# Patient Record
Sex: FEMALE | Race: WHITE | Employment: OTHER | URBAN - METROPOLITAN AREA
[De-identification: names, ages, dates, MRNs, and addresses within clinical notes are randomized per-mention and may not be internally consistent; named-entity substitution may affect disease eponyms.]

---

## 2017-01-22 ENCOUNTER — HOSPITAL ENCOUNTER (OUTPATIENT)
Age: 67
Discharge: HOME | End: 2017-01-22
Attending: FAMILY MEDICINE
Payer: MEDICARE

## 2017-01-22 VITALS
HEART RATE: 69 BPM | RESPIRATION RATE: 20 BRPM | DIASTOLIC BLOOD PRESSURE: 73 MMHG | HEIGHT: 64 IN | BODY MASS INDEX: 25.61 KG/M2 | OXYGEN SATURATION: 100 % | TEMPERATURE: 98 F | WEIGHT: 150 LBS | SYSTOLIC BLOOD PRESSURE: 142 MMHG

## 2017-01-22 DIAGNOSIS — I88.9 CERVICAL ADENITIS: ICD-10-CM

## 2017-01-22 DIAGNOSIS — J03.00 STREPTOCOCCAL TONSILLOPHARYNGITIS: Primary | ICD-10-CM

## 2017-01-22 LAB — POCT RAPID STREP: POSITIVE

## 2017-01-22 PROCEDURE — 87430 STREP A AG IA: CPT | Performed by: FAMILY MEDICINE

## 2017-01-22 PROCEDURE — 99213 OFFICE O/P EST LOW 20 MIN: CPT

## 2017-01-22 RX ORDER — AMOXICILLIN AND CLAVULANATE POTASSIUM 875; 125 MG/1; MG/1
1 TABLET, FILM COATED ORAL 2 TIMES DAILY
Qty: 20 TABLET | Refills: 0 | Status: SHIPPED | OUTPATIENT
Start: 2017-01-22 | End: 2017-02-01

## 2017-01-22 NOTE — ED INITIAL ASSESSMENT (HPI)
Pt states woke up with pain to left side of neck. Thinks it is a swollen gland. Hurts on left side with swallowing. Denies fever/chills. States had some nasal congestion yesterday and took Advil Cold and Sinus.

## 2017-06-17 ENCOUNTER — OFFICE VISIT (OUTPATIENT)
Dept: INTERNAL MEDICINE CLINIC | Facility: CLINIC | Age: 67
End: 2017-06-17

## 2017-06-17 VITALS
HEART RATE: 70 BPM | WEIGHT: 157 LBS | TEMPERATURE: 98 F | DIASTOLIC BLOOD PRESSURE: 76 MMHG | BODY MASS INDEX: 26.8 KG/M2 | HEIGHT: 64 IN | SYSTOLIC BLOOD PRESSURE: 112 MMHG | RESPIRATION RATE: 16 BRPM

## 2017-06-17 DIAGNOSIS — Z12.31 VISIT FOR SCREENING MAMMOGRAM: ICD-10-CM

## 2017-06-17 DIAGNOSIS — Z00.00 ENCOUNTER FOR ANNUAL HEALTH EXAMINATION: ICD-10-CM

## 2017-06-17 DIAGNOSIS — Z11.59 ENCOUNTER FOR HEPATITIS C SCREENING TEST FOR LOW RISK PATIENT: ICD-10-CM

## 2017-06-17 DIAGNOSIS — Z00.00 MEDICARE ANNUAL WELLNESS VISIT, SUBSEQUENT: Primary | ICD-10-CM

## 2017-06-17 DIAGNOSIS — I10 ESSENTIAL HYPERTENSION: ICD-10-CM

## 2017-06-17 DIAGNOSIS — K63.5 POLYP OF COLON, UNSPECIFIED PART OF COLON, UNSPECIFIED TYPE: ICD-10-CM

## 2017-06-17 DIAGNOSIS — Z85.3 HISTORY OF BREAST CANCER: ICD-10-CM

## 2017-06-17 DIAGNOSIS — E78.00 HIGH CHOLESTEROL: ICD-10-CM

## 2017-06-17 DIAGNOSIS — Z87.19 HISTORY OF DIVERTICULITIS: ICD-10-CM

## 2017-06-17 DIAGNOSIS — E04.2 MULTIPLE THYROID NODULES: ICD-10-CM

## 2017-06-17 DIAGNOSIS — Z78.0 POST-MENOPAUSAL: ICD-10-CM

## 2017-06-17 PROCEDURE — 99203 OFFICE O/P NEW LOW 30 MIN: CPT | Performed by: INTERNAL MEDICINE

## 2017-06-17 PROCEDURE — G0439 PPPS, SUBSEQ VISIT: HCPCS | Performed by: INTERNAL MEDICINE

## 2017-06-17 RX ORDER — ATENOLOL 50 MG/1
50 TABLET ORAL DAILY
Qty: 90 TABLET | Refills: 3 | Status: SHIPPED | OUTPATIENT
Start: 2017-06-17 | End: 2018-05-09

## 2017-06-17 RX ORDER — TRIAMTERENE AND HYDROCHLOROTHIAZIDE 37.5; 25 MG/1; MG/1
1 CAPSULE ORAL EVERY MORNING
Qty: 90 CAPSULE | Refills: 3 | Status: SHIPPED | OUTPATIENT
Start: 2017-06-17 | End: 2018-05-09

## 2017-06-17 NOTE — PATIENT INSTRUCTIONS
Emily Velasquez's SCREENING SCHEDULE   Tests on this list are recommended by your physician but may not be covered, or covered at this frequency, by your insurer. Please check with your insurance carrier before scheduling to verify coverage.    PREVENTATI Years due on 09/07/2021 Update Health Maintenance if applicable    Flex Sigmoidoscopy Screen  Covered every 5 years No results found for this or any previous visit. No flowsheet data found.      Fecal Occult Blood   Covered Annually No results found for: FO previous visit. Please get once after your 65th birthday    Hepatitis B for Moderate/High Risk       No orders found for this or any previous visit.  Medium/high risk factors:   End-stage renal disease   Hemophiliacs who received Factor VIII or IX concentra

## 2017-07-17 ENCOUNTER — OFFICE VISIT (OUTPATIENT)
Dept: INTERNAL MEDICINE CLINIC | Facility: CLINIC | Age: 67
End: 2017-07-17

## 2017-07-17 VITALS
WEIGHT: 155 LBS | HEART RATE: 60 BPM | TEMPERATURE: 98 F | RESPIRATION RATE: 96 BRPM | BODY MASS INDEX: 27 KG/M2 | DIASTOLIC BLOOD PRESSURE: 80 MMHG | SYSTOLIC BLOOD PRESSURE: 128 MMHG

## 2017-07-17 DIAGNOSIS — R53.83 FATIGUE, UNSPECIFIED TYPE: ICD-10-CM

## 2017-07-17 DIAGNOSIS — J02.9 SORE THROAT: Primary | ICD-10-CM

## 2017-07-17 PROCEDURE — 99213 OFFICE O/P EST LOW 20 MIN: CPT | Performed by: NURSE PRACTITIONER

## 2017-07-17 NOTE — PATIENT INSTRUCTIONS
Gargle with warm salt water solution 3-5 times daily. Dissolve 1/2 teaspoon salt in half cup of warm tap water. Gargle and spit. Use a humidifier in your room at night.      Try a premixed saline nasal spray, available over the counter, such as Ocean Na

## 2017-07-17 NOTE — PROGRESS NOTES
Patient presents with:  Sore Throat: x 3-4 nights, per patient not too bad. ..[facility], is going on vacation and wants to have something before it gets worse.  Took Tylenol        HPI:  Presents with 4 day history of sore throat 2       Physical Exam  /80 (BP Location: Right arm, Patient Position: Sitting, Cuff Size: adult)   Pulse 60   Temp 98.1 °F (36.7 °C) (Oral)   Resp (!) 96   Wt 155 lb   LMP  (LMP Unknown)   BMI 26.61 kg/m²   Constitutional:  No distress.    HEENT:  Nor

## 2017-08-14 ENCOUNTER — MED REC SCAN ONLY (OUTPATIENT)
Dept: INTERNAL MEDICINE CLINIC | Facility: CLINIC | Age: 67
End: 2017-08-14

## 2017-09-01 ENCOUNTER — HOSPITAL ENCOUNTER (OUTPATIENT)
Dept: MAMMOGRAPHY | Age: 67
Discharge: HOME | End: 2017-09-01
Attending: INTERNAL MEDICINE
Payer: MEDICARE

## 2017-09-01 ENCOUNTER — LAB ENCOUNTER (OUTPATIENT)
Dept: LAB | Age: 67
End: 2017-09-01
Attending: INTERNAL MEDICINE
Payer: MEDICARE

## 2017-09-01 ENCOUNTER — HOSPITAL ENCOUNTER (OUTPATIENT)
Dept: BONE DENSITY | Age: 67
Discharge: HOME | End: 2017-09-01
Attending: INTERNAL MEDICINE
Payer: MEDICARE

## 2017-09-01 DIAGNOSIS — Z78.0 POST-MENOPAUSAL: ICD-10-CM

## 2017-09-01 DIAGNOSIS — Z85.3 HISTORY OF BREAST CANCER: ICD-10-CM

## 2017-09-01 DIAGNOSIS — I10 ESSENTIAL HYPERTENSION: ICD-10-CM

## 2017-09-01 DIAGNOSIS — Z12.31 VISIT FOR SCREENING MAMMOGRAM: ICD-10-CM

## 2017-09-01 DIAGNOSIS — Z11.59 ENCOUNTER FOR HEPATITIS C SCREENING TEST FOR LOW RISK PATIENT: ICD-10-CM

## 2017-09-01 DIAGNOSIS — E04.2 MULTIPLE THYROID NODULES: ICD-10-CM

## 2017-09-01 DIAGNOSIS — E78.00 HIGH CHOLESTEROL: ICD-10-CM

## 2017-09-01 LAB
ALBUMIN SERPL-MCNC: 3.8 G/DL (ref 3.5–4.8)
ALP LIVER SERPL-CCNC: 71 U/L (ref 55–142)
ALT SERPL-CCNC: 31 U/L (ref 14–54)
AST SERPL-CCNC: 23 U/L (ref 15–41)
BASOPHILS # BLD AUTO: 0.08 X10(3) UL (ref 0–0.1)
BASOPHILS NFR BLD AUTO: 0.9 %
BILIRUB SERPL-MCNC: 0.9 MG/DL (ref 0.1–2)
BUN BLD-MCNC: 18 MG/DL (ref 8–20)
CALCIUM BLD-MCNC: 9.6 MG/DL (ref 8.3–10.3)
CHLORIDE: 103 MMOL/L (ref 101–111)
CHOLEST SMN-MCNC: 334 MG/DL (ref ?–200)
CO2: 29 MMOL/L (ref 22–32)
CREAT BLD-MCNC: 1.05 MG/DL (ref 0.55–1.02)
EOSINOPHIL # BLD AUTO: 0.45 X10(3) UL (ref 0–0.3)
EOSINOPHIL NFR BLD AUTO: 5.1 %
ERYTHROCYTE [DISTWIDTH] IN BLOOD BY AUTOMATED COUNT: 13.6 % (ref 11.5–16)
GLUCOSE BLD-MCNC: 107 MG/DL (ref 70–99)
HCT VFR BLD AUTO: 43.9 % (ref 34–50)
HDLC SERPL-MCNC: 54 MG/DL (ref 45–?)
HDLC SERPL: 6.19 {RATIO} (ref ?–4.44)
HEPATITIS C VIRUS AB INTERPRETATION: NONREACTIVE
HGB BLD-MCNC: 14.3 G/DL (ref 12–16)
IMMATURE GRANULOCYTE COUNT: 0.03 X10(3) UL (ref 0–1)
IMMATURE GRANULOCYTE RATIO %: 0.3 %
LDLC SERPL CALC-MCNC: 215 MG/DL (ref ?–130)
LDLC SERPL-MCNC: 65 MG/DL (ref 5–40)
LYMPHOCYTES # BLD AUTO: 2.14 X10(3) UL (ref 0.9–4)
LYMPHOCYTES NFR BLD AUTO: 24.3 %
M PROTEIN MFR SERPL ELPH: 7.9 G/DL (ref 6.1–8.3)
MCH RBC QN AUTO: 28.4 PG (ref 27–33.2)
MCHC RBC AUTO-ENTMCNC: 32.6 G/DL (ref 31–37)
MCV RBC AUTO: 87.3 FL (ref 81–100)
MONOCYTES # BLD AUTO: 0.8 X10(3) UL (ref 0.1–0.6)
MONOCYTES NFR BLD AUTO: 9.1 %
NEUTROPHIL ABS PRELIM: 5.32 X10 (3) UL (ref 1.3–6.7)
NEUTROPHILS # BLD AUTO: 5.32 X10(3) UL (ref 1.3–6.7)
NEUTROPHILS NFR BLD AUTO: 60.3 %
NONHDLC SERPL-MCNC: 280 MG/DL (ref ?–130)
PLATELET # BLD AUTO: 289 10(3)UL (ref 150–450)
POTASSIUM SERPL-SCNC: 4.4 MMOL/L (ref 3.6–5.1)
RBC # BLD AUTO: 5.03 X10(6)UL (ref 3.8–5.1)
RED CELL DISTRIBUTION WIDTH-SD: 42.6 FL (ref 35.1–46.3)
SODIUM SERPL-SCNC: 140 MMOL/L (ref 136–144)
TRIGLYCERIDES: 325 MG/DL (ref ?–150)
TSI SER-ACNC: 1.87 MIU/ML (ref 0.35–5.5)
WBC # BLD AUTO: 8.8 X10(3) UL (ref 4–13)

## 2017-09-01 PROCEDURE — 80053 COMPREHEN METABOLIC PANEL: CPT

## 2017-09-01 PROCEDURE — 86803 HEPATITIS C AB TEST: CPT

## 2017-09-01 PROCEDURE — 36415 COLL VENOUS BLD VENIPUNCTURE: CPT

## 2017-09-01 PROCEDURE — 77080 DXA BONE DENSITY AXIAL: CPT | Performed by: INTERNAL MEDICINE

## 2017-09-01 PROCEDURE — 85025 COMPLETE CBC W/AUTO DIFF WBC: CPT

## 2017-09-01 PROCEDURE — 84443 ASSAY THYROID STIM HORMONE: CPT

## 2017-09-01 PROCEDURE — 77067 SCR MAMMO BI INCL CAD: CPT | Performed by: INTERNAL MEDICINE

## 2017-09-01 PROCEDURE — 80061 LIPID PANEL: CPT

## 2017-10-04 ENCOUNTER — OFFICE VISIT (OUTPATIENT)
Dept: INTERNAL MEDICINE CLINIC | Facility: CLINIC | Age: 67
End: 2017-10-04

## 2017-10-04 VITALS
DIASTOLIC BLOOD PRESSURE: 80 MMHG | WEIGHT: 161 LBS | HEIGHT: 64 IN | SYSTOLIC BLOOD PRESSURE: 132 MMHG | HEART RATE: 67 BPM | BODY MASS INDEX: 27.49 KG/M2 | TEMPERATURE: 98 F | RESPIRATION RATE: 16 BRPM | OXYGEN SATURATION: 97 %

## 2017-10-04 DIAGNOSIS — J02.9 SORE THROAT: Primary | ICD-10-CM

## 2017-10-04 PROCEDURE — 99213 OFFICE O/P EST LOW 20 MIN: CPT | Performed by: PHYSICIAN ASSISTANT

## 2017-10-04 PROCEDURE — 87081 CULTURE SCREEN ONLY: CPT | Performed by: PHYSICIAN ASSISTANT

## 2017-10-04 NOTE — PROGRESS NOTES
HPI:   Anne Beaulieu is a 69year old female who presents for upper respiratory symptoms for  2  days. Patient reports sore throat, dry cough, prior history of sinusitis, denies fever, denies sinus pain. Very mild cough and mild congestion.   Cough is Stroke Mother    • Other Kimberly Porch Mother    • Other Kimberly Porch Maternal Grandmother       of aneurysm   • Other [OTHER] Sister      spinal problems, prolactin problem, factor v leiden + (pt negative)   • Other [OTHER] Brother      healthy   • Breast Cancer

## 2017-10-09 ENCOUNTER — OFFICE VISIT (OUTPATIENT)
Dept: INTERNAL MEDICINE CLINIC | Facility: CLINIC | Age: 67
End: 2017-10-09

## 2017-10-09 VITALS
DIASTOLIC BLOOD PRESSURE: 82 MMHG | SYSTOLIC BLOOD PRESSURE: 130 MMHG | RESPIRATION RATE: 16 BRPM | TEMPERATURE: 98 F | HEART RATE: 68 BPM | BODY MASS INDEX: 29.25 KG/M2 | HEIGHT: 62.5 IN | WEIGHT: 163 LBS

## 2017-10-09 DIAGNOSIS — E78.00 HIGH CHOLESTEROL: Primary | ICD-10-CM

## 2017-10-09 DIAGNOSIS — I10 ESSENTIAL HYPERTENSION: ICD-10-CM

## 2017-10-09 PROCEDURE — 99213 OFFICE O/P EST LOW 20 MIN: CPT | Performed by: INTERNAL MEDICINE

## 2017-10-09 RX ORDER — ROSUVASTATIN CALCIUM 5 MG/1
5 TABLET, COATED ORAL NIGHTLY
Qty: 90 TABLET | Refills: 0 | Status: SHIPPED | OUTPATIENT
Start: 2017-10-09 | End: 2018-04-27

## 2017-10-09 NOTE — PROGRESS NOTES
Patient presents with:  Lab Results: ROOM 8       HPI:  Here for high chol. Pt had been on statin in past, had to have bowel resectiion, stopped med after that. No known relation per pt but she alsways worried it was somehow related.      Review of Systems time. No distress. HEENT:  Normocephalic and atraumatic. TM's wnl. Nose normal. Oropharynx is clear and moist.   Eyes: Conjunctivae wnl. Neck: Normal range of motion. Neck supple. Normal carotid pulses. No masses.   Cardiovascular: Normal rate, regular

## 2018-02-18 ENCOUNTER — HOSPITAL ENCOUNTER (INPATIENT)
Facility: HOSPITAL | Age: 68
LOS: 1 days | Discharge: HOME | DRG: 379 | End: 2018-02-19
Attending: EMERGENCY MEDICINE | Admitting: HOSPITALIST
Payer: MEDICARE

## 2018-02-18 DIAGNOSIS — K62.5 RECTAL BLEEDING: Primary | ICD-10-CM

## 2018-02-18 LAB
APTT PPP: 23.2 SECONDS (ref 25–34)
BASOPHILS # BLD AUTO: 0.09 X10(3) UL (ref 0–0.1)
BASOPHILS NFR BLD AUTO: 1 %
BUN BLD-MCNC: 21 MG/DL (ref 8–20)
CALCIUM BLD-MCNC: 8.5 MG/DL (ref 8.3–10.3)
CHLORIDE: 98 MMOL/L (ref 101–111)
CO2: 29 MMOL/L (ref 22–32)
CREAT BLD-MCNC: 1.03 MG/DL (ref 0.55–1.02)
EOSINOPHIL # BLD AUTO: 0.59 X10(3) UL (ref 0–0.3)
EOSINOPHIL NFR BLD AUTO: 6.6 %
ERYTHROCYTE [DISTWIDTH] IN BLOOD BY AUTOMATED COUNT: 12.8 % (ref 11.5–16)
GLUCOSE BLD-MCNC: 147 MG/DL (ref 70–99)
HCT VFR BLD AUTO: 40.5 % (ref 34–50)
HGB BLD-MCNC: 13.9 G/DL (ref 12–16)
IMMATURE GRANULOCYTE COUNT: 0.02 X10(3) UL (ref 0–1)
IMMATURE GRANULOCYTE RATIO %: 0.2 %
INR BLD: 0.87 (ref 0.89–1.12)
LYMPHOCYTES # BLD AUTO: 3.23 X10(3) UL (ref 0.9–4)
LYMPHOCYTES NFR BLD AUTO: 36 %
MCH RBC QN AUTO: 28.7 PG (ref 27–33.2)
MCHC RBC AUTO-ENTMCNC: 34.3 G/DL (ref 31–37)
MCV RBC AUTO: 83.7 FL (ref 81–100)
MONOCYTES # BLD AUTO: 0.79 X10(3) UL (ref 0.1–1)
MONOCYTES NFR BLD AUTO: 8.8 %
NEUTROPHIL ABS PRELIM: 4.26 X10 (3) UL (ref 1.3–6.7)
NEUTROPHILS # BLD AUTO: 4.26 X10(3) UL (ref 1.3–6.7)
NEUTROPHILS NFR BLD AUTO: 47.4 %
PLATELET # BLD AUTO: 258 10(3)UL (ref 150–450)
POTASSIUM SERPL-SCNC: 3.4 MMOL/L (ref 3.6–5.1)
PSA SERPL DL<=0.01 NG/ML-MCNC: 11.6 SECONDS (ref 11.8–14.1)
RBC # BLD AUTO: 4.84 X10(6)UL (ref 3.8–5.1)
RED CELL DISTRIBUTION WIDTH-SD: 38.9 FL (ref 35.1–46.3)
SODIUM SERPL-SCNC: 134 MMOL/L (ref 136–144)
WBC # BLD AUTO: 9 X10(3) UL (ref 4–13)

## 2018-02-18 RX ORDER — ONDANSETRON 2 MG/ML
4 INJECTION INTRAMUSCULAR; INTRAVENOUS EVERY 4 HOURS PRN
Status: CANCELLED | OUTPATIENT
Start: 2018-02-18

## 2018-02-18 RX ORDER — SODIUM CHLORIDE 9 MG/ML
INJECTION, SOLUTION INTRAVENOUS CONTINUOUS
Status: CANCELLED | OUTPATIENT
Start: 2018-02-18 | End: 2018-02-19

## 2018-02-19 VITALS
WEIGHT: 162.19 LBS | HEART RATE: 61 BPM | RESPIRATION RATE: 18 BRPM | OXYGEN SATURATION: 99 % | HEIGHT: 64 IN | DIASTOLIC BLOOD PRESSURE: 64 MMHG | BODY MASS INDEX: 27.69 KG/M2 | TEMPERATURE: 98 F | SYSTOLIC BLOOD PRESSURE: 148 MMHG

## 2018-02-19 LAB
BUN BLD-MCNC: 19 MG/DL (ref 8–20)
CALCIUM BLD-MCNC: 8.8 MG/DL (ref 8.3–10.3)
CHLORIDE: 104 MMOL/L (ref 101–111)
CO2: 26 MMOL/L (ref 22–32)
CREAT BLD-MCNC: 0.8 MG/DL (ref 0.55–1.02)
ERYTHROCYTE [DISTWIDTH] IN BLOOD BY AUTOMATED COUNT: 12.8 % (ref 11.5–16)
GLUCOSE BLD-MCNC: 105 MG/DL (ref 70–99)
HCT VFR BLD AUTO: 37.1 % (ref 34–50)
HGB BLD-MCNC: 12.6 G/DL (ref 12–16)
MCH RBC QN AUTO: 28.7 PG (ref 27–33.2)
MCHC RBC AUTO-ENTMCNC: 34 G/DL (ref 31–37)
MCV RBC AUTO: 84.5 FL (ref 81–100)
PLATELET # BLD AUTO: 228 10(3)UL (ref 150–450)
POTASSIUM SERPL-SCNC: 3.2 MMOL/L (ref 3.6–5.1)
RBC # BLD AUTO: 4.39 X10(6)UL (ref 3.8–5.1)
RED CELL DISTRIBUTION WIDTH-SD: 39.5 FL (ref 35.1–46.3)
SODIUM SERPL-SCNC: 138 MMOL/L (ref 136–144)
WBC # BLD AUTO: 7.5 X10(3) UL (ref 4–13)

## 2018-02-19 PROCEDURE — 99222 1ST HOSP IP/OBS MODERATE 55: CPT | Performed by: HOSPITALIST

## 2018-02-19 RX ORDER — POTASSIUM CHLORIDE 14.9 MG/ML
20 INJECTION INTRAVENOUS ONCE
Status: COMPLETED | OUTPATIENT
Start: 2018-02-19 | End: 2018-02-19

## 2018-02-19 RX ORDER — ATENOLOL 50 MG/1
50 TABLET ORAL
Status: DISCONTINUED | OUTPATIENT
Start: 2018-02-19 | End: 2018-02-19

## 2018-02-19 RX ORDER — TRIAMTERENE AND HYDROCHLOROTHIAZIDE 37.5; 25 MG/1; MG/1
1 CAPSULE ORAL EVERY MORNING
Status: DISCONTINUED | OUTPATIENT
Start: 2018-02-19 | End: 2018-02-19

## 2018-02-19 RX ORDER — POTASSIUM CHLORIDE 20 MEQ/1
40 TABLET, EXTENDED RELEASE ORAL EVERY 4 HOURS
Status: DISPENSED | OUTPATIENT
Start: 2018-02-19 | End: 2018-02-19

## 2018-02-19 RX ORDER — SODIUM CHLORIDE 9 MG/ML
INJECTION, SOLUTION INTRAVENOUS CONTINUOUS
Status: DISCONTINUED | OUTPATIENT
Start: 2018-02-19 | End: 2018-02-19

## 2018-02-19 RX ORDER — ONDANSETRON 2 MG/ML
4 INJECTION INTRAMUSCULAR; INTRAVENOUS EVERY 6 HOURS PRN
Status: DISCONTINUED | OUTPATIENT
Start: 2018-02-19 | End: 2018-02-19

## 2018-02-19 NOTE — PLAN OF CARE
NURSING DISCHARGE NOTE    Discharged Home via Wheelchair. Accompanied by Family member and Support staff  Belongings Taken by patient/family. Patient given discharge paperwork. Verbalizes understanding.

## 2018-02-19 NOTE — ED NOTES
[facility].

## 2018-02-19 NOTE — PROGRESS NOTES
Hospitalist Progress Note    Patient seen and examined and agree with plan outlined by Dr. Christian Delarosa. No further rectal bleeding since admission. Hemoglobin stable. Continue CLD. GI following.     Bayron Paez,

## 2018-02-19 NOTE — CONSULTS
[facility]                       [facility]    Stephany Carvalho Patient Status:  Inpatient    1950 MRN [id number]   [facility] 3NE-A Attending Justin Ghotra, [address] [facility] Day # 0 PCP Cande Beverage diseases NEC     utd with gyn   • Hyperlipidemia    • Other and unspecified personal history of malignant neoplasm 2004    breast cancer   • Unspecified menopausal and postmenopausal disorder     treated with effexor - however pt would like to get off med asthma, copd, recurrent pneumonia            Hematologic: The patient reports no easy bruising, frequent gum bleeding or nose bleeding;   The patient has no history of known chronic anemia            Dermatologic: The patient reports no recent rashes or chr CO2 26.0 02/19/2018    02/19/2018   CA 8.8 02/19/2018   PTT 23.2 02/18/2018   INR 0.87 02/18/2018   PTP 11.6 02/18/2018     Recent Labs   Lab  02/18/18   2255  02/19/18   0521   GLU  147*  105*   BUN  21*  19   CREATSERUM  1.03*  0.80   GFRAA  62 Stephan Moses had two episodes of rectal bleeding per rectum, heavy in amount and bright red in color, dripped into toilet bowel after BM. She has not had this previously. Both episodes were assoc with brown stool mixed in.   She had hemorrhoidal bleeding earlie

## 2018-02-19 NOTE — H&P
[facility]  History and Physical     Anne Mealing Patient Status:  Observation    1950 MRN [id number]   [facility] 3NE-A Attending Kathy Costa MD   Hosp Day # 0 PCP Michael Sarah MD     Chief Complaint: Rectal bleeding ventral hernia repair    Social History:  reports that she has never smoked. She has never used smokeless tobacco. She reports that she drinks alcohol. She reports that she does not use drugs.     Family History:   Family History   Problem Relation Age of O No carotid bruits. Respiratory: Clear to auscultation bilaterally. No wheezes. No rhonchi. Cardiovascular: S1, S2. Regular rate and rhythm. No murmurs, rubs or gallops. Equal pulses. Chest and Back: No tenderness or deformity.   Abdomen: Soft, nontender

## 2018-02-19 NOTE — ED PROVIDER NOTES
Patient Seen in: [facility] 3ne-a    History   Patient presents with:  Bleeding (hematologic)  Anal Problem (GI)    Stated Complaint: BRIGHT RED RECTAL BLEEDING    HPI    69-year-old female with a history of breast cancer, diverticulosis, diverticul Smokeless tobacco: Never Used                      Alcohol use:  Yes              Comment: OCCASIONALLY SOME RED WINE      Review of Systems    Positive for stated complaint: BRIGHT RED RECTAL BLEEDING  Other syst Absolute 0.59 (*)     All other components within normal limits   CBC WITH DIFFERENTIAL WITH PLATELET    Narrative: The following orders were created for panel order CBC WITH DIFFERENTIAL WITH PLATELET.   Procedure                               Abnormal

## 2018-02-19 NOTE — PROGRESS NOTES
Physician Addendum  Full consult to follow:    Mrs Jacqueline Villeda had two episodes of rectal bleeding per rectum, heavy in amount and bright red in color, dripped into toilet bowel after BM. She has not had this previously.   Both episodes were assoc with brown st

## 2018-02-19 NOTE — PROGRESS NOTES
NURSING ADMISSION NOTE      Patient admitted via Wheelchair  Oriented to room 2445  Safety precautions initiated. Bed in low position. Call light in reach. Received patient from [place] ED. A&Ox4, accompanied by .  Admission navigator compl

## 2018-04-25 ENCOUNTER — TELEPHONE (OUTPATIENT)
Dept: INTERNAL MEDICINE CLINIC | Facility: CLINIC | Age: 68
End: 2018-04-25

## 2018-04-25 DIAGNOSIS — Z85.3 HISTORY OF BREAST CANCER: ICD-10-CM

## 2018-04-25 DIAGNOSIS — I10 ESSENTIAL HYPERTENSION: Primary | ICD-10-CM

## 2018-04-25 DIAGNOSIS — E04.2 MULTIPLE THYROID NODULES: ICD-10-CM

## 2018-04-25 DIAGNOSIS — Z12.39 ENCOUNTER FOR SCREENING FOR MALIGNANT NEOPLASM OF BREAST: ICD-10-CM

## 2018-04-25 DIAGNOSIS — E78.00 HIGH CHOLESTEROL: ICD-10-CM

## 2018-04-25 NOTE — TELEPHONE ENCOUNTER
Future Appointments  Date Time Provider Tay Washington   6/18/2018 1:00 PM Marisa Mace MD [facility]     [facility]  Orders to edward-Pt aware to fast-no call back required

## 2018-04-27 ENCOUNTER — OFFICE VISIT (OUTPATIENT)
Dept: OBGYN CLINIC | Facility: CLINIC | Age: 68
End: 2018-04-27

## 2018-04-27 VITALS
HEIGHT: 63 IN | BODY MASS INDEX: 28.7 KG/M2 | SYSTOLIC BLOOD PRESSURE: 130 MMHG | DIASTOLIC BLOOD PRESSURE: 80 MMHG | WEIGHT: 162 LBS

## 2018-04-27 DIAGNOSIS — Z85.3 HX OF BREAST CANCER: ICD-10-CM

## 2018-04-27 DIAGNOSIS — Z01.419 WELL FEMALE EXAM WITH ROUTINE GYNECOLOGICAL EXAM: Primary | ICD-10-CM

## 2018-04-27 PROCEDURE — G0101 CA SCREEN;PELVIC/BREAST EXAM: HCPCS | Performed by: OBSTETRICS & GYNECOLOGY

## 2018-04-27 NOTE — PROGRESS NOTES
GYN H&P     4/27/2018  1:41 PM    CC: Patient is here for gyne exam    HPI: patient is a 69year old [id number] here for her annual gyn exam.   She has no complaints. Postmenopausal, no pmb. Hx of breast cancer 15 yrs ago, took about 5 yrs of arimadex.  Denies REPORT      Comment: ventral hernia repair    Altace [Ramipril]       Anaphylaxis    Comment:angioedema    Current Outpatient Prescriptions on File Prior to Visit:  atenolol 50 MG Oral Tab Take 1 tablet (50 mg total) by mouth daily.  Disp: 90 tablet Rfl: 3 cough or wheezing  Cardiovascular: denies chest pain, palpitations, exercise intolerance   GI: denies abdominal pain, diarrhea, constipation  : no complaints, denies dysuria, increased urinary frequency.  no dyspareunia no pmb  Hematological/lymphatic: de gynecological exam    2. Hx of breast cancer        Patient counseled on diet/exercise       1. Well female exam with routine gynecological exam  Neg exam    2.  Hx of breast cancer  Has mammos in september      Hetal Lopez MD

## 2018-04-27 NOTE — PATIENT INSTRUCTIONS
Breast Health: Breast Self-Awareness  What is breast self-awareness? Breast self-awareness is knowing how your breasts normally look and feel. Your breasts change as you go through different stages of your life.  So it’s important to learn what is normal It’s normal to be upset if you find a lump. But it’s important to contact your provider right away. Remember that most breast lumps are benign. This means they are not cancer. Date Last Reviewed: 8/1/2017  © 9298-6499 [employer] [address]  [address] Mathew

## 2018-05-09 RX ORDER — ATENOLOL 50 MG/1
50 TABLET ORAL DAILY
Qty: 90 TABLET | Refills: 3 | Status: ON HOLD | OUTPATIENT
Start: 2018-05-09 | End: 2018-12-31

## 2018-05-09 RX ORDER — TRIAMTERENE AND HYDROCHLOROTHIAZIDE 37.5; 25 MG/1; MG/1
1 CAPSULE ORAL EVERY MORNING
Qty: 90 CAPSULE | Refills: 3 | Status: SHIPPED | OUTPATIENT
Start: 2018-05-09 | End: 2018-06-11

## 2018-05-31 ENCOUNTER — TELEPHONE (OUTPATIENT)
Dept: INTERNAL MEDICINE CLINIC | Facility: CLINIC | Age: 68
End: 2018-05-31

## 2018-05-31 NOTE — TELEPHONE ENCOUNTER
Emily had hip surgery on 5/24/18 and will be released from hospital on 6/2/18. They were advised to schedule a HFU with her PCP. Please contact her spouse, Blayne henson per FYI, to schedule.

## 2018-05-31 NOTE — TELEPHONE ENCOUNTER
Called and spoke with pt's , [facility] per HIPAA), to schedule HFU on 6/5 as requested for post hip surgery from 5/24/18.  verbalized understanding and agreed with POC.  indicated pt is doing well. Wound site looks good.  No redness or

## 2018-06-11 ENCOUNTER — LAB ENCOUNTER (OUTPATIENT)
Dept: LAB | Age: 68
End: 2018-06-11
Attending: INTERNAL MEDICINE
Payer: MEDICARE

## 2018-06-11 ENCOUNTER — OFFICE VISIT (OUTPATIENT)
Dept: INTERNAL MEDICINE CLINIC | Facility: CLINIC | Age: 68
End: 2018-06-11

## 2018-06-11 ENCOUNTER — TELEPHONE (OUTPATIENT)
Dept: INTERNAL MEDICINE CLINIC | Facility: CLINIC | Age: 68
End: 2018-06-11

## 2018-06-11 VITALS
WEIGHT: 169 LBS | BODY MASS INDEX: 29.95 KG/M2 | DIASTOLIC BLOOD PRESSURE: 70 MMHG | HEIGHT: 63 IN | HEART RATE: 64 BPM | SYSTOLIC BLOOD PRESSURE: 134 MMHG | RESPIRATION RATE: 16 BRPM

## 2018-06-11 DIAGNOSIS — R79.89 ELEVATED PLATELET COUNT: Primary | ICD-10-CM

## 2018-06-11 DIAGNOSIS — I10 ESSENTIAL HYPERTENSION: Primary | ICD-10-CM

## 2018-06-11 DIAGNOSIS — Z09 HOSPITAL DISCHARGE FOLLOW-UP: ICD-10-CM

## 2018-06-11 DIAGNOSIS — I10 ESSENTIAL HYPERTENSION: ICD-10-CM

## 2018-06-11 DIAGNOSIS — Z96.642 HISTORY OF LEFT HIP REPLACEMENT: ICD-10-CM

## 2018-06-11 PROCEDURE — 1111F DSCHRG MED/CURRENT MED MERGE: CPT | Performed by: INTERNAL MEDICINE

## 2018-06-11 PROCEDURE — 99496 TRANSJ CARE MGMT HIGH F2F 7D: CPT | Performed by: INTERNAL MEDICINE

## 2018-06-11 PROCEDURE — 85025 COMPLETE CBC W/AUTO DIFF WBC: CPT

## 2018-06-11 RX ORDER — TRIAMTERENE AND HYDROCHLOROTHIAZIDE 37.5; 25 MG/1; MG/1
1 TABLET ORAL DAILY
Qty: 90 TABLET | Refills: 1 | Status: SHIPPED | OUTPATIENT
Start: 2018-06-11 | End: 2018-12-08

## 2018-06-11 NOTE — PROGRESS NOTES
HPI:    Jory Alvarado is a 77year old female here today for hospital follow up.    [facility]   Admit Date: 5/24/18  Discharge Date: 6/8/18  Hospital Discharge Diagnosis:   Left hip replacement    Interactive contact wit Essential hypertension; High blood pressure; diseases NEC (2005); diseases NEC (1990); diseases NEC; diseases NEC; Hyperlipidemia; Other and unspecified personal history of malignant neoplasm (2004); and Unspecified menopausal and postmenopausal disorder. Height as of this encounter: 63\". Weight as of this encounter: 169 lb.    /70   Pulse 64   Resp 16   Ht 63\"   Wt 169 lb   BMI 29.94 kg/m²    GENERAL: well developed, well nourished, in no apparent distress  SKIN: no rashes, no suspicious lesions

## 2018-06-12 ENCOUNTER — TELEPHONE (OUTPATIENT)
Dept: INTERNAL MEDICINE CLINIC | Facility: CLINIC | Age: 68
End: 2018-06-12

## 2018-06-12 PROBLEM — M25.552 ACUTE HIP PAIN, LEFT: Status: ACTIVE | Noted: 2018-06-12

## 2018-06-12 NOTE — TELEPHONE ENCOUNTER
Mike Vail with [facility] states this patient is coming in today for PT, post-op, hip, and there is no referral.  Please advise.     [phone]

## 2018-06-12 NOTE — TELEPHONE ENCOUNTER
Spoke with patient, states Dr. Araceli Araiza with [facility] did her surgery. Spoke with Lady Agrawal with [facility], informed would need to contact Dr. Beti Galo office for PT orders as he may have specific indications in regards to PT.  Lady Agrawal will contact Dr. Beti Galo

## 2018-06-13 ENCOUNTER — TELEPHONE (OUTPATIENT)
Dept: INTERNAL MEDICINE CLINIC | Facility: CLINIC | Age: 68
End: 2018-06-13

## 2018-06-13 NOTE — TELEPHONE ENCOUNTER
Spoke with patient stating this morning she noticed a rash on her left arm that is very itchy, \"little red\", and \"raised bumps\" that comes and goes, no discharge from bumps. She has been putting lotion on it, which seems to help with itchiness.  She did

## 2018-06-13 NOTE — TELEPHONE ENCOUNTER
Pt called and stated that her whole body is Itchy and has been for the last couple days. She had taken a walk outside and developed a rash on her LT arm, was able to make it partially go away with lotion. States today was the worst day for the itching.

## 2018-06-25 ENCOUNTER — LABORATORY ENCOUNTER (OUTPATIENT)
Dept: LAB | Age: 68
End: 2018-06-25
Attending: INTERNAL MEDICINE
Payer: MEDICARE

## 2018-06-25 DIAGNOSIS — R79.89 ELEVATED PLATELET COUNT: ICD-10-CM

## 2018-06-25 PROCEDURE — 85025 COMPLETE CBC W/AUTO DIFF WBC: CPT

## 2018-08-04 ENCOUNTER — LAB ENCOUNTER (OUTPATIENT)
Dept: LAB | Facility: HOSPITAL | Age: 68
End: 2018-08-04
Attending: INTERNAL MEDICINE
Payer: MEDICARE

## 2018-08-04 DIAGNOSIS — E04.2 MULTIPLE THYROID NODULES: ICD-10-CM

## 2018-08-04 DIAGNOSIS — I10 ESSENTIAL HYPERTENSION: ICD-10-CM

## 2018-08-04 DIAGNOSIS — E78.00 HIGH CHOLESTEROL: ICD-10-CM

## 2018-08-04 LAB
ALBUMIN SERPL-MCNC: 3.6 G/DL (ref 3.5–4.8)
ALBUMIN/GLOB SERPL: 1 {RATIO} (ref 1–2)
ALP LIVER SERPL-CCNC: 99 U/L (ref 55–142)
ALT SERPL-CCNC: 30 U/L (ref 14–54)
ANION GAP SERPL CALC-SCNC: 7 MMOL/L (ref 0–18)
AST SERPL-CCNC: 25 U/L (ref 15–41)
BASOPHILS # BLD AUTO: 0.06 X10(3) UL (ref 0–0.1)
BASOPHILS NFR BLD AUTO: 0.8 %
BILIRUB SERPL-MCNC: 0.8 MG/DL (ref 0.1–2)
BUN BLD-MCNC: 12 MG/DL (ref 8–20)
BUN/CREAT SERPL: 13.6 (ref 10–20)
CALCIUM BLD-MCNC: 9.8 MG/DL (ref 8.3–10.3)
CHLORIDE SERPL-SCNC: 104 MMOL/L (ref 101–111)
CHOLEST SMN-MCNC: 255 MG/DL (ref ?–200)
CO2 SERPL-SCNC: 29 MMOL/L (ref 22–32)
CREAT BLD-MCNC: 0.88 MG/DL (ref 0.55–1.02)
EOSINOPHIL # BLD AUTO: 0.48 X10(3) UL (ref 0–0.3)
EOSINOPHIL NFR BLD AUTO: 6.2 %
ERYTHROCYTE [DISTWIDTH] IN BLOOD BY AUTOMATED COUNT: 14.1 % (ref 11.5–16)
GLOBULIN PLAS-MCNC: 3.7 G/DL (ref 2.5–3.7)
GLUCOSE BLD-MCNC: 111 MG/DL (ref 70–99)
HCT VFR BLD AUTO: 42.6 % (ref 34–50)
HDLC SERPL-MCNC: 51 MG/DL (ref 40–59)
HGB BLD-MCNC: 13.9 G/DL (ref 12–16)
IMMATURE GRANULOCYTE COUNT: 0.02 X10(3) UL (ref 0–1)
IMMATURE GRANULOCYTE RATIO %: 0.3 %
LDLC SERPL CALC-MCNC: 154 MG/DL (ref ?–100)
LYMPHOCYTES # BLD AUTO: 1.92 X10(3) UL (ref 0.9–4)
LYMPHOCYTES NFR BLD AUTO: 25 %
M PROTEIN MFR SERPL ELPH: 7.3 G/DL (ref 6.1–8.3)
MCH RBC QN AUTO: 28 PG (ref 27–33.2)
MCHC RBC AUTO-ENTMCNC: 32.6 G/DL (ref 31–37)
MCV RBC AUTO: 85.7 FL (ref 81–100)
MONOCYTES # BLD AUTO: 0.78 X10(3) UL (ref 0.1–1)
MONOCYTES NFR BLD AUTO: 10.1 %
NEUTROPHIL ABS PRELIM: 4.43 X10 (3) UL (ref 1.3–6.7)
NEUTROPHILS # BLD AUTO: 4.43 X10(3) UL (ref 1.3–6.7)
NEUTROPHILS NFR BLD AUTO: 57.6 %
NONHDLC SERPL-MCNC: 204 MG/DL (ref ?–130)
OSMOLALITY SERPL CALC.SUM OF ELEC: 290 MOSM/KG (ref 275–295)
PLATELET # BLD AUTO: 278 10(3)UL (ref 150–450)
POTASSIUM SERPL-SCNC: 4.2 MMOL/L (ref 3.6–5.1)
RBC # BLD AUTO: 4.97 X10(6)UL (ref 3.8–5.1)
RED CELL DISTRIBUTION WIDTH-SD: 44 FL (ref 35.1–46.3)
SODIUM SERPL-SCNC: 140 MMOL/L (ref 136–144)
TRIGL SERPL-MCNC: 249 MG/DL (ref 30–149)
TSI SER-ACNC: 0.88 MIU/ML (ref 0.35–5.5)
VLDLC SERPL CALC-MCNC: 50 MG/DL (ref 0–30)
WBC # BLD AUTO: 7.7 X10(3) UL (ref 4–13)

## 2018-08-04 PROCEDURE — 85025 COMPLETE CBC W/AUTO DIFF WBC: CPT

## 2018-08-04 PROCEDURE — 80061 LIPID PANEL: CPT

## 2018-08-04 PROCEDURE — 80053 COMPREHEN METABOLIC PANEL: CPT

## 2018-08-04 PROCEDURE — 84443 ASSAY THYROID STIM HORMONE: CPT

## 2018-08-04 PROCEDURE — 36415 COLL VENOUS BLD VENIPUNCTURE: CPT

## 2018-08-13 ENCOUNTER — OFFICE VISIT (OUTPATIENT)
Dept: INTERNAL MEDICINE CLINIC | Facility: CLINIC | Age: 68
End: 2018-08-13
Payer: MEDICARE

## 2018-08-13 VITALS
RESPIRATION RATE: 12 BRPM | WEIGHT: 151 LBS | DIASTOLIC BLOOD PRESSURE: 84 MMHG | TEMPERATURE: 98 F | HEART RATE: 64 BPM | SYSTOLIC BLOOD PRESSURE: 132 MMHG | HEIGHT: 63 IN | BODY MASS INDEX: 26.75 KG/M2

## 2018-08-13 DIAGNOSIS — Z78.0 POST-MENOPAUSAL: ICD-10-CM

## 2018-08-13 DIAGNOSIS — Z12.31 VISIT FOR SCREENING MAMMOGRAM: ICD-10-CM

## 2018-08-13 DIAGNOSIS — K63.5 POLYP OF COLON, UNSPECIFIED PART OF COLON, UNSPECIFIED TYPE: ICD-10-CM

## 2018-08-13 DIAGNOSIS — E04.2 MULTIPLE THYROID NODULES: ICD-10-CM

## 2018-08-13 DIAGNOSIS — Z96.642 HISTORY OF LEFT HIP REPLACEMENT: ICD-10-CM

## 2018-08-13 DIAGNOSIS — Z00.00 MEDICARE ANNUAL WELLNESS VISIT, SUBSEQUENT: Primary | ICD-10-CM

## 2018-08-13 DIAGNOSIS — Z85.3 HISTORY OF BREAST CANCER: ICD-10-CM

## 2018-08-13 DIAGNOSIS — E78.00 HIGH CHOLESTEROL: ICD-10-CM

## 2018-08-13 DIAGNOSIS — Z00.00 ENCOUNTER FOR ANNUAL HEALTH EXAMINATION: ICD-10-CM

## 2018-08-13 DIAGNOSIS — I10 ESSENTIAL HYPERTENSION: ICD-10-CM

## 2018-08-13 DIAGNOSIS — Z87.19 HISTORY OF DIVERTICULITIS: ICD-10-CM

## 2018-08-13 PROBLEM — K62.5 RECTAL BLEEDING: Status: RESOLVED | Noted: 2018-02-18 | Resolved: 2018-08-13

## 2018-08-13 PROBLEM — M25.552 ACUTE HIP PAIN, LEFT: Status: RESOLVED | Noted: 2018-06-12 | Resolved: 2018-08-13

## 2018-08-13 PROCEDURE — G0009 ADMIN PNEUMOCOCCAL VACCINE: HCPCS | Performed by: INTERNAL MEDICINE

## 2018-08-13 PROCEDURE — 90670 PCV13 VACCINE IM: CPT | Performed by: INTERNAL MEDICINE

## 2018-08-13 PROCEDURE — G0439 PPPS, SUBSEQ VISIT: HCPCS | Performed by: INTERNAL MEDICINE

## 2018-08-13 NOTE — PATIENT INSTRUCTIONS
Emily Velasquez's SCREENING SCHEDULE   Tests on this list are recommended by your physician but may not be covered, or covered at this frequency, by your insurer. Please check with your insurance carrier before scheduling to verify coverage.    PREVENTATI cigarettes in their lifetime   • Anyone with a family history    Colorectal Cancer Screening  Covered up to Age 76     Colonoscopy Screen   Covered every 10 years- more often if abnormal Colonoscopy due on 09/07/2021 Update Health Maintenance if applicable (Prevnar)  Covered Once after 65   Orders placed or performed in visit on 08/13/18  -PNEUMOCOCCAL VACC, 13 MARLY IM    Please get once after your 65th birthday    Pneumococcal 23 (Pneumovax)  Covered Once after 65 No orders found for this or any previous vis

## 2018-08-13 NOTE — PROGRESS NOTES
HPI:   Cici Solano is a 77year old female who presents for a Medicare Subsequent Annual Wellness visit (Pt already had Initial Annual Wellness). Here for medicare well visit.  Still with left hip limitations after surgical replacement, but would l screened for Alcohol abuse and had a score of 0 so is at low risk.     Patient Care Team: Patient Care Team:  Nakul Freire MD as PCP - General (Internal Medicine)  Neagr Trevizo MD ([facility])    Patient Active Problem List:     Jessica (2005); Colectomy; colonoscopy; lumpectomy right (2004); radiation right (2004); and timoteo biopsy stereotactic nodule 1 site right (2004).     Her family history includes Breast Cancer (age of onset: 54) in her self; Breast Cancer (age of onset: [de-identified]) in her ma and S2 normal, no murmur, rub, or gallop   Abdomen:   Soft, non-tender, bowel sounds active all four quadrants,  no masses, no organomegaly   Pelvic: Deferred   Extremities: Extremities normal, atraumatic, no cyanosis or edema   Pulses: 2+ and symmetric level?: Daily Walks; Other  How would you describe your daily physical activity?: Moderate  How would you describe your current health state?: Fair  How do you maintain positive mental well-being?: Social Interaction; Visiting Friends; Visiting Family      Th Mammogram due on 09/01/2018 Update Health Maintenance if applicable     Immunizations (Update Immunization Activity if applicable)     Influenza  Covered Annually  Please get every year    Pneumococcal 13 (Prevnar)  Covered Once after 65 08/13/2018 Please

## 2018-08-30 ENCOUNTER — MED REC SCAN ONLY (OUTPATIENT)
Dept: INTERNAL MEDICINE CLINIC | Facility: CLINIC | Age: 68
End: 2018-08-30

## 2018-10-14 ENCOUNTER — HOSPITAL ENCOUNTER (OUTPATIENT)
Age: 68
Discharge: HOME | End: 2018-10-14
Attending: FAMILY MEDICINE
Payer: MEDICARE

## 2018-10-14 VITALS
RESPIRATION RATE: 18 BRPM | OXYGEN SATURATION: 97 % | HEIGHT: 63 IN | BODY MASS INDEX: 22.15 KG/M2 | WEIGHT: 125 LBS | TEMPERATURE: 98 F | HEART RATE: 57 BPM | SYSTOLIC BLOOD PRESSURE: 150 MMHG | DIASTOLIC BLOOD PRESSURE: 62 MMHG

## 2018-10-14 DIAGNOSIS — H11.31 SUBCONJUNCTIVAL HEMORRHAGE OF RIGHT EYE: Primary | ICD-10-CM

## 2018-10-14 PROCEDURE — 99212 OFFICE O/P EST SF 10 MIN: CPT

## 2018-10-14 NOTE — ED PROVIDER NOTES
Patient Seen in: [address] [facility]    History   Patient presents with:   Eye Visual Problem (opthalmic)    Stated Complaint: Irritation/Redness to R eye    HPI  78 yo F here with complaints of R eye redness - no other symptoms - noticed stated complaint: Irritation/Redness to R eye  Other systems are as noted in HPI. Constitutional and vital signs reviewed. All other systems reviewed and negative except as noted above.     Physical Exam     ED Triage Vitals [10/14/18 0950]   /6

## 2018-10-29 ENCOUNTER — HOSPITAL ENCOUNTER (OUTPATIENT)
Dept: MAMMOGRAPHY | Age: 68
Discharge: HOME | End: 2018-10-29
Attending: INTERNAL MEDICINE
Payer: MEDICARE

## 2018-10-29 DIAGNOSIS — Z12.31 VISIT FOR SCREENING MAMMOGRAM: ICD-10-CM

## 2018-10-29 PROCEDURE — 77063 BREAST TOMOSYNTHESIS BI: CPT | Performed by: INTERNAL MEDICINE

## 2018-10-29 PROCEDURE — 77067 SCR MAMMO BI INCL CAD: CPT | Performed by: INTERNAL MEDICINE

## 2018-12-10 RX ORDER — TRIAMTERENE AND HYDROCHLOROTHIAZIDE 37.5; 25 MG/1; MG/1
TABLET ORAL
Qty: 90 TABLET | Refills: 0 | Status: SHIPPED | OUTPATIENT
Start: 2018-12-10 | End: 2019-01-07

## 2018-12-12 ENCOUNTER — OFFICE VISIT (OUTPATIENT)
Dept: INTERNAL MEDICINE CLINIC | Facility: CLINIC | Age: 68
End: 2018-12-12
Payer: MEDICARE

## 2018-12-12 VITALS
BODY MASS INDEX: 27.07 KG/M2 | DIASTOLIC BLOOD PRESSURE: 80 MMHG | HEART RATE: 66 BPM | TEMPERATURE: 98 F | RESPIRATION RATE: 16 BRPM | OXYGEN SATURATION: 98 % | HEIGHT: 63 IN | WEIGHT: 152.81 LBS | SYSTOLIC BLOOD PRESSURE: 150 MMHG

## 2018-12-12 DIAGNOSIS — K21.9 GASTROESOPHAGEAL REFLUX DISEASE, ESOPHAGITIS PRESENCE NOT SPECIFIED: Primary | ICD-10-CM

## 2018-12-12 DIAGNOSIS — R10.13 EPIGASTRIC ABDOMINAL PAIN: ICD-10-CM

## 2018-12-12 PROCEDURE — 99213 OFFICE O/P EST LOW 20 MIN: CPT | Performed by: INTERNAL MEDICINE

## 2018-12-12 NOTE — PROGRESS NOTES
Patient presents with:  Diarrhea: ROOM 9- Started friday a couple weeks ago. was having watery diahrrea 9 times a day. Has settled down since then. was also having excessive gas. experiences lightheadedness from time to time. Refused flu shot.        HPI: Physical Exam  /80   Pulse 66   Temp 98 °F (36.7 °C) (Oral)   Resp 16   Ht 63\"   Wt 152 lb 12.8 oz   SpO2 98%   Breastfeeding? No   BMI 27.07 kg/m²   Constitutional: Oriented to person, place, and time. No distress.    HEENT:  Normocephalic and

## 2018-12-17 ENCOUNTER — LAB ENCOUNTER (OUTPATIENT)
Dept: LAB | Facility: HOSPITAL | Age: 68
End: 2018-12-17
Attending: INTERNAL MEDICINE
Payer: MEDICARE

## 2018-12-17 DIAGNOSIS — K21.9 GASTROESOPHAGEAL REFLUX DISEASE, ESOPHAGITIS PRESENCE NOT SPECIFIED: ICD-10-CM

## 2018-12-17 PROCEDURE — 87338 HPYLORI STOOL AG IA: CPT

## 2018-12-18 ENCOUNTER — HOSPITAL ENCOUNTER (OUTPATIENT)
Dept: ULTRASOUND IMAGING | Age: 68
Discharge: HOME | End: 2018-12-18
Attending: INTERNAL MEDICINE
Payer: MEDICARE

## 2018-12-18 DIAGNOSIS — R10.13 EPIGASTRIC ABDOMINAL PAIN: ICD-10-CM

## 2018-12-18 PROCEDURE — 76700 US EXAM ABDOM COMPLETE: CPT | Performed by: INTERNAL MEDICINE

## 2018-12-28 ENCOUNTER — TELEPHONE (OUTPATIENT)
Dept: INTERNAL MEDICINE CLINIC | Facility: CLINIC | Age: 68
End: 2018-12-28

## 2018-12-28 NOTE — TELEPHONE ENCOUNTER
Patient notified to restart Nexium, follow low fat diet and see CB for f/u on 1/2/19 at 11:15am.  Pt verbalizes understanding.

## 2018-12-28 NOTE — TELEPHONE ENCOUNTER
Patient states she came to see AS on 12/12/18 for diarrhea, gas, scheduled US Abdomen, H Pylori tests negative, was told to take Nexium otc.   Pt states Xmas Day pt had diarrhea again, read that Nexium could cause diarrhea so she stopped the otc med and nev

## 2018-12-28 NOTE — TELEPHONE ENCOUNTER
Pt stated she was recently seen for stomach issues on 12/12. She stated that she is experiencing chest pain/pressure tightness that started in the middle of the night. She described the pain as \"felt like an elephant was sitting on my chest\".

## 2018-12-29 ENCOUNTER — HOSPITAL ENCOUNTER (INPATIENT)
Facility: HOSPITAL | Age: 68
LOS: 3 days | Discharge: HOME | DRG: 247 | End: 2019-01-02
Attending: EMERGENCY MEDICINE | Admitting: HOSPITALIST
Payer: MEDICARE

## 2018-12-29 ENCOUNTER — APPOINTMENT (OUTPATIENT)
Dept: GENERAL RADIOLOGY | Facility: HOSPITAL | Age: 68
DRG: 247 | End: 2018-12-29
Payer: MEDICARE

## 2018-12-29 DIAGNOSIS — R94.31 ST SEGMENT DEPRESSION: ICD-10-CM

## 2018-12-29 DIAGNOSIS — R07.9 CHEST PAIN: ICD-10-CM

## 2018-12-29 DIAGNOSIS — R07.9 ACUTE CHEST PAIN: Primary | ICD-10-CM

## 2018-12-29 LAB
ALBUMIN SERPL-MCNC: 3.5 G/DL (ref 3.1–4.5)
ALBUMIN/GLOB SERPL: 0.8 {RATIO} (ref 1–2)
ALP LIVER SERPL-CCNC: 86 U/L (ref 55–142)
ALT SERPL-CCNC: 28 U/L (ref 14–54)
ANION GAP SERPL CALC-SCNC: 6 MMOL/L (ref 0–18)
APTT PPP: 31.4 SECONDS (ref 26.1–34.6)
AST SERPL-CCNC: 29 U/L (ref 15–41)
ATRIAL RATE: 66 BPM
BASOPHILS # BLD AUTO: 0.06 X10(3) UL (ref 0–0.1)
BASOPHILS NFR BLD AUTO: 0.8 %
BILIRUB SERPL-MCNC: 0.6 MG/DL (ref 0.1–2)
BUN BLD-MCNC: 17 MG/DL (ref 8–20)
BUN/CREAT SERPL: 15.9 (ref 10–20)
CALCIUM BLD-MCNC: 9.3 MG/DL (ref 8.3–10.3)
CHLORIDE SERPL-SCNC: 102 MMOL/L (ref 101–111)
CO2 SERPL-SCNC: 31 MMOL/L (ref 22–32)
CREAT BLD-MCNC: 1.07 MG/DL (ref 0.55–1.02)
EOSINOPHIL # BLD AUTO: 0.33 X10(3) UL (ref 0–0.3)
EOSINOPHIL NFR BLD AUTO: 4.1 %
ERYTHROCYTE [DISTWIDTH] IN BLOOD BY AUTOMATED COUNT: 12.9 % (ref 11.5–16)
GLOBULIN PLAS-MCNC: 4.2 G/DL (ref 2.8–4.4)
GLUCOSE BLD-MCNC: 141 MG/DL (ref 70–99)
HCT VFR BLD AUTO: 42.7 % (ref 34–50)
HGB BLD-MCNC: 14.7 G/DL (ref 12–16)
IMMATURE GRANULOCYTE COUNT: 0.02 X10(3) UL (ref 0–1)
IMMATURE GRANULOCYTE RATIO %: 0.3 %
LYMPHOCYTES # BLD AUTO: 1.44 X10(3) UL (ref 0.9–4)
LYMPHOCYTES NFR BLD AUTO: 18 %
M PROTEIN MFR SERPL ELPH: 7.7 G/DL (ref 6.4–8.2)
MCH RBC QN AUTO: 28.7 PG (ref 27–33.2)
MCHC RBC AUTO-ENTMCNC: 34.4 G/DL (ref 31–37)
MCV RBC AUTO: 83.4 FL (ref 81–100)
MONOCYTES # BLD AUTO: 0.57 X10(3) UL (ref 0.1–1)
MONOCYTES NFR BLD AUTO: 7.1 %
NEUTROPHIL ABS PRELIM: 5.58 X10 (3) UL (ref 1.3–6.7)
NEUTROPHILS # BLD AUTO: 5.58 X10(3) UL (ref 1.3–6.7)
NEUTROPHILS NFR BLD AUTO: 69.7 %
OSMOLALITY SERPL CALC.SUM OF ELEC: 292 MOSM/KG (ref 275–295)
P AXIS: 68 DEGREES
P-R INTERVAL: 216 MS
PLATELET # BLD AUTO: 267 10(3)UL (ref 150–450)
POTASSIUM SERPL-SCNC: 3.4 MMOL/L (ref 3.6–5.1)
Q-T INTERVAL: 408 MS
QRS DURATION: 86 MS
QTC CALCULATION (BEZET): 427 MS
R AXIS: 77 DEGREES
RBC # BLD AUTO: 5.12 X10(6)UL (ref 3.8–5.1)
RED CELL DISTRIBUTION WIDTH-SD: 39.1 FL (ref 35.1–46.3)
SODIUM SERPL-SCNC: 139 MMOL/L (ref 136–144)
T AXIS: 87 DEGREES
TROPONIN I SERPL-MCNC: 0.3 NG/ML (ref ?–0.05)
TROPONIN I SERPL-MCNC: <0.046 NG/ML (ref ?–0.05)
VENTRICULAR RATE: 66 BPM
WBC # BLD AUTO: 8 X10(3) UL (ref 4–13)

## 2018-12-29 PROCEDURE — 71045 X-RAY EXAM CHEST 1 VIEW: CPT

## 2018-12-29 PROCEDURE — 99223 1ST HOSP IP/OBS HIGH 75: CPT | Performed by: HOSPITALIST

## 2018-12-29 RX ORDER — ONDANSETRON 2 MG/ML
4 INJECTION INTRAMUSCULAR; INTRAVENOUS EVERY 6 HOURS PRN
Status: DISCONTINUED | OUTPATIENT
Start: 2018-12-29 | End: 2019-01-02

## 2018-12-29 RX ORDER — POTASSIUM CHLORIDE 20 MEQ/1
40 TABLET, EXTENDED RELEASE ORAL EVERY 4 HOURS
Status: DISPENSED | OUTPATIENT
Start: 2018-12-29 | End: 2018-12-29

## 2018-12-29 RX ORDER — HEPARIN SODIUM AND DEXTROSE 10000; 5 [USP'U]/100ML; G/100ML
12 INJECTION INTRAVENOUS ONCE
Status: COMPLETED | OUTPATIENT
Start: 2018-12-29 | End: 2018-12-30

## 2018-12-29 RX ORDER — ACETAMINOPHEN 325 MG/1
650 TABLET ORAL EVERY 6 HOURS PRN
Status: DISCONTINUED | OUTPATIENT
Start: 2018-12-29 | End: 2019-01-02

## 2018-12-29 RX ORDER — HEPARIN SODIUM 5000 [USP'U]/ML
60 INJECTION INTRAVENOUS; SUBCUTANEOUS ONCE
Status: COMPLETED | OUTPATIENT
Start: 2018-12-29 | End: 2018-12-29

## 2018-12-29 RX ORDER — NITROGLYCERIN 20 MG/100ML
10 INJECTION INTRAVENOUS CONTINUOUS
Status: DISCONTINUED | OUTPATIENT
Start: 2018-12-29 | End: 2018-12-31

## 2018-12-29 RX ORDER — ASPIRIN 81 MG/1
81 TABLET, CHEWABLE ORAL DAILY
Status: DISCONTINUED | OUTPATIENT
Start: 2018-12-29 | End: 2019-01-02

## 2018-12-29 RX ORDER — NITROGLYCERIN 0.4 MG/1
0.4 TABLET SUBLINGUAL EVERY 5 MIN PRN
Status: DISCONTINUED | OUTPATIENT
Start: 2018-12-29 | End: 2019-01-02

## 2018-12-29 RX ORDER — ENOXAPARIN SODIUM 100 MG/ML
40 INJECTION SUBCUTANEOUS DAILY
Status: DISCONTINUED | OUTPATIENT
Start: 2018-12-29 | End: 2018-12-29

## 2018-12-29 RX ORDER — ASPIRIN 81 MG/1
324 TABLET, CHEWABLE ORAL ONCE
Status: COMPLETED | OUTPATIENT
Start: 2018-12-29 | End: 2018-12-29

## 2018-12-29 RX ORDER — ASPIRIN 325 MG
325 TABLET ORAL DAILY
Status: DISCONTINUED | OUTPATIENT
Start: 2018-12-29 | End: 2018-12-29

## 2018-12-29 RX ORDER — ATENOLOL 50 MG/1
50 TABLET ORAL DAILY
Status: DISCONTINUED | OUTPATIENT
Start: 2018-12-29 | End: 2018-12-29

## 2018-12-29 RX ORDER — HEPARIN SODIUM AND DEXTROSE 10000; 5 [USP'U]/100ML; G/100ML
INJECTION INTRAVENOUS CONTINUOUS
Status: DISCONTINUED | OUTPATIENT
Start: 2018-12-29 | End: 2018-12-31

## 2018-12-29 RX ORDER — FAMOTIDINE 20 MG/1
20 TABLET ORAL DAILY
Status: DISCONTINUED | OUTPATIENT
Start: 2018-12-29 | End: 2018-12-31

## 2018-12-29 NOTE — CONSULTS
[facility]  Report of Consultation    Sandra Leonel Patient Status:  Observation    1950 MRN [id number]   [facility] 8NE-A Attending Randall Aschoff, MD   Hosp Day # 0 PCP Mike Matamoros MD     Reason for Consultation:  Chest RADIATION RIGHT  2004   • SPECIAL SERVICE OR REPORT  2005    diverticulitis - had L sigmoidectomy   • SPECIAL SERVICE OR REPORT  2005    ventral hernia repair     Family History   Problem Relation Age of Onset   • Heart Disorder Father         mi at 55 and (36.6 °C), Min:97.5 °F (36.4 °C), Max:97.9 °F (36.6 °C)    Wt Readings from Last 3 Encounters:  12/29/18 : 150 lb 14.4 oz (68.4 kg)  12/12/18 : 152 lb 12.8 oz (69.3 kg)  10/14/18 : 125 lb (56.7 kg)      Telemetry: Normal sinus rhythm  General: Alert and or a.m.  4.  Begin nitroglycerin  5. Beta-blocker  6.   I would have a low threshold for proceeding with cardiac catheterization given her history    Zehra Poe MD  12/29/2018  2:25 PM

## 2018-12-29 NOTE — H&P
[facility]PITAL[facility]  History and Physical     Patric Singh Patient Status:  Emergency    1950 MRN [id number]   Location [address] [facility] Attending Doug Talavera MD   Hosp Day # 0 PCP Robles Washington MD     Chief Complaint: Kezia Brine History   Problem Relation Age of Onset   • Heart Disorder Father         mi at 55 and  at 48 of 9th mi   • Lipids Father    • Heart Disease Father    • Other (Other) Father    • Hypertension Mother    • Cancer Mother 52        breast ca   • Stroke Mot sounds. No rebound, guarding or organomegaly. Neurologic: No focal neurological deficits. CNII-XII grossly intact. Musculoskeletal: Moves all extremities. Extremities: No edema or cyanosis. Integument: No rashes or lesions.    Psychiatric: Appropriate m

## 2018-12-29 NOTE — PLAN OF CARE
Patient/Family Goals    • Patient/Family Long Term Goal Progressing    • Patient/Family Short Term Goal Progressing        Patient is alert and oriented x4. NSR on tele. Oxygenation is 100% on RA. L sounds clear. VSS.  Patient denies chest pain, shortness o

## 2018-12-29 NOTE — ED INITIAL ASSESSMENT (HPI)
Patient to ED for intermittent, substernal, squeezing chest pain x 5 days. Patient denies aggravating or alleviating factors.

## 2018-12-29 NOTE — ED PROVIDER NOTES
Patient Seen in: [facility] Emergency Department    History   Patient presents with:  Chest Pain Angina (cardiovascular)    Stated Complaint: chest pain    HPI    Patient presents with acute episodes of chest pain which initially started several day COLONOSCOPY     • COLONOSCOPY,DIAGNOSTIC  2004    nl colonoscopy   • HIP SURGERY Left     May 2018   • LUMPECTOMY RIGHT  2004   • GERMÁN BIOPSY STEREOTACTIC NODULE 1 SITE RIGHT  2004   • RADIATION RIGHT  2004   • SPECIAL SERVICE OR REPORT  2005    diverticuli Nursing note and vitals reviewed.            ED Course     Labs Reviewed   COMP METABOLIC PANEL (14) - Abnormal; Notable for the following components:       Result Value    Glucose 141 (*)     Potassium 3.4 (*)     Creatinine 1.07 (*)     GFR, Non- for evaluation for acute coronary disease.   Admission disposition: 12/29/2018  1:05 PM                 Disposition and Plan     Clinical Impression:  Acute chest pain  (primary encounter diagnosis)  ST segment depression    Disposition:  Admit  12/29/2018

## 2018-12-30 ENCOUNTER — APPOINTMENT (OUTPATIENT)
Dept: CV DIAGNOSTICS | Facility: HOSPITAL | Age: 68
DRG: 247 | End: 2018-12-30
Attending: INTERNAL MEDICINE
Payer: MEDICARE

## 2018-12-30 ENCOUNTER — PRIOR ORIGINAL RECORDS (OUTPATIENT)
Dept: OTHER | Age: 68
End: 2018-12-30

## 2018-12-30 LAB
APTT PPP: 63.2 SECONDS (ref 26.1–34.6)
APTT PPP: 88.7 SECONDS (ref 26.1–34.6)
ATRIAL RATE: 64 BPM
CHOLEST SMN-MCNC: 265 MG/DL (ref ?–200)
HDLC SERPL-MCNC: 47 MG/DL (ref 40–59)
LDLC SERPL CALC-MCNC: 149 MG/DL (ref ?–100)
NONHDLC SERPL-MCNC: 218 MG/DL (ref ?–130)
P AXIS: 72 DEGREES
P-R INTERVAL: 214 MS
POTASSIUM SERPL-SCNC: 3.8 MMOL/L (ref 3.6–5.1)
Q-T INTERVAL: 432 MS
QRS DURATION: 84 MS
QTC CALCULATION (BEZET): 445 MS
R AXIS: 51 DEGREES
T AXIS: 15 DEGREES
TRIGL SERPL-MCNC: 343 MG/DL (ref 30–149)
TROPONIN I SERPL-MCNC: 0.89 NG/ML (ref ?–0.05)
TROPONIN I SERPL-MCNC: 1.86 NG/ML (ref ?–0.05)
VENTRICULAR RATE: 64 BPM
VLDLC SERPL CALC-MCNC: 69 MG/DL (ref 0–30)

## 2018-12-30 PROCEDURE — 93306 TTE W/DOPPLER COMPLETE: CPT | Performed by: INTERNAL MEDICINE

## 2018-12-30 PROCEDURE — 99232 SBSQ HOSP IP/OBS MODERATE 35: CPT | Performed by: HOSPITALIST

## 2018-12-30 RX ORDER — SODIUM CHLORIDE 9 MG/ML
INJECTION, SOLUTION INTRAVENOUS CONTINUOUS
Status: DISCONTINUED | OUTPATIENT
Start: 2018-12-30 | End: 2019-01-01

## 2018-12-30 NOTE — PLAN OF CARE
Assumed care for pt at 19:30 on 12/29    Right arm precaution for hx right lumpectomy  Heparin infusing at 8ml/hr, next ptt 00:30  Nitro infusing at 1.5 ml/hr    A&Ox4  Denies chest discomfort.  Reporting cramp pain in abdomen following K admin and minor he

## 2018-12-30 NOTE — PROGRESS NOTES
[facility]  Progress Note    Jordan Madrid Patient Status:  Inpatient    1950 MRN [id number]   [facility] 8NE-A Attending Chadwick Torres MD   Hosp Day # 0 PCP Tammy Daniels MD     Subjective:  No complaints of chest pain to Continuous   aspirin chewable tab 81 mg 81 mg Oral Daily   metoprolol Tartrate (LOPRESSOR) tab 25 mg 25 mg Oral 2x Daily(Beta Blocker)   heparin (PORCINE) drip [id number]/250mL infusion CONTINUOUS 200-3,000 Units/hr Intravenous Continuous   famoTIDine (PEP

## 2018-12-31 ENCOUNTER — APPOINTMENT (OUTPATIENT)
Dept: INTERVENTIONAL RADIOLOGY/VASCULAR | Facility: HOSPITAL | Age: 68
DRG: 247 | End: 2018-12-31
Attending: INTERNAL MEDICINE
Payer: MEDICARE

## 2018-12-31 ENCOUNTER — APPOINTMENT (OUTPATIENT)
Dept: ULTRASOUND IMAGING | Facility: HOSPITAL | Age: 68
DRG: 247 | End: 2018-12-31
Attending: INTERNAL MEDICINE
Payer: MEDICARE

## 2018-12-31 LAB
ANION GAP SERPL CALC-SCNC: 8 MMOL/L (ref 0–18)
APTT PPP: 48.8 SECONDS (ref 26.1–34.6)
BASOPHILS # BLD AUTO: 0.06 X10(3) UL (ref 0–0.1)
BASOPHILS NFR BLD AUTO: 0.6 %
BUN BLD-MCNC: 13 MG/DL (ref 8–20)
BUN/CREAT SERPL: 14.4 (ref 10–20)
CALCIUM BLD-MCNC: 8.9 MG/DL (ref 8.3–10.3)
CHLORIDE SERPL-SCNC: 107 MMOL/L (ref 101–111)
CO2 SERPL-SCNC: 28 MMOL/L (ref 22–32)
CREAT BLD-MCNC: 0.9 MG/DL (ref 0.55–1.02)
EOSINOPHIL # BLD AUTO: 0.48 X10(3) UL (ref 0–0.3)
EOSINOPHIL NFR BLD AUTO: 5 %
ERYTHROCYTE [DISTWIDTH] IN BLOOD BY AUTOMATED COUNT: 13.2 % (ref 11.5–16)
GLUCOSE BLD-MCNC: 99 MG/DL (ref 70–99)
HCT VFR BLD AUTO: 40.2 % (ref 34–50)
HGB BLD-MCNC: 13.1 G/DL (ref 12–16)
IMMATURE GRANULOCYTE COUNT: 0.04 X10(3) UL (ref 0–1)
IMMATURE GRANULOCYTE RATIO %: 0.4 %
ISTAT ACTIVATED CLOTTING TIME: 307 SECONDS (ref 74–137)
LYMPHOCYTES # BLD AUTO: 2.84 X10(3) UL (ref 0.9–4)
LYMPHOCYTES NFR BLD AUTO: 29.4 %
MCH RBC QN AUTO: 28.2 PG (ref 27–33.2)
MCHC RBC AUTO-ENTMCNC: 32.6 G/DL (ref 31–37)
MCV RBC AUTO: 86.5 FL (ref 81–100)
MONOCYTES # BLD AUTO: 0.69 X10(3) UL (ref 0.1–1)
MONOCYTES NFR BLD AUTO: 7.2 %
NEUTROPHIL ABS PRELIM: 5.54 X10 (3) UL (ref 1.3–6.7)
NEUTROPHILS # BLD AUTO: 5.54 X10(3) UL (ref 1.3–6.7)
NEUTROPHILS NFR BLD AUTO: 57.4 %
OSMOLALITY SERPL CALC.SUM OF ELEC: 296 MOSM/KG (ref 275–295)
PLATELET # BLD AUTO: 247 10(3)UL (ref 150–450)
POTASSIUM SERPL-SCNC: 3.6 MMOL/L (ref 3.6–5.1)
RBC # BLD AUTO: 4.65 X10(6)UL (ref 3.8–5.1)
RED CELL DISTRIBUTION WIDTH-SD: 42 FL (ref 35.1–46.3)
SODIUM SERPL-SCNC: 143 MMOL/L (ref 136–144)
WBC # BLD AUTO: 9.7 X10(3) UL (ref 4–13)

## 2018-12-31 PROCEDURE — B2111ZZ FLUOROSCOPY OF MULTIPLE CORONARY ARTERIES USING LOW OSMOLAR CONTRAST: ICD-10-PCS | Performed by: INTERNAL MEDICINE

## 2018-12-31 PROCEDURE — 4A023N7 MEASUREMENT OF CARDIAC SAMPLING AND PRESSURE, LEFT HEART, PERCUTANEOUS APPROACH: ICD-10-PCS | Performed by: INTERNAL MEDICINE

## 2018-12-31 PROCEDURE — 76882 US LMTD JT/FCL EVL NVASC XTR: CPT | Performed by: INTERNAL MEDICINE

## 2018-12-31 PROCEDURE — B240ZZ3 ULTRASONOGRAPHY OF SINGLE CORONARY ARTERY, INTRAVASCULAR: ICD-10-PCS | Performed by: INTERNAL MEDICINE

## 2018-12-31 PROCEDURE — 99232 SBSQ HOSP IP/OBS MODERATE 35: CPT | Performed by: HOSPITALIST

## 2018-12-31 PROCEDURE — B2151ZZ FLUOROSCOPY OF LEFT HEART USING LOW OSMOLAR CONTRAST: ICD-10-PCS | Performed by: INTERNAL MEDICINE

## 2018-12-31 PROCEDURE — 027034Z DILATION OF CORONARY ARTERY, ONE ARTERY WITH DRUG-ELUTING INTRALUMINAL DEVICE, PERCUTANEOUS APPROACH: ICD-10-PCS | Performed by: INTERNAL MEDICINE

## 2018-12-31 RX ORDER — ATORVASTATIN CALCIUM 20 MG/1
20 TABLET, FILM COATED ORAL NIGHTLY
Qty: 30 TABLET | Refills: 2 | Status: SHIPPED | OUTPATIENT
Start: 2018-12-31 | End: 2019-01-02

## 2018-12-31 RX ORDER — LIDOCAINE HYDROCHLORIDE 10 MG/ML
INJECTION, SOLUTION EPIDURAL; INFILTRATION; INTRACAUDAL; PERINEURAL
Status: COMPLETED
Start: 2018-12-31 | End: 2018-12-31

## 2018-12-31 RX ORDER — SODIUM CHLORIDE 9 MG/ML
INJECTION, SOLUTION INTRAVENOUS CONTINUOUS
Status: ACTIVE | OUTPATIENT
Start: 2018-12-31 | End: 2018-12-31

## 2018-12-31 RX ORDER — PRASUGREL 10 MG/1
10 TABLET, FILM COATED ORAL DAILY
Status: DISCONTINUED | OUTPATIENT
Start: 2019-01-01 | End: 2019-01-02

## 2018-12-31 RX ORDER — MIDAZOLAM HYDROCHLORIDE 1 MG/ML
INJECTION INTRAMUSCULAR; INTRAVENOUS
Status: COMPLETED
Start: 2018-12-31 | End: 2018-12-31

## 2018-12-31 RX ORDER — ATENOLOL 50 MG/1
50 TABLET ORAL DAILY
Qty: 30 TABLET | Refills: 0 | Status: SHIPPED | COMMUNITY
Start: 2018-12-31

## 2018-12-31 RX ORDER — TRIAMTERENE AND HYDROCHLOROTHIAZIDE 75; 50 MG/1; MG/1
0.5 TABLET ORAL
Status: DISCONTINUED | OUTPATIENT
Start: 2018-12-31 | End: 2018-12-31 | Stop reason: SDUPTHER

## 2018-12-31 RX ORDER — PRASUGREL 10 MG/1
10 TABLET, FILM COATED ORAL DAILY
Qty: 30 TABLET | Refills: 2 | Status: SHIPPED | OUTPATIENT
Start: 2019-01-01 | End: 2019-01-02

## 2018-12-31 RX ORDER — TRIAMTERENE AND HYDROCHLOROTHIAZIDE 37.5; 25 MG/1; MG/1
1 CAPSULE ORAL DAILY
Status: DISCONTINUED | OUTPATIENT
Start: 2018-12-31 | End: 2019-01-02

## 2018-12-31 RX ORDER — ASPIRIN 81 MG/1
81 TABLET, CHEWABLE ORAL DAILY
Qty: 30 TABLET | Refills: 2 | Status: SHIPPED | OUTPATIENT
Start: 2019-01-01 | End: 2019-01-02

## 2018-12-31 RX ORDER — HEPARIN SODIUM 5000 [USP'U]/ML
INJECTION, SOLUTION INTRAVENOUS; SUBCUTANEOUS
Status: COMPLETED
Start: 2018-12-31 | End: 2018-12-31

## 2018-12-31 RX ORDER — PRASUGREL 10 MG/1
TABLET, FILM COATED ORAL
Status: COMPLETED
Start: 2018-12-31 | End: 2018-12-31

## 2018-12-31 RX ORDER — MORPHINE SULFATE 4 MG/ML
1 INJECTION, SOLUTION INTRAMUSCULAR; INTRAVENOUS EVERY 4 HOURS PRN
Status: DISCONTINUED | OUTPATIENT
Start: 2018-12-31 | End: 2019-01-02

## 2018-12-31 RX ORDER — MORPHINE SULFATE 4 MG/ML
INJECTION, SOLUTION INTRAMUSCULAR; INTRAVENOUS
Status: COMPLETED
Start: 2018-12-31 | End: 2018-12-31

## 2018-12-31 RX ORDER — HEPARIN SODIUM 1000 [USP'U]/ML
INJECTION, SOLUTION INTRAVENOUS; SUBCUTANEOUS
Status: COMPLETED
Start: 2018-12-31 | End: 2018-12-31

## 2018-12-31 RX ORDER — ATORVASTATIN CALCIUM 20 MG/1
20 TABLET, FILM COATED ORAL NIGHTLY
Status: DISCONTINUED | OUTPATIENT
Start: 2018-12-31 | End: 2019-01-02

## 2018-12-31 RX ORDER — ASPIRIN 81 MG/1
TABLET, CHEWABLE ORAL
Status: COMPLETED
Start: 2018-12-31 | End: 2018-12-31

## 2018-12-31 NOTE — PLAN OF CARE
Right groin ultrasound with pseudoaneurysm. Will order u/s guided thrombin injection for am. Keep bedrest until then.      Claribel Day NP  12/31/2018  5:42 PM

## 2018-12-31 NOTE — PROGRESS NOTES
[facility]  Progress Note     Patric Singh Patient Status:  Observation    1950 MRN [id number]   [facility] 8NE-A Attending Nancy Woodard MD   Hosp Day # 1 PCP Robles Washington MD     Chief Complaint: chest pain    S: [place] metoprolol Tartrate  25 mg Oral 2x Daily(Beta Blocker)   • famoTIDine  20 mg Oral Daily       ASSESSMENT / PLAN:     1. NSTEMI:  S/P RCA stent, continue with meds  2. HTN  3.  HL    Plan of care: As above    Quality:  · DVT Prophylaxis: Heparin  · CODE stat

## 2018-12-31 NOTE — DIETARY NOTE
Nutrition Short Note    Dietitian consult received per cardiac rehab/CHF protocol. Pt to be educated by cardiac rehab staff and encouraged to attend outpatient classes taught by JARVIS. JARVIS available PRN.     Dimitry Hinkle RD, LDN  Pager #[phone]

## 2018-12-31 NOTE — PROCEDURES
[facility]  PCI Procedure Note    Galina Carlson Location: Cath Lab    CSN [id number] MRN [id number]   Admission Date 12/29/2018 Procedure Date 12/31/2018   Attending Physician Kyle Garibay MD Procedure Physician Efren Pedroza MD     Pre-Procedure maintained by RN and moderate conscious sedation of Versed and fentanyl was given. Blood pressure, oxygen saturation and heart rate was monitored throughout the case from 7:04 AM until 7:56 AM      Impression:  1.   Left ventriculography was performed in t

## 2018-12-31 NOTE — PROGRESS NOTES
Assumed care at : patient laying flat in bed, denies CP, SOB and dizziness. AO x4, NSR on tele monitor, posterior bilateral lung sounds clear. Right groin site soft with small hematoma, tender to palpation, dressing cdi.  Plan for ultrasound to the [facility]

## 2018-12-31 NOTE — PLAN OF CARE
Femstop placed for 1 hour as directed by Dr. Jeri Song. Right groin site is stable, small hematoma remains unchanged, tender per patient upon palpitation. Vitals are stable and right pedal pulse palpable. Family at bedside.

## 2018-12-31 NOTE — PLAN OF CARE
Patient is alert and oriented. Patient had 1 stent placed to RCA this morning with Dr. Lupe Persaud. Arrived back to room at 0820. No complications until patient got up to  [facility] at 12:30pm. Patient complaining of severe pain, especially with palpation.  TAN cody

## 2018-12-31 NOTE — CARDIAC REHAB
All MI/CAD education completed with pt. Plans to start cardiac rehab at [facility] in February.

## 2018-12-31 NOTE — PLAN OF CARE
Assumed care for pt at 19:30 on 12/30    Right arm precaution  A&Ox4  Denies pain or chest discomfort. States headache improved with prn tylenol. VSS on RA   NSR  EF result pending  Continent of B&B. I&O  NPO since midnight.  Consent in chart for cath by ALTON

## 2019-01-01 ENCOUNTER — EXTERNAL RECORD (OUTPATIENT)
Dept: HEALTH INFORMATION MANAGEMENT | Facility: OTHER | Age: 69
End: 2019-01-01

## 2019-01-01 ENCOUNTER — APPOINTMENT (OUTPATIENT)
Dept: ULTRASOUND IMAGING | Facility: HOSPITAL | Age: 69
DRG: 247 | End: 2019-01-01
Attending: INTERNAL MEDICINE
Payer: MEDICARE

## 2019-01-01 PROBLEM — I25.10 CAD (CORONARY ARTERY DISEASE): Status: ACTIVE | Noted: 2018-12-29

## 2019-01-01 PROBLEM — I21.4 NSTEMI (NON-ST ELEVATED MYOCARDIAL INFARCTION) (HCC): Status: ACTIVE | Noted: 2018-12-29

## 2019-01-01 LAB
ANION GAP SERPL CALC-SCNC: 7 MMOL/L (ref 0–18)
BASOPHILS # BLD AUTO: 0.04 X10(3) UL (ref 0–0.1)
BASOPHILS NFR BLD AUTO: 0.4 %
BUN BLD-MCNC: 11 MG/DL (ref 8–20)
BUN/CREAT SERPL: 12.8 (ref 10–20)
CALCIUM BLD-MCNC: 8.7 MG/DL (ref 8.3–10.3)
CHLORIDE SERPL-SCNC: 103 MMOL/L (ref 101–111)
CO2 SERPL-SCNC: 27 MMOL/L (ref 22–32)
CREAT BLD-MCNC: 0.86 MG/DL (ref 0.55–1.02)
EOSINOPHIL # BLD AUTO: 0.32 X10(3) UL (ref 0–0.3)
EOSINOPHIL NFR BLD AUTO: 3.5 %
ERYTHROCYTE [DISTWIDTH] IN BLOOD BY AUTOMATED COUNT: 13.4 % (ref 11.5–16)
GLUCOSE BLD-MCNC: 104 MG/DL (ref 70–99)
HCT VFR BLD AUTO: 37 % (ref 34–50)
HGB BLD-MCNC: 12.3 G/DL (ref 12–16)
IMMATURE GRANULOCYTE COUNT: 0.04 X10(3) UL (ref 0–1)
IMMATURE GRANULOCYTE RATIO %: 0.4 %
LYMPHOCYTES # BLD AUTO: 1.18 X10(3) UL (ref 0.9–4)
LYMPHOCYTES NFR BLD AUTO: 12.9 %
MCH RBC QN AUTO: 28.4 PG (ref 27–33.2)
MCHC RBC AUTO-ENTMCNC: 33.2 G/DL (ref 31–37)
MCV RBC AUTO: 85.5 FL (ref 81–100)
MONOCYTES # BLD AUTO: 0.7 X10(3) UL (ref 0.1–1)
MONOCYTES NFR BLD AUTO: 7.6 %
NEUTROPHIL ABS PRELIM: 6.9 X10 (3) UL (ref 1.3–6.7)
NEUTROPHILS # BLD AUTO: 6.9 X10(3) UL (ref 1.3–6.7)
NEUTROPHILS NFR BLD AUTO: 75.2 %
OSMOLALITY SERPL CALC.SUM OF ELEC: 284 MOSM/KG (ref 275–295)
PLATELET # BLD AUTO: 215 10(3)UL (ref 150–450)
POTASSIUM SERPL-SCNC: 3.7 MMOL/L (ref 3.6–5.1)
RBC # BLD AUTO: 4.33 X10(6)UL (ref 3.8–5.1)
RED CELL DISTRIBUTION WIDTH-SD: 42.4 FL (ref 35.1–46.3)
SODIUM SERPL-SCNC: 137 MMOL/L (ref 136–144)
WBC # BLD AUTO: 9.2 X10(3) UL (ref 4–13)

## 2019-01-01 PROCEDURE — 36002 PSEUDOANEURYSM INJECTION TRT: CPT | Performed by: INTERNAL MEDICINE

## 2019-01-01 PROCEDURE — 76942 ECHO GUIDE FOR BIOPSY: CPT | Performed by: INTERNAL MEDICINE

## 2019-01-01 PROCEDURE — 3E053GC INTRODUCTION OF OTHER THERAPEUTIC SUBSTANCE INTO PERIPHERAL ARTERY, PERCUTANEOUS APPROACH: ICD-10-PCS | Performed by: RADIOLOGY

## 2019-01-01 PROCEDURE — 99232 SBSQ HOSP IP/OBS MODERATE 35: CPT | Performed by: HOSPITALIST

## 2019-01-01 RX ORDER — POLYETHYLENE GLYCOL 3350 17 G/17G
17 POWDER, FOR SOLUTION ORAL DAILY PRN
Status: DISCONTINUED | OUTPATIENT
Start: 2019-01-01 | End: 2019-01-02

## 2019-01-01 NOTE — PROGRESS NOTES
[facility]  Progress Note     Washington Kar Patient Status:  Observation    1950 MRN [id number]   [facility] 8NE-A Attending Macy Paulino MD   Hosp Day # 2 PCP Wilson Alvares MD     Chief Complaint: chest pain    S: [place] 0.298*  0.891*  1.860*            Imaging: Imaging data reviewed in Epic.     Medications:   • thrombin  5,000 Units Topical Once   • Prasugrel HCl  10 mg Oral Daily   • atorvastatin  20 mg Oral Nightly   • Triamterene-HCTZ  1 capsule Oral Daily   • aspirin

## 2019-01-01 NOTE — PROGRESS NOTES
· [facility]      Subjective:  Just returned from thombin injection of PSA.  Mild pain, no chest pain or dyspnea    Objective:  141/78  tmax 99.2  SR  BUN/cr 11/0.86  Hgb 12.3    Cardiac: S1 S2 regular  Lungs: clear  Abdomen: soft  Extremities:

## 2019-01-01 NOTE — PROCEDURES
[address] Patient Status:  Inpatient    1950 MRN [id number]   [facility] 8NE-A Attending Kwaku Krause MD   Hosp Day # 2 PCP Lalitha Banks MD         Brief Procedure Report    Pre-Operative Diagnosis: Right

## 2019-01-01 NOTE — PROCEDURES
[facility]  Pre-Procedure Note    Name: Javier Reyna  MRN#: [id number]  : 1950    Procedure:  Ultrasound Guided Pseudoaneurysm thrombin injection    Indication: Right CFA pseudoaneurysm    Allergies:      Altace [Ramipril]       ANAPHYLAXIS

## 2019-01-02 ENCOUNTER — APPOINTMENT (OUTPATIENT)
Dept: ULTRASOUND IMAGING | Facility: HOSPITAL | Age: 69
DRG: 247 | End: 2019-01-02
Attending: RADIOLOGY
Payer: MEDICARE

## 2019-01-02 ENCOUNTER — TELEPHONE (OUTPATIENT)
Dept: INTERNAL MEDICINE CLINIC | Facility: CLINIC | Age: 69
End: 2019-01-02

## 2019-01-02 VITALS
OXYGEN SATURATION: 97 % | WEIGHT: 150.88 LBS | DIASTOLIC BLOOD PRESSURE: 63 MMHG | BODY MASS INDEX: 26.73 KG/M2 | TEMPERATURE: 98 F | RESPIRATION RATE: 18 BRPM | HEIGHT: 63 IN | SYSTOLIC BLOOD PRESSURE: 122 MMHG | HEART RATE: 63 BPM

## 2019-01-02 PROCEDURE — 93926 LOWER EXTREMITY STUDY: CPT | Performed by: RADIOLOGY

## 2019-01-02 PROCEDURE — 99239 HOSP IP/OBS DSCHRG MGMT >30: CPT | Performed by: HOSPITALIST

## 2019-01-02 RX ORDER — ASPIRIN 81 MG/1
81 TABLET, CHEWABLE ORAL DAILY
Qty: 30 TABLET | Refills: 2 | Status: SHIPPED | OUTPATIENT
Start: 2019-01-02 | End: 2019-09-10

## 2019-01-02 RX ORDER — PRASUGREL 10 MG/1
10 TABLET, FILM COATED ORAL DAILY
Qty: 30 TABLET | Refills: 2 | Status: ON HOLD | OUTPATIENT
Start: 2019-01-02 | End: 2019-09-11

## 2019-01-02 RX ORDER — AMLODIPINE BESYLATE 5 MG/1
5 TABLET ORAL DAILY
Qty: 30 TABLET | Refills: 3 | Status: SHIPPED | OUTPATIENT
Start: 2019-01-02 | End: 2019-01-02

## 2019-01-02 RX ORDER — ATORVASTATIN CALCIUM 20 MG/1
20 TABLET, FILM COATED ORAL NIGHTLY
Qty: 30 TABLET | Refills: 2 | Status: SHIPPED | OUTPATIENT
Start: 2019-01-02

## 2019-01-02 RX ORDER — AMLODIPINE BESYLATE 5 MG/1
5 TABLET ORAL DAILY
Status: DISCONTINUED | OUTPATIENT
Start: 2019-01-02 | End: 2019-01-02

## 2019-01-02 NOTE — PROGRESS NOTES
[facility]OSPITAL[facility]  Progress Note     Libby Masterson Patient Status:  Observation    1950 MRN [id number]   [facility] 8NE-A Attending Pma Lemus MD   Hosp Day # 3 PCP Noy Elizabeth MD     Chief Complaint: chest pain    S: Flakita Renee 0.891*  1.860*            Imaging: Imaging data reviewed in Epic.     Medications:   • AmLODIPine Besylate  5 mg Oral Daily   • Prasugrel HCl  10 mg Oral Daily   • atorvastatin  20 mg Oral Nightly   • Triamterene-HCTZ  1 capsule Oral Daily   • aspirin  81 m

## 2019-01-02 NOTE — PROGRESS NOTES
[facility]  Cardiology Progress Note    Augustina Flaming Patient Status:  Inpatient    1950 MRN [id number]   [facility] 8NE-A Attending Lisandra Roger MD   Hosp Day # 3 PCP Elzbieta Alcantar MD     Subjective:  No complaints of chest

## 2019-01-02 NOTE — TELEPHONE ENCOUNTER
Called pt to schedule HFU on 1/7/19 as requested. No concerns at this time. Pt verbalized understanding and agreed with POC.

## 2019-01-02 NOTE — PROGRESS NOTES
Okay for dc per Dr. Dolores Johnson, does not need Norvasc at dc   Medication and follow up instructions reviewed   IV removed, tele removed   Waiting for wheelchair for transport

## 2019-01-02 NOTE — PLAN OF CARE
Assumed patient care at 0730. Vital signs stable   Right groin with small hematoma- unchanged from last night and yesterday. No complaints of pain, slight tenderness to touch   Norvasc started this am   US right groin completed- no pseudoaneurysm.  Discusse

## 2019-01-02 NOTE — TELEPHONE ENCOUNTER
Patient was discharged from [facility] today after having a slight heart attack.     She was told to f/u in one week

## 2019-01-02 NOTE — CM/SW NOTE
IM discussed with patient and spouse, they declined form as they are d/c home today and do not want to appeal.

## 2019-01-03 ENCOUNTER — PATIENT OUTREACH (OUTPATIENT)
Dept: CASE MANAGEMENT | Age: 69
End: 2019-01-03

## 2019-01-03 ENCOUNTER — PRIOR ORIGINAL RECORDS (OUTPATIENT)
Dept: OTHER | Age: 69
End: 2019-01-03

## 2019-01-03 DIAGNOSIS — I21.4 NSTEMI (NON-ST ELEVATED MYOCARDIAL INFARCTION) (HCC): ICD-10-CM

## 2019-01-03 DIAGNOSIS — Z02.9 ENCOUNTERS FOR UNSPECIFIED ADMINISTRATIVE PURPOSE: ICD-10-CM

## 2019-01-03 PROCEDURE — 1111F DSCHRG MED/CURRENT MED MERGE: CPT

## 2019-01-03 NOTE — DISCHARGE SUMMARY
[facility]  DISCHARGE SUMMARY     Jordan Madrid Patient Status:  Inpatient    1950 MRN [id number]   [facility] 8NE-A Attending No att. providers found   Hosp Day # 3 PCP Tammy Daniels MD     Date of Admission: 2018  D known as:  EFFIENT      Take 1 tablet (10 mg total) by mouth daily.    Quantity:  30 tablet  Refills:  2        CHANGE how you take these medications      Instructions Prescription details   atenolol 50 MG Tabs  Commonly known as:  TENORMIN  What changed: edema.  -----------------------------------------------------------------------------------------------  PATIENT DISCHARGE INSTRUCTIONS: See electronic chart    Yajaira Fair MD 1/3/2019    Time spent:  > 30 minutes

## 2019-01-04 ENCOUNTER — PRIOR ORIGINAL RECORDS (OUTPATIENT)
Dept: OTHER | Age: 69
End: 2019-01-04

## 2019-01-04 ENCOUNTER — HOSPITAL ENCOUNTER (EMERGENCY)
Facility: HOSPITAL | Age: 69
Discharge: HOME | End: 2019-01-04
Attending: EMERGENCY MEDICINE
Payer: MEDICARE

## 2019-01-04 ENCOUNTER — TELEPHONE (OUTPATIENT)
Dept: INTERNAL MEDICINE CLINIC | Facility: CLINIC | Age: 69
End: 2019-01-04

## 2019-01-04 ENCOUNTER — APPOINTMENT (OUTPATIENT)
Dept: CT IMAGING | Facility: HOSPITAL | Age: 69
End: 2019-01-04
Attending: EMERGENCY MEDICINE
Payer: MEDICARE

## 2019-01-04 VITALS
DIASTOLIC BLOOD PRESSURE: 66 MMHG | TEMPERATURE: 98 F | WEIGHT: 146 LBS | BODY MASS INDEX: 25.87 KG/M2 | HEART RATE: 82 BPM | OXYGEN SATURATION: 100 % | SYSTOLIC BLOOD PRESSURE: 115 MMHG | HEIGHT: 63 IN | RESPIRATION RATE: 18 BRPM

## 2019-01-04 DIAGNOSIS — K59.00 CONSTIPATION, UNSPECIFIED CONSTIPATION TYPE: Primary | ICD-10-CM

## 2019-01-04 DIAGNOSIS — T14.8XXA HEMATOMA: ICD-10-CM

## 2019-01-04 LAB
ANION GAP SERPL CALC-SCNC: 6 MMOL/L (ref 0–18)
BASOPHILS # BLD AUTO: 0.06 X10(3) UL (ref 0–0.1)
BASOPHILS NFR BLD AUTO: 0.6 %
BILIRUB UR QL STRIP.AUTO: NEGATIVE
BUN BLD-MCNC: 19 MG/DL (ref 8–20)
BUN/CREAT SERPL: 19.4 (ref 10–20)
CALCIUM BLD-MCNC: 9.3 MG/DL (ref 8.3–10.3)
CHLORIDE SERPL-SCNC: 99 MMOL/L (ref 101–111)
CLARITY UR REFRACT.AUTO: CLEAR
CO2 SERPL-SCNC: 29 MMOL/L (ref 22–32)
COLOR UR AUTO: YELLOW
CREAT BLD-MCNC: 0.98 MG/DL (ref 0.55–1.02)
EOSINOPHIL # BLD AUTO: 0.51 X10(3) UL (ref 0–0.3)
EOSINOPHIL NFR BLD AUTO: 4.7 %
ERYTHROCYTE [DISTWIDTH] IN BLOOD BY AUTOMATED COUNT: 12.9 % (ref 11.5–16)
GLUCOSE BLD-MCNC: 87 MG/DL (ref 70–99)
GLUCOSE UR STRIP.AUTO-MCNC: NEGATIVE MG/DL
HCT VFR BLD AUTO: 39.6 % (ref 34–50)
HGB BLD-MCNC: 13.3 G/DL (ref 12–16)
IMMATURE GRANULOCYTE COUNT: 0.04 X10(3) UL (ref 0–1)
IMMATURE GRANULOCYTE RATIO %: 0.4 %
KETONES UR STRIP.AUTO-MCNC: NEGATIVE MG/DL
LEUKOCYTE ESTERASE UR QL STRIP.AUTO: NEGATIVE
LYMPHOCYTES # BLD AUTO: 2.49 X10(3) UL (ref 0.9–4)
LYMPHOCYTES NFR BLD AUTO: 23.1 %
MCH RBC QN AUTO: 28.7 PG (ref 27–33.2)
MCHC RBC AUTO-ENTMCNC: 33.6 G/DL (ref 31–37)
MCV RBC AUTO: 85.5 FL (ref 81–100)
MONOCYTES # BLD AUTO: 1.18 X10(3) UL (ref 0.1–1)
MONOCYTES NFR BLD AUTO: 10.9 %
NEUTROPHIL ABS PRELIM: 6.5 X10 (3) UL (ref 1.3–6.7)
NEUTROPHILS # BLD AUTO: 6.5 X10(3) UL (ref 1.3–6.7)
NEUTROPHILS NFR BLD AUTO: 60.3 %
NITRITE UR QL STRIP.AUTO: NEGATIVE
OSMOLALITY SERPL CALC.SUM OF ELEC: 280 MOSM/KG (ref 275–295)
PH UR STRIP.AUTO: 6 [PH] (ref 4.5–8)
PLATELET # BLD AUTO: 310 10(3)UL (ref 150–450)
POTASSIUM SERPL-SCNC: 3.5 MMOL/L (ref 3.6–5.1)
PROT UR STRIP.AUTO-MCNC: NEGATIVE MG/DL
RBC # BLD AUTO: 4.63 X10(6)UL (ref 3.8–5.1)
RBC UR QL AUTO: NEGATIVE
RED CELL DISTRIBUTION WIDTH-SD: 39.8 FL (ref 35.1–46.3)
SODIUM SERPL-SCNC: 134 MMOL/L (ref 136–144)
SP GR UR STRIP.AUTO: 1 (ref 1–1.03)
UROBILINOGEN UR STRIP.AUTO-MCNC: <2 MG/DL
WBC # BLD AUTO: 10.8 X10(3) UL (ref 4–13)

## 2019-01-04 PROCEDURE — 74176 CT ABD & PELVIS W/O CONTRAST: CPT | Performed by: EMERGENCY MEDICINE

## 2019-01-04 PROCEDURE — 81003 URINALYSIS AUTO W/O SCOPE: CPT | Performed by: EMERGENCY MEDICINE

## 2019-01-04 PROCEDURE — 99285 EMERGENCY DEPT VISIT HI MDM: CPT | Performed by: EMERGENCY MEDICINE

## 2019-01-04 PROCEDURE — 36415 COLL VENOUS BLD VENIPUNCTURE: CPT | Performed by: EMERGENCY MEDICINE

## 2019-01-04 PROCEDURE — 85025 COMPLETE CBC W/AUTO DIFF WBC: CPT | Performed by: EMERGENCY MEDICINE

## 2019-01-04 PROCEDURE — 99284 EMERGENCY DEPT VISIT MOD MDM: CPT | Performed by: EMERGENCY MEDICINE

## 2019-01-04 PROCEDURE — 80048 BASIC METABOLIC PNL TOTAL CA: CPT | Performed by: EMERGENCY MEDICINE

## 2019-01-04 RX ORDER — SODIUM CHLORIDE 9 MG/ML
125 INJECTION, SOLUTION INTRAVENOUS CONTINUOUS
Status: DISCONTINUED | OUTPATIENT
Start: 2019-01-04 | End: 2019-01-04

## 2019-01-04 NOTE — ED INITIAL ASSESSMENT (HPI)
Pt c/o constipation, Pt has not had BM since Sunday, Pt had stent placement Monday, Pt rpt she is eating fruit, drinking, taking laxatives w/o relief.

## 2019-01-04 NOTE — TELEPHONE ENCOUNTER
Needs to start stool softener colace 100mg bid and miralax daily prn for bm, if more blood in stool , needs to go to ER, if abd pain, go to ER.

## 2019-01-04 NOTE — PROGRESS NOTES
Initial Post Discharge Follow Up   Discharge Date: 1/2/19  Contact Date: 1/3/2019    Consent Verification:  Assessment Completed With: Patient  HIPAA Verified?   Yes    Discharge Dx:    Non-ST elevation MI, S/P cardiac cath and stent to RCA    General: tablet Rfl: 2   atorvastatin 20 MG Oral Tab Take 1 tablet (20 mg total) by mouth nightly. Disp: 30 tablet Rfl: 2   Prasugrel HCl 10 MG Oral Tab Take 1 tablet (10 mg total) by mouth daily.  Disp: 30 tablet Rfl: 2   atenolol 50 MG Oral Tab Take 1 tablet (50 m [facility])    Jan 07, 2019  2:15 PM [facility] Follow Up with MD Eliud [address] ([facility] [place])        [address], [place]  [facility]  [facility]

## 2019-01-04 NOTE — TELEPHONE ENCOUNTER
Patient notified she needs to start Stool Softener Colace 100mg BID and Miralax daily PRN for BM, if more blood in the stool or abdominal pain then pt should go to the ER. Pt verbalizes understanding and will go to the ER for evaluation.

## 2019-01-04 NOTE — TELEPHONE ENCOUNTER
Patient was recently in the hospital for a mild heart attack. Patient has not had a bowel movement in 5 days and she is having a little blood because of the straining. Patient has taken generic stool softener,  miralax drinking a lot of fluid.   Patient y

## 2019-01-04 NOTE — ED PROVIDER NOTES
Patient Seen in: [facility] Emergency Department    History   Patient presents with:  Constipation (gastrointestinal)    Stated Complaint: constipation    HPI  This is a 68-year-old female that presents with constipation and abdominal pain.   She has Smoking status: Never Smoker      Smokeless tobacco: Never Used    Alcohol use: Yes      Comment: OCCASIONALLY SOME RED WINE    Drug use: No      Review of Systems    Positive for stated complaint: constipation  Other systems are as noted in HPI.   Constitu Status                     ---------                               -----------         ------                     CBC W/ DIFFERENTIAL[[id number]]          Abnormal            Final result                 Please view results for these tests on the individual by: Ervin Machado MD            Ct Abdomen+pelvis(cpt=[id number])    Result Date: 1/4/2019  PROCEDURE:  CT ABDOMEN+PELVIS (TSJ=[id number])  COMPARISON:  [facility] , CT ABDOMEN PELVIS IV CONTRAST, NO ORAL (ER), 2/16/2016, 15:40.   INDICATIONS:  Patient presents with a femoral artery and vein measuring approximately 2.3 cm. This would be consistent with patient's recent history of coronary stent placement, via right groin puncture. BONES:  No acute osseous abnormalities are noted.   There is evidence of prior ORIF of a l groin is compatible with a hematoma measuring 23 x 11 x 19 mm. There are normal waveforms and patency of the right iliac artery/vein, right common femoral artery/vein, superficial femoral artery/vein, and right deep femoral artery.        CONCLUSION:  No u recurrence and unintended thrombosis of a native vessel. The patient voiced understanding wished to proceed.   Review was made with the previous sonogram.  The right common femoral artery pseudoaneurysm was evaluated sonographically and site demarcation was chest radiography. CONCLUSION:  Borderline heart size. No active disease seen. Dictated by: Cyd Kehr, MD on 12/29/2018 at 12:55     Approved by: Cyd Kehr, MD              [facility]   IV line was established of normal saline.   Basic labs were

## 2019-01-05 NOTE — ED NOTES
Explained procedure to Pt, Pt became upset and does not want to have enema, ER Phys made aware of same

## 2019-01-05 NOTE — ED NOTES
Attempted soap suds enema on Pt. After about 1/4 of the soap suds enema PT declined and wanted RN to stop. I did immediately upon Pt request. Pt then sat down on bedside commode safely and began passing some stool.    ER Phys made aware of same

## 2019-01-07 ENCOUNTER — OFFICE VISIT (OUTPATIENT)
Dept: INTERNAL MEDICINE CLINIC | Facility: CLINIC | Age: 69
End: 2019-01-07
Payer: MEDICARE

## 2019-01-07 VITALS
BODY MASS INDEX: 26 KG/M2 | HEIGHT: 63 IN | DIASTOLIC BLOOD PRESSURE: 68 MMHG | TEMPERATURE: 99 F | HEART RATE: 76 BPM | SYSTOLIC BLOOD PRESSURE: 110 MMHG

## 2019-01-07 DIAGNOSIS — I21.4 NSTEMI (NON-ST ELEVATED MYOCARDIAL INFARCTION) (HCC): Primary | ICD-10-CM

## 2019-01-07 DIAGNOSIS — I25.10 CORONARY ARTERY DISEASE INVOLVING NATIVE CORONARY ARTERY OF NATIVE HEART WITHOUT ANGINA PECTORIS: ICD-10-CM

## 2019-01-07 DIAGNOSIS — E87.6 HYPOKALEMIA: ICD-10-CM

## 2019-01-07 DIAGNOSIS — I10 ESSENTIAL HYPERTENSION: ICD-10-CM

## 2019-01-07 DIAGNOSIS — Z09 HOSPITAL DISCHARGE FOLLOW-UP: ICD-10-CM

## 2019-01-07 DIAGNOSIS — E78.00 HIGH CHOLESTEROL: ICD-10-CM

## 2019-01-07 PROCEDURE — 1111F DSCHRG MED/CURRENT MED MERGE: CPT | Performed by: INTERNAL MEDICINE

## 2019-01-07 PROCEDURE — 99496 TRANSJ CARE MGMT HIGH F2F 7D: CPT | Performed by: INTERNAL MEDICINE

## 2019-01-07 RX ORDER — TRIAMTERENE AND HYDROCHLOROTHIAZIDE 37.5; 25 MG/1; MG/1
0.5 TABLET ORAL
Qty: 90 TABLET | Refills: 0 | COMMUNITY
Start: 2019-01-07 | End: 2019-03-11

## 2019-01-07 NOTE — PROGRESS NOTES
HPI:    Dayami Anglin is a 77year old female here today for hospital follow up.    She was discharged from Inpatient hospital, [facility] to Home   Admission Date: 1/4/19   Discharge Date: 1/4/19  Hospital Discharge Diagnoses (since 12/8/2018)   N by mouth daily. atenolol 50 MG Oral Tab Take 1 tablet (50 mg total) by mouth daily. No current facility-administered medications on file prior to visit.        HISTORY: reconciled and reviewed with patient  She  has a past medical history of Breast ca denies depression or anxiety  HEMATOLOGIC: denies hx of anemia, or bruising, denies bleeding  ENDOCRINE: denies thyroid history  ALL/ASTHMA: denies hx of allergy or asthma    PHYSICAL EXAM:   No LMP recorded.  Patient is not currently having periods (Reason that the following are true:  Communication with the patient was made within 2 business days of discharge on date above   Medical Decision Making- Based on service period of discharge to 30 days:   · Number of Possible Diagnoses and/or Management Options:

## 2019-01-18 ENCOUNTER — PRIOR ORIGINAL RECORDS (OUTPATIENT)
Dept: OTHER | Age: 69
End: 2019-01-18

## 2019-01-18 ENCOUNTER — MYAURORA ACCOUNT LINK (OUTPATIENT)
Dept: OTHER | Age: 69
End: 2019-01-18

## 2019-01-28 LAB
BUN: 19 MG/DL
CALCIUM: 9.3 MG/DL
CHLORIDE: 99 MEQ/L
CHOLESTEROL, TOTAL: 265 MG/DL
CREATININE, SERUM: 0.98 MG/DL
GLUCOSE: 87 MG/DL
HDL CHOLESTEROL: 47 MG/DL
HEMATOCRIT: 39.6 %
HEMOGLOBIN: 13.3 G/DL
LDL CHOLESTEROL: 149 MG/DL
PLATELETS: 310 K/UL
POTASSIUM, SERUM: 3.5 MEQ/L
RED BLOOD COUNT: 4.63 X 10-6/U
SODIUM: 134 MEQ/L
TRIGLYCERIDES: 343 MG/DL
WHITE BLOOD COUNT: 10.8 X 10-3/U

## 2019-01-31 ENCOUNTER — CARDPULM VISIT (OUTPATIENT)
Dept: CARDIAC REHAB | Facility: HOSPITAL | Age: 69
End: 2019-01-31
Attending: INTERNAL MEDICINE
Payer: MEDICARE

## 2019-01-31 VITALS
HEIGHT: 63 IN | OXYGEN SATURATION: 98 % | DIASTOLIC BLOOD PRESSURE: 80 MMHG | WEIGHT: 152 LBS | SYSTOLIC BLOOD PRESSURE: 120 MMHG | HEART RATE: 63 BPM | BODY MASS INDEX: 26.93 KG/M2

## 2019-02-01 ENCOUNTER — CARDPULM VISIT (OUTPATIENT)
Dept: CARDIAC REHAB | Facility: HOSPITAL | Age: 69
End: 2019-02-01
Attending: INTERNAL MEDICINE
Payer: MEDICARE

## 2019-02-01 PROCEDURE — 93798 PHYS/QHP OP CAR RHAB W/ECG: CPT

## 2019-02-04 ENCOUNTER — CARDPULM VISIT (OUTPATIENT)
Dept: CARDIAC REHAB | Facility: HOSPITAL | Age: 69
End: 2019-02-04
Attending: INTERNAL MEDICINE
Payer: MEDICARE

## 2019-02-04 PROCEDURE — 93798 PHYS/QHP OP CAR RHAB W/ECG: CPT

## 2019-02-06 ENCOUNTER — CARDPULM VISIT (OUTPATIENT)
Dept: CARDIAC REHAB | Facility: HOSPITAL | Age: 69
End: 2019-02-06
Attending: INTERNAL MEDICINE
Payer: MEDICARE

## 2019-02-06 PROCEDURE — 93798 PHYS/QHP OP CAR RHAB W/ECG: CPT

## 2019-02-08 ENCOUNTER — CARDPULM VISIT (OUTPATIENT)
Dept: CARDIAC REHAB | Facility: HOSPITAL | Age: 69
End: 2019-02-08
Attending: INTERNAL MEDICINE
Payer: MEDICARE

## 2019-02-08 PROCEDURE — 93798 PHYS/QHP OP CAR RHAB W/ECG: CPT

## 2019-02-11 ENCOUNTER — CARDPULM VISIT (OUTPATIENT)
Dept: CARDIAC REHAB | Facility: HOSPITAL | Age: 69
End: 2019-02-11
Attending: INTERNAL MEDICINE
Payer: MEDICARE

## 2019-02-11 PROCEDURE — 93798 PHYS/QHP OP CAR RHAB W/ECG: CPT

## 2019-02-13 ENCOUNTER — CARDPULM VISIT (OUTPATIENT)
Dept: CARDIAC REHAB | Facility: HOSPITAL | Age: 69
End: 2019-02-13
Attending: INTERNAL MEDICINE
Payer: MEDICARE

## 2019-02-13 PROCEDURE — 93798 PHYS/QHP OP CAR RHAB W/ECG: CPT

## 2019-02-15 ENCOUNTER — CARDPULM VISIT (OUTPATIENT)
Dept: CARDIAC REHAB | Facility: HOSPITAL | Age: 69
End: 2019-02-15
Attending: INTERNAL MEDICINE
Payer: MEDICARE

## 2019-02-15 PROCEDURE — 93798 PHYS/QHP OP CAR RHAB W/ECG: CPT

## 2019-02-18 ENCOUNTER — CARDPULM VISIT (OUTPATIENT)
Dept: CARDIAC REHAB | Facility: HOSPITAL | Age: 69
End: 2019-02-18
Attending: INTERNAL MEDICINE
Payer: MEDICARE

## 2019-02-18 PROCEDURE — 93798 PHYS/QHP OP CAR RHAB W/ECG: CPT

## 2019-02-20 ENCOUNTER — CARDPULM VISIT (OUTPATIENT)
Dept: CARDIAC REHAB | Facility: HOSPITAL | Age: 69
End: 2019-02-20
Attending: INTERNAL MEDICINE
Payer: MEDICARE

## 2019-02-20 PROCEDURE — 93798 PHYS/QHP OP CAR RHAB W/ECG: CPT

## 2019-02-22 ENCOUNTER — APPOINTMENT (OUTPATIENT)
Dept: CARDIAC REHAB | Facility: HOSPITAL | Age: 69
End: 2019-02-22
Attending: INTERNAL MEDICINE
Payer: MEDICARE

## 2019-02-25 ENCOUNTER — APPOINTMENT (OUTPATIENT)
Dept: CARDIAC REHAB | Facility: HOSPITAL | Age: 69
End: 2019-02-25
Attending: INTERNAL MEDICINE
Payer: MEDICARE

## 2019-02-27 ENCOUNTER — APPOINTMENT (OUTPATIENT)
Dept: CARDIAC REHAB | Facility: HOSPITAL | Age: 69
End: 2019-02-27
Attending: INTERNAL MEDICINE
Payer: MEDICARE

## 2019-02-28 VITALS
SYSTOLIC BLOOD PRESSURE: 124 MMHG | WEIGHT: 152 LBS | BODY MASS INDEX: 26.93 KG/M2 | HEART RATE: 64 BPM | HEIGHT: 63 IN | DIASTOLIC BLOOD PRESSURE: 70 MMHG

## 2019-03-01 ENCOUNTER — APPOINTMENT (OUTPATIENT)
Dept: CARDIAC REHAB | Facility: HOSPITAL | Age: 69
End: 2019-03-01
Attending: INTERNAL MEDICINE
Payer: MEDICARE

## 2019-03-04 ENCOUNTER — APPOINTMENT (OUTPATIENT)
Dept: CARDIAC REHAB | Facility: HOSPITAL | Age: 69
End: 2019-03-04
Attending: INTERNAL MEDICINE
Payer: MEDICARE

## 2019-03-06 ENCOUNTER — APPOINTMENT (OUTPATIENT)
Dept: CARDIAC REHAB | Facility: HOSPITAL | Age: 69
End: 2019-03-06
Attending: INTERNAL MEDICINE
Payer: MEDICARE

## 2019-03-08 ENCOUNTER — APPOINTMENT (OUTPATIENT)
Dept: CARDIAC REHAB | Facility: HOSPITAL | Age: 69
End: 2019-03-08
Attending: INTERNAL MEDICINE
Payer: MEDICARE

## 2019-03-08 RX ORDER — TRIAMTERENE AND HYDROCHLOROTHIAZIDE 37.5; 25 MG/1; MG/1
1 TABLET ORAL DAILY
COMMUNITY
Start: 2019-01-18 | End: 2019-09-26 | Stop reason: SDUPTHER

## 2019-03-08 RX ORDER — ATENOLOL 50 MG/1
1 TABLET ORAL DAILY
COMMUNITY
Start: 2019-01-18 | End: 2019-09-26 | Stop reason: SDUPTHER

## 2019-03-08 RX ORDER — ATORVASTATIN CALCIUM 20 MG/1
TABLET, FILM COATED ORAL
COMMUNITY
Start: 2019-01-18 | End: 2019-09-26 | Stop reason: SDUPTHER

## 2019-03-08 RX ORDER — PHENOL 1.4 %
1 AEROSOL, SPRAY (ML) MUCOUS MEMBRANE DAILY
COMMUNITY
Start: 2019-01-18

## 2019-03-08 RX ORDER — PRASUGREL 10 MG/1
1 TABLET, FILM COATED ORAL DAILY
COMMUNITY
Start: 2019-01-18 | End: 2019-09-26 | Stop reason: ALTCHOICE

## 2019-03-11 ENCOUNTER — APPOINTMENT (OUTPATIENT)
Dept: CARDIAC REHAB | Facility: HOSPITAL | Age: 69
End: 2019-03-11
Attending: INTERNAL MEDICINE
Payer: MEDICARE

## 2019-03-11 RX ORDER — TRIAMTERENE AND HYDROCHLOROTHIAZIDE 37.5; 25 MG/1; MG/1
TABLET ORAL
Qty: 90 TABLET | Refills: 1 | Status: SHIPPED | OUTPATIENT
Start: 2019-03-11 | End: 2019-06-27 | Stop reason: DRUGHIGH

## 2019-03-13 ENCOUNTER — LAB ENCOUNTER (OUTPATIENT)
Dept: LAB | Facility: HOSPITAL | Age: 69
End: 2019-03-13
Attending: INTERNAL MEDICINE
Payer: MEDICARE

## 2019-03-13 ENCOUNTER — APPOINTMENT (OUTPATIENT)
Dept: CARDIAC REHAB | Facility: HOSPITAL | Age: 69
End: 2019-03-13
Attending: INTERNAL MEDICINE
Payer: MEDICARE

## 2019-03-13 DIAGNOSIS — E78.00 PURE HYPERCHOLESTEROLEMIA: Primary | ICD-10-CM

## 2019-03-13 LAB
ALBUMIN SERPL-MCNC: 3.6 G/DL (ref 3.4–5)
ALBUMIN/GLOB SERPL: 0.9 {RATIO} (ref 1–2)
ALP LIVER SERPL-CCNC: 94 U/L (ref 55–142)
ALT SERPL-CCNC: 22 U/L (ref 13–56)
ANION GAP SERPL CALC-SCNC: 7 MMOL/L (ref 0–18)
AST SERPL-CCNC: 22 U/L (ref 15–37)
BILIRUB SERPL-MCNC: 0.7 MG/DL (ref 0.1–2)
BUN BLD-MCNC: 14 MG/DL (ref 7–18)
BUN/CREAT SERPL: 17.1 (ref 10–20)
CALCIUM BLD-MCNC: 8.6 MG/DL (ref 8.5–10.1)
CHLORIDE SERPL-SCNC: 106 MMOL/L (ref 98–107)
CHOLEST SMN-MCNC: 203 MG/DL (ref ?–200)
CO2 SERPL-SCNC: 29 MMOL/L (ref 21–32)
CREAT BLD-MCNC: 0.82 MG/DL (ref 0.55–1.02)
GLOBULIN PLAS-MCNC: 3.8 G/DL (ref 2.8–4.4)
GLUCOSE BLD-MCNC: 103 MG/DL (ref 70–99)
HDLC SERPL-MCNC: 65 MG/DL (ref 40–59)
LDLC SERPL CALC-MCNC: 90 MG/DL (ref ?–100)
M PROTEIN MFR SERPL ELPH: 7.4 G/DL (ref 6.4–8.2)
NONHDLC SERPL-MCNC: 138 MG/DL (ref ?–130)
OSMOLALITY SERPL CALC.SUM OF ELEC: 295 MOSM/KG (ref 275–295)
POTASSIUM SERPL-SCNC: 3.9 MMOL/L (ref 3.5–5.1)
SODIUM SERPL-SCNC: 142 MMOL/L (ref 136–145)
TRIGL SERPL-MCNC: 238 MG/DL (ref 30–149)
VLDLC SERPL CALC-MCNC: 48 MG/DL (ref 0–30)

## 2019-03-13 PROCEDURE — 80061 LIPID PANEL: CPT

## 2019-03-13 PROCEDURE — 80053 COMPREHEN METABOLIC PANEL: CPT

## 2019-03-13 PROCEDURE — 36415 COLL VENOUS BLD VENIPUNCTURE: CPT

## 2019-03-15 ENCOUNTER — APPOINTMENT (OUTPATIENT)
Dept: CARDIAC REHAB | Facility: HOSPITAL | Age: 69
End: 2019-03-15
Attending: INTERNAL MEDICINE
Payer: MEDICARE

## 2019-03-18 ENCOUNTER — CARDPULM VISIT (OUTPATIENT)
Dept: CARDIAC REHAB | Facility: HOSPITAL | Age: 69
End: 2019-03-18
Attending: INTERNAL MEDICINE
Payer: MEDICARE

## 2019-03-18 PROCEDURE — 93798 PHYS/QHP OP CAR RHAB W/ECG: CPT

## 2019-03-19 ENCOUNTER — OFFICE VISIT (OUTPATIENT)
Dept: CARDIOLOGY | Age: 69
End: 2019-03-19

## 2019-03-19 VITALS
BODY MASS INDEX: 27.82 KG/M2 | HEIGHT: 63 IN | DIASTOLIC BLOOD PRESSURE: 80 MMHG | WEIGHT: 157 LBS | SYSTOLIC BLOOD PRESSURE: 154 MMHG | HEART RATE: 74 BPM

## 2019-03-19 DIAGNOSIS — I25.2 STATUS POST NON-ST ELEVATION MYOCARDIAL INFARCTION (NSTEMI): ICD-10-CM

## 2019-03-19 DIAGNOSIS — Z95.5 HISTORY OF HEART ARTERY STENT: ICD-10-CM

## 2019-03-19 DIAGNOSIS — I25.110 CORONARY ARTERY DISEASE INVOLVING NATIVE CORONARY ARTERY OF NATIVE HEART WITH UNSTABLE ANGINA PECTORIS (CMD): Primary | ICD-10-CM

## 2019-03-19 PROBLEM — I25.10 CAD (CORONARY ARTERY DISEASE), NATIVE CORONARY ARTERY: Status: ACTIVE | Noted: 2019-01-18

## 2019-03-19 PROCEDURE — 99214 OFFICE O/P EST MOD 30 MIN: CPT | Performed by: INTERNAL MEDICINE

## 2019-03-19 RX ORDER — LORATADINE 10 MG/1
10 TABLET ORAL DAILY PRN
COMMUNITY
Start: 2019-03-06 | End: 2019-09-26 | Stop reason: ALTCHOICE

## 2019-03-19 RX ORDER — TRIAMTERENE AND HYDROCHLOROTHIAZIDE 37.5; 25 MG/1; MG/1
1 TABLET ORAL DAILY
COMMUNITY

## 2019-03-19 RX ORDER — DIAPER,BRIEF,INFANT-TODD,DISP
EACH MISCELLANEOUS
COMMUNITY
Start: 2019-03-06 | End: 2019-09-26 | Stop reason: ALTCHOICE

## 2019-03-20 ENCOUNTER — CARDPULM VISIT (OUTPATIENT)
Dept: CARDIAC REHAB | Facility: HOSPITAL | Age: 69
End: 2019-03-20
Attending: INTERNAL MEDICINE
Payer: MEDICARE

## 2019-03-20 PROCEDURE — 93798 PHYS/QHP OP CAR RHAB W/ECG: CPT

## 2019-03-22 ENCOUNTER — CARDPULM VISIT (OUTPATIENT)
Dept: CARDIAC REHAB | Facility: HOSPITAL | Age: 69
End: 2019-03-22
Attending: INTERNAL MEDICINE
Payer: MEDICARE

## 2019-03-22 PROCEDURE — 93798 PHYS/QHP OP CAR RHAB W/ECG: CPT

## 2019-03-25 ENCOUNTER — CARDPULM VISIT (OUTPATIENT)
Dept: CARDIAC REHAB | Facility: HOSPITAL | Age: 69
End: 2019-03-25
Attending: INTERNAL MEDICINE
Payer: MEDICARE

## 2019-03-25 PROCEDURE — 93798 PHYS/QHP OP CAR RHAB W/ECG: CPT

## 2019-03-27 ENCOUNTER — CARDPULM VISIT (OUTPATIENT)
Dept: CARDIAC REHAB | Facility: HOSPITAL | Age: 69
End: 2019-03-27
Attending: INTERNAL MEDICINE
Payer: MEDICARE

## 2019-03-27 PROCEDURE — 93798 PHYS/QHP OP CAR RHAB W/ECG: CPT

## 2019-03-28 ENCOUNTER — CARDPULM VISIT (OUTPATIENT)
Dept: CARDIAC REHAB | Facility: HOSPITAL | Age: 69
End: 2019-03-28
Attending: OBSTETRICS & GYNECOLOGY
Payer: MEDICARE

## 2019-03-28 PROCEDURE — 93798 PHYS/QHP OP CAR RHAB W/ECG: CPT

## 2019-03-29 ENCOUNTER — APPOINTMENT (OUTPATIENT)
Dept: CARDIAC REHAB | Facility: HOSPITAL | Age: 69
End: 2019-03-29
Attending: INTERNAL MEDICINE
Payer: MEDICARE

## 2019-04-01 ENCOUNTER — CARDPULM VISIT (OUTPATIENT)
Dept: CARDIAC REHAB | Facility: HOSPITAL | Age: 69
End: 2019-04-01
Attending: INTERNAL MEDICINE
Payer: MEDICARE

## 2019-04-01 PROCEDURE — 93798 PHYS/QHP OP CAR RHAB W/ECG: CPT

## 2019-04-02 ENCOUNTER — TELEPHONE (OUTPATIENT)
Dept: CARDIOLOGY | Age: 69
End: 2019-04-02

## 2019-04-03 ENCOUNTER — PATIENT OUTREACH (OUTPATIENT)
Dept: CASE MANAGEMENT | Age: 69
End: 2019-04-03

## 2019-04-03 ENCOUNTER — CARDPULM VISIT (OUTPATIENT)
Dept: CARDIAC REHAB | Facility: HOSPITAL | Age: 69
End: 2019-04-03
Attending: INTERNAL MEDICINE
Payer: MEDICARE

## 2019-04-03 PROCEDURE — 93798 PHYS/QHP OP CAR RHAB W/ECG: CPT

## 2019-04-03 NOTE — PROGRESS NOTES
Called and left detailed message for CCM INTRO. Left message to call back. Verified HIPPA. Will follow up with patient at a later time.

## 2019-04-05 ENCOUNTER — CARDPULM VISIT (OUTPATIENT)
Dept: CARDIAC REHAB | Facility: HOSPITAL | Age: 69
End: 2019-04-05
Attending: INTERNAL MEDICINE
Payer: MEDICARE

## 2019-04-05 ENCOUNTER — TELEPHONE (OUTPATIENT)
Dept: CARDIOLOGY | Age: 69
End: 2019-04-05

## 2019-04-05 PROCEDURE — 93798 PHYS/QHP OP CAR RHAB W/ECG: CPT

## 2019-04-08 ENCOUNTER — CARDPULM VISIT (OUTPATIENT)
Dept: CARDIAC REHAB | Facility: HOSPITAL | Age: 69
End: 2019-04-08
Attending: INTERNAL MEDICINE
Payer: MEDICARE

## 2019-04-08 PROCEDURE — 93798 PHYS/QHP OP CAR RHAB W/ECG: CPT

## 2019-04-10 ENCOUNTER — APPOINTMENT (OUTPATIENT)
Dept: CARDIAC REHAB | Facility: HOSPITAL | Age: 69
End: 2019-04-10
Attending: INTERNAL MEDICINE
Payer: MEDICARE

## 2019-04-12 ENCOUNTER — CARDPULM VISIT (OUTPATIENT)
Dept: CARDIAC REHAB | Facility: HOSPITAL | Age: 69
End: 2019-04-12
Attending: INTERNAL MEDICINE
Payer: MEDICARE

## 2019-04-12 PROCEDURE — 93798 PHYS/QHP OP CAR RHAB W/ECG: CPT

## 2019-04-15 ENCOUNTER — CARDPULM VISIT (OUTPATIENT)
Dept: CARDIAC REHAB | Facility: HOSPITAL | Age: 69
End: 2019-04-15
Attending: INTERNAL MEDICINE
Payer: MEDICARE

## 2019-04-15 PROCEDURE — 93798 PHYS/QHP OP CAR RHAB W/ECG: CPT

## 2019-04-17 ENCOUNTER — CARDPULM VISIT (OUTPATIENT)
Dept: CARDIAC REHAB | Facility: HOSPITAL | Age: 69
End: 2019-04-17
Attending: INTERNAL MEDICINE
Payer: MEDICARE

## 2019-04-17 PROCEDURE — 93798 PHYS/QHP OP CAR RHAB W/ECG: CPT

## 2019-04-19 ENCOUNTER — HOSPITAL ENCOUNTER (EMERGENCY)
Age: 69
Discharge: HOME | End: 2019-04-19
Attending: EMERGENCY MEDICINE
Payer: MEDICARE

## 2019-04-19 ENCOUNTER — APPOINTMENT (OUTPATIENT)
Dept: CT IMAGING | Age: 69
End: 2019-04-19
Attending: EMERGENCY MEDICINE
Payer: MEDICARE

## 2019-04-19 ENCOUNTER — TELEPHONE (OUTPATIENT)
Dept: INTERNAL MEDICINE CLINIC | Facility: CLINIC | Age: 69
End: 2019-04-19

## 2019-04-19 ENCOUNTER — APPOINTMENT (OUTPATIENT)
Dept: CARDIAC REHAB | Facility: HOSPITAL | Age: 69
End: 2019-04-19
Attending: INTERNAL MEDICINE
Payer: MEDICARE

## 2019-04-19 VITALS
WEIGHT: 150 LBS | HEIGHT: 63 IN | RESPIRATION RATE: 16 BRPM | DIASTOLIC BLOOD PRESSURE: 74 MMHG | BODY MASS INDEX: 26.58 KG/M2 | TEMPERATURE: 98 F | HEART RATE: 81 BPM | SYSTOLIC BLOOD PRESSURE: 127 MMHG | OXYGEN SATURATION: 99 %

## 2019-04-19 DIAGNOSIS — R11.0 NAUSEA: ICD-10-CM

## 2019-04-19 DIAGNOSIS — R51.9 HEADACHE DISORDER: Primary | ICD-10-CM

## 2019-04-19 PROCEDURE — 80048 BASIC METABOLIC PNL TOTAL CA: CPT | Performed by: EMERGENCY MEDICINE

## 2019-04-19 PROCEDURE — 99284 EMERGENCY DEPT VISIT MOD MDM: CPT

## 2019-04-19 PROCEDURE — 96374 THER/PROPH/DIAG INJ IV PUSH: CPT

## 2019-04-19 PROCEDURE — 85025 COMPLETE CBC W/AUTO DIFF WBC: CPT | Performed by: EMERGENCY MEDICINE

## 2019-04-19 PROCEDURE — S0028 INJECTION, FAMOTIDINE, 20 MG: HCPCS | Performed by: EMERGENCY MEDICINE

## 2019-04-19 PROCEDURE — 70450 CT HEAD/BRAIN W/O DYE: CPT | Performed by: EMERGENCY MEDICINE

## 2019-04-19 PROCEDURE — 96361 HYDRATE IV INFUSION ADD-ON: CPT

## 2019-04-19 PROCEDURE — 96375 TX/PRO/DX INJ NEW DRUG ADDON: CPT

## 2019-04-19 RX ORDER — FAMOTIDINE 10 MG/ML
20 INJECTION, SOLUTION INTRAVENOUS ONCE
Status: COMPLETED | OUTPATIENT
Start: 2019-04-19 | End: 2019-04-19

## 2019-04-19 RX ORDER — DIPHENHYDRAMINE HYDROCHLORIDE 50 MG/ML
25 INJECTION INTRAMUSCULAR; INTRAVENOUS ONCE
Status: COMPLETED | OUTPATIENT
Start: 2019-04-19 | End: 2019-04-19

## 2019-04-19 RX ORDER — METOCLOPRAMIDE HYDROCHLORIDE 5 MG/ML
5 INJECTION INTRAMUSCULAR; INTRAVENOUS ONCE
Status: COMPLETED | OUTPATIENT
Start: 2019-04-19 | End: 2019-04-19

## 2019-04-19 RX ORDER — DIPHENHYDRAMINE HCL 50 MG
50 CAPSULE ORAL ONCE
Status: COMPLETED | OUTPATIENT
Start: 2019-04-19 | End: 2019-04-19

## 2019-04-19 RX ORDER — ONDANSETRON 4 MG/1
4 TABLET, ORALLY DISINTEGRATING ORAL EVERY 4 HOURS PRN
Qty: 10 TABLET | Refills: 0 | Status: SHIPPED | OUTPATIENT
Start: 2019-04-19 | End: 2019-05-08 | Stop reason: ALTCHOICE

## 2019-04-19 RX ORDER — ONDANSETRON 2 MG/ML
4 INJECTION INTRAMUSCULAR; INTRAVENOUS ONCE
Status: COMPLETED | OUTPATIENT
Start: 2019-04-19 | End: 2019-04-19

## 2019-04-19 RX ORDER — ACETAMINOPHEN 500 MG
1000 TABLET ORAL ONCE
Status: COMPLETED | OUTPATIENT
Start: 2019-04-19 | End: 2019-04-19

## 2019-04-19 RX ORDER — SODIUM CHLORIDE 9 MG/ML
INJECTION, SOLUTION INTRAVENOUS CONTINUOUS
Status: DISCONTINUED | OUTPATIENT
Start: 2019-04-19 | End: 2019-04-19

## 2019-04-19 NOTE — ED PROVIDER NOTES
Patient Seen in: THE [facility] Emergency Department In [place]    History   Patient presents with:  Headache (neurologic)  Nausea/Vomiting/Diarrhea (gastrointestinal)    Stated Complaint: nausea and headache, had cortisone injections at 1500    HPI    This i [facility]nelly   • APPENDECTOMY  2005    at time of diverticulitis   • COLECTOMY     • COLONOSCOPY     • COLONOSCOPY,DIAGNOSTIC  2004    nl colonoscopy   • HIP SURGERY Left     May 2018   • LUMPECTOMY RIGHT  2004   • GERMÁN BIOPSY STEREOTACTIC NODULE 1 SITE RIGHT nontender and nondistended. Normoactive bowel sounds. No rebound. No guarding. EXTREMITIES: Bruising on right forearm noted. Bruising on left hip noted. Warm with brisk capillary refill. Neuro: Speech clear. No facial asymmetry. No pronator drift.  Estella Cerda condition with her .           Disposition and Plan     Clinical Impression:  Headache disorder  (primary encounter diagnosis)  Nausea    Disposition:  Discharge  4/19/2019  5:50 am    Follow-up:  Jared Dai MD  [address] [place] [address]

## 2019-04-19 NOTE — TELEPHONE ENCOUNTER
Patient notified Glucose high in the ER, probably from the Cortisone injection. Pt notified to rest, can take Tylenol every 8 hours as needed,, can also take Benadryl as she believes this eases her h/a.   Pt notified to call with worsening s/s, if sever sh

## 2019-04-19 NOTE — ED INITIAL ASSESSMENT (HPI)
Had cortisone injections in both knees yesterday at 1500.  Last night started feeling achy and tired, woke up at 0230 with headache and nausea

## 2019-04-19 NOTE — TELEPHONE ENCOUNTER
Glucose high in ER today but no history of diabetes. likley from cortisone injection.    Rest,  Tylenol every eight hours PRN   Call with worsening symptoms

## 2019-04-19 NOTE — TELEPHONE ENCOUNTER
Pt got a Cortizone shot yesterday from Dr. Henry Barlow 4-39-02. Woke up with a very bad headache with bad nausea at about 2:30 am.     Did end up going to the ED.   They told her they had never heard of a headache as a reaction, but Pt's have come in the same

## 2019-04-19 NOTE — TELEPHONE ENCOUNTER
Patient states she received bilateral cortisone injections in her knees(bone on bone) yesterday at Dr Malachi Kirby office, last night had a terrible h/z and nausea in the middle of the night, went to the ER for evaluation, gave her Benadryl and Zofran which

## 2019-04-22 ENCOUNTER — CARDPULM VISIT (OUTPATIENT)
Dept: CARDIAC REHAB | Facility: HOSPITAL | Age: 69
End: 2019-04-22
Attending: INTERNAL MEDICINE
Payer: MEDICARE

## 2019-04-22 PROCEDURE — 93798 PHYS/QHP OP CAR RHAB W/ECG: CPT

## 2019-04-24 ENCOUNTER — APPOINTMENT (OUTPATIENT)
Dept: CARDIAC REHAB | Facility: HOSPITAL | Age: 69
End: 2019-04-24
Attending: INTERNAL MEDICINE
Payer: MEDICARE

## 2019-04-26 ENCOUNTER — CARDPULM VISIT (OUTPATIENT)
Dept: CARDIAC REHAB | Facility: HOSPITAL | Age: 69
End: 2019-04-26
Attending: INTERNAL MEDICINE
Payer: MEDICARE

## 2019-04-26 PROCEDURE — 93798 PHYS/QHP OP CAR RHAB W/ECG: CPT

## 2019-04-27 RX ORDER — ATENOLOL 50 MG/1
50 TABLET ORAL DAILY
Qty: 90 TABLET | Refills: 1 | Status: SHIPPED | OUTPATIENT
Start: 2019-04-27 | End: 2019-05-08

## 2019-04-29 ENCOUNTER — CARDPULM VISIT (OUTPATIENT)
Dept: CARDIAC REHAB | Facility: HOSPITAL | Age: 69
End: 2019-04-29
Attending: INTERNAL MEDICINE
Payer: MEDICARE

## 2019-04-29 PROCEDURE — 93798 PHYS/QHP OP CAR RHAB W/ECG: CPT

## 2019-04-29 NOTE — TELEPHONE ENCOUNTER
Patient states within 24 hours the headache was gone, s/s resolved. ER f/u scheduled for 5/8/19 with AS. Pt verbalizes understanding.

## 2019-05-01 ENCOUNTER — DOCUMENTATION (OUTPATIENT)
Dept: CARDIOLOGY | Age: 69
End: 2019-05-01

## 2019-05-01 ENCOUNTER — CARDPULM VISIT (OUTPATIENT)
Dept: CARDIAC REHAB | Facility: HOSPITAL | Age: 69
End: 2019-05-01
Attending: INTERNAL MEDICINE
Payer: MEDICARE

## 2019-05-01 ENCOUNTER — TELEPHONE (OUTPATIENT)
Dept: CARDIOLOGY | Age: 69
End: 2019-05-01

## 2019-05-01 DIAGNOSIS — E78.00 HYPERCHOLESTEREMIA: Primary | ICD-10-CM

## 2019-05-01 PROCEDURE — 93798 PHYS/QHP OP CAR RHAB W/ECG: CPT

## 2019-05-01 RX ORDER — EZETIMIBE 10 MG/1
10 TABLET ORAL DAILY
Qty: 30 TABLET | Refills: 4 | Status: SHIPPED | OUTPATIENT
Start: 2019-05-01 | End: 2019-05-01 | Stop reason: SDUPTHER

## 2019-05-01 RX ORDER — EZETIMIBE 10 MG/1
10 TABLET ORAL DAILY
Qty: 30 TABLET | Refills: 4 | Status: SHIPPED | OUTPATIENT
Start: 2019-05-01 | End: 2019-09-26 | Stop reason: ALTCHOICE

## 2019-05-06 ENCOUNTER — CARDPULM VISIT (OUTPATIENT)
Dept: CARDIAC REHAB | Facility: HOSPITAL | Age: 69
End: 2019-05-06
Attending: INTERNAL MEDICINE
Payer: MEDICARE

## 2019-05-06 PROCEDURE — 93798 PHYS/QHP OP CAR RHAB W/ECG: CPT

## 2019-05-08 ENCOUNTER — CARDPULM VISIT (OUTPATIENT)
Dept: CARDIAC REHAB | Facility: HOSPITAL | Age: 69
End: 2019-05-08
Attending: INTERNAL MEDICINE
Payer: MEDICARE

## 2019-05-08 ENCOUNTER — OFFICE VISIT (OUTPATIENT)
Dept: INTERNAL MEDICINE CLINIC | Facility: CLINIC | Age: 69
End: 2019-05-08
Payer: MEDICARE

## 2019-05-08 ENCOUNTER — LAB ENCOUNTER (OUTPATIENT)
Dept: LAB | Age: 69
End: 2019-05-08
Attending: INTERNAL MEDICINE
Payer: MEDICARE

## 2019-05-08 VITALS
BODY MASS INDEX: 27.29 KG/M2 | HEART RATE: 60 BPM | WEIGHT: 154 LBS | DIASTOLIC BLOOD PRESSURE: 80 MMHG | HEIGHT: 63 IN | TEMPERATURE: 97 F | SYSTOLIC BLOOD PRESSURE: 128 MMHG | RESPIRATION RATE: 16 BRPM

## 2019-05-08 DIAGNOSIS — D72.828 OTHER ELEVATED WHITE BLOOD CELL (WBC) COUNT: ICD-10-CM

## 2019-05-08 DIAGNOSIS — R73.9 HYPERGLYCEMIA: ICD-10-CM

## 2019-05-08 DIAGNOSIS — T50.905A ADVERSE EFFECT OF DRUG, INITIAL ENCOUNTER: Primary | ICD-10-CM

## 2019-05-08 PROCEDURE — 83036 HEMOGLOBIN GLYCOSYLATED A1C: CPT

## 2019-05-08 PROCEDURE — 93798 PHYS/QHP OP CAR RHAB W/ECG: CPT

## 2019-05-08 PROCEDURE — 99213 OFFICE O/P EST LOW 20 MIN: CPT | Performed by: INTERNAL MEDICINE

## 2019-05-08 PROCEDURE — 85025 COMPLETE CBC W/AUTO DIFF WBC: CPT

## 2019-05-08 PROCEDURE — 1111F DSCHRG MED/CURRENT MED MERGE: CPT | Performed by: INTERNAL MEDICINE

## 2019-05-08 PROCEDURE — 80048 BASIC METABOLIC PNL TOTAL CA: CPT

## 2019-05-08 RX ORDER — EZETIMIBE 10 MG/1
10 TABLET ORAL EVERY OTHER DAY
Refills: 4 | COMMUNITY
Start: 2019-05-01

## 2019-05-08 NOTE — PROGRESS NOTES
Patient presents with:  Hospital F/U:  Rm 9;[facility] 4/19, headache resolved, nausea resolved  Post-Op: cardiac stent placement 12/31, feeling well      HPI:  Here for f/u from er for ha and nausea with bp elevation, leukocytosis and hyeprglycemia all p (20 mg total) by mouth nightly. Disp: 30 tablet Rfl: 2   Prasugrel HCl 10 MG Oral Tab Take 1 tablet (10 mg total) by mouth daily. Disp: 30 tablet Rfl: 2   atenolol 50 MG Oral Tab Take 1 tablet (50 mg total) by mouth daily.  Disp: 30 tablet Rfl: 0   ezetimib

## 2019-05-10 ENCOUNTER — CARDPULM VISIT (OUTPATIENT)
Dept: CARDIAC REHAB | Facility: HOSPITAL | Age: 69
End: 2019-05-10
Attending: INTERNAL MEDICINE
Payer: MEDICARE

## 2019-05-10 PROBLEM — R73.03 PREDIABETES: Status: ACTIVE | Noted: 2019-05-10

## 2019-05-10 PROCEDURE — 93798 PHYS/QHP OP CAR RHAB W/ECG: CPT

## 2019-05-15 ENCOUNTER — CARDPULM VISIT (OUTPATIENT)
Dept: CARDIAC REHAB | Facility: HOSPITAL | Age: 69
End: 2019-05-15
Attending: INTERNAL MEDICINE
Payer: MEDICARE

## 2019-05-15 PROCEDURE — 93798 PHYS/QHP OP CAR RHAB W/ECG: CPT

## 2019-05-17 ENCOUNTER — CARDPULM VISIT (OUTPATIENT)
Dept: CARDIAC REHAB | Facility: HOSPITAL | Age: 69
End: 2019-05-17
Attending: INTERNAL MEDICINE
Payer: MEDICARE

## 2019-05-17 PROCEDURE — 93798 PHYS/QHP OP CAR RHAB W/ECG: CPT

## 2019-05-20 ENCOUNTER — CARDPULM VISIT (OUTPATIENT)
Dept: CARDIAC REHAB | Facility: HOSPITAL | Age: 69
End: 2019-05-20
Attending: INTERNAL MEDICINE
Payer: MEDICARE

## 2019-05-20 PROCEDURE — 93798 PHYS/QHP OP CAR RHAB W/ECG: CPT

## 2019-05-22 ENCOUNTER — CARDPULM VISIT (OUTPATIENT)
Dept: CARDIAC REHAB | Facility: HOSPITAL | Age: 69
End: 2019-05-22
Attending: INTERNAL MEDICINE
Payer: MEDICARE

## 2019-05-22 PROCEDURE — 93798 PHYS/QHP OP CAR RHAB W/ECG: CPT

## 2019-05-23 ENCOUNTER — CARDPULM VISIT (OUTPATIENT)
Dept: CARDIAC REHAB | Facility: HOSPITAL | Age: 69
End: 2019-05-23
Attending: INTERNAL MEDICINE
Payer: MEDICARE

## 2019-05-23 ENCOUNTER — TELEPHONE (OUTPATIENT)
Dept: CARDIOLOGY | Age: 69
End: 2019-05-23

## 2019-05-23 PROCEDURE — 93798 PHYS/QHP OP CAR RHAB W/ECG: CPT

## 2019-05-24 ENCOUNTER — CARDPULM VISIT (OUTPATIENT)
Dept: CARDIAC REHAB | Facility: HOSPITAL | Age: 69
End: 2019-05-24
Attending: INTERNAL MEDICINE
Payer: MEDICARE

## 2019-05-24 PROCEDURE — 93798 PHYS/QHP OP CAR RHAB W/ECG: CPT

## 2019-05-27 ENCOUNTER — APPOINTMENT (OUTPATIENT)
Dept: CARDIAC REHAB | Facility: HOSPITAL | Age: 69
End: 2019-05-27
Attending: INTERNAL MEDICINE
Payer: MEDICARE

## 2019-06-05 ENCOUNTER — TELEPHONE (OUTPATIENT)
Dept: INTERNAL MEDICINE CLINIC | Facility: CLINIC | Age: 69
End: 2019-06-05

## 2019-06-05 NOTE — TELEPHONE ENCOUNTER
Medicare wellness   Future Appointments   Date Time Provider Tay Padmini   8/12/2019  2:15 PM Marisa Mace MD [facility]     Orders to [place] bravo must fast no call back required

## 2019-06-06 ENCOUNTER — LAB ENCOUNTER (OUTPATIENT)
Dept: LAB | Facility: HOSPITAL | Age: 69
End: 2019-06-06
Attending: INTERNAL MEDICINE
Payer: MEDICARE

## 2019-06-06 DIAGNOSIS — E78.00 PURE HYPERCHOLESTEROLEMIA: Primary | ICD-10-CM

## 2019-06-06 LAB
ALBUMIN SERPL-MCNC: 3.5 G/DL
ALBUMIN/GLOB SERPL: NORMAL {RATIO}
ALP SERPL-CCNC: 91 U/L
ALT SERPL-CCNC: 21 U/L
ANION GAP SERPL CALC-SCNC: NORMAL MMOL/L
AST SERPL-CCNC: 21 U/L
BILIRUB SERPL-MCNC: 1 MG/DL
BUN SERPL-MCNC: NORMAL MG/DL
BUN/CREAT SERPL: NORMAL
CALCIUM SERPL-MCNC: NORMAL MG/DL
CHLORIDE SERPL-SCNC: NORMAL MMOL/L
CHOLEST SERPL-MCNC: 156 MG/DL
CHOLEST/HDLC SERPL: NORMAL {RATIO}
CO2 SERPL-SCNC: NORMAL MMOL/L
CREAT SERPL-MCNC: NORMAL MG/DL
GLOBULIN SER-MCNC: NORMAL G/DL
GLUCOSE SERPL-MCNC: NORMAL MG/DL
HDLC SERPL-MCNC: 61 MG/DL
LDLC SERPL CALC-MCNC: 51 MG/DL
LENGTH OF FAST TIME PATIENT: NORMAL H
LENGTH OF FAST TIME PATIENT: NORMAL H
NONHDLC SERPL-MCNC: 95 MG/DL
POTASSIUM SERPL-SCNC: NORMAL MMOL/L
PROT SERPL-MCNC: 7.2 G/DL
SODIUM SERPL-SCNC: NORMAL MMOL/L
TRIGL SERPL-MCNC: 218 MG/DL
VLDLC SERPL CALC-MCNC: NORMAL MG/DL

## 2019-06-06 PROCEDURE — 36415 COLL VENOUS BLD VENIPUNCTURE: CPT

## 2019-06-06 PROCEDURE — 80076 HEPATIC FUNCTION PANEL: CPT

## 2019-06-06 PROCEDURE — 80061 LIPID PANEL: CPT

## 2019-06-07 ENCOUNTER — TELEPHONE (OUTPATIENT)
Dept: CARDIOLOGY | Age: 69
End: 2019-06-07

## 2019-06-11 ENCOUNTER — CLINICAL ABSTRACT (OUTPATIENT)
Dept: CARDIOLOGY | Age: 69
End: 2019-06-11

## 2019-06-26 ENCOUNTER — TELEPHONE (OUTPATIENT)
Dept: INTERNAL MEDICINE CLINIC | Facility: CLINIC | Age: 69
End: 2019-06-26

## 2019-06-26 NOTE — TELEPHONE ENCOUNTER
Called pt for more information. Pt reports bilateral ankle/feet swelling that started a couple days ago. Pt reports L ankle is more swollen than R ankle which is also the same side of her broken hip (occurred last year).  Pt currently denies shortness of

## 2019-06-26 NOTE — TELEPHONE ENCOUNTER
Pt has had Ankle swelling for a couple of days. She does have a stent and wants to make sure.      Future Appointments   Date Time Provider Tay Padmini   6/27/2019 11:00 AM Ruthie Hernandez PA-C [facility]

## 2019-06-27 ENCOUNTER — OFFICE VISIT (OUTPATIENT)
Dept: INTERNAL MEDICINE CLINIC | Facility: CLINIC | Age: 69
End: 2019-06-27
Payer: MEDICARE

## 2019-06-27 VITALS
HEIGHT: 63 IN | HEART RATE: 60 BPM | DIASTOLIC BLOOD PRESSURE: 84 MMHG | RESPIRATION RATE: 16 BRPM | BODY MASS INDEX: 27.46 KG/M2 | TEMPERATURE: 99 F | SYSTOLIC BLOOD PRESSURE: 126 MMHG | WEIGHT: 155 LBS

## 2019-06-27 DIAGNOSIS — M25.473 ANKLE SWELLING, UNSPECIFIED LATERALITY: Primary | ICD-10-CM

## 2019-06-27 PROCEDURE — 99213 OFFICE O/P EST LOW 20 MIN: CPT | Performed by: PHYSICIAN ASSISTANT

## 2019-06-27 RX ORDER — TRIAMTERENE AND HYDROCHLOROTHIAZIDE 37.5; 25 MG/1; MG/1
0.5 TABLET ORAL DAILY
COMMUNITY
End: 2020-03-18

## 2019-06-27 NOTE — PROGRESS NOTES
Patient presents with:  Swelling: Pt is having bilateral ankle swelling going on for the past 3 days. LB-rm 6      HPI:  Pt presents with c/o swelling in her ankles, L>R over the last 2-3 days.   She does have a h/o CAD with \"stent\" placement last Novembe Outpatient Medications:  Triamterene-HCTZ 37.5-25 MG Oral Tab Take 0.5 tablets by mouth daily. Disp:  Rfl:    ezetimibe 10 MG Oral Tab Take 10 mg by mouth every other day.    Disp:  Rfl: 4   aspirin 81 MG Oral Chew Tab Chew 1 tablet (81 mg total) by mouth d Patient Instructions on file for this visit. All questions were answered and the patient understands the plan.

## 2019-07-17 NOTE — TELEPHONE ENCOUNTER
Scheduled appt   Future Appointments   Date Time Provider Tay Padmini   8/12/2019  2:15 PM Courtney Odom MD [facility]

## 2019-08-05 ENCOUNTER — LAB ENCOUNTER (OUTPATIENT)
Dept: LAB | Age: 69
End: 2019-08-05
Attending: INTERNAL MEDICINE
Payer: MEDICARE

## 2019-08-05 ENCOUNTER — TELEPHONE (OUTPATIENT)
Dept: INTERNAL MEDICINE CLINIC | Facility: CLINIC | Age: 69
End: 2019-08-05

## 2019-08-05 DIAGNOSIS — R73.03 PREDIABETES: ICD-10-CM

## 2019-08-05 LAB
ALBUMIN SERPL-MCNC: 3.6 G/DL (ref 3.4–5)
ALBUMIN/GLOB SERPL: 0.9 {RATIO} (ref 1–2)
ALP LIVER SERPL-CCNC: 106 U/L (ref 55–142)
ALT SERPL-CCNC: 18 U/L (ref 13–56)
ANION GAP SERPL CALC-SCNC: 6 MMOL/L (ref 0–18)
AST SERPL-CCNC: 16 U/L (ref 15–37)
BILIRUB SERPL-MCNC: 1 MG/DL (ref 0.1–2)
BUN BLD-MCNC: 16 MG/DL (ref 7–18)
BUN/CREAT SERPL: 17.8 (ref 10–20)
CALCIUM BLD-MCNC: 9.8 MG/DL (ref 8.5–10.1)
CHLORIDE SERPL-SCNC: 107 MMOL/L (ref 98–112)
CO2 SERPL-SCNC: 28 MMOL/L (ref 21–32)
CREAT BLD-MCNC: 0.9 MG/DL (ref 0.55–1.02)
EST. AVERAGE GLUCOSE BLD GHB EST-MCNC: 123 MG/DL (ref 68–126)
GLOBULIN PLAS-MCNC: 3.8 G/DL (ref 2.8–4.4)
GLUCOSE BLD-MCNC: 116 MG/DL (ref 70–99)
HBA1C MFR BLD HPLC: 5.9 % (ref ?–5.7)
M PROTEIN MFR SERPL ELPH: 7.4 G/DL (ref 6.4–8.2)
OSMOLALITY SERPL CALC.SUM OF ELEC: 294 MOSM/KG (ref 275–295)
POTASSIUM SERPL-SCNC: 4.8 MMOL/L (ref 3.5–5.1)
SODIUM SERPL-SCNC: 141 MMOL/L (ref 136–145)

## 2019-08-05 PROCEDURE — 83036 HEMOGLOBIN GLYCOSYLATED A1C: CPT

## 2019-08-05 PROCEDURE — 80053 COMPREHEN METABOLIC PANEL: CPT

## 2019-08-05 NOTE — TELEPHONE ENCOUNTER
Pt called and stated that she has been experiencing a lot of shoudler pain after sitting and sleeping and just keeps getting worse.      States that this morning the pain was restricting her from even blow drying her hair     Please advise

## 2019-08-05 NOTE — TELEPHONE ENCOUNTER
Patient states she has been having trouble with bilateral shoulders for about one month that is now affecting her neck, this am could hardly raise her arms, once she gets moving in the am things are better but still discomfort throughout the day with any k

## 2019-08-06 ENCOUNTER — OFFICE VISIT (OUTPATIENT)
Dept: INTERNAL MEDICINE CLINIC | Facility: CLINIC | Age: 69
End: 2019-08-06
Payer: MEDICARE

## 2019-08-06 VITALS
SYSTOLIC BLOOD PRESSURE: 138 MMHG | HEART RATE: 72 BPM | BODY MASS INDEX: 27.64 KG/M2 | DIASTOLIC BLOOD PRESSURE: 80 MMHG | HEIGHT: 63 IN | TEMPERATURE: 98 F | WEIGHT: 156 LBS

## 2019-08-06 DIAGNOSIS — M25.511 ACUTE PAIN OF BOTH SHOULDERS: ICD-10-CM

## 2019-08-06 DIAGNOSIS — M25.512 ACUTE PAIN OF BOTH SHOULDERS: ICD-10-CM

## 2019-08-06 DIAGNOSIS — R73.03 PREDIABETES: Primary | ICD-10-CM

## 2019-08-06 DIAGNOSIS — I10 ESSENTIAL HYPERTENSION: Primary | ICD-10-CM

## 2019-08-06 PROCEDURE — 99214 OFFICE O/P EST MOD 30 MIN: CPT | Performed by: NURSE PRACTITIONER

## 2019-08-06 RX ORDER — PREDNISONE 10 MG/1
10 TABLET ORAL DAILY
Qty: 10 TABLET | Refills: 0 | Status: SHIPPED | OUTPATIENT
Start: 2019-08-06 | End: 2019-08-22

## 2019-08-06 NOTE — PROGRESS NOTES
Cici Solano is a 68year old female. Patient presents with:  Shoulder Pain: LG. Room 10. Bilateral shoulder pain that is going into her neck for about 1 month and its getting worse.  She is having a hard time lifting her arms       HPI:   Presents for blood pressure    • High cholesterol    • Hx of diseases NEC 2005    diverticulosis   • Hx of diseases NEC 1990    had stress test - had some extra beats   • Hx of diseases NEC     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   • Hx of diseases NEC     utd with gyn   • Hyperl GENERAL: well developed, well nourished,in no apparent distress  LUNGS: normal rate without respiratory distress, lungs clear to auscultation  CARDIO: RRR without murmur  EXTREMITIES: no edema, normal strength and tone    MS  Upper trap discomfort.   incr

## 2019-08-12 ENCOUNTER — OFFICE VISIT (OUTPATIENT)
Dept: INTERNAL MEDICINE CLINIC | Facility: CLINIC | Age: 69
End: 2019-08-12
Payer: MEDICARE

## 2019-08-12 ENCOUNTER — APPOINTMENT (OUTPATIENT)
Dept: LAB | Age: 69
End: 2019-08-12
Attending: INTERNAL MEDICINE
Payer: MEDICARE

## 2019-08-12 VITALS
TEMPERATURE: 98 F | SYSTOLIC BLOOD PRESSURE: 132 MMHG | WEIGHT: 154 LBS | HEART RATE: 68 BPM | RESPIRATION RATE: 16 BRPM | HEIGHT: 63 IN | BODY MASS INDEX: 27.29 KG/M2 | DIASTOLIC BLOOD PRESSURE: 80 MMHG

## 2019-08-12 DIAGNOSIS — K63.5 POLYP OF COLON, UNSPECIFIED PART OF COLON, UNSPECIFIED TYPE: ICD-10-CM

## 2019-08-12 DIAGNOSIS — E78.00 HIGH CHOLESTEROL: ICD-10-CM

## 2019-08-12 DIAGNOSIS — E87.6 HYPOKALEMIA: ICD-10-CM

## 2019-08-12 DIAGNOSIS — E04.2 MULTIPLE THYROID NODULES: ICD-10-CM

## 2019-08-12 DIAGNOSIS — M25.512 ACUTE PAIN OF BOTH SHOULDERS: ICD-10-CM

## 2019-08-12 DIAGNOSIS — Z00.00 ENCOUNTER FOR ANNUAL HEALTH EXAMINATION: Primary | ICD-10-CM

## 2019-08-12 DIAGNOSIS — M25.511 ACUTE PAIN OF BOTH SHOULDERS: ICD-10-CM

## 2019-08-12 DIAGNOSIS — Z87.19 HISTORY OF DIVERTICULITIS: ICD-10-CM

## 2019-08-12 DIAGNOSIS — I21.4 NSTEMI (NON-ST ELEVATED MYOCARDIAL INFARCTION) (HCC): ICD-10-CM

## 2019-08-12 DIAGNOSIS — R73.03 PREDIABETES: ICD-10-CM

## 2019-08-12 DIAGNOSIS — Z85.3 HISTORY OF BREAST CANCER: ICD-10-CM

## 2019-08-12 DIAGNOSIS — Z96.642 HISTORY OF LEFT HIP REPLACEMENT: ICD-10-CM

## 2019-08-12 DIAGNOSIS — I10 ESSENTIAL HYPERTENSION: ICD-10-CM

## 2019-08-12 DIAGNOSIS — Z78.0 POST-MENOPAUSAL: ICD-10-CM

## 2019-08-12 DIAGNOSIS — I25.10 CORONARY ARTERY DISEASE INVOLVING NATIVE CORONARY ARTERY OF NATIVE HEART WITHOUT ANGINA PECTORIS: ICD-10-CM

## 2019-08-12 LAB
CRP SERPL-MCNC: 0.78 MG/DL (ref ?–0.3)
SED RATE-ML: 14 MM/HR (ref 0–25)

## 2019-08-12 PROCEDURE — 85652 RBC SED RATE AUTOMATED: CPT

## 2019-08-12 PROCEDURE — G0439 PPPS, SUBSEQ VISIT: HCPCS | Performed by: INTERNAL MEDICINE

## 2019-08-12 PROCEDURE — 86140 C-REACTIVE PROTEIN: CPT

## 2019-08-12 NOTE — PROGRESS NOTES
HPI:   Galina Carlson is a 68year old female who presents for a Medicare Subsequent Annual Wellness visit (Pt already had Initial Annual Wellness). Here for medicare well visit. Has stable cad, htn, high hol, on meds no se's.  She notes gwendolyn shoulder Phyliss Soulier was screened for Alcohol abuse and had a score of 0 so is at low risk.     Patient Care Team: Patient Care Team:  Vic Wilcox MD as PCP - General (Internal Medicine)  Vic Wilcox MD as PCP - [facility] [place] [facility] to the [place]  Rohan Cantu MD (OBSTETRIC tablet (10 mg total) by mouth daily. atenolol 50 MG Oral Tab Take 1 tablet (50 mg total) by mouth daily.       MEDICAL INFORMATION:   She  has a past medical history of Atherosclerosis of coronary artery, [facility] ([facility] Diverticulitis, Hearing Assessment  (Required for AWV/SWV)    Whispered Voice       Visual Acuity                           General Appearance:  Alert, cooperative, no distress, appears stated age   Head:  Normocephalic, without obvious abnormality, atraumatic   Eyes:  PE myocardial infarction) [facility]  See above, resolved  Multiple thyroid nodules  Stable continue observaton  Hypokalemia  resolved  Polyp of colon, unspecified part of colon, unspecified type  Stable continue f/uw with gi  Post-menopausal  Stable continue obse only, or if medically necessary Electrocardiogram date12/30/2018       Colorectal Cancer Screening      Colonoscopy Screen every 10 years Colonoscopy due on 09/07/2021 Update Health Maintenance if applicable    Flex Sigmoidoscopy Screen every 10 years No r This may be covered with your prescription benefits, but Medicare does not cover unless Medically needed    Zoster  Not covered by Medicare Part B No vaccine history found This may be covered with your pharmacy  prescription benefits      SPECIFIC DISEASE

## 2019-08-12 NOTE — PATIENT INSTRUCTIONS
Emily Velasquez's SCREENING SCHEDULE   Tests on this list are recommended by your physician but may not be covered, or covered at this frequency, by your insurer. Please check with your insurance carrier before scheduling to verify coverage.    PREVENTATI Anyone with a family history    Colorectal Cancer Screening  Covered up to Age 76     Colonoscopy Screen   Covered every 10 years- more often if abnormal Colonoscopy due on 09/07/2021 Update Health Maintenance if applicable    Flex Sigmoidoscopy Screen  Co Orders placed or performed in visit on 08/13/18   • PNEUMOCOCCAL VACC, 13 MARLY IM    Please get once after your 65th birthday    Pneumococcal 23 (Pneumovax)  Covered Once after 65 No orders found for this or any previous visit.  Please get once after your 65

## 2019-08-19 ENCOUNTER — TELEPHONE (OUTPATIENT)
Dept: INTERNAL MEDICINE CLINIC | Facility: CLINIC | Age: 69
End: 2019-08-19

## 2019-08-19 DIAGNOSIS — M25.50 ARTHRALGIA, UNSPECIFIED JOINT: Primary | ICD-10-CM

## 2019-08-19 NOTE — TELEPHONE ENCOUNTER
Patient states she saw SD on 8/6/19, prescribed Prednisone which she finished on Sunday, felt the medication was helping her shoulder and hip pain, however today she could hardly dry her hair or get up from a sitting position, feels this is now affecting h

## 2019-08-19 NOTE — TELEPHONE ENCOUNTER
Last appt with SD on 8-6-19 for shoulder pain. .. Pt states as soon as she went off the steroids the pain was back in her shoulder (worse than they were before) and has moved into her back. Pls call to discuss.

## 2019-08-20 ENCOUNTER — TELEPHONE (OUTPATIENT)
Dept: INTERNAL MEDICINE CLINIC | Facility: CLINIC | Age: 69
End: 2019-08-20

## 2019-08-20 DIAGNOSIS — I10 ESSENTIAL HYPERTENSION: Primary | ICD-10-CM

## 2019-08-20 DIAGNOSIS — E78.00 HIGH CHOLESTEROL: ICD-10-CM

## 2019-08-20 NOTE — TELEPHONE ENCOUNTER
See note by me form Telephone encoutner today. Observe, rpt labs sed wayne, crp, ck.  F/u with pt tomorrow

## 2019-08-20 NOTE — TELEPHONE ENCOUNTER
Patient states her pain yesterday was 8 out of 10, today since stopping the Zetia and Atorvastatin is a 2-3, very tolerable, joints warm not hot.   Pt notified to have Sed Rate and CRP labs today and we will call to schedule her with a Rheumatologist.  Pt s

## 2019-08-20 NOTE — TELEPHONE ENCOUNTER
DIscussed with pt gwendolyn shoulder and hp pain better after hold statin and zetia one day. She will compee labs. Denies chest pain or sob. No symptoms similar to cad and stent hx in 12/18.

## 2019-08-21 ENCOUNTER — APPOINTMENT (OUTPATIENT)
Dept: LAB | Age: 69
End: 2019-08-21
Attending: INTERNAL MEDICINE
Payer: MEDICARE

## 2019-08-21 DIAGNOSIS — E78.00 HIGH CHOLESTEROL: ICD-10-CM

## 2019-08-21 DIAGNOSIS — M25.50 ARTHRALGIA, UNSPECIFIED JOINT: ICD-10-CM

## 2019-08-21 DIAGNOSIS — I10 ESSENTIAL HYPERTENSION: ICD-10-CM

## 2019-08-21 LAB
CK SERPL-CCNC: 49 U/L (ref 26–192)
CRP SERPL-MCNC: 3.7 MG/DL (ref ?–0.3)
SED RATE-ML: 30 MM/HR (ref 0–25)

## 2019-08-21 PROCEDURE — 86140 C-REACTIVE PROTEIN: CPT

## 2019-08-21 PROCEDURE — 82550 ASSAY OF CK (CPK): CPT

## 2019-08-21 PROCEDURE — 85652 RBC SED RATE AUTOMATED: CPT

## 2019-08-21 NOTE — TELEPHONE ENCOUNTER
Spoke with patient states she is doing ok, not as well as yesterday. Patient states was doing 75% better, but today only 50%, will continue to stay off the Atorvastatin and Zetia, and complete labs this afternoon. FYI to AS.

## 2019-08-22 DIAGNOSIS — M25.512 BILATERAL SHOULDER PAIN, UNSPECIFIED CHRONICITY: Primary | ICD-10-CM

## 2019-08-22 DIAGNOSIS — M25.511 BILATERAL SHOULDER PAIN, UNSPECIFIED CHRONICITY: Primary | ICD-10-CM

## 2019-08-22 RX ORDER — PREDNISONE 10 MG/1
10 TABLET ORAL DAILY
Qty: 10 TABLET | Refills: 0 | Status: SHIPPED | OUTPATIENT
Start: 2019-08-22 | End: 2019-08-28

## 2019-08-27 ENCOUNTER — TELEPHONE (OUTPATIENT)
Dept: INTERNAL MEDICINE CLINIC | Facility: CLINIC | Age: 69
End: 2019-08-27

## 2019-08-27 NOTE — TELEPHONE ENCOUNTER
Patient states when we had spoken on 8/22/19, AS had wanted pt to take Prednisone 30mg daily and see Rheumatology. Pt was afraid to do that after a reaction to a cortisone injection in her knees.   Pt states she was comfortable only taking Prednisone 10mg

## 2019-08-27 NOTE — TELEPHONE ENCOUNTER
Patient notified per AS to take Prednisone 20mg daily until she sees Dr Carri Christianson Rheumatologist 9/9/19. Pt will need a new script at appt with AS for tomorrow 8/28/19. Pt verbalizes understanding. PETERI to AS.

## 2019-08-28 ENCOUNTER — OFFICE VISIT (OUTPATIENT)
Dept: INTERNAL MEDICINE CLINIC | Facility: CLINIC | Age: 69
End: 2019-08-28
Payer: MEDICARE

## 2019-08-28 VITALS
TEMPERATURE: 98 F | DIASTOLIC BLOOD PRESSURE: 74 MMHG | BODY MASS INDEX: 27.29 KG/M2 | HEIGHT: 63 IN | HEART RATE: 68 BPM | WEIGHT: 154 LBS | SYSTOLIC BLOOD PRESSURE: 132 MMHG | RESPIRATION RATE: 16 BRPM

## 2019-08-28 DIAGNOSIS — M35.3 PMR (POLYMYALGIA RHEUMATICA) (HCC): Primary | ICD-10-CM

## 2019-08-28 DIAGNOSIS — M25.511 BILATERAL SHOULDER PAIN, UNSPECIFIED CHRONICITY: ICD-10-CM

## 2019-08-28 DIAGNOSIS — M25.512 BILATERAL SHOULDER PAIN, UNSPECIFIED CHRONICITY: ICD-10-CM

## 2019-08-28 PROCEDURE — 99213 OFFICE O/P EST LOW 20 MIN: CPT | Performed by: INTERNAL MEDICINE

## 2019-08-28 RX ORDER — PREDNISONE 10 MG/1
10 TABLET ORAL 2 TIMES DAILY
Qty: 60 TABLET | Refills: 0 | Status: SHIPPED | OUTPATIENT
Start: 2019-08-28 | End: 2019-09-10

## 2019-08-28 RX ORDER — MULTIVIT-MIN/IRON FUM/FOLIC AC 7.5 MG-4
1 TABLET ORAL DAILY
COMMUNITY
End: 2019-09-10

## 2019-08-28 NOTE — PROGRESS NOTES
Patient presents with:  Joint Pain: F/U, notes improvment with steroids      HPI:  Pt here for f/u of hip and arm pain, with stiffness. Started amonth ago.  Sed rate up, started prednnonse short sourse, improved, stopped steroids smptoms reutned and sed rat tablet Rfl: 0   Triamterene-HCTZ 37.5-25 MG Oral Tab Take 0.5 tablets by mouth daily. Disp:  Rfl:    aspirin 81 MG Oral Chew Tab Chew 1 tablet (81 mg total) by mouth daily.  Disp: 30 tablet Rfl: 2   Prasugrel HCl 10 MG Oral Tab Take 1 tablet (10 mg total) b file.  There are no Patient Instructions on file for this visit. All questions were answered and the patient understands the plan.

## 2019-08-29 ENCOUNTER — TELEPHONE (OUTPATIENT)
Dept: INTERNAL MEDICINE CLINIC | Facility: CLINIC | Age: 69
End: 2019-08-29

## 2019-08-29 NOTE — TELEPHONE ENCOUNTER
Pt stated she misplaced her medication for Prednisone. She doesn't know what to do and is due to take it right now. Please advise ASAP. She stated this is urgent.

## 2019-08-29 NOTE — TELEPHONE ENCOUNTER
Pt called back stated she found it in a plastic bag. She said to disregard message.  Ok to close nothing further is needed

## 2019-09-09 ENCOUNTER — OFFICE VISIT (OUTPATIENT)
Dept: RHEUMATOLOGY | Facility: CLINIC | Age: 69
End: 2019-09-09
Payer: MEDICARE

## 2019-09-09 VITALS
RESPIRATION RATE: 16 BRPM | WEIGHT: 157 LBS | HEART RATE: 66 BPM | TEMPERATURE: 99 F | DIASTOLIC BLOOD PRESSURE: 85 MMHG | SYSTOLIC BLOOD PRESSURE: 160 MMHG | BODY MASS INDEX: 28 KG/M2

## 2019-09-09 DIAGNOSIS — Z87.19 HISTORY OF DIVERTICULITIS: ICD-10-CM

## 2019-09-09 DIAGNOSIS — R79.82 ELEVATED C-REACTIVE PROTEIN (CRP): ICD-10-CM

## 2019-09-09 DIAGNOSIS — M85.89 OSTEOPENIA OF MULTIPLE SITES: ICD-10-CM

## 2019-09-09 DIAGNOSIS — R70.0 ELEVATED SED RATE: ICD-10-CM

## 2019-09-09 DIAGNOSIS — M35.3 PMR (POLYMYALGIA RHEUMATICA) (HCC): Primary | ICD-10-CM

## 2019-09-09 DIAGNOSIS — Z79.52 CURRENT CHRONIC USE OF SYSTEMIC STEROIDS: ICD-10-CM

## 2019-09-09 DIAGNOSIS — E55.9 VITAMIN D DEFICIENCY: ICD-10-CM

## 2019-09-09 PROCEDURE — 99204 OFFICE O/P NEW MOD 45 MIN: CPT | Performed by: INTERNAL MEDICINE

## 2019-09-09 RX ORDER — PREDNISONE 1 MG/1
5 TABLET ORAL DAILY
Qty: 180 TABLET | Refills: 0 | Status: SHIPPED | OUTPATIENT
Start: 2019-09-09 | End: 2019-09-10

## 2019-09-09 NOTE — PATIENT INSTRUCTIONS
-- decrease prednisone to 17.5mg daily for the next 2 weeks, then decrease to 15mg daily  -- get updated inflammatory markers in one month (non-fasting)  -- depending on your tests as well as your symptoms, will determine next steps in steroid management methotrexate depending on how you respond to decreasing steroids   Preventing Osteoporosis: Meeting Your Calcium Needs    Your body needs calcium to build and repair bones. But it can't make calcium on its own.  That's why it's important to eat calcium-rich professional's instructions. Preventing Osteoporosis: Avoiding Bone Loss  Certain factors can speed up bone loss or decrease bone growth. For example, alcohol, cigarettes, and certain medicines reduce bone mass.  Some foods make it hard for your body may limit what you can do. But hip fractures are very serious. They often need surgery, and it can take months to recover. To reduce your risk for fractures:  · Get regular exercise. Try walking, swimming, or weight training.   · Eat foods that are rich in

## 2019-09-09 NOTE — PROGRESS NOTES
?  RHEUMATOLOGY NEW PATIENT   Date of visit: 9/9/2019  ? Patient presents with:  Joint Pain: NP ref by PCP for joint pain, abnormal labs. Pt states 'symptoms started in early July this year.  Hips and shoulders ache will but is on prednisone right now an 2 weeks, then decrease to 15mg daily  -- get updated inflammatory markers in one month (non-fasting)  -- depending on your tests as well as your symptoms, will determine next steps in steroid management  -- read about methotrexate below and with hand out p This was discussed in detail as a risk. Should pregnancy occur, this medication would be extremely toxic to the fetus. While on methotrexate, sulfa based drugs including but not limited to Bactrim, should be avoided due to potential toxicity.      Patient well as reaching. She was initially started on oral prednisone 10mg with some improvement of symptoms. Would be on for about 10 days at a time. 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  Had undergone bilateral knee injectio Hyperlipidemia    • Other and unspecified personal history of malignant neoplasm 2004    breast cancer   • Unspecified menopausal and postmenopausal disorder     treated with effexor - however pt would like to get off med     Past Surgical History:  Past S Rfl: 0     Modified Medications    No medications on file     There are no discontinued medications. ?  ?  Allergies:    Ramipril                ANAPHYLAXIS    Comment:Oral  Altace [Ramipril]       ANAPHYLAXIS    Comment:angioedema  ?   REVIEW OF SYSTEMS Cardiovascular: Normal rate, regular rhythm, normal heart sounds and intact distal pulses. No murmur heard. Pulmonary/Chest: Effort normal and breath sounds normal. No respiratory distress. She has no wheezes. Abdominal: Soft.  She exhibits no disten 05/08/2019    BAABSO 0.06 05/08/2019     Lab Results   Component Value Date     (H) 08/05/2019    BUN 16 08/05/2019    BUNCREA 17.8 08/05/2019    CREATSERUM 0.90 08/05/2019    ANIONGAP 6 08/05/2019    GFR 54 (L) 09/01/2017    GFRNAA 65 08/05/2019

## 2019-09-10 ENCOUNTER — HOSPITAL ENCOUNTER (OUTPATIENT)
Facility: HOSPITAL | Age: 69
Setting detail: OBSERVATION
Discharge: HOME | End: 2019-09-11
Attending: EMERGENCY MEDICINE | Admitting: HOSPITALIST
Payer: MEDICARE

## 2019-09-10 ENCOUNTER — APPOINTMENT (OUTPATIENT)
Dept: GENERAL RADIOLOGY | Facility: HOSPITAL | Age: 69
End: 2019-09-10
Attending: EMERGENCY MEDICINE
Payer: MEDICARE

## 2019-09-10 DIAGNOSIS — R07.9 CHEST PAIN OF UNCERTAIN ETIOLOGY: Primary | ICD-10-CM

## 2019-09-10 PROBLEM — Z95.5 PRESENCE OF DRUG COATED STENT IN RIGHT CORONARY ARTERY: Status: ACTIVE | Noted: 2019-09-10

## 2019-09-10 PROCEDURE — 99215 OFFICE O/P EST HI 40 MIN: CPT | Performed by: INTERNAL MEDICINE

## 2019-09-10 PROCEDURE — 99220 INITIAL OBSERVATION CARE,LEVL III: CPT | Performed by: HOSPITALIST

## 2019-09-10 PROCEDURE — 71045 X-RAY EXAM CHEST 1 VIEW: CPT | Performed by: EMERGENCY MEDICINE

## 2019-09-10 RX ORDER — PREDNISONE 10 MG/1
15 TABLET ORAL DAILY
COMMUNITY
Start: 2019-09-24 | End: 2019-09-21

## 2019-09-10 RX ORDER — ACETAMINOPHEN 325 MG/1
650 TABLET ORAL EVERY 6 HOURS PRN
Status: DISCONTINUED | OUTPATIENT
Start: 2019-09-10 | End: 2019-09-11

## 2019-09-10 RX ORDER — TEMAZEPAM 15 MG/1
15 CAPSULE ORAL NIGHTLY PRN
Status: DISCONTINUED | OUTPATIENT
Start: 2019-09-10 | End: 2019-09-11

## 2019-09-10 RX ORDER — METOCLOPRAMIDE HYDROCHLORIDE 5 MG/ML
5 INJECTION INTRAMUSCULAR; INTRAVENOUS EVERY 8 HOURS PRN
Status: DISCONTINUED | OUTPATIENT
Start: 2019-09-10 | End: 2019-09-11

## 2019-09-10 RX ORDER — PREDNISONE 10 MG/1
10 TABLET ORAL DAILY
COMMUNITY
Start: 2019-10-08 | End: 2019-09-21

## 2019-09-10 RX ORDER — NITROGLYCERIN 0.4 MG/1
0.4 TABLET SUBLINGUAL EVERY 5 MIN PRN
Status: DISCONTINUED | OUTPATIENT
Start: 2019-09-10 | End: 2019-09-11

## 2019-09-10 RX ORDER — PREDNISONE 1 MG/1
5 TABLET ORAL DAILY
COMMUNITY
Start: 2019-10-23 | End: 2019-09-21

## 2019-09-10 RX ORDER — PRASUGREL 10 MG/1
10 TABLET, FILM COATED ORAL DAILY
Status: DISCONTINUED | OUTPATIENT
Start: 2019-09-10 | End: 2019-09-11

## 2019-09-10 RX ORDER — ATORVASTATIN CALCIUM 20 MG/1
20 TABLET, FILM COATED ORAL NIGHTLY
Status: DISCONTINUED | OUTPATIENT
Start: 2019-09-10 | End: 2019-09-11

## 2019-09-10 RX ORDER — AMLODIPINE BESYLATE 2.5 MG/1
2.5 TABLET ORAL DAILY
Status: DISCONTINUED | OUTPATIENT
Start: 2019-09-10 | End: 2019-09-11

## 2019-09-10 RX ORDER — ASPIRIN 81 MG/1
81 TABLET ORAL DAILY
COMMUNITY

## 2019-09-10 RX ORDER — DOXEPIN HYDROCHLORIDE 50 MG/1
1 CAPSULE ORAL DAILY
Status: DISCONTINUED | OUTPATIENT
Start: 2019-09-10 | End: 2019-09-11

## 2019-09-10 RX ORDER — HYDRALAZINE HYDROCHLORIDE 20 MG/ML
10 INJECTION INTRAMUSCULAR; INTRAVENOUS EVERY 6 HOURS PRN
Status: DISCONTINUED | OUTPATIENT
Start: 2019-09-10 | End: 2019-09-11

## 2019-09-10 RX ORDER — MULTIVITAMIN WITH FOLIC ACID 400 MCG
1 TABLET ORAL DAILY
COMMUNITY

## 2019-09-10 RX ORDER — ATENOLOL 50 MG/1
50 TABLET ORAL DAILY
Status: DISCONTINUED | OUTPATIENT
Start: 2019-09-10 | End: 2019-09-11

## 2019-09-10 RX ORDER — PREDNISONE 10 MG/1
17.5 TABLET ORAL DAILY
COMMUNITY
Start: 2019-09-10 | End: 2019-09-23

## 2019-09-10 RX ORDER — ONDANSETRON 2 MG/ML
4 INJECTION INTRAMUSCULAR; INTRAVENOUS EVERY 6 HOURS PRN
Status: DISCONTINUED | OUTPATIENT
Start: 2019-09-10 | End: 2019-09-11

## 2019-09-10 RX ORDER — TRIAMTERENE AND HYDROCHLOROTHIAZIDE 75; 50 MG/1; MG/1
0.25 TABLET ORAL DAILY
Status: DISCONTINUED | OUTPATIENT
Start: 2019-09-10 | End: 2019-09-11

## 2019-09-10 RX ORDER — ASPIRIN 81 MG/1
81 TABLET ORAL DAILY
Status: DISCONTINUED | OUTPATIENT
Start: 2019-09-10 | End: 2019-09-11

## 2019-09-10 RX ORDER — EZETIMIBE 10 MG/1
10 TABLET ORAL EVERY OTHER DAY
Status: DISCONTINUED | OUTPATIENT
Start: 2019-09-10 | End: 2019-09-11

## 2019-09-10 RX ORDER — PREDNISONE 1 MG/1
17.5 TABLET ORAL DAILY
COMMUNITY
Start: 2019-09-10 | End: 2019-09-23

## 2019-09-10 NOTE — PLAN OF CARE
Admission Note    Patient brought to room. Oriented to room, shown call light use. Orthostatic BP taken. Vital signs stable. Home medications reviewed with patient. Patient updated on current plan of care. Plan is cath in AM, NPO after midnight.  Patient ve

## 2019-09-10 NOTE — CONSULTS
[facility]  Cardiology Consultation    Orlando Mention Patient Status:  Observation    1950 MRN [id number]   [facility] 2NE-A Attending Pamela Santos MD   Hosp Day # 0 PCP Adrian Christensen MD     Reason for Consultation:  Chest p     diverticulitis - had L sigmoidectomy   • SPECIAL SERVICE OR REPORT      ventral hernia repair     Family History   Problem Relation Age of Onset   • Heart Disorder Father         mi at 55 and  at 48 of 9th mi   • Lipids Father    • Heart [place] MG/5ML suspension 30 mL, 30 mL, Oral, Daily PRN  •  ondansetron HCl (ZOFRAN) injection 4 mg, 4 mg, Intravenous, Q6H PRN  •  Metoclopramide HCl (REGLAN) injection 10 mg, 10 mg, Intravenous, Q8H PRN    Review of Systems:  A comprehensive review of systems wa very similar to November when she had nonstemi  Active Problems:    Essential hypertension: bp up today. Pure hypercholesterolemia    CAD (coronary artery disease)    Presence of drug coated stent in right coronary artery          Recommendations:  1.

## 2019-09-10 NOTE — H&P
[facility]  History and Physical     Augustina Flaming Patient Status:  Emergency    1950 MRN [id number]   Location [facility] Attending Will, Blayne Breen MD   Hosp Day # 0 PCP Elzbieta Alcantar MD     Chief Complaint: RIGHT  2004   • RADIATION RIGHT  2004   • SPECIAL SERVICE OR REPORT  2005    diverticulitis - had L sigmoidectomy   • SPECIAL SERVICE OR REPORT  2005    ventral hernia repair       Social History:  reports that she has never smoked.  She has never used smok tablet (20 mg total) by mouth nightly. Disp: 30 tablet Rfl: 2   Prasugrel HCl 10 MG Oral Tab Take 1 tablet (10 mg total) by mouth daily. Disp: 30 tablet Rfl: 2   atenolol 50 MG Oral Tab Take 1 tablet (50 mg total) by mouth daily.  Disp: 30 tablet Rfl: 0   [ 7.1       Estimated Creatinine Clearance: 39.5 mL/min (A) (based on SCr of 1.08 mg/dL (H)). No results for input(s): PTP, INR in the last 168 hours. Recent Labs   Lab 09/10/19  1518   TROP <0.045       Imaging: Imaging data reviewed in Epic.       ASS

## 2019-09-10 NOTE — ED INITIAL ASSESSMENT (HPI)
Per patient, this morning she has some shortness of breath with pressure like pain in the center of her chest with walking up the stairs.  Symptoms have resolved with rest. AAO X 4.

## 2019-09-10 NOTE — HISTORICAL OFFICE NOTE
3/19/2019     [facility]  Clinic Note     Orlando Mention  : 1950  PCP: Adrian Christensen MD     Reason for Visit:                   Chief Complaint   Patient presents with MG tablet Take 1 tablet by mouth daily.       • atorvastatin (LIPITOR) 20 MG tablet TAKE 1 TABLET DAILY AT BEDTIME.       • Multiple Vitamins-Minerals (MULTIVITAMIN ADULTS 50+) Tab Take 1 tablet by mouth daily.       • prasugrel (EFFIENT) 10 MG Tab Take 1 TRIGLYCERIDE, AST, ALT in the last 72 hours.        Impression:                     1. Coronary artery disease involving native coronary artery of native heart with unstable angina pectoris (CMS/HCC)    2. History of heart artery stent    3.  Status post no Weldon Estrellita JL 3.0 guide catheter.     Following diagnostic angiography as described above decision was made to proceed with percutaneous revascularization.     The patient was given a bolus of Angiomax and begun on a continuous infusion.   An ACT was drawn to e

## 2019-09-10 NOTE — ED PROVIDER NOTES
Patient Seen in: [facility] Emergency Department    History   No chief complaint on file.     Stated Complaint: Chest pain with exertion    HPI    The patient is a 75-year-old female with a history of coronary disease, status post stenting this past sigmoidectomy   • SPECIAL SERVICE OR REPORT  2005    ventral hernia repair                    Social History    Tobacco Use      Smoking status: Never Smoker      Smokeless tobacco: Never Used    Alcohol use:  Yes      Alcohol/week: 2.0 - 3.0 standard drink extremities. Distal pulses normal and symmetric. No calf swelling, asymmetry, tenderness or cords. Skin: No masses or nodules or abnormalities.   Psych: Normal interaction, cooperative with exam       ED Course     Labs Reviewed   COMP METABOLIC PANEL (1 waves in leads V1 and V2. This is an abnormal EKG. But no evidence of acute ischemia. And it is similar to her previous EKG obtained on December 30, 2018. On arrival of the patient an EKG was obtained which showed no acute process.   The pat on Admission  Date Reviewed: 9/9/2019          ICD-10-CM Noted POA    Chest pain of uncertain etiology K49.84 9/10/2019 Unknown

## 2019-09-11 ENCOUNTER — APPOINTMENT (OUTPATIENT)
Dept: INTERVENTIONAL RADIOLOGY/VASCULAR | Facility: HOSPITAL | Age: 69
End: 2019-09-11
Attending: INTERNAL MEDICINE
Payer: MEDICARE

## 2019-09-11 VITALS
SYSTOLIC BLOOD PRESSURE: 118 MMHG | OXYGEN SATURATION: 99 % | BODY MASS INDEX: 27 KG/M2 | WEIGHT: 152.56 LBS | TEMPERATURE: 98 F | HEART RATE: 60 BPM | RESPIRATION RATE: 16 BRPM | DIASTOLIC BLOOD PRESSURE: 56 MMHG

## 2019-09-11 PROCEDURE — 99152 MOD SED SAME PHYS/QHP 5/>YRS: CPT | Performed by: INTERNAL MEDICINE

## 2019-09-11 PROCEDURE — 99217 OBSERVATION CARE DISCHARGE: CPT | Performed by: HOSPITALIST

## 2019-09-11 PROCEDURE — B2111ZZ FLUOROSCOPY OF MULTIPLE CORONARY ARTERIES USING LOW OSMOLAR CONTRAST: ICD-10-PCS | Performed by: INTERNAL MEDICINE

## 2019-09-11 PROCEDURE — 93454 CORONARY ARTERY ANGIO S&I: CPT | Performed by: INTERNAL MEDICINE

## 2019-09-11 RX ORDER — LIDOCAINE HYDROCHLORIDE 10 MG/ML
INJECTION, SOLUTION EPIDURAL; INFILTRATION; INTRACAUDAL; PERINEURAL
Status: COMPLETED
Start: 2019-09-11 | End: 2019-09-11

## 2019-09-11 RX ORDER — SODIUM CHLORIDE 9 MG/ML
INJECTION, SOLUTION INTRAVENOUS CONTINUOUS
Status: ACTIVE | OUTPATIENT
Start: 2019-09-11 | End: 2019-09-11

## 2019-09-11 RX ORDER — SODIUM CHLORIDE 9 MG/ML
INJECTION, SOLUTION INTRAVENOUS
Status: COMPLETED | OUTPATIENT
Start: 2019-09-12 | End: 2019-09-11

## 2019-09-11 RX ORDER — ASPIRIN 81 MG/1
324 TABLET, CHEWABLE ORAL ONCE
Status: COMPLETED | OUTPATIENT
Start: 2019-09-11 | End: 2019-09-11

## 2019-09-11 RX ORDER — MIDAZOLAM HYDROCHLORIDE 1 MG/ML
INJECTION INTRAMUSCULAR; INTRAVENOUS
Status: COMPLETED
Start: 2019-09-11 | End: 2019-09-11

## 2019-09-11 RX ORDER — HEPARIN SODIUM 5000 [USP'U]/ML
INJECTION, SOLUTION INTRAVENOUS; SUBCUTANEOUS
Status: COMPLETED
Start: 2019-09-11 | End: 2019-09-11

## 2019-09-11 RX ORDER — AMLODIPINE BESYLATE 2.5 MG/1
2.5 TABLET ORAL DAILY
Qty: 30 TABLET | Refills: 3 | Status: SHIPPED | OUTPATIENT
Start: 2019-09-12 | End: 2020-08-20

## 2019-09-11 RX ORDER — VERAPAMIL HYDROCHLORIDE 2.5 MG/ML
INJECTION, SOLUTION INTRAVENOUS
Status: COMPLETED
Start: 2019-09-11 | End: 2019-09-11

## 2019-09-11 NOTE — PROGRESS NOTES
[facility] Pharmacy Note:  Renal Dose Adjustment for Metoclopramide (REGLAN)    Rowan Boast A Erlene [place] has been prescribed Metoclopramide (REGLAN) 10 mg every 8 hours as needed for nausea    Estimated Creatinine Clearance: 39.5 mL/min (A) (based on SCr of 1.08 mg/dL (H)

## 2019-09-11 NOTE — PLAN OF CARE
Received patient at 0730. Alert and Oriented x4. Tele Rhythm NSR to SB. O2 saturation 96%  On room air. Breath sounds clear. Bed is locked and in low position. Call light and personal items within reach. Pt denies pain or discomfort at present.  Pt voiding Initiate emergency measures for life threatening arrhythmias  - Monitor electrolytes and administer replacement therapy as ordered  Outcome: Progressing

## 2019-09-11 NOTE — PROGRESS NOTES
Prelim angio    LCA normal    RCA stent widely patent    4/5 slender sheath right RA - TR band    Home today after recovers from procedure. Reviewed with patient and her .     Same CV medications but stop Prasugrel given need for fairly prolonged,

## 2019-09-11 NOTE — PLAN OF CARE
Pt OK to go home per primary and cardiology. To follow up with cardiology in 1-2 weeks (appt already made). Right radial site CDI, no drainage or hematoma. Pt instructed on care at home. Pt and  verbalize understanding of DC instructions.  Pt has been

## 2019-09-11 NOTE — PROGRESS NOTES
Reviewed with Dr. Delaney Cristina. Discussed with patient. Old films reviewed. Discussed R/B/A - including no intervention with patient. She appears to have a good understanding. Feeling well since admission. No chest pain.     ECG normal    Troponins normal

## 2019-09-11 NOTE — PROGRESS NOTES
Pt. Alert and o x 4 , resp. Pattern easy and non labored. Tele shows SR/SB on the monitor. Pt. Had a HA earlier , medicated with Tylenol with relief , denies chest pain or SOB. Jeanne Petersen POC discussed with pt. Verbalized understanding.  Pre procedural

## 2019-09-11 NOTE — PROGRESS NOTES
[facility]  Progress Note     Estefany Coltluis daniel Patient Status:  Observation    1950 MRN [id number]   [facility] 2NE-A Attending Gustavo Saucedo [address] Day # 0 PCP Briana Tapia MD     Chief Complaint: Chest pain    S Oral Nightly   • Prasugrel HCl  10 mg Oral Daily   • ezetimibe  10 mg Oral QOD   • Triamterene-HCTZ  0.25 tablet Oral Daily   • aspirin EC  81 mg Oral Daily   • multivitamin  1 tablet Oral Daily   • predniSONE  17.5 mg Oral Daily   • amLODIPine Besylate  2

## 2019-09-12 ENCOUNTER — TELEPHONE (OUTPATIENT)
Dept: INTERNAL MEDICINE CLINIC | Facility: CLINIC | Age: 69
End: 2019-09-12

## 2019-09-12 ENCOUNTER — PATIENT OUTREACH (OUTPATIENT)
Dept: CASE MANAGEMENT | Age: 69
End: 2019-09-12

## 2019-09-12 DIAGNOSIS — R07.9 CHEST PAIN OF UNCERTAIN ETIOLOGY: ICD-10-CM

## 2019-09-12 DIAGNOSIS — Z02.9 ENCOUNTERS FOR UNSPECIFIED ADMINISTRATIVE PURPOSE: ICD-10-CM

## 2019-09-12 PROCEDURE — 1111F DSCHRG MED/CURRENT MED MERGE: CPT

## 2019-09-12 NOTE — PROCEDURES
[address]    PATIENT'S NAME: Antonio Ribera   ATTENDING PHYSICIAN: Rosas Wall D.O.   OPERATING PHYSICIAN: Joshua Finch M.D.    PATIENT ACCOUNT#:   [de-identified]    LOCATION:  [address] [place] [address]  MEDICAL RECORD #:   [id number]       DATE OF BIR right coronary artery with a 5-Barb[place] LIMA catheter. The left coronary artery was even more challenging, and despite the use of several standard catheters and subspecialty catheters, we were never able to engage the left main coronary artery.   I do thi

## 2019-09-12 NOTE — PROGRESS NOTES
Initial Post Discharge Follow Up   Discharge Date: 9/11/19  Contact Date: 9/12/2019    Consent Verification:  Assessment Completed With: Patient  HIPAA Verified?   Yes    Discharge Dx:   Chest pain of uncertain etiology    Was TCC ordered: no    General: mg by mouth daily. For 2 weeks then taper down to 15mg for 2 weeks then 10mg for 2 weeks then take 5mg for 2 weeks. Disp:  Rfl:    predniSONE 5 MG Oral Tab Take 17.5 mg by mouth daily.  For 2 weeks then taper down to 15mg for 2 weeks then 10mg for 2 weeks t incentive spirometer?  No-pt states she did not use one after d/c      How well did you feel prepared for your surgery:good   Appointments Scheduled:    PCP in one week:   no-TE to PCP regarding HFU as no openings within recommended 7 days of d/c.   Cardiol d/c instructions. NCM reviewed s/s of infection with pt and when to call MD, [address] or go to ER. Reviewed with pt cardiac catheterization/home care instructions per  her d/c instructions.   SEAMUS sent a TE to PCP regarding a TCM HFU recommendation as no openin

## 2019-09-12 NOTE — TELEPHONE ENCOUNTER
NCM spoke with pt for TCM. NCM unable to schedule a TCM HFU with pt as no openings within recommended 7 day timeframe with PCP. Pt is high risk for readmission and would benefit from a TCM HFU by 9/18/19. Please f/up with pt to try and schedule as pt would greatly benefit. Thank you.     Book by date: 9/25/19

## 2019-09-16 NOTE — DISCHARGE SUMMARY
[facility]  DISCHARGE SUMMARY     Karla Sanchez Patient Status:  Observation    1950 MRN [id number]   [facility] 2NE-A Attending No att. providers found   Hosp Day # 0 PCP Lalitha Banks MD     Date of Admission: 9/10/2019 Besylate 2.5 MG Tabs  Commonly known as:  NORVASC      Take 1 tablet (2.5 mg total) by mouth daily.    Quantity:  30 tablet  Refills:  3        CONTINUE taking these medications      Instructions Prescription details   aspirin EC 81 MG Tbec      Take 81 mg known as:  EFFIENT              Where to Get Your Medications      These medications were sent to [address] [place] [address], [phone], [address], [facility]

## 2019-09-20 ENCOUNTER — TELEPHONE (OUTPATIENT)
Dept: CARDIOLOGY | Age: 69
End: 2019-09-20

## 2019-09-20 RX ORDER — EZETIMIBE 10 MG/1
10 TABLET ORAL EVERY OTHER DAY
COMMUNITY
End: 2019-09-20 | Stop reason: DRUGHIGH

## 2019-09-20 RX ORDER — EZETIMIBE 10 MG/1
10 TABLET ORAL EVERY OTHER DAY
Qty: 45 TABLET | Refills: 1 | Status: SHIPPED | OUTPATIENT
Start: 2019-09-20 | End: 2019-09-26 | Stop reason: SDUPTHER

## 2019-09-20 RX ORDER — EZETIMIBE 10 MG/1
TABLET ORAL
Qty: 45 TABLET | Refills: 2 | OUTPATIENT
Start: 2019-09-20

## 2019-09-21 ENCOUNTER — OFFICE VISIT (OUTPATIENT)
Dept: INTERNAL MEDICINE CLINIC | Facility: CLINIC | Age: 69
End: 2019-09-21
Payer: MEDICARE

## 2019-09-21 VITALS
HEART RATE: 64 BPM | WEIGHT: 156.19 LBS | BODY MASS INDEX: 27.68 KG/M2 | DIASTOLIC BLOOD PRESSURE: 84 MMHG | HEIGHT: 63 IN | RESPIRATION RATE: 16 BRPM | SYSTOLIC BLOOD PRESSURE: 120 MMHG

## 2019-09-21 DIAGNOSIS — Z12.31 VISIT FOR SCREENING MAMMOGRAM: ICD-10-CM

## 2019-09-21 DIAGNOSIS — I10 ESSENTIAL HYPERTENSION: Primary | ICD-10-CM

## 2019-09-21 DIAGNOSIS — Z95.5 PRESENCE OF DRUG COATED STENT IN RIGHT CORONARY ARTERY: ICD-10-CM

## 2019-09-21 DIAGNOSIS — M35.3 PMR (POLYMYALGIA RHEUMATICA) (HCC): ICD-10-CM

## 2019-09-21 DIAGNOSIS — R07.9 CHEST PAIN OF UNCERTAIN ETIOLOGY: ICD-10-CM

## 2019-09-21 DIAGNOSIS — I25.10 CORONARY ARTERY DISEASE INVOLVING NATIVE CORONARY ARTERY OF NATIVE HEART WITHOUT ANGINA PECTORIS: ICD-10-CM

## 2019-09-21 PROCEDURE — 90732 PPSV23 VACC 2 YRS+ SUBQ/IM: CPT | Performed by: INTERNAL MEDICINE

## 2019-09-21 PROCEDURE — 1111F DSCHRG MED/CURRENT MED MERGE: CPT | Performed by: INTERNAL MEDICINE

## 2019-09-21 PROCEDURE — 99495 TRANSJ CARE MGMT MOD F2F 14D: CPT | Performed by: INTERNAL MEDICINE

## 2019-09-21 PROCEDURE — G0009 ADMIN PNEUMOCOCCAL VACCINE: HCPCS | Performed by: INTERNAL MEDICINE

## 2019-09-21 NOTE — PROGRESS NOTES
HPI:    Shay Tena is a 68year old female here today for hospital follow up.    She was discharged from Inpatient hospital, [facility] to Home   Admission Date: 9/10/19   Discharge Date: 9/11/19  Hospital Discharge Diagnoses (since 8/22/2019) weeks.   predniSONE 5 MG Oral Tab Take 17.5 mg by mouth daily. For 2 weeks then taper down to 15mg for 2 weeks then 10mg for 2 weeks then take 5mg for 2 weeks. [START ON 9/24/2019] predniSONE 10 MG Oral Tab Take 15 mg by mouth daily.  For 2 weeks then tap her brother, father, maternal grandmother, mother, and sister; Stroke in her mother. She  reports that she has never smoked. She has never used smokeless tobacco. She reports that she drinks about 2.0 - 3.0 standard drinks of alcohol per week.  She report native coronary artery of native heart without angina pectoris  Stable contnue meds  Chest pain of uncertain etiology  resolved  Presence of drug coated stent in right coronary artery  Stable contineu meds  PMR (polymyalgia rheumatica) (HCC)  Improving, co

## 2019-09-26 ENCOUNTER — APPOINTMENT (OUTPATIENT)
Dept: CARDIOLOGY | Age: 69
End: 2019-09-26

## 2019-09-26 ENCOUNTER — OFFICE VISIT (OUTPATIENT)
Dept: CARDIOLOGY | Age: 69
End: 2019-09-26

## 2019-09-26 VITALS
HEART RATE: 54 BPM | DIASTOLIC BLOOD PRESSURE: 68 MMHG | BODY MASS INDEX: 27.82 KG/M2 | SYSTOLIC BLOOD PRESSURE: 128 MMHG | HEIGHT: 63 IN | WEIGHT: 157 LBS

## 2019-09-26 DIAGNOSIS — I25.10 ATHEROSCLEROSIS OF CORONARY ARTERY OF NATIVE HEART WITHOUT ANGINA PECTORIS, UNSPECIFIED VESSEL OR LESION TYPE: Primary | ICD-10-CM

## 2019-09-26 DIAGNOSIS — Z95.5 HISTORY OF HEART ARTERY STENT: ICD-10-CM

## 2019-09-26 PROCEDURE — 99214 OFFICE O/P EST MOD 30 MIN: CPT | Performed by: NURSE PRACTITIONER

## 2019-09-26 RX ORDER — AMLODIPINE BESYLATE 2.5 MG/1
2.5 TABLET ORAL DAILY
Qty: 90 TABLET | Refills: 3 | Status: SHIPPED | OUTPATIENT
Start: 2019-09-26 | End: 2020-03-24 | Stop reason: ALTCHOICE

## 2019-09-26 RX ORDER — ATENOLOL 50 MG/1
50 TABLET ORAL DAILY
Qty: 90 TABLET | Refills: 3 | Status: SHIPPED | OUTPATIENT
Start: 2019-09-26 | End: 2020-12-15

## 2019-09-26 RX ORDER — ATORVASTATIN CALCIUM 20 MG/1
20 TABLET, FILM COATED ORAL AT BEDTIME
Qty: 90 TABLET | Refills: 3 | Status: SHIPPED | OUTPATIENT
Start: 2019-09-26 | End: 2021-01-11

## 2019-09-26 RX ORDER — AMLODIPINE BESYLATE 2.5 MG/1
2.5 TABLET ORAL DAILY
COMMUNITY
Start: 2019-09-12 | End: 2019-09-26 | Stop reason: SDUPTHER

## 2019-09-26 RX ORDER — EZETIMIBE 10 MG/1
10 TABLET ORAL EVERY OTHER DAY
Qty: 45 TABLET | Refills: 3 | Status: SHIPPED | OUTPATIENT
Start: 2019-09-26 | End: 2021-03-24

## 2019-09-26 ASSESSMENT — ENCOUNTER SYMPTOMS
HEMOPTYSIS: 0
SUSPICIOUS LESIONS: 0
BRUISES/BLEEDS EASILY: 0
HEMATOCHEZIA: 0
WEIGHT LOSS: 0
COUGH: 0
ALLERGIC/IMMUNOLOGIC COMMENTS: NO NEW FOOD ALLERGIES
CHILLS: 0
FEVER: 0
WEIGHT GAIN: 0

## 2019-09-26 ASSESSMENT — PATIENT HEALTH QUESTIONNAIRE - PHQ9
1. LITTLE INTEREST OR PLEASURE IN DOING THINGS: NOT AT ALL
SUM OF ALL RESPONSES TO PHQ9 QUESTIONS 1 AND 2: 0
2. FEELING DOWN, DEPRESSED OR HOPELESS: NOT AT ALL
SUM OF ALL RESPONSES TO PHQ9 QUESTIONS 1 AND 2: 0

## 2019-10-03 ENCOUNTER — APPOINTMENT (OUTPATIENT)
Dept: LAB | Age: 69
End: 2019-10-03
Attending: INTERNAL MEDICINE
Payer: MEDICARE

## 2019-10-03 DIAGNOSIS — M35.3 PMR (POLYMYALGIA RHEUMATICA) (HCC): ICD-10-CM

## 2019-10-03 DIAGNOSIS — R70.0 ELEVATED SED RATE: ICD-10-CM

## 2019-10-03 DIAGNOSIS — Z79.52 CURRENT CHRONIC USE OF SYSTEMIC STEROIDS: ICD-10-CM

## 2019-10-03 DIAGNOSIS — R79.82 ELEVATED C-REACTIVE PROTEIN (CRP): ICD-10-CM

## 2019-10-03 DIAGNOSIS — E55.9 VITAMIN D DEFICIENCY: ICD-10-CM

## 2019-10-03 PROCEDURE — 82306 VITAMIN D 25 HYDROXY: CPT

## 2019-10-03 PROCEDURE — 85652 RBC SED RATE AUTOMATED: CPT

## 2019-10-03 PROCEDURE — 86140 C-REACTIVE PROTEIN: CPT

## 2019-10-04 DIAGNOSIS — R79.82 ELEVATED C-REACTIVE PROTEIN (CRP): ICD-10-CM

## 2019-10-04 DIAGNOSIS — R70.0 ELEVATED SED RATE: Primary | ICD-10-CM

## 2019-10-09 ENCOUNTER — PATIENT MESSAGE (OUTPATIENT)
Dept: RHEUMATOLOGY | Facility: CLINIC | Age: 69
End: 2019-10-09

## 2019-10-16 ENCOUNTER — TELEPHONE (OUTPATIENT)
Dept: INTERNAL MEDICINE CLINIC | Facility: CLINIC | Age: 69
End: 2019-10-16

## 2019-10-16 ENCOUNTER — TELEPHONE (OUTPATIENT)
Dept: CARDIOLOGY | Age: 69
End: 2019-10-16

## 2019-10-16 DIAGNOSIS — Z12.31 ENCOUNTER FOR SCREENING MAMMOGRAM FOR MALIGNANT NEOPLASM OF BREAST: Primary | ICD-10-CM

## 2019-11-07 ENCOUNTER — TELEPHONE (OUTPATIENT)
Dept: RHEUMATOLOGY | Facility: CLINIC | Age: 69
End: 2019-11-07

## 2019-11-08 RX ORDER — PREDNISONE 2.5 MG
TABLET ORAL
Qty: 183 TABLET | Refills: 0 | Status: SHIPPED | OUTPATIENT
Start: 2019-11-08 | End: 2019-12-02 | Stop reason: ALTCHOICE

## 2019-11-14 ENCOUNTER — APPOINTMENT (OUTPATIENT)
Dept: LAB | Age: 69
End: 2019-11-14
Attending: INTERNAL MEDICINE
Payer: MEDICARE

## 2019-11-14 ENCOUNTER — HOSPITAL ENCOUNTER (OUTPATIENT)
Dept: MAMMOGRAPHY | Age: 69
Discharge: HOME | End: 2019-11-14
Attending: INTERNAL MEDICINE
Payer: MEDICARE

## 2019-11-14 DIAGNOSIS — Z12.31 ENCOUNTER FOR SCREENING MAMMOGRAM FOR MALIGNANT NEOPLASM OF BREAST: ICD-10-CM

## 2019-11-14 DIAGNOSIS — R79.82 ELEVATED C-REACTIVE PROTEIN (CRP): ICD-10-CM

## 2019-11-14 DIAGNOSIS — R70.0 ELEVATED SED RATE: ICD-10-CM

## 2019-11-14 PROCEDURE — 85652 RBC SED RATE AUTOMATED: CPT

## 2019-11-14 PROCEDURE — 77063 BREAST TOMOSYNTHESIS BI: CPT | Performed by: INTERNAL MEDICINE

## 2019-11-14 PROCEDURE — 36415 COLL VENOUS BLD VENIPUNCTURE: CPT

## 2019-11-14 PROCEDURE — 77067 SCR MAMMO BI INCL CAD: CPT | Performed by: INTERNAL MEDICINE

## 2019-11-14 PROCEDURE — 86140 C-REACTIVE PROTEIN: CPT

## 2019-11-20 ENCOUNTER — OFFICE VISIT (OUTPATIENT)
Dept: RHEUMATOLOGY | Facility: CLINIC | Age: 69
End: 2019-11-20
Payer: MEDICARE

## 2019-11-20 VITALS
RESPIRATION RATE: 16 BRPM | WEIGHT: 159 LBS | HEART RATE: 64 BPM | BODY MASS INDEX: 28 KG/M2 | SYSTOLIC BLOOD PRESSURE: 122 MMHG | DIASTOLIC BLOOD PRESSURE: 86 MMHG | TEMPERATURE: 98 F

## 2019-11-20 DIAGNOSIS — M35.3 PMR (POLYMYALGIA RHEUMATICA) (HCC): Primary | ICD-10-CM

## 2019-11-20 DIAGNOSIS — Z87.19 HISTORY OF DIVERTICULITIS: ICD-10-CM

## 2019-11-20 DIAGNOSIS — Z79.52 CURRENT CHRONIC USE OF SYSTEMIC STEROIDS: ICD-10-CM

## 2019-11-20 DIAGNOSIS — E55.9 VITAMIN D DEFICIENCY: ICD-10-CM

## 2019-11-20 DIAGNOSIS — M85.89 OSTEOPENIA OF MULTIPLE SITES: ICD-10-CM

## 2019-11-20 DIAGNOSIS — R79.82 ELEVATED C-REACTIVE PROTEIN (CRP): ICD-10-CM

## 2019-11-20 PROCEDURE — 99214 OFFICE O/P EST MOD 30 MIN: CPT | Performed by: INTERNAL MEDICINE

## 2019-11-20 NOTE — PATIENT INSTRUCTIONS
-- continue prednisone 5mg daily for now  -- continue calcium/vitamin d supplementation  -- repeat labs in 2 weeks and will adjust steroids based off those labs  -- continue weight bearing activities as well as water aerobics  -- follow up in 3 months or s

## 2019-11-22 ENCOUNTER — PATIENT MESSAGE (OUTPATIENT)
Dept: RHEUMATOLOGY | Facility: CLINIC | Age: 69
End: 2019-11-22

## 2019-11-22 RX ORDER — PREDNISONE 1 MG/1
10 TABLET ORAL DAILY
Qty: 97 TABLET | Refills: 0 | Status: SHIPPED | OUTPATIENT
Start: 2019-11-22 | End: 2020-03-12

## 2019-12-02 ENCOUNTER — OFFICE VISIT (OUTPATIENT)
Dept: INTERNAL MEDICINE CLINIC | Facility: CLINIC | Age: 69
End: 2019-12-02
Payer: MEDICARE

## 2019-12-02 VITALS
OXYGEN SATURATION: 98 % | BODY MASS INDEX: 28.35 KG/M2 | RESPIRATION RATE: 16 BRPM | HEART RATE: 66 BPM | HEIGHT: 63 IN | DIASTOLIC BLOOD PRESSURE: 84 MMHG | WEIGHT: 160 LBS | TEMPERATURE: 99 F | SYSTOLIC BLOOD PRESSURE: 128 MMHG

## 2019-12-02 DIAGNOSIS — J02.9 SORE THROAT: Primary | ICD-10-CM

## 2019-12-02 DIAGNOSIS — I10 ESSENTIAL HYPERTENSION: ICD-10-CM

## 2019-12-02 DIAGNOSIS — M35.3 PMR (POLYMYALGIA RHEUMATICA) (HCC): ICD-10-CM

## 2019-12-02 PROCEDURE — 99214 OFFICE O/P EST MOD 30 MIN: CPT | Performed by: INTERNAL MEDICINE

## 2019-12-02 NOTE — PROGRESS NOTES
Patient presents with: Follow - Up: RG rm 9 3 month f/u joint pain, sore throat issue      HPI:  Here for f/u of htn, pmr, doing better, weaning off prednisone. Pt is taking is slowly, had trouble at 5mg  Htn, stable on med no se's.  Has had cold, sore t total) by mouth daily. Take 10 mg x7/days then 7.5 mg daily 97 tablet 0   • Calcium Carbonate-Vitamin D (CALCIUM-VITAMIN D3 OR) Take by mouth. • aspirin EC 81 MG Oral Tab EC Take 81 mg by mouth daily.      • Multiple Vitamin (TAB-A-JIMBO) Oral Tab Take 1 defined types were placed in this encounter. Meds & Refills for this Visit:  Requested Prescriptions      No prescriptions requested or ordered in this encounter       Imaging & Consults:  None    No follow-ups on file.   There are no Patient Instructi

## 2020-01-02 ENCOUNTER — PATIENT MESSAGE (OUTPATIENT)
Dept: RHEUMATOLOGY | Facility: CLINIC | Age: 70
End: 2020-01-02

## 2020-01-04 ENCOUNTER — TELEPHONE (OUTPATIENT)
Dept: CARDIOLOGY | Age: 70
End: 2020-01-04

## 2020-01-06 ENCOUNTER — APPOINTMENT (OUTPATIENT)
Dept: LAB | Facility: HOSPITAL | Age: 70
End: 2020-01-06
Attending: INTERNAL MEDICINE
Payer: MEDICARE

## 2020-01-06 DIAGNOSIS — R79.82 ELEVATED C-REACTIVE PROTEIN (CRP): ICD-10-CM

## 2020-01-06 DIAGNOSIS — M35.3 PMR (POLYMYALGIA RHEUMATICA) (HCC): ICD-10-CM

## 2020-01-06 DIAGNOSIS — E55.9 VITAMIN D DEFICIENCY: ICD-10-CM

## 2020-01-06 DIAGNOSIS — Z79.52 CURRENT CHRONIC USE OF SYSTEMIC STEROIDS: ICD-10-CM

## 2020-01-06 DIAGNOSIS — M85.89 OSTEOPENIA OF MULTIPLE SITES: ICD-10-CM

## 2020-01-06 LAB
CRP SERPL-MCNC: 0.67 MG/DL (ref ?–0.3)
SED RATE-ML: 14 MM/HR (ref 0–25)
VIT D+METAB SERPL-MCNC: 31.6 NG/ML (ref 30–100)

## 2020-01-06 PROCEDURE — 82306 VITAMIN D 25 HYDROXY: CPT

## 2020-01-06 PROCEDURE — 86140 C-REACTIVE PROTEIN: CPT

## 2020-01-06 PROCEDURE — 36415 COLL VENOUS BLD VENIPUNCTURE: CPT

## 2020-01-06 PROCEDURE — 85652 RBC SED RATE AUTOMATED: CPT

## 2020-01-06 RX ORDER — PRASUGREL 10 MG/1
TABLET, FILM COATED ORAL
Qty: 90 TABLET | Refills: 3 | OUTPATIENT
Start: 2020-01-06

## 2020-01-07 ENCOUNTER — TELEPHONE (OUTPATIENT)
Dept: RHEUMATOLOGY | Facility: CLINIC | Age: 70
End: 2020-01-07

## 2020-01-07 DIAGNOSIS — M35.3 PMR (POLYMYALGIA RHEUMATICA) (HCC): Primary | ICD-10-CM

## 2020-01-07 DIAGNOSIS — R79.82 ELEVATED C-REACTIVE PROTEIN (CRP): ICD-10-CM

## 2020-01-07 DIAGNOSIS — Z79.52 CURRENT CHRONIC USE OF SYSTEMIC STEROIDS: ICD-10-CM

## 2020-01-07 NOTE — TELEPHONE ENCOUNTER
Called patient and discussed test results at length. ESR is stable, however CRP has only minimally improved since last month. Patient is currently taking 7.5 mg of prednisone daily.   States some days she does need to add ibuprofen to help with some of he

## 2020-01-10 ENCOUNTER — PATIENT MESSAGE (OUTPATIENT)
Dept: RHEUMATOLOGY | Facility: CLINIC | Age: 70
End: 2020-01-10

## 2020-01-13 NOTE — TELEPHONE ENCOUNTER
Return patient's call in response to my chart message.     States the patient was taking 5 mg and on the third day, she noticed some pain over her biceps as well as some tingling down her arm and some pain within the middle and ring fingers of the right carter

## 2020-01-24 ENCOUNTER — APPOINTMENT (OUTPATIENT)
Dept: LAB | Age: 70
End: 2020-01-24
Attending: INTERNAL MEDICINE
Payer: MEDICARE

## 2020-01-24 ENCOUNTER — OFFICE VISIT (OUTPATIENT)
Dept: RHEUMATOLOGY | Facility: CLINIC | Age: 70
End: 2020-01-24
Payer: MEDICARE

## 2020-01-24 VITALS
HEART RATE: 64 BPM | WEIGHT: 158 LBS | SYSTOLIC BLOOD PRESSURE: 124 MMHG | RESPIRATION RATE: 18 BRPM | HEIGHT: 63 IN | DIASTOLIC BLOOD PRESSURE: 84 MMHG | BODY MASS INDEX: 28 KG/M2 | TEMPERATURE: 98 F

## 2020-01-24 DIAGNOSIS — M35.3 PMR (POLYMYALGIA RHEUMATICA) (HCC): Primary | ICD-10-CM

## 2020-01-24 DIAGNOSIS — Z79.52 CURRENT CHRONIC USE OF SYSTEMIC STEROIDS: ICD-10-CM

## 2020-01-24 DIAGNOSIS — M25.512 CHRONIC PAIN OF BOTH SHOULDERS: ICD-10-CM

## 2020-01-24 DIAGNOSIS — M25.511 CHRONIC PAIN OF BOTH SHOULDERS: ICD-10-CM

## 2020-01-24 DIAGNOSIS — M79.89 BILATERAL HAND SWELLING: ICD-10-CM

## 2020-01-24 DIAGNOSIS — G89.29 CHRONIC PAIN OF BOTH SHOULDERS: ICD-10-CM

## 2020-01-24 DIAGNOSIS — M35.3 PMR (POLYMYALGIA RHEUMATICA) (HCC): ICD-10-CM

## 2020-01-24 DIAGNOSIS — R79.82 ELEVATED C-REACTIVE PROTEIN (CRP): ICD-10-CM

## 2020-01-24 DIAGNOSIS — E55.9 VITAMIN D DEFICIENCY: ICD-10-CM

## 2020-01-24 DIAGNOSIS — R70.0 ELEVATED SED RATE: ICD-10-CM

## 2020-01-24 DIAGNOSIS — M85.89 OSTEOPENIA OF MULTIPLE SITES: ICD-10-CM

## 2020-01-24 LAB
CRP SERPL-MCNC: 2.21 MG/DL (ref ?–0.3)
RHEUMATOID FACT SERPL-ACNC: <10 IU/ML (ref ?–15)
SED RATE-ML: 18 MM/HR (ref 0–25)

## 2020-01-24 PROCEDURE — 86200 CCP ANTIBODY: CPT

## 2020-01-24 PROCEDURE — 86140 C-REACTIVE PROTEIN: CPT

## 2020-01-24 PROCEDURE — 99214 OFFICE O/P EST MOD 30 MIN: CPT | Performed by: INTERNAL MEDICINE

## 2020-01-24 PROCEDURE — 86038 ANTINUCLEAR ANTIBODIES: CPT

## 2020-01-24 PROCEDURE — 86431 RHEUMATOID FACTOR QUANT: CPT

## 2020-01-24 PROCEDURE — 85652 RBC SED RATE AUTOMATED: CPT

## 2020-01-24 RX ORDER — ALENDRONATE SODIUM 70 MG/1
70 TABLET ORAL WEEKLY
Qty: 4 TABLET | Refills: 2 | Status: SHIPPED | OUTPATIENT
Start: 2020-01-24 | End: 2020-04-13

## 2020-01-24 NOTE — PROGRESS NOTES
?  RHEUMATOLOGY Follow up   Date of visit: 1/24/2020  ? Patient presents with:  PMR: est pt-fu- Pt states my joints are achy'- Pt taking 5mg prednisone, not so great. Bilateral hand pain R>L, knuckles worse. Taking advil daily, seems to help.      ASSESSME sulfasalazine use.  Pt hesitant with sulfasalazine and is considering plaquenil but would like to wait until after her trip to [place].      -- increase prednisone to 10mg daily  -- add alendronate once weekly for prevention of steroid induced osteoporosis was getting injections through orthopedics. Last cortisone injections were in April of 2019 but feels like the pain has worsened. States over the past 3 weeks, she has had increased pain and swelling in the joints of her hands and wrists.    She has taper prednisone further. Upon stopping the prednisone, pt had suffered another flare with bilateral shoulder as well as hip discomfort. Was recommended to take 30mg, however pt hesitant due to her previous reaction.  So pt is currently taking 10mg twice daily wi SITE RIGHT ([id number])  2004   • RADIATION RIGHT  2004   • SPECIAL SERVICE OR REPORT  2005    diverticulitis - had L sigmoidectomy   • SPECIAL SERVICE OR REPORT  2005    ventral hernia repair     Family History:  Family History   Problem Relation Age of On MG Oral Tab, Take 1 tablet (20 mg total) by mouth nightly., Disp: 30 tablet, Rfl: 2  atenolol 50 MG Oral Tab, Take 1 tablet (50 mg total) by mouth daily. , Disp: 30 tablet, Rfl: 0      Modified Medications    No medications on file     There are no disconti moist. No oropharyngeal exudate. Eyes: Pupils are equal, round, and reactive to light. Conjunctivae and EOM are normal. No scleral icterus. Neck: Normal range of motion. Neck supple. No JVD present. No tracheal deviation present.    + significant parasp bodies.     X-rays AP of the pelvis and AP and lateral of the left hip show mild osteoarthritis with spur formation in both sides of the joint and slight loss of clear space. There are no acute changes.   There is a proximal intramedullary nail and lag scr 09/10/2019     09/10/2019    CO2 27.0 09/10/2019       Additional Labs:  01/2020  ESR 14 normal   CRP 0.67 borderline   Vit D 31.6 low normal    11/2019  ESR 16 normal   CRP 0.72 borderline     10/2019  ESR 7 normal   CRP <0.29 normal   Vit D 23.4

## 2020-01-24 NOTE — PATIENT INSTRUCTIONS
-- increase prednisone to 10mg daily  -- add alendronate once weekly for prevention of steroid induced osteoporosis  -- get updated bone density to evaluate status of osteopenia   -- start PT for the shoulders and the hands  -- read about plaquenil and we

## 2020-01-27 ENCOUNTER — PATIENT MESSAGE (OUTPATIENT)
Dept: RHEUMATOLOGY | Facility: CLINIC | Age: 70
End: 2020-01-27

## 2020-01-27 ENCOUNTER — TELEPHONE (OUTPATIENT)
Dept: RHEUMATOLOGY | Facility: CLINIC | Age: 70
End: 2020-01-27

## 2020-01-27 LAB
ANA SCREEN: NEGATIVE
CCP IGG SERPL-ACNC: 0.6 U/ML (ref 0–6.9)

## 2020-01-27 RX ORDER — PREDNISONE 10 MG/1
10 TABLET ORAL DAILY
Qty: 90 TABLET | Refills: 0 | Status: SHIPPED | OUTPATIENT
Start: 2020-01-27 | End: 2020-05-01

## 2020-01-27 NOTE — TELEPHONE ENCOUNTER
From: Fidencio Larry  To: Gudelia Padilla DO  Sent: 1/27/2020 12:38 PM CST  Subject: Other    Hello Dr. Janey Gordillo. The physical therapy order in the computer is for [facility]. Would you be able to make it for Leila Mei?  I have been to a girl t

## 2020-01-27 NOTE — TELEPHONE ENCOUNTER
Called patient and notified of test results. RF/CCP were negative. Sed rate was normal, however CRP is increased. Patient did increase to 10 mg of prednisone daily over the weekend and has not noticed any significant difference in symptoms.     Advised p

## 2020-01-28 ENCOUNTER — HOSPITAL ENCOUNTER (OUTPATIENT)
Dept: BONE DENSITY | Age: 70
Discharge: HOME | End: 2020-01-28
Attending: INTERNAL MEDICINE
Payer: MEDICARE

## 2020-01-28 DIAGNOSIS — Z79.52 CURRENT CHRONIC USE OF SYSTEMIC STEROIDS: ICD-10-CM

## 2020-01-28 DIAGNOSIS — M85.89 OSTEOPENIA OF MULTIPLE SITES: ICD-10-CM

## 2020-01-28 PROCEDURE — 77080 DXA BONE DENSITY AXIAL: CPT | Performed by: INTERNAL MEDICINE

## 2020-01-29 ENCOUNTER — TELEPHONE (OUTPATIENT)
Dept: RHEUMATOLOGY | Facility: CLINIC | Age: 70
End: 2020-01-29

## 2020-01-29 NOTE — TELEPHONE ENCOUNTER
Pt phoned office for condition update. Currently taking prednisone 20 mg daily. States 100% relief of symptoms. Would like to notify Dr. Annalee Smith.

## 2020-01-31 RX ORDER — PREDNISONE 2.5 MG/1
TABLET ORAL
Qty: 183 TABLET | Refills: 0 | OUTPATIENT
Start: 2020-01-31

## 2020-02-14 ENCOUNTER — APPOINTMENT (OUTPATIENT)
Dept: LAB | Age: 70
End: 2020-02-14
Attending: INTERNAL MEDICINE
Payer: MEDICARE

## 2020-02-14 DIAGNOSIS — M35.3 PMR (POLYMYALGIA RHEUMATICA) (HCC): ICD-10-CM

## 2020-02-14 DIAGNOSIS — R79.82 ELEVATED C-REACTIVE PROTEIN (CRP): ICD-10-CM

## 2020-02-14 LAB
CRP SERPL-MCNC: 0.31 MG/DL (ref ?–0.3)
SED RATE-ML: 9 MM/HR (ref 0–25)

## 2020-02-14 PROCEDURE — 85652 RBC SED RATE AUTOMATED: CPT

## 2020-02-14 PROCEDURE — 86140 C-REACTIVE PROTEIN: CPT

## 2020-02-17 ENCOUNTER — TELEPHONE (OUTPATIENT)
Dept: RHEUMATOLOGY | Facility: CLINIC | Age: 70
End: 2020-02-17

## 2020-02-17 NOTE — TELEPHONE ENCOUNTER
Called pt and discussed test results in detail. Pt reports feeling well overall. Has been able to taper down to 7.5mg of prednisone currently. Advised pt to decrease to 5mg until appointment with me later this week.      Patient verbalized understanding of

## 2020-02-17 NOTE — TELEPHONE ENCOUNTER
Patient returning call from Dr. Wilson Degree regarding lab results from 02/14/20. Please call back to discuss, [phone].

## 2020-02-21 ENCOUNTER — OFFICE VISIT (OUTPATIENT)
Dept: RHEUMATOLOGY | Facility: CLINIC | Age: 70
End: 2020-02-21
Payer: MEDICARE

## 2020-02-21 VITALS
WEIGHT: 163 LBS | DIASTOLIC BLOOD PRESSURE: 86 MMHG | HEART RATE: 62 BPM | BODY MASS INDEX: 28.88 KG/M2 | SYSTOLIC BLOOD PRESSURE: 140 MMHG | HEIGHT: 63 IN

## 2020-02-21 DIAGNOSIS — M25.512 CHRONIC PAIN OF BOTH SHOULDERS: ICD-10-CM

## 2020-02-21 DIAGNOSIS — M25.511 CHRONIC PAIN OF BOTH SHOULDERS: ICD-10-CM

## 2020-02-21 DIAGNOSIS — Z79.52 CURRENT CHRONIC USE OF SYSTEMIC STEROIDS: ICD-10-CM

## 2020-02-21 DIAGNOSIS — R70.0 ELEVATED SED RATE: ICD-10-CM

## 2020-02-21 DIAGNOSIS — G89.29 CHRONIC PAIN OF BOTH SHOULDERS: ICD-10-CM

## 2020-02-21 DIAGNOSIS — M85.89 OSTEOPENIA OF MULTIPLE SITES: ICD-10-CM

## 2020-02-21 DIAGNOSIS — M35.3 PMR (POLYMYALGIA RHEUMATICA) (HCC): Primary | ICD-10-CM

## 2020-02-21 DIAGNOSIS — R79.82 ELEVATED C-REACTIVE PROTEIN (CRP): ICD-10-CM

## 2020-02-21 PROCEDURE — 99214 OFFICE O/P EST MOD 30 MIN: CPT | Performed by: INTERNAL MEDICINE

## 2020-02-21 NOTE — PATIENT INSTRUCTIONS
-- continue current dose of prednisone 5mg daily  -- repeat inflammatory markers monthly for next several months  -- start alendronate when back from your trip  -- will consider adding plaquenil if prednisone not able to be tapered further  -- follow up in

## 2020-02-21 NOTE — PROGRESS NOTES
?  RHEUMATOLOGY Follow up   Date of visit: 02/21/2020  ? Patient presents with:  PMR: 1 month f/u Patient still taking prednisone, symtoms \"about the same. \" Patient denies any new aches, patient recently started PT this week     ASSESSMENT, DISCUSSION & plaquenil but would like to wait until after her trip to [place].      -- continue current dose of prednisone 5mg daily  -- repeat inflammatory markers monthly for next several months  -- start alendronate when back from your trip  -- will consider adding ibuprofen to only 400mg a few times a week. Has not started alendronate yet, will start when back from vacation. No new areas of pain. No new skin rashes.      HPI from initial consultation  referred for rheumatologic evaluation due to elevated inflamma calcium/vit d in her multivitamin  Admits to being under increased stress over the past several months after requiring stent placement as well as recently moving.   Denies any current bitemporal headaches, jaw claudication or vision changes.   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 HENT: Positive for tinnitus. Negative for hearing loss. Eyes: Negative for blurred vision, double vision and redness. Respiratory: Negative for cough, shortness of breath and wheezing.     Cardiovascular: Negative for Soft. She exhibits no distension. Musculoskeletal:         General: Tenderness present. No edema. Comments: Diffuse small heberden/brenda nodes of the fingers, no basilar joint tenderness of the 1st CMC bilaterally but slight squaring present.    Vick Bolden tricompartmental spur formation. There are no fractures dislocations or loose bodies.     Labs:  Lab Results   Component Value Date    WBC 18.6 (H) 09/10/2019    RBC 4.86 09/10/2019    HGB 13.7 09/10/2019    HCT 40.8 09/10/2019    .0 09/10/2019    M

## 2020-03-12 RX ORDER — PREDNISONE 1 MG/1
TABLET ORAL
Qty: 90 TABLET | Refills: 1 | Status: SHIPPED | OUTPATIENT
Start: 2020-03-12 | End: 2020-08-07

## 2020-03-16 ENCOUNTER — LAB ENCOUNTER (OUTPATIENT)
Dept: LAB | Age: 70
End: 2020-03-16
Attending: INTERNAL MEDICINE
Payer: MEDICARE

## 2020-03-16 DIAGNOSIS — M35.3 PMR (POLYMYALGIA RHEUMATICA) (HCC): ICD-10-CM

## 2020-03-16 DIAGNOSIS — R79.82 ELEVATED C-REACTIVE PROTEIN (CRP): ICD-10-CM

## 2020-03-16 DIAGNOSIS — Z79.52 CURRENT CHRONIC USE OF SYSTEMIC STEROIDS: ICD-10-CM

## 2020-03-16 DIAGNOSIS — R73.03 PREDIABETES: ICD-10-CM

## 2020-03-16 LAB
ALBUMIN SERPL-MCNC: 3.5 G/DL (ref 3.4–5)
ALBUMIN/GLOB SERPL: 0.9 {RATIO} (ref 1–2)
ALP LIVER SERPL-CCNC: 69 U/L (ref 55–142)
ALT SERPL-CCNC: 23 U/L (ref 13–56)
ANION GAP SERPL CALC-SCNC: 7 MMOL/L (ref 0–18)
AST SERPL-CCNC: 21 U/L (ref 15–37)
BILIRUB SERPL-MCNC: 0.8 MG/DL (ref 0.1–2)
BUN BLD-MCNC: 19 MG/DL (ref 7–18)
BUN/CREAT SERPL: 22.4 (ref 10–20)
CALCIUM BLD-MCNC: 9.2 MG/DL (ref 8.5–10.1)
CHLORIDE SERPL-SCNC: 102 MMOL/L (ref 98–112)
CO2 SERPL-SCNC: 30 MMOL/L (ref 21–32)
CREAT BLD-MCNC: 0.85 MG/DL (ref 0.55–1.02)
CRP SERPL-MCNC: 0.42 MG/DL (ref ?–0.3)
EST. AVERAGE GLUCOSE BLD GHB EST-MCNC: 146 MG/DL (ref 68–126)
GLOBULIN PLAS-MCNC: 3.9 G/DL (ref 2.8–4.4)
GLUCOSE BLD-MCNC: 95 MG/DL (ref 70–99)
HBA1C MFR BLD HPLC: 6.7 % (ref ?–5.7)
M PROTEIN MFR SERPL ELPH: 7.4 G/DL (ref 6.4–8.2)
OSMOLALITY SERPL CALC.SUM OF ELEC: 290 MOSM/KG (ref 275–295)
PATIENT FASTING Y/N/NP: YES
POTASSIUM SERPL-SCNC: 3.7 MMOL/L (ref 3.5–5.1)
SED RATE-ML: 4 MM/HR (ref 0–25)
SODIUM SERPL-SCNC: 139 MMOL/L (ref 136–145)

## 2020-03-16 PROCEDURE — 83036 HEMOGLOBIN GLYCOSYLATED A1C: CPT

## 2020-03-16 PROCEDURE — 85652 RBC SED RATE AUTOMATED: CPT

## 2020-03-16 PROCEDURE — 80053 COMPREHEN METABOLIC PANEL: CPT

## 2020-03-16 PROCEDURE — 86140 C-REACTIVE PROTEIN: CPT

## 2020-03-18 ENCOUNTER — TELEPHONE (OUTPATIENT)
Dept: INTERNAL MEDICINE CLINIC | Facility: CLINIC | Age: 70
End: 2020-03-18

## 2020-03-18 DIAGNOSIS — M35.3 PMR (POLYMYALGIA RHEUMATICA) (HCC): ICD-10-CM

## 2020-03-18 DIAGNOSIS — E09.9 STEROID-INDUCED DIABETES (HCC): Primary | ICD-10-CM

## 2020-03-18 DIAGNOSIS — T38.0X5A STEROID-INDUCED DIABETES (HCC): Primary | ICD-10-CM

## 2020-03-18 PROCEDURE — G2012 BRIEF CHECK IN BY MD/QHP: HCPCS | Performed by: INTERNAL MEDICINE

## 2020-03-18 RX ORDER — TRIAMTERENE AND HYDROCHLOROTHIAZIDE 37.5; 25 MG/1; MG/1
TABLET ORAL
Qty: 90 TABLET | Refills: 1 | Status: SHIPPED | OUTPATIENT
Start: 2020-03-18 | End: 2020-09-08

## 2020-03-18 NOTE — TELEPHONE ENCOUNTER
Virtual/Telephone Check-In    Tommy Garrison verbally consents to a [employer] on 03/18/20. Patient understands and accepts financial responsibility for any deductible, co-insurance and/or co-pays associated with this service.

## 2020-03-18 NOTE — TELEPHONE ENCOUNTER
LOV:12/02/19-sore throat  Last CPE:8/12/19  Last refill:Triamterene-HCTZ 37.5-25 MG Oral Tab  Last labs that are related: cmp, 3/16/20  Future appointment:  Future Appointments   Date Time Provider Tay [place]   4/2/2020 10:45 AM Catherine Labs, PT DM

## 2020-03-20 ENCOUNTER — TELEPHONE (OUTPATIENT)
Dept: CARDIOLOGY | Age: 70
End: 2020-03-20

## 2020-03-24 ENCOUNTER — OFFICE VISIT (OUTPATIENT)
Dept: CARDIOLOGY | Age: 70
End: 2020-03-24

## 2020-03-24 VITALS — SYSTOLIC BLOOD PRESSURE: 155 MMHG | BODY MASS INDEX: 28.17 KG/M2 | WEIGHT: 159 LBS | DIASTOLIC BLOOD PRESSURE: 76 MMHG

## 2020-03-24 DIAGNOSIS — Z95.5 HISTORY OF HEART ARTERY STENT: ICD-10-CM

## 2020-03-24 DIAGNOSIS — I25.2 STATUS POST NON-ST ELEVATION MYOCARDIAL INFARCTION (NSTEMI): ICD-10-CM

## 2020-03-24 DIAGNOSIS — I25.110 CORONARY ARTERY DISEASE INVOLVING NATIVE CORONARY ARTERY OF NATIVE HEART WITH UNSTABLE ANGINA PECTORIS (CMD): Primary | ICD-10-CM

## 2020-03-24 PROCEDURE — 99441 TELEPHONE E&M BY PHYSICIAN EST PT NOT ORIG PREV 7 DAYS 5-10 MIN: CPT | Performed by: INTERNAL MEDICINE

## 2020-03-24 RX ORDER — ALENDRONATE SODIUM 70 MG/1
70 TABLET ORAL
COMMUNITY
Start: 2020-01-24 | End: 2020-03-24 | Stop reason: ALTCHOICE

## 2020-03-24 RX ORDER — PREDNISONE 5 MG/1
5 TABLET ORAL DAILY
COMMUNITY
Start: 2020-03-12 | End: 2020-09-29 | Stop reason: DRUGHIGH

## 2020-03-24 ASSESSMENT — PATIENT HEALTH QUESTIONNAIRE - PHQ9
SUM OF ALL RESPONSES TO PHQ9 QUESTIONS 1 AND 2: 0
1. LITTLE INTEREST OR PLEASURE IN DOING THINGS: NOT AT ALL
SUM OF ALL RESPONSES TO PHQ9 QUESTIONS 1 AND 2: 0
2. FEELING DOWN, DEPRESSED OR HOPELESS: NOT AT ALL

## 2020-04-13 DIAGNOSIS — Z79.52 CURRENT CHRONIC USE OF SYSTEMIC STEROIDS: ICD-10-CM

## 2020-04-13 DIAGNOSIS — M85.89 OSTEOPENIA OF MULTIPLE SITES: ICD-10-CM

## 2020-04-13 RX ORDER — ALENDRONATE SODIUM 70 MG/1
TABLET ORAL
Qty: 12 TABLET | Refills: 0 | Status: SHIPPED | OUTPATIENT
Start: 2020-04-13 | End: 2020-08-07

## 2020-04-13 NOTE — TELEPHONE ENCOUNTER
Future Appointments   Date Time Provider Tay Washington   5/1/2020 11:00 AM Alivia Juarez, [facility] JALEN Claudio

## 2020-05-01 ENCOUNTER — TELEMEDICINE (OUTPATIENT)
Dept: RHEUMATOLOGY | Facility: CLINIC | Age: 70
End: 2020-05-01

## 2020-05-01 VITALS
DIASTOLIC BLOOD PRESSURE: 70 MMHG | BODY MASS INDEX: 27.82 KG/M2 | HEIGHT: 63 IN | WEIGHT: 157 LBS | SYSTOLIC BLOOD PRESSURE: 147 MMHG | HEART RATE: 68 BPM

## 2020-05-01 DIAGNOSIS — R70.0 ELEVATED SED RATE: ICD-10-CM

## 2020-05-01 DIAGNOSIS — M35.3 PMR (POLYMYALGIA RHEUMATICA) (HCC): Primary | ICD-10-CM

## 2020-05-01 DIAGNOSIS — Z79.52 CURRENT CHRONIC USE OF SYSTEMIC STEROIDS: ICD-10-CM

## 2020-05-01 DIAGNOSIS — R79.82 ELEVATED C-REACTIVE PROTEIN (CRP): ICD-10-CM

## 2020-05-01 DIAGNOSIS — M79.89 BILATERAL HAND SWELLING: ICD-10-CM

## 2020-05-01 PROCEDURE — 99213 OFFICE O/P EST LOW 20 MIN: CPT | Performed by: INTERNAL MEDICINE

## 2020-05-01 RX ORDER — PREDNISONE 1 MG/1
4 TABLET ORAL DAILY
Qty: 360 TABLET | Refills: 0 | Status: SHIPPED | OUTPATIENT
Start: 2020-05-01 | End: 2020-07-30

## 2020-05-01 NOTE — PROGRESS NOTES
[facility] TeleHealth Audio and Visual Visit   Office visit canceled due to coronavirus pandemic    This was an audio/video conversation using Epic/Mirametrix in lieu of an in-person visit due to need to limit person to person contact during the [facility] Jan[facility] is a 64 yo woman with recent onset of bilateral shoulder as well as hip pain and associated weakness due to the pain. Her exam and her inflammatory markers are consistent with diagnosis of PMR.   Discussed at length complications from the disease as tablets (4 mg total) by mouth daily.  -     SED RATE, WESTERGREN (AUTOMATED); Standing  -     C-REACTIVE PROTEIN; Standing  -     CBC WITH DIFFERENTIAL WITH PLATELET; Future  -     COMP METABOLIC PANEL (14);  Future    Current chronic use of systemic steroi medication and feels the BP is better and the puffiness is better. Denies any new areas of joint pain or skin rashes. Currently taking 5mg of prednisone. Still taking ibuprofen a few times per week.      HPI from initial consultation  referred for rheumat overall. Does have hx of osteopenia, does get some calcium/vit d in her multivitamin  Admits to being under increased stress over the past several months after requiring stent placement as well as recently moving.   Denies any current bitemporal headaches, • Other (Other) Father    • Hypertension Mother    • Cancer Mother 52        breast ca   • Stroke Mother    • Other (Other) Mother    • Other (Other) Maternal Grandmother          of aneurysm   • Other (Other) Sister         spinal problems, prolacti of Systems   Constitutional: Negative for chills, fever, malaise/fatigue and weight loss. HENT: Positive for tinnitus. Negative for congestion, hearing loss and sore throat. Eyes: Negative for blurred vision, double vision, photophobia and redness. fingers. Minimal swelling of the MCPs diffusely - resolved /stable   No swelling of the DIPs, PIPs, wrists  Able to stand up from seated position without assistance.    Some diffuse puffiness of fingers- stable    Neurological: She is alert and oriented to 09/10/2019    NEPERCENT 89.4 09/10/2019    LYPERCENT 5.2 09/10/2019    MOPERCENT 4.3 09/10/2019    EOPERCENT 0.1 09/10/2019    BAPERCENT 0.2 09/10/2019    NE 16.63 (H) 09/10/2019    LYMABS 0.97 (L) 09/10/2019    MOABSO 0.80 09/10/2019    EOABSO 0.01 09/10/

## 2020-06-12 ENCOUNTER — LAB ENCOUNTER (OUTPATIENT)
Dept: LAB | Age: 70
End: 2020-06-12
Attending: INTERNAL MEDICINE
Payer: MEDICARE

## 2020-06-12 DIAGNOSIS — M79.89 BILATERAL HAND SWELLING: ICD-10-CM

## 2020-06-12 DIAGNOSIS — M35.3 PMR (POLYMYALGIA RHEUMATICA) (HCC): ICD-10-CM

## 2020-06-12 DIAGNOSIS — R79.82 ELEVATED C-REACTIVE PROTEIN (CRP): ICD-10-CM

## 2020-06-12 DIAGNOSIS — R70.0 ELEVATED SED RATE: ICD-10-CM

## 2020-06-12 PROCEDURE — 36415 COLL VENOUS BLD VENIPUNCTURE: CPT

## 2020-06-12 PROCEDURE — 85652 RBC SED RATE AUTOMATED: CPT

## 2020-06-12 PROCEDURE — 85025 COMPLETE CBC W/AUTO DIFF WBC: CPT

## 2020-06-12 PROCEDURE — 86140 C-REACTIVE PROTEIN: CPT

## 2020-06-12 PROCEDURE — 80053 COMPREHEN METABOLIC PANEL: CPT

## 2020-07-01 ENCOUNTER — TELEPHONE (OUTPATIENT)
Dept: INTERNAL MEDICINE CLINIC | Facility: CLINIC | Age: 70
End: 2020-07-01

## 2020-07-01 DIAGNOSIS — E04.2 MULTIPLE THYROID NODULES: ICD-10-CM

## 2020-07-01 DIAGNOSIS — Z00.00 ROUTINE GENERAL MEDICAL EXAMINATION AT A HEALTH CARE FACILITY: Primary | ICD-10-CM

## 2020-07-01 DIAGNOSIS — D72.828 OTHER ELEVATED WHITE BLOOD CELL (WBC) COUNT: ICD-10-CM

## 2020-07-01 DIAGNOSIS — Z13.0 SCREENING FOR BLOOD DISEASE: ICD-10-CM

## 2020-07-01 DIAGNOSIS — E78.00 HIGH CHOLESTEROL: ICD-10-CM

## 2020-07-01 DIAGNOSIS — R73.9 HYPERGLYCEMIA: ICD-10-CM

## 2020-07-01 DIAGNOSIS — E87.6 HYPOKALEMIA: ICD-10-CM

## 2020-07-01 DIAGNOSIS — E78.00 PURE HYPERCHOLESTEROLEMIA: ICD-10-CM

## 2020-07-01 NOTE — TELEPHONE ENCOUNTER
Wellness   Future Appointments   Date Time Provider Tay [place]   8/5/2020  9:15 AM Aliya Gregg PA-C [facility]        Orders to [place] must fast no call back required

## 2020-07-10 ENCOUNTER — APPOINTMENT (OUTPATIENT)
Dept: LAB | Facility: HOSPITAL | Age: 70
End: 2020-07-10
Attending: INTERNAL MEDICINE
Payer: MEDICARE

## 2020-07-10 DIAGNOSIS — M79.89 BILATERAL HAND SWELLING: ICD-10-CM

## 2020-07-10 DIAGNOSIS — M35.3 PMR (POLYMYALGIA RHEUMATICA) (HCC): ICD-10-CM

## 2020-07-10 DIAGNOSIS — Z79.52 CURRENT CHRONIC USE OF SYSTEMIC STEROIDS: ICD-10-CM

## 2020-07-10 DIAGNOSIS — R70.0 ELEVATED SED RATE: ICD-10-CM

## 2020-07-10 DIAGNOSIS — R79.82 ELEVATED C-REACTIVE PROTEIN (CRP): ICD-10-CM

## 2020-07-10 DIAGNOSIS — M35.3 PMR (POLYMYALGIA RHEUMATICA) (HCC): Primary | ICD-10-CM

## 2020-07-10 LAB
CRP SERPL-MCNC: 0.46 MG/DL (ref ?–0.3)
SED RATE-ML: 9 MM/HR (ref 0–25)

## 2020-07-10 PROCEDURE — 85652 RBC SED RATE AUTOMATED: CPT

## 2020-07-10 PROCEDURE — 36415 COLL VENOUS BLD VENIPUNCTURE: CPT

## 2020-07-10 PROCEDURE — 86140 C-REACTIVE PROTEIN: CPT

## 2020-07-23 NOTE — TELEPHONE ENCOUNTER
Bloodwork orders placed to THE [facility] lab for upcoming physical per protocol.

## 2020-07-28 ENCOUNTER — LAB ENCOUNTER (OUTPATIENT)
Dept: LAB | Age: 70
End: 2020-07-28
Attending: INTERNAL MEDICINE
Payer: MEDICARE

## 2020-07-28 DIAGNOSIS — E87.6 HYPOKALEMIA: Primary | ICD-10-CM

## 2020-07-28 DIAGNOSIS — D72.828 OTHER ELEVATED WHITE BLOOD CELL (WBC) COUNT: ICD-10-CM

## 2020-07-28 DIAGNOSIS — Z00.00 ROUTINE GENERAL MEDICAL EXAMINATION AT A HEALTH CARE FACILITY: ICD-10-CM

## 2020-07-28 DIAGNOSIS — E78.00 PURE HYPERCHOLESTEROLEMIA: ICD-10-CM

## 2020-07-28 DIAGNOSIS — E04.2 MULTIPLE THYROID NODULES: ICD-10-CM

## 2020-07-28 DIAGNOSIS — E78.00 HIGH CHOLESTEROL: ICD-10-CM

## 2020-07-28 LAB
ALBUMIN SERPL-MCNC: 3.6 G/DL (ref 3.4–5)
ALBUMIN/GLOB SERPL: 0.9 {RATIO} (ref 1–2)
ALP LIVER SERPL-CCNC: 69 U/L (ref 55–142)
ALT SERPL-CCNC: 23 U/L (ref 13–56)
ANION GAP SERPL CALC-SCNC: 5 MMOL/L (ref 0–18)
AST SERPL-CCNC: 18 U/L (ref 15–37)
BASOPHILS # BLD AUTO: 0.07 X10(3) UL (ref 0–0.2)
BASOPHILS NFR BLD AUTO: 0.7 %
BILIRUB SERPL-MCNC: 0.8 MG/DL (ref 0.1–2)
BUN BLD-MCNC: 17 MG/DL (ref 7–18)
BUN/CREAT SERPL: 15.5 (ref 10–20)
CALCIUM BLD-MCNC: 9.5 MG/DL (ref 8.5–10.1)
CHLORIDE SERPL-SCNC: 101 MMOL/L (ref 98–112)
CHOLEST SMN-MCNC: 161 MG/DL (ref ?–200)
CO2 SERPL-SCNC: 31 MMOL/L (ref 21–32)
CREAT BLD-MCNC: 1.1 MG/DL (ref 0.55–1.02)
DEPRECATED RDW RBC AUTO: 46.6 FL (ref 35.1–46.3)
EOSINOPHIL # BLD AUTO: 0.38 X10(3) UL (ref 0–0.7)
EOSINOPHIL NFR BLD AUTO: 3.6 %
ERYTHROCYTE [DISTWIDTH] IN BLOOD BY AUTOMATED COUNT: 14.3 % (ref 11–15)
GLOBULIN PLAS-MCNC: 3.8 G/DL (ref 2.8–4.4)
GLUCOSE BLD-MCNC: 106 MG/DL (ref 70–99)
HCT VFR BLD AUTO: 42.2 % (ref 35–48)
HDLC SERPL-MCNC: 72 MG/DL (ref 40–59)
HGB BLD-MCNC: 13.3 G/DL (ref 12–16)
IMM GRANULOCYTES # BLD AUTO: 0.04 X10(3) UL (ref 0–1)
IMM GRANULOCYTES NFR BLD: 0.4 %
LDLC SERPL CALC-MCNC: 55 MG/DL (ref ?–100)
LYMPHOCYTES # BLD AUTO: 2.58 X10(3) UL (ref 1–4)
LYMPHOCYTES NFR BLD AUTO: 24.3 %
M PROTEIN MFR SERPL ELPH: 7.4 G/DL (ref 6.4–8.2)
MCH RBC QN AUTO: 28.3 PG (ref 26–34)
MCHC RBC AUTO-ENTMCNC: 31.5 G/DL (ref 31–37)
MCV RBC AUTO: 89.8 FL (ref 80–100)
MONOCYTES # BLD AUTO: 0.8 X10(3) UL (ref 0.1–1)
MONOCYTES NFR BLD AUTO: 7.5 %
NEUTROPHILS # BLD AUTO: 6.74 X10 (3) UL (ref 1.5–7.7)
NEUTROPHILS # BLD AUTO: 6.74 X10(3) UL (ref 1.5–7.7)
NEUTROPHILS NFR BLD AUTO: 63.5 %
NONHDLC SERPL-MCNC: 89 MG/DL (ref ?–130)
OSMOLALITY SERPL CALC.SUM OF ELEC: 286 MOSM/KG (ref 275–295)
PATIENT FASTING Y/N/NP: YES
PATIENT FASTING Y/N/NP: YES
PLATELET # BLD AUTO: 321 10(3)UL (ref 150–450)
POTASSIUM SERPL-SCNC: 3.3 MMOL/L (ref 3.5–5.1)
RBC # BLD AUTO: 4.7 X10(6)UL (ref 3.8–5.3)
SODIUM SERPL-SCNC: 137 MMOL/L (ref 136–145)
TRIGL SERPL-MCNC: 168 MG/DL (ref 30–149)
TSI SER-ACNC: 1.13 MIU/ML (ref 0.36–3.74)
VLDLC SERPL CALC-MCNC: 34 MG/DL (ref 0–30)
WBC # BLD AUTO: 10.6 X10(3) UL (ref 4–11)

## 2020-07-28 PROCEDURE — 84443 ASSAY THYROID STIM HORMONE: CPT

## 2020-07-28 PROCEDURE — 85025 COMPLETE CBC W/AUTO DIFF WBC: CPT

## 2020-07-28 PROCEDURE — 80053 COMPREHEN METABOLIC PANEL: CPT

## 2020-07-28 PROCEDURE — 80061 LIPID PANEL: CPT

## 2020-07-28 RX ORDER — POTASSIUM CHLORIDE 750 MG/1
10 TABLET, FILM COATED, EXTENDED RELEASE ORAL DAILY
Qty: 30 TABLET | Refills: 0 | Status: SHIPPED | OUTPATIENT
Start: 2020-07-28 | End: 2020-12-08

## 2020-07-28 NOTE — PROGRESS NOTES
Labs are noted and OK to hold for upcoming OV, EXCEPT K is a little low. Have pt take KDur 10 MEq daily until f/u visit with me next week. Will discuss further then.   Also will need to repeat BMP next week, she could try to schedule lab for the same day

## 2020-08-05 ENCOUNTER — OFFICE VISIT (OUTPATIENT)
Dept: INTERNAL MEDICINE CLINIC | Facility: CLINIC | Age: 70
End: 2020-08-05
Payer: MEDICARE

## 2020-08-05 ENCOUNTER — LAB ENCOUNTER (OUTPATIENT)
Dept: LAB | Age: 70
End: 2020-08-05
Attending: INTERNAL MEDICINE
Payer: MEDICARE

## 2020-08-05 VITALS
OXYGEN SATURATION: 99 % | SYSTOLIC BLOOD PRESSURE: 130 MMHG | HEIGHT: 63 IN | HEART RATE: 64 BPM | DIASTOLIC BLOOD PRESSURE: 78 MMHG | TEMPERATURE: 97 F | WEIGHT: 157.81 LBS | BODY MASS INDEX: 27.96 KG/M2

## 2020-08-05 DIAGNOSIS — M35.3 PMR (POLYMYALGIA RHEUMATICA) (HCC): ICD-10-CM

## 2020-08-05 DIAGNOSIS — Z00.00 ENCOUNTER FOR ANNUAL HEALTH EXAMINATION: Primary | ICD-10-CM

## 2020-08-05 DIAGNOSIS — E87.6 HYPOKALEMIA: ICD-10-CM

## 2020-08-05 DIAGNOSIS — R73.03 PRE-DIABETES: ICD-10-CM

## 2020-08-05 DIAGNOSIS — I10 ESSENTIAL HYPERTENSION: ICD-10-CM

## 2020-08-05 DIAGNOSIS — M85.859 OSTEOPENIA OF NECK OF FEMUR, UNSPECIFIED LATERALITY: ICD-10-CM

## 2020-08-05 DIAGNOSIS — Z78.0 POST-MENOPAUSE: ICD-10-CM

## 2020-08-05 DIAGNOSIS — K63.5 POLYP OF COLON, UNSPECIFIED PART OF COLON, UNSPECIFIED TYPE: ICD-10-CM

## 2020-08-05 DIAGNOSIS — Z79.52 CURRENT CHRONIC USE OF SYSTEMIC STEROIDS: ICD-10-CM

## 2020-08-05 DIAGNOSIS — Z12.31 SCREENING MAMMOGRAM FOR HIGH-RISK PATIENT: ICD-10-CM

## 2020-08-05 DIAGNOSIS — E78.00 PURE HYPERCHOLESTEROLEMIA: ICD-10-CM

## 2020-08-05 DIAGNOSIS — Z85.3 HISTORY OF BREAST CANCER: ICD-10-CM

## 2020-08-05 DIAGNOSIS — I25.2 HISTORY OF NON-ST ELEVATION MYOCARDIAL INFARCTION (NSTEMI): ICD-10-CM

## 2020-08-05 LAB
ANION GAP SERPL CALC-SCNC: 4 MMOL/L (ref 0–18)
BUN BLD-MCNC: 13 MG/DL (ref 7–18)
BUN/CREAT SERPL: 13 (ref 10–20)
CALCIUM BLD-MCNC: 9.9 MG/DL (ref 8.5–10.1)
CHLORIDE SERPL-SCNC: 101 MMOL/L (ref 98–112)
CO2 SERPL-SCNC: 34 MMOL/L (ref 21–32)
CREAT BLD-MCNC: 1 MG/DL (ref 0.55–1.02)
CRP SERPL-MCNC: <0.29 MG/DL (ref ?–0.3)
EST. AVERAGE GLUCOSE BLD GHB EST-MCNC: 131 MG/DL (ref 68–126)
GLUCOSE BLD-MCNC: 110 MG/DL (ref 70–99)
HBA1C MFR BLD HPLC: 6.2 % (ref ?–5.7)
OSMOLALITY SERPL CALC.SUM OF ELEC: 289 MOSM/KG (ref 275–295)
PATIENT FASTING Y/N/NP: NO
POTASSIUM SERPL-SCNC: 3.9 MMOL/L (ref 3.5–5.1)
SED RATE-ML: 9 MM/HR (ref 0–25)
SODIUM SERPL-SCNC: 139 MMOL/L (ref 136–145)

## 2020-08-05 PROCEDURE — 85652 RBC SED RATE AUTOMATED: CPT

## 2020-08-05 PROCEDURE — G0439 PPPS, SUBSEQ VISIT: HCPCS | Performed by: PHYSICIAN ASSISTANT

## 2020-08-05 PROCEDURE — 83036 HEMOGLOBIN GLYCOSYLATED A1C: CPT

## 2020-08-05 PROCEDURE — 80048 BASIC METABOLIC PNL TOTAL CA: CPT

## 2020-08-05 PROCEDURE — 86140 C-REACTIVE PROTEIN: CPT

## 2020-08-05 PROCEDURE — 99213 OFFICE O/P EST LOW 20 MIN: CPT | Performed by: PHYSICIAN ASSISTANT

## 2020-08-05 RX ORDER — PREDNISONE 1 MG/1
1 TABLET ORAL
COMMUNITY
End: 2020-08-07

## 2020-08-05 NOTE — PROGRESS NOTES
HPI:   Jordan Madrid is a 65year old female who presents for a Medicare Subsequent Annual Wellness visit (Pt already had Initial Annual Wellness). BPs at home are usually 120s/70s.   Annual Physical due on 08/12/2020     Saw Dr. Karie Ellis for eye exam List:     Essential hypertension - Compliant with meds, improved on recheck. Pure hypercholesterolemia - Compliant with Zetia and Atorvastatin. LDL controlled. Mild increase in TGs.       Multiple thyroid nodules     History of diverticulitis     Polyp TABLET BY MOUTH ONE TIME PER WEEK  TRIAMTERENE-HCTZ 37.5-25 MG Oral Tab, TAKE 1 TABLET BY MOUTH EVERY DAY  PREDNISONE 5 MG Oral Tab, TAKE 1 TABLET BY MOUTH EVERY DAY  Calcium Carbonate-Vitamin D (CALCIUM-VITAMIN D3 OR), Take by mouth.   amLODIPine Besylate HISTORY:   She  reports that she has never smoked. She has never used smokeless tobacco. She reports current alcohol use of about 2.0 - 3.0 standard drinks of alcohol per week. She reports that she does not use drugs.      REVIEW OF SYSTEMS:   GENERAL: feel Soft, non-tender, bowel sounds active all four quadrants,  no masses, no organomegaly   Pelvic: Deferred   Extremities: Extremities normal, atraumatic, no cyanosis or edema   Pulses: 2+ and symmetric   Skin: Skin color, texture, turgor normal, no rashes or with Rheum. Hypokalemia - Has increased dietary K as she cannot tolerate supplements. Repeat BMP today. Diet assessment: good     PLAN:  The patient indicates understanding of these issues and agrees to the plan.   Reinforced healthy diet, lifes Diabetics, FHx Glaucoma, AA>50, > 65 No flowsheet data found. Bone Density Screening      Dexascan Every two years Last Dexa Scan:   XR DEXA BONE DENSITOMETRY (CPT=77080) 01/28/2020    No flowsheet data found.     Pap and Pelvic      Pap: Every 3 08/05/2020 3.9   11/27/2007 4.4    No flowsheet data found. Creatinine  Annually Creatinine, Serum (mg/dL)   Date Value   11/27/2007 0.9     Creatinine (mg/dL)   Date Value   08/05/2020 1.00    No flowsheet data found.     Drug Serum Conc  Annually No

## 2020-08-05 NOTE — PATIENT INSTRUCTIONS
Emily Velasquez's SCREENING SCHEDULE   Tests on this list are recommended by your physician but may not be covered, or covered at this frequency, by your insurer. Please check with your insurance carrier before scheduling to verify coverage.    PREVENTATI Anyone with a family history    Colorectal Cancer Screening  Covered up to Age 76     Colonoscopy Screen   Covered every 10 years- more often if abnormal Colonoscopy due on 09/07/2021 Update Health Maintenance if applicable    Flex Sigmoidoscopy Screen  Co Orders placed or performed in visit on 08/13/18   • PNEUMOCOCCAL VACC, 13 MARLY IM    Please get once after your 65th birthday    Pneumococcal 23 (Pneumovax)  Covered Once after 65 Orders placed or performed in visit on 09/21/19   • PNEUMOCOCCAL IMM (Jayjay Quezada

## 2020-08-06 NOTE — PROGRESS NOTES
Please notify pt that I added an A1c to the blood the lab had already done as after her visit I realized the lab had not done the A1c that Dr. Yaneli Vasquez had ordered in March.   Her average blood sugar has gone down from March and is falling in the pre-diabe

## 2020-08-07 ENCOUNTER — OFFICE VISIT (OUTPATIENT)
Dept: RHEUMATOLOGY | Facility: CLINIC | Age: 70
End: 2020-08-07
Payer: MEDICARE

## 2020-08-07 VITALS
SYSTOLIC BLOOD PRESSURE: 130 MMHG | DIASTOLIC BLOOD PRESSURE: 70 MMHG | RESPIRATION RATE: 16 BRPM | HEART RATE: 66 BPM | BODY MASS INDEX: 28.14 KG/M2 | HEIGHT: 63 IN | TEMPERATURE: 97 F | WEIGHT: 158.81 LBS

## 2020-08-07 DIAGNOSIS — Z87.19 HISTORY OF DIVERTICULITIS: ICD-10-CM

## 2020-08-07 DIAGNOSIS — M85.89 OSTEOPENIA OF MULTIPLE SITES: ICD-10-CM

## 2020-08-07 DIAGNOSIS — Z79.52 CURRENT CHRONIC USE OF SYSTEMIC STEROIDS: ICD-10-CM

## 2020-08-07 DIAGNOSIS — G89.29 CHRONIC PAIN OF BOTH SHOULDERS: ICD-10-CM

## 2020-08-07 DIAGNOSIS — E55.9 VITAMIN D DEFICIENCY: ICD-10-CM

## 2020-08-07 DIAGNOSIS — M35.3 PMR (POLYMYALGIA RHEUMATICA) (HCC): Primary | ICD-10-CM

## 2020-08-07 DIAGNOSIS — M25.512 CHRONIC PAIN OF BOTH SHOULDERS: ICD-10-CM

## 2020-08-07 DIAGNOSIS — M25.511 CHRONIC PAIN OF BOTH SHOULDERS: ICD-10-CM

## 2020-08-07 PROCEDURE — 99214 OFFICE O/P EST MOD 30 MIN: CPT | Performed by: INTERNAL MEDICINE

## 2020-08-07 RX ORDER — PREDNISONE 1 MG/1
3 TABLET ORAL
Qty: 270 TABLET | Refills: 1 | Status: SHIPPED | OUTPATIENT
Start: 2020-08-07 | End: 2020-12-08

## 2020-08-07 NOTE — PROGRESS NOTES
?  RHEUMATOLOGY Follow up   Date of visit: 08/07/2020  ? Patient presents with:  PMR: f/u from video visit. Feeling good. No symptoms as of now.       ASSESSMENT, DISCUSSION & PLAN   Assessment:  PMR (polymyalgia rheumatica) (HCC)  (primary encounter diagn disorders, weight gain, elevated blood sugars, diabetes, increased risk of infection, osteoporosis and fractures, suppressed adrenal gland hormone production as well as thinning of the skin and slower wound healing.    -- okay to decrease prednisone to 3mg hands but states not interfering with her daily lift. Currently taking 4mg daily. Is taking a calcium/vitamin d supplement over the counter. Eager to decrease prednisone further. States never started alendronate. Takes ibuprofen seldomly.    Denies any ne to her normal self now.      Does feel like prednisone has made her more tired overall.   Does have hx of osteopenia, does get some calcium/vit d in her multivitamin  Admits to being under increased stress over the past several months after requiring stent mi at 53 and  at 46 of 9th mi   • Lipids Father    • Heart Disease Father    • Other (Other) Father    • Hypertension Mother    • Cancer Mother 52        breast ca   • Stroke Mother    • Other (Other) Mother    • Other (Other) Maternal Grandmother 70 MG Oral Tab             PREDNISONE 5 MG Oral Tab               predniSONE 1 MG Oral Tab               ?   ?  Allergies:    Ramipril                ANAPHYLAXIS    Comment:Oral  Altace [Ramipril]       ANAPHYLAXIS    Comment:angioedema  Cortisone Conjunctivae and EOM are normal. No scleral icterus. Neck: Normal range of motion. No JVD present. No tracheal deviation present. + significant paraspinal muscle tightness and trapezius tightness.     Cardiovascular: Normal rate, regular rhythm, normal matched controls:  133      Change from prior spine examination:  -5.2*%  A positive change demonstrates increase in BMD since prior examination, a negative change demonstrates decrease in BMD.  This comparison is statistically significant at the 95% confi dislocations or loose bodies.     X-rays 4 views of the right knee show moderate to severe osteoarthritis with significant tricompartmental spurring and significant loss of medial clear space.     X-rays 4 views of the left knee show severe osteoarthritis normal   CRP 3.70  ESR 30     08/12/2019  CRP 0.78  ESR 14    DO JALEN Zamorano Rheumatology  8/7/2020

## 2020-08-07 NOTE — PATIENT INSTRUCTIONS
-- okay to decrease prednisone to 3mg daily x 2 week and if feeling well, then okay to decrease to 2mg daily  -- repeat labs monthly  -- update me if symptoms worsen with the decrease   -- follow up in 4 months or sooner as needed  -- be sure due to osteop

## 2020-08-18 DIAGNOSIS — E87.6 HYPOKALEMIA: ICD-10-CM

## 2020-08-18 RX ORDER — POTASSIUM CHLORIDE 750 MG/1
10 TABLET, FILM COATED, EXTENDED RELEASE ORAL DAILY
Qty: 30 TABLET | Refills: 0 | OUTPATIENT
Start: 2020-08-18

## 2020-08-18 NOTE — TELEPHONE ENCOUNTER
Last OV: 8/5/20 well adult    Future Appointments   Date Time Provider Tay [place]   12/8/2020 12:30 PM Jessy Juarez, DO [facility]        Latest labs: 7/28/20 CBC, CMP, Lipid, TSH w/ref    Latest RX: Potassium Chloride ER 10 MEQ Oral Tab C

## 2020-08-18 NOTE — TELEPHONE ENCOUNTER
Denied as at last visit pt stated she cannot tolerate KCl supplement. Last K was normal on repeat as well.

## 2020-08-20 ENCOUNTER — TELEPHONE (OUTPATIENT)
Dept: INTERNAL MEDICINE CLINIC | Facility: CLINIC | Age: 70
End: 2020-08-20

## 2020-08-20 ENCOUNTER — HOSPITAL ENCOUNTER (EMERGENCY)
Age: 70
Discharge: HOME | End: 2020-08-20
Attending: EMERGENCY MEDICINE
Payer: MEDICARE

## 2020-08-20 ENCOUNTER — APPOINTMENT (OUTPATIENT)
Dept: GENERAL RADIOLOGY | Age: 70
End: 2020-08-20
Attending: PHYSICIAN ASSISTANT
Payer: MEDICARE

## 2020-08-20 VITALS
RESPIRATION RATE: 18 BRPM | SYSTOLIC BLOOD PRESSURE: 145 MMHG | DIASTOLIC BLOOD PRESSURE: 67 MMHG | OXYGEN SATURATION: 99 % | HEIGHT: 63 IN | BODY MASS INDEX: 27.11 KG/M2 | WEIGHT: 153 LBS | HEART RATE: 84 BPM

## 2020-08-20 DIAGNOSIS — K59.00 CONSTIPATION, UNSPECIFIED CONSTIPATION TYPE: Primary | ICD-10-CM

## 2020-08-20 DIAGNOSIS — K56.41 IMPACTED STOOL IN RECTUM (HCC): ICD-10-CM

## 2020-08-20 PROCEDURE — 74018 RADEX ABDOMEN 1 VIEW: CPT | Performed by: PHYSICIAN ASSISTANT

## 2020-08-20 PROCEDURE — 99283 EMERGENCY DEPT VISIT LOW MDM: CPT

## 2020-08-20 RX ORDER — BISACODYL 10 MG
20 SUPPOSITORY, RECTAL RECTAL ONCE
Status: COMPLETED | OUTPATIENT
Start: 2020-08-20 | End: 2020-08-20

## 2020-08-20 RX ORDER — LIDOCAINE HYDROCHLORIDE 20 MG/ML
5 JELLY TOPICAL ONCE
Status: COMPLETED | OUTPATIENT
Start: 2020-08-20 | End: 2020-08-20

## 2020-08-20 NOTE — TELEPHONE ENCOUNTER
She can try an enema if feels there is hard stool that she cannot evacuate. I think this is probably the best placed to start. She could try a 2nd dose of Miralax tomorrow needed as well.

## 2020-08-20 NOTE — TELEPHONE ENCOUNTER
Patient states tried enema and unable to pass BM. Patient states that this time she is very uncomfortable and unable to pass BM. Patient advised at this time to go to [facility] for evaluation of possible fecal impaction. Patient denies questions or concerns.  FYI

## 2020-08-20 NOTE — TELEPHONE ENCOUNTER
Patient had diarrhea about a week ago, then patient took imodium and now it has been 3 days since she had a bowel movement.   Please advise

## 2020-08-20 NOTE — ED PROVIDER NOTES
Patient reports constipation. Patient had diarrhea last week for a day and half. She took Imodium. She had normal bowel movements afterwards. Since Monday however, she has not been able to have a bowel movement.   She feels a great urge to do so and pre

## 2020-08-20 NOTE — ED PROVIDER NOTES
Patient Seen in: [address] [facility] In [place]      History   Patient presents with:  Constipation    Stated Complaint: CONSTIPATION    HPI  Yi Lal is a 64-year-old female who presents for evaluation of constipation.   She has a past medical h Surgical History:   Procedure Laterality Date   • ANGIOPLASTY (CORONARY)  12/31/2018    stent RCA Bhavna Mu   • APPENDECTOMY  2005    at time of diverticulitis   • COLECTOMY     • COLONOSCOPY     • COLONOSCOPY,DIAGNOSTIC  2004    nl colonoscopy   • HIP SURGER all 4 quadrants. Non-tender to light and deep palpation in all 4 quadrants. No rebound. No guarding. Skin: Skin is warm and dry. No rash noted. No erythema.     ED Course   Labs Reviewed - No data to display    ED Course as of Aug 20 1623  --------- Prescribed:  Discharge Medication List as of 8/20/2020  4:14 PM

## 2020-08-20 NOTE — TELEPHONE ENCOUNTER
Patient states few days of diarrhea last week and took imodium. Patient states feels like she has to have a BM but unable to produce BM. Patient states took 1.5 days of imodium with improvement of diarrhea. No fevers. Patient increased fluids.  Patient

## 2020-08-20 NOTE — TELEPHONE ENCOUNTER
Patient advised of CB note and will call with any ongoing concerns or questions.  Patient verbalized understanding of plan of care

## 2020-09-08 ENCOUNTER — TELEPHONE (OUTPATIENT)
Dept: INTERNAL MEDICINE CLINIC | Facility: CLINIC | Age: 70
End: 2020-09-08

## 2020-09-08 ENCOUNTER — TELEPHONE (OUTPATIENT)
Dept: CARDIOLOGY | Age: 70
End: 2020-09-08

## 2020-09-08 RX ORDER — ATENOLOL 50 MG/1
TABLET ORAL
Qty: 90 TABLET | Refills: 3 | OUTPATIENT
Start: 2020-09-08

## 2020-09-08 RX ORDER — TRIAMTERENE AND HYDROCHLOROTHIAZIDE 37.5; 25 MG/1; MG/1
TABLET ORAL
Qty: 90 TABLET | Refills: 0 | Status: SHIPPED | OUTPATIENT
Start: 2020-09-08 | End: 2020-12-17

## 2020-09-08 NOTE — TELEPHONE ENCOUNTER
Pt called and stated that CB ordered a Dexa scan for her but the pt stated that she just had one on 01/28/2020.      Pt would like to know if the scan done in Jan is OK 7

## 2020-09-08 NOTE — TELEPHONE ENCOUNTER
Please call pt to let her know CB cancelled the Bone Density Scan. The one she had on 1/24/20 is fine. Thank you.

## 2020-09-08 NOTE — TELEPHONE ENCOUNTER
PASSED per protocol, refill sent. Last PE 8. 5.20   Future Appointments   Date Time Provider Tay [place]   [facility]   12/8/2020 12:30 PM Priyanka Juarez,  [facility]

## 2020-09-09 ENCOUNTER — LAB ENCOUNTER (OUTPATIENT)
Dept: LAB | Age: 70
End: 2020-09-09
Attending: INTERNAL MEDICINE
Payer: MEDICARE

## 2020-09-09 DIAGNOSIS — Z79.52 CURRENT CHRONIC USE OF SYSTEMIC STEROIDS: ICD-10-CM

## 2020-09-09 DIAGNOSIS — M85.89 OSTEOPENIA OF MULTIPLE SITES: ICD-10-CM

## 2020-09-09 DIAGNOSIS — E55.9 VITAMIN D DEFICIENCY: ICD-10-CM

## 2020-09-09 DIAGNOSIS — M35.3 PMR (POLYMYALGIA RHEUMATICA) (HCC): ICD-10-CM

## 2020-09-09 LAB
CRP SERPL-MCNC: 0.3 MG/DL (ref ?–0.3)
SED RATE-ML: 11 MM/HR (ref 0–25)
VIT D+METAB SERPL-MCNC: 36.5 NG/ML (ref 30–100)

## 2020-09-09 PROCEDURE — 82306 VITAMIN D 25 HYDROXY: CPT

## 2020-09-09 PROCEDURE — 85652 RBC SED RATE AUTOMATED: CPT

## 2020-09-09 PROCEDURE — 36415 COLL VENOUS BLD VENIPUNCTURE: CPT

## 2020-09-09 PROCEDURE — 86140 C-REACTIVE PROTEIN: CPT

## 2020-09-29 ENCOUNTER — OFFICE VISIT (OUTPATIENT)
Dept: CARDIOLOGY | Age: 70
End: 2020-09-29

## 2020-09-29 VITALS
DIASTOLIC BLOOD PRESSURE: 80 MMHG | SYSTOLIC BLOOD PRESSURE: 128 MMHG | WEIGHT: 155 LBS | HEIGHT: 63 IN | HEART RATE: 62 BPM | BODY MASS INDEX: 27.46 KG/M2

## 2020-09-29 DIAGNOSIS — I25.110 CORONARY ARTERY DISEASE INVOLVING NATIVE CORONARY ARTERY OF NATIVE HEART WITH UNSTABLE ANGINA PECTORIS (CMD): Primary | ICD-10-CM

## 2020-09-29 DIAGNOSIS — I25.2 STATUS POST NON-ST ELEVATION MYOCARDIAL INFARCTION (NSTEMI): ICD-10-CM

## 2020-09-29 DIAGNOSIS — Z95.5 HISTORY OF HEART ARTERY STENT: ICD-10-CM

## 2020-09-29 PROCEDURE — 99214 OFFICE O/P EST MOD 30 MIN: CPT | Performed by: INTERNAL MEDICINE

## 2020-09-29 RX ORDER — PREDNISONE 1 MG/1
2 TABLET ORAL DAILY
COMMUNITY
Start: 2020-08-07

## 2020-09-29 SDOH — SOCIAL STABILITY: SOCIAL NETWORK: ARE YOU MARRIED, WIDOWED, DIVORCED, SEPARATED, NEVER MARRIED, OR LIVING WITH A PARTNER?: MARRIED

## 2020-09-29 SDOH — HEALTH STABILITY: PHYSICAL HEALTH: ON AVERAGE, HOW MANY MINUTES DO YOU ENGAGE IN EXERCISE AT THIS LEVEL?: 60 MIN

## 2020-09-29 SDOH — HEALTH STABILITY: PHYSICAL HEALTH: ON AVERAGE, HOW MANY DAYS PER WEEK DO YOU ENGAGE IN MODERATE TO STRENUOUS EXERCISE (LIKE A BRISK WALK)?: 3 DAYS

## 2020-09-29 ASSESSMENT — PATIENT HEALTH QUESTIONNAIRE - PHQ9
SUM OF ALL RESPONSES TO PHQ9 QUESTIONS 1 AND 2: 0
CLINICAL INTERPRETATION OF PHQ9 SCORE: NO FURTHER SCREENING NEEDED
SUM OF ALL RESPONSES TO PHQ9 QUESTIONS 1 AND 2: 0
1. LITTLE INTEREST OR PLEASURE IN DOING THINGS: NOT AT ALL
2. FEELING DOWN, DEPRESSED OR HOPELESS: NOT AT ALL
CLINICAL INTERPRETATION OF PHQ2 SCORE: NO FURTHER SCREENING NEEDED

## 2020-10-15 ENCOUNTER — LAB ENCOUNTER (OUTPATIENT)
Dept: LAB | Age: 70
End: 2020-10-15
Attending: INTERNAL MEDICINE
Payer: MEDICARE

## 2020-10-15 DIAGNOSIS — R70.0 ELEVATED SED RATE: ICD-10-CM

## 2020-10-15 DIAGNOSIS — M35.3 PMR (POLYMYALGIA RHEUMATICA) (HCC): ICD-10-CM

## 2020-10-15 DIAGNOSIS — M79.89 BILATERAL HAND SWELLING: ICD-10-CM

## 2020-10-15 DIAGNOSIS — R79.82 ELEVATED C-REACTIVE PROTEIN (CRP): ICD-10-CM

## 2020-10-15 PROCEDURE — 86140 C-REACTIVE PROTEIN: CPT

## 2020-10-15 PROCEDURE — 36415 COLL VENOUS BLD VENIPUNCTURE: CPT

## 2020-10-15 PROCEDURE — 85652 RBC SED RATE AUTOMATED: CPT

## 2020-12-03 ENCOUNTER — LAB ENCOUNTER (OUTPATIENT)
Dept: LAB | Age: 70
End: 2020-12-03
Attending: INTERNAL MEDICINE
Payer: MEDICARE

## 2020-12-03 DIAGNOSIS — Z79.52 CURRENT CHRONIC USE OF SYSTEMIC STEROIDS: ICD-10-CM

## 2020-12-03 DIAGNOSIS — M35.3 PMR (POLYMYALGIA RHEUMATICA) (HCC): ICD-10-CM

## 2020-12-03 DIAGNOSIS — R79.82 ELEVATED C-REACTIVE PROTEIN (CRP): ICD-10-CM

## 2020-12-03 PROCEDURE — 36415 COLL VENOUS BLD VENIPUNCTURE: CPT

## 2020-12-03 PROCEDURE — 86140 C-REACTIVE PROTEIN: CPT

## 2020-12-03 PROCEDURE — 85652 RBC SED RATE AUTOMATED: CPT

## 2020-12-08 ENCOUNTER — VIRTUAL PHONE E/M (OUTPATIENT)
Dept: RHEUMATOLOGY | Facility: CLINIC | Age: 70
End: 2020-12-08

## 2020-12-08 DIAGNOSIS — M85.89 OSTEOPENIA OF MULTIPLE SITES: ICD-10-CM

## 2020-12-08 DIAGNOSIS — Z79.52 CURRENT CHRONIC USE OF SYSTEMIC STEROIDS: ICD-10-CM

## 2020-12-08 DIAGNOSIS — M25.551 BILATERAL HIP PAIN: ICD-10-CM

## 2020-12-08 DIAGNOSIS — M25.552 BILATERAL HIP PAIN: ICD-10-CM

## 2020-12-08 DIAGNOSIS — M35.3 PMR (POLYMYALGIA RHEUMATICA) (HCC): Primary | ICD-10-CM

## 2020-12-08 PROCEDURE — 99442 PHONE E/M BY PHYS 11-20 MIN: CPT | Performed by: INTERNAL MEDICINE

## 2020-12-08 RX ORDER — PREDNISONE 1 MG/1
2 TABLET ORAL
Qty: 270 TABLET | Refills: 1 | COMMUNITY
Start: 2020-12-08 | End: 2021-01-29

## 2020-12-08 NOTE — PROGRESS NOTES
Telephone appointment - audio only  In-Office visit canceled due to coronavirus pandemic    Emily Ryan consents to a virtual/telephone check in service on 12/08/2020.   Patient understands and accepts financial responsibility for any deductible, coinsu if that worsens. Okay to take ibuprofen prn. Previously recommended starting alendronate for bone protection, pt hesitant to do so. Now that pt on low dose, can monitor off medication and recommended continued calcium and vitamin d supplementation.      R problems with her shoulders. She does have some increased aches in the hips which she attributes to change in weather as well as osteoarthritis. She also has hx of knee osteoarthritis for which she is holding off on surgical intervention due to pandemic. recommended to take 30mg, however pt hesitant due to her previous reaction. So pt is currently taking 10mg twice daily with improvement. States she feels back to her normal self now.      Does feel like prednisone has made her more tired overall.   Does hav SPECIAL SERVICE OR REPORT      ventral hernia repair     Family History:  Family History   Problem Relation Age of Onset   • Heart Disorder Father         mi at 55 and  at 48 of 9th mi   • Lipids Father    • Heart Disease Father    • Other (Other) congestion, hearing loss, nosebleeds, sinus pain and sore throat. Eyes: Negative for blurred vision, double vision, photophobia and redness. Respiratory: Negative for cough, shortness of breath and wheezing.     Cardiovascular: Negative for chest pain, mineral density (BMD) (g/cm2):  1.069    Lumbar T-Score:  0.2      % young normals:  102      % age matched controls:  133      Change from prior spine examination:  -5.2*%  A positive change demonstrates increase in BMD since prior examination, a negative is no residual fracture line visible. This appears to have gone on to full healing.   No fractures dislocations or loose bodies.     X-rays 4 views of the right knee show moderate to severe osteoarthritis with significant tricompartmental spurring and sign CRP 0.72 borderline     10/2019  ESR 7 normal   CRP <0.29 normal   Vit D 23.4     08/21/2019  CK normal   CRP 3.70  ESR 30     08/12/2019  CRP 0.78  ESR 14    Luna Juarez DO  [facility]  12/08/2020

## 2020-12-08 NOTE — PATIENT INSTRUCTIONS
-- okay to decrease prednisone to 1mg daily   -- repeat labs one month after decreasing   -- update me if symptoms worsen with the decrease   -- follow up in 4 months or sooner as needed  -- be sure due to osteopenia, you're getting at least 1000-1200mg of

## 2020-12-15 RX ORDER — ATENOLOL 50 MG/1
TABLET ORAL
Qty: 90 TABLET | Refills: 3 | Status: SHIPPED | OUTPATIENT
Start: 2020-12-15

## 2020-12-16 ENCOUNTER — TELEMEDICINE (OUTPATIENT)
Dept: INTERNAL MEDICINE CLINIC | Facility: CLINIC | Age: 70
End: 2020-12-16
Payer: MEDICARE

## 2020-12-16 DIAGNOSIS — R05.9 COUGH: Primary | ICD-10-CM

## 2020-12-16 DIAGNOSIS — R68.89 TEMPERATURE INTOLERANCE: ICD-10-CM

## 2020-12-16 DIAGNOSIS — J02.9 SORE THROAT: ICD-10-CM

## 2020-12-16 PROCEDURE — 99213 OFFICE O/P EST LOW 20 MIN: CPT | Performed by: PHYSICIAN ASSISTANT

## 2020-12-16 NOTE — PROGRESS NOTES
Telehealth outside of [address] Verbal Consent   I conducted a telehealth visit with Jory Alvarado today, 12/16/20, which was completed using two-way, real-time interactive audio and video communication.  This has been done in good amy to [employer] last couple of nights. No other associated sxs. No Further Nursing Notes to Review            REVIEW OF SYSTEMS:  Pertinent items are noted in HPI.                Physical Exam:  alert, appears stated age, cooperative and no distress, Speaking in full s

## 2020-12-17 ENCOUNTER — LAB ENCOUNTER (OUTPATIENT)
Dept: LAB | Age: 70
End: 2020-12-17
Attending: PHYSICIAN ASSISTANT
Payer: MEDICARE

## 2020-12-17 DIAGNOSIS — J02.9 SORE THROAT: ICD-10-CM

## 2020-12-17 DIAGNOSIS — R05.9 COUGH: ICD-10-CM

## 2020-12-17 RX ORDER — TRIAMTERENE AND HYDROCHLOROTHIAZIDE 37.5; 25 MG/1; MG/1
TABLET ORAL
Qty: 90 TABLET | Refills: 0 | Status: SHIPPED | OUTPATIENT
Start: 2020-12-17 | End: 2021-02-25

## 2020-12-17 NOTE — TELEPHONE ENCOUNTER
PASSED per protocol, refill sent. Last PE 8. 5.20   Future Appointments   Date Time Provider Tay Washington   12/17/2020 10:00 AM [facility] COVID RESOURCE [facility] [place]   4/14/2021 10:30 AM Sudarshan Juarez, DO [facility]

## 2021-01-11 RX ORDER — ATORVASTATIN CALCIUM 20 MG/1
TABLET, FILM COATED ORAL
Qty: 90 TABLET | Refills: 3 | Status: SHIPPED | OUTPATIENT
Start: 2021-01-11

## 2021-01-12 ENCOUNTER — HOSPITAL ENCOUNTER (OUTPATIENT)
Dept: MAMMOGRAPHY | Age: 71
Discharge: HOME | End: 2021-01-12
Attending: PHYSICIAN ASSISTANT
Payer: MEDICARE

## 2021-01-12 DIAGNOSIS — Z85.3 HISTORY OF BREAST CANCER: ICD-10-CM

## 2021-01-12 DIAGNOSIS — Z12.31 ENCOUNTER FOR SCREENING MAMMOGRAM FOR HIGH-RISK PATIENT: ICD-10-CM

## 2021-01-12 PROCEDURE — 77063 BREAST TOMOSYNTHESIS BI: CPT | Performed by: PHYSICIAN ASSISTANT

## 2021-01-12 PROCEDURE — 77067 SCR MAMMO BI INCL CAD: CPT | Performed by: PHYSICIAN ASSISTANT

## 2021-01-29 DIAGNOSIS — Z79.52 CURRENT CHRONIC USE OF SYSTEMIC STEROIDS: ICD-10-CM

## 2021-01-29 DIAGNOSIS — M35.3 PMR (POLYMYALGIA RHEUMATICA) (HCC): ICD-10-CM

## 2021-01-29 RX ORDER — PREDNISONE 1 MG/1
TABLET ORAL
Qty: 270 TABLET | Refills: 1 | Status: SHIPPED | OUTPATIENT
Start: 2021-01-29 | End: 2021-05-06

## 2021-02-08 ENCOUNTER — LAB ENCOUNTER (OUTPATIENT)
Dept: LAB | Age: 71
End: 2021-02-08
Attending: PHYSICIAN ASSISTANT
Payer: MEDICARE

## 2021-02-08 DIAGNOSIS — Z79.52 CURRENT CHRONIC USE OF SYSTEMIC STEROIDS: ICD-10-CM

## 2021-02-08 DIAGNOSIS — R68.89 TEMPERATURE INTOLERANCE: ICD-10-CM

## 2021-02-08 DIAGNOSIS — M35.3 PMR (POLYMYALGIA RHEUMATICA) (HCC): ICD-10-CM

## 2021-02-08 LAB
CRP SERPL-MCNC: <0.29 MG/DL (ref ?–0.3)
SED RATE-ML: 13 MM/HR
TSI SER-ACNC: 1.14 MIU/ML (ref 0.36–3.74)

## 2021-02-08 PROCEDURE — 86140 C-REACTIVE PROTEIN: CPT

## 2021-02-08 PROCEDURE — 84443 ASSAY THYROID STIM HORMONE: CPT

## 2021-02-08 PROCEDURE — 36415 COLL VENOUS BLD VENIPUNCTURE: CPT

## 2021-02-08 PROCEDURE — 85652 RBC SED RATE AUTOMATED: CPT

## 2021-02-24 ENCOUNTER — TELEPHONE (OUTPATIENT)
Dept: INTERNAL MEDICINE CLINIC | Facility: CLINIC | Age: 71
End: 2021-02-24

## 2021-02-24 DIAGNOSIS — Z23 NEED FOR TDAP VACCINATION: Primary | ICD-10-CM

## 2021-02-24 NOTE — TELEPHONE ENCOUNTER
Pt receiving second pfizer injection 3/9, patient scheduled TDAP nurse visit 3/26/2021. No hx of TDAP on file, son having baby in April. Order pended for review and approval if ok please advise.      LOV with CB 8/5/2020

## 2021-02-24 NOTE — TELEPHONE ENCOUNTER
Pt son wife is having a baby and she was advised to have whooping cough inj -not sure she has had this already? Pt is having 2nd covid inj on 3/9/21 so not sure when she can get this whooping cough inj if she needs it as well?

## 2021-02-25 RX ORDER — TRIAMTERENE AND HYDROCHLOROTHIAZIDE 37.5; 25 MG/1; MG/1
TABLET ORAL
Qty: 90 TABLET | Refills: 0 | Status: SHIPPED | OUTPATIENT
Start: 2021-02-25 | End: 2021-08-24

## 2021-02-25 NOTE — TELEPHONE ENCOUNTER
PASSED per protocol, refill sent. Last PE 8. 5.20  Future Appointments   Date Time Provider Tay Washington   3/26/2021  9:45 AM [facility] N[facility]SE [facility]   4/14/2021 10:30 AM Luna Juarez,  [facility]

## 2021-03-08 DIAGNOSIS — Z23 NEED FOR VACCINATION: ICD-10-CM

## 2021-03-10 ENCOUNTER — LAB ENCOUNTER (OUTPATIENT)
Dept: LAB | Age: 71
End: 2021-03-10
Attending: INTERNAL MEDICINE
Payer: MEDICARE

## 2021-03-10 ENCOUNTER — OFFICE VISIT (OUTPATIENT)
Dept: INTERNAL MEDICINE CLINIC | Facility: CLINIC | Age: 71
End: 2021-03-10
Payer: MEDICARE

## 2021-03-10 VITALS
RESPIRATION RATE: 18 BRPM | HEIGHT: 63 IN | TEMPERATURE: 97 F | BODY MASS INDEX: 27.04 KG/M2 | DIASTOLIC BLOOD PRESSURE: 84 MMHG | SYSTOLIC BLOOD PRESSURE: 132 MMHG | WEIGHT: 152.63 LBS

## 2021-03-10 DIAGNOSIS — I25.10 CORONARY ARTERY DISEASE INVOLVING NATIVE CORONARY ARTERY OF NATIVE HEART WITHOUT ANGINA PECTORIS: ICD-10-CM

## 2021-03-10 DIAGNOSIS — G89.29 CHRONIC ABDOMINAL PAIN: ICD-10-CM

## 2021-03-10 DIAGNOSIS — R53.82 CHRONIC FATIGUE: Primary | ICD-10-CM

## 2021-03-10 DIAGNOSIS — R10.9 CHRONIC ABDOMINAL PAIN: ICD-10-CM

## 2021-03-10 DIAGNOSIS — R73.03 PRE-DIABETES: ICD-10-CM

## 2021-03-10 DIAGNOSIS — R53.82 CHRONIC FATIGUE: ICD-10-CM

## 2021-03-10 DIAGNOSIS — I10 ESSENTIAL HYPERTENSION: ICD-10-CM

## 2021-03-10 LAB
ALBUMIN SERPL-MCNC: 3.8 G/DL (ref 3.4–5)
ALBUMIN/GLOB SERPL: 1 {RATIO} (ref 1–2)
ALP LIVER SERPL-CCNC: 97 U/L
ALT SERPL-CCNC: 24 U/L
ANION GAP SERPL CALC-SCNC: 7 MMOL/L (ref 0–18)
AST SERPL-CCNC: 21 U/L (ref 15–37)
BASOPHILS # BLD AUTO: 0.06 X10(3) UL (ref 0–0.2)
BASOPHILS NFR BLD AUTO: 0.6 %
BILIRUB SERPL-MCNC: 0.8 MG/DL (ref 0.1–2)
BUN BLD-MCNC: 20 MG/DL (ref 7–18)
BUN/CREAT SERPL: 19 (ref 10–20)
CALCIUM BLD-MCNC: 10.1 MG/DL (ref 8.5–10.1)
CHLORIDE SERPL-SCNC: 102 MMOL/L (ref 98–112)
CO2 SERPL-SCNC: 29 MMOL/L (ref 21–32)
CREAT BLD-MCNC: 1.05 MG/DL
DEPRECATED RDW RBC AUTO: 42.7 FL (ref 35.1–46.3)
EOSINOPHIL # BLD AUTO: 0.16 X10(3) UL (ref 0–0.7)
EOSINOPHIL NFR BLD AUTO: 1.6 %
ERYTHROCYTE [DISTWIDTH] IN BLOOD BY AUTOMATED COUNT: 13.6 % (ref 11–15)
EST. AVERAGE GLUCOSE BLD GHB EST-MCNC: 134 MG/DL (ref 68–126)
GLOBULIN PLAS-MCNC: 3.9 G/DL (ref 2.8–4.4)
GLUCOSE BLD-MCNC: 104 MG/DL (ref 70–99)
HBA1C MFR BLD HPLC: 6.3 % (ref ?–5.7)
HCT VFR BLD AUTO: 40.5 %
HGB BLD-MCNC: 13.2 G/DL
IMM GRANULOCYTES # BLD AUTO: 0.04 X10(3) UL (ref 0–1)
IMM GRANULOCYTES NFR BLD: 0.4 %
LYMPHOCYTES # BLD AUTO: 0.8 X10(3) UL (ref 1–4)
LYMPHOCYTES NFR BLD AUTO: 8 %
M PROTEIN MFR SERPL ELPH: 7.7 G/DL (ref 6.4–8.2)
MCH RBC QN AUTO: 28.2 PG (ref 26–34)
MCHC RBC AUTO-ENTMCNC: 32.6 G/DL (ref 31–37)
MCV RBC AUTO: 86.5 FL
MONOCYTES # BLD AUTO: 0.75 X10(3) UL (ref 0.1–1)
MONOCYTES NFR BLD AUTO: 7.5 %
NEUTROPHILS # BLD AUTO: 8.13 X10 (3) UL (ref 1.5–7.7)
NEUTROPHILS # BLD AUTO: 8.13 X10(3) UL (ref 1.5–7.7)
NEUTROPHILS NFR BLD AUTO: 81.9 %
OSMOLALITY SERPL CALC.SUM OF ELEC: 289 MOSM/KG (ref 275–295)
PATIENT FASTING Y/N/NP: NO
PLATELET # BLD AUTO: 290 10(3)UL (ref 150–450)
POTASSIUM SERPL-SCNC: 3.7 MMOL/L (ref 3.5–5.1)
RBC # BLD AUTO: 4.68 X10(6)UL
SODIUM SERPL-SCNC: 138 MMOL/L (ref 136–145)
VIT B12 SERPL-MCNC: 624 PG/ML (ref 193–986)
WBC # BLD AUTO: 9.9 X10(3) UL (ref 4–11)

## 2021-03-10 PROCEDURE — 83036 HEMOGLOBIN GLYCOSYLATED A1C: CPT

## 2021-03-10 PROCEDURE — 99213 OFFICE O/P EST LOW 20 MIN: CPT | Performed by: INTERNAL MEDICINE

## 2021-03-10 PROCEDURE — 85025 COMPLETE CBC W/AUTO DIFF WBC: CPT

## 2021-03-10 PROCEDURE — 36415 COLL VENOUS BLD VENIPUNCTURE: CPT

## 2021-03-10 PROCEDURE — 82607 VITAMIN B-12: CPT

## 2021-03-10 PROCEDURE — 80053 COMPREHEN METABOLIC PANEL: CPT

## 2021-03-10 NOTE — PROGRESS NOTES
Patient presents with:  Fatigue: MR rm 8 fatigue since recieving covid vaccines   Change of Bowel Habits: pt states that she is having to go more frequently up 8x a day       HPI:  Here for chronic fatigue for mos, tired, non focal. No chest pain or sob.  Nick Trivedi coronary artery     PMR (polymyalgia rheumatica) ([facility])      Current Outpatient Medications   Medication Sig Dispense Refill   • TRIAMTERENE-HCTZ 37.5-25 MG Oral Tab TAKE 1 TABLET BY MOUTH EVERY DAY 90 tablet 0   • PREDNISONE 1 MG Oral Tab TAKE 3 TABLETS BY cardiology  See me in 3 mos pending w/u  Us abd for vague abd discomfort  Orders Placed This Encounter      CBC With Diff      CMP      Hemoglobin A1C      VITAMIN B12      Meds & Refills for this Visit:  Requested Prescriptions      No prescriptions reque

## 2021-03-12 ENCOUNTER — TELEPHONE (OUTPATIENT)
Dept: INTERNAL MEDICINE CLINIC | Facility: CLINIC | Age: 71
End: 2021-03-12

## 2021-03-12 NOTE — TELEPHONE ENCOUNTER
Patient received covid vaccine, second dose 3/9/2021 and reports increased lymph nodes under left arm, same side as injection.  Patient advised can be side effect of vaccine but due to history of breast cancer to closely monitor and if no improvement seen t

## 2021-03-12 NOTE — TELEPHONE ENCOUNTER
Pt received vaccine covid on Tues 3-9. Her lymph nodes under left arm swelled immediately. She is concerned because she did have breast cancer 17 years ago.  Also wanting to know if still ok to get the tdap vaccine that is scheduled with these symptoms aracelis

## 2021-03-23 ENCOUNTER — HOSPITAL ENCOUNTER (OUTPATIENT)
Dept: ULTRASOUND IMAGING | Age: 71
Discharge: HOME | End: 2021-03-23
Attending: INTERNAL MEDICINE
Payer: MEDICARE

## 2021-03-23 DIAGNOSIS — G89.29 CHRONIC ABDOMINAL PAIN: ICD-10-CM

## 2021-03-23 DIAGNOSIS — R10.9 CHRONIC ABDOMINAL PAIN: ICD-10-CM

## 2021-03-23 PROCEDURE — 76700 US EXAM ABDOM COMPLETE: CPT | Performed by: INTERNAL MEDICINE

## 2021-03-24 RX ORDER — EZETIMIBE 10 MG/1
10 TABLET ORAL EVERY OTHER DAY
Qty: 45 TABLET | Refills: 3 | Status: SHIPPED | OUTPATIENT
Start: 2021-03-24

## 2021-03-26 ENCOUNTER — NURSE ONLY (OUTPATIENT)
Dept: INTERNAL MEDICINE CLINIC | Facility: CLINIC | Age: 71
End: 2021-03-26
Payer: MEDICARE

## 2021-03-26 PROCEDURE — 90471 IMMUNIZATION ADMIN: CPT | Performed by: PHYSICIAN ASSISTANT

## 2021-03-26 PROCEDURE — 90715 TDAP VACCINE 7 YRS/> IM: CPT | Performed by: PHYSICIAN ASSISTANT

## 2021-04-12 ENCOUNTER — LAB ENCOUNTER (OUTPATIENT)
Dept: LAB | Age: 71
End: 2021-04-12
Attending: INTERNAL MEDICINE
Payer: MEDICARE

## 2021-04-12 ENCOUNTER — TELEPHONE (OUTPATIENT)
Dept: RHEUMATOLOGY | Facility: CLINIC | Age: 71
End: 2021-04-12

## 2021-04-12 DIAGNOSIS — M35.3 PMR (POLYMYALGIA RHEUMATICA) (HCC): ICD-10-CM

## 2021-04-12 DIAGNOSIS — R79.89 ABNORMAL CBC MEASUREMENT: ICD-10-CM

## 2021-04-12 DIAGNOSIS — Z79.52 CURRENT CHRONIC USE OF SYSTEMIC STEROIDS: ICD-10-CM

## 2021-04-12 PROCEDURE — 85652 RBC SED RATE AUTOMATED: CPT

## 2021-04-12 PROCEDURE — 36415 COLL VENOUS BLD VENIPUNCTURE: CPT

## 2021-04-12 PROCEDURE — 86140 C-REACTIVE PROTEIN: CPT

## 2021-04-12 PROCEDURE — 85025 COMPLETE CBC W/AUTO DIFF WBC: CPT

## 2021-04-12 NOTE — TELEPHONE ENCOUNTER
Current labs done April 12 are stable sed rate 9 CRP negative and CBC normal.  Patient feels overall well occasional knee pain but does have arthritis osteo in her knees. On prednisone 1 mg/day. Okay to stop the prednisone which was being used for PMR.

## 2021-04-12 NOTE — TELEPHONE ENCOUNTER
Pt returned my call,  explained her labs still being resulted. Explained to pt that Dr. Carri Christianson is out of the office. Dr. Aliya Araujo will call pt once results received. Pt currently taking 1 mg prednisone daily. Co bilateral hip and knee pain.  Not sure if its

## 2021-04-23 ENCOUNTER — OFFICE VISIT (OUTPATIENT)
Dept: INTERNAL MEDICINE CLINIC | Facility: CLINIC | Age: 71
End: 2021-04-23
Payer: MEDICARE

## 2021-04-23 ENCOUNTER — TELEPHONE (OUTPATIENT)
Dept: INTERNAL MEDICINE CLINIC | Facility: CLINIC | Age: 71
End: 2021-04-23

## 2021-04-23 VITALS
HEIGHT: 63 IN | SYSTOLIC BLOOD PRESSURE: 124 MMHG | TEMPERATURE: 97 F | WEIGHT: 151.63 LBS | DIASTOLIC BLOOD PRESSURE: 82 MMHG | BODY MASS INDEX: 26.87 KG/M2 | RESPIRATION RATE: 18 BRPM

## 2021-04-23 DIAGNOSIS — E78.00 PURE HYPERCHOLESTEROLEMIA: ICD-10-CM

## 2021-04-23 DIAGNOSIS — Z12.11 SCREEN FOR COLON CANCER: Primary | ICD-10-CM

## 2021-04-23 DIAGNOSIS — M35.3 PMR (POLYMYALGIA RHEUMATICA) (HCC): ICD-10-CM

## 2021-04-23 DIAGNOSIS — I10 ESSENTIAL HYPERTENSION: ICD-10-CM

## 2021-04-23 DIAGNOSIS — R73.03 PRE-DIABETES: ICD-10-CM

## 2021-04-23 DIAGNOSIS — Z00.00 ROUTINE GENERAL MEDICAL EXAMINATION AT A HEALTH CARE FACILITY: Primary | ICD-10-CM

## 2021-04-23 PROBLEM — M25.511 ACUTE PAIN OF BOTH SHOULDERS: Status: RESOLVED | Noted: 2019-08-12 | Resolved: 2021-04-23

## 2021-04-23 PROBLEM — M25.512 ACUTE PAIN OF BOTH SHOULDERS: Status: RESOLVED | Noted: 2019-08-12 | Resolved: 2021-04-23

## 2021-04-23 PROBLEM — E87.6 HYPOKALEMIA: Status: RESOLVED | Noted: 2019-01-07 | Resolved: 2021-04-23

## 2021-04-23 PROCEDURE — 99213 OFFICE O/P EST LOW 20 MIN: CPT | Performed by: INTERNAL MEDICINE

## 2021-04-23 RX ORDER — PERPHENAZINE/AMITRIPTYLINE HCL 2 MG-25 MG
TABLET ORAL
COMMUNITY
Start: 2021-03-01

## 2021-04-23 NOTE — TELEPHONE ENCOUNTER
Pt wants to know if AS wants her to get any other labs done for her cpe. Please advise    [facility]. Pt aware to get labs done no sooner than 2 weeks prior to the appt. Pt aware to fast.  No call back required.   Future Appointments   Date Time Prov

## 2021-04-25 PROBLEM — R10.84 GENERALIZED ABDOMINAL PAIN: Status: ACTIVE | Noted: 2021-04-25

## 2021-04-25 NOTE — PROGRESS NOTES
Patient presents with: Follow - Up: MR rm 9 6-wk f/up      HPI:  Here for /fu of htn, PMR, doing well taking meds, off of prednisone now, no return of symptom sat this point. Sugars ahave normalized.  She also had been having intermittent mild gassy abd di 37.5-25 MG Oral Tab TAKE 1 TABLET BY MOUTH EVERY DAY 90 tablet 0   • Calcium Carbonate-Vitamin D (CALCIUM-VITAMIN D3 OR) Take by mouth. • aspirin EC 81 MG Oral Tab EC Take 81 mg by mouth daily.      • Multiple Vitamin (TAB-A-JIMBO) Oral Tab Take 1 tablet (INTERNAL)    Return in about 5 months (around 9/23/2021). There are no Patient Instructions on file for this visit. All questions were answered and the patient understands the plan.

## 2021-04-30 ENCOUNTER — HOSPITAL ENCOUNTER (EMERGENCY)
Age: 71
Discharge: HOME | End: 2021-04-30
Attending: EMERGENCY MEDICINE
Payer: MEDICARE

## 2021-04-30 VITALS
SYSTOLIC BLOOD PRESSURE: 153 MMHG | BODY MASS INDEX: 27 KG/M2 | WEIGHT: 150 LBS | OXYGEN SATURATION: 95 % | TEMPERATURE: 97 F | DIASTOLIC BLOOD PRESSURE: 83 MMHG | HEART RATE: 64 BPM | RESPIRATION RATE: 18 BRPM

## 2021-04-30 DIAGNOSIS — T50.905A ADVERSE EFFECT OF DRUG, INITIAL ENCOUNTER: Primary | ICD-10-CM

## 2021-04-30 PROCEDURE — 93005 ELECTROCARDIOGRAM TRACING: CPT

## 2021-04-30 PROCEDURE — 93010 ELECTROCARDIOGRAM REPORT: CPT

## 2021-04-30 PROCEDURE — 99283 EMERGENCY DEPT VISIT LOW MDM: CPT

## 2021-05-01 NOTE — ED INITIAL ASSESSMENT (HPI)
Pt c/o htn 191/121, ha, heart racing after receiving r, knee cortison shot today. States same sx occurred previously when she has cortisone shot in both knees.

## 2021-05-01 NOTE — ED PROVIDER NOTES
Patient Seen in: [facility] In [place]      History   Patient presents with:  Hypertension    Stated Complaint: htn and ha after receiving cortisone shot to right knee    HPI/Subjective:   HPI    38-year-old female comes in the hospit COLONOSCOPY,DIAGNOSTIC  2004    nl colonoscopy   • HIP SURGERY Left     May 2018   • LUMPECTOMY RIGHT  2004   • GERMÁN BIOPSY STEREO NODULE 1 SITE RIGHT (CPT=19081)  2004   • RADIATION RIGHT  2004   • SPECIAL SERVICE OR REPORT  2005    diverticulitis - had L customary discharge instuctions were discussed given the patient's ER course. We discussed signs and symptoms that should prompt the patient's immediate return to the emergency department.    Reasonable over the counter and prescription treatment options a

## 2021-05-03 ENCOUNTER — TELEPHONE (OUTPATIENT)
Dept: INTERNAL MEDICINE CLINIC | Facility: CLINIC | Age: 71
End: 2021-05-03

## 2021-05-03 NOTE — TELEPHONE ENCOUNTER
Pt stated she gets cortizone shots and spoke with AS and they decided to do a Cortizone shot on her knee - but one at a time. Pt stated she had one knee done and had to go the ER because her bp was all over the place and now is worried about getting the other knee done and wants to discuss it. Pt stated she was fine the day but wants to be sure it's worth it. Please advise.

## 2021-05-03 NOTE — TELEPHONE ENCOUNTER
Please offer ER follow up with patient to discuss, patient could discuss with ortho as well. Which ever patient is comfortable with.

## 2021-05-04 NOTE — TELEPHONE ENCOUNTER
Pt called back to check on the status. I offered an appt for her tomorrow. Pt declined and wants to speak to the nurse ASAP to explain why she needs to see the doctor to further discuss. She needs to know if she should cxl the appt or not?  Please advise

## 2021-05-04 NOTE — TELEPHONE ENCOUNTER
Patient states she has had two reactions from steroids, the recent one 4/30/21 was after a steroid injection for her knee from Dr Myrna Joseph at about 11am then did not start having a reaction until 4pm, went to the ER with a 'bad' h/a, /120, ER MD performed an EKG and told her it was fine, her BP eventually started to come down and by the next morning she did not have anymore side effects. Pt scheduled ER f/u with CB for 5/6/21 to determine if she could have an injection in her other knee. Pt states the steroid injection significantly helped her knee.  JERMAINE to [facility]

## 2021-05-05 ENCOUNTER — PATIENT OUTREACH (OUTPATIENT)
Dept: CASE MANAGEMENT | Age: 71
End: 2021-05-05

## 2021-05-05 NOTE — PROGRESS NOTES
Spoke to pt intro'd CCM program pt would like more information in the mail. Sending and pt will call once received.

## 2021-05-06 ENCOUNTER — OFFICE VISIT (OUTPATIENT)
Dept: INTERNAL MEDICINE CLINIC | Facility: CLINIC | Age: 71
End: 2021-05-06
Payer: MEDICARE

## 2021-05-06 VITALS
TEMPERATURE: 97 F | HEART RATE: 59 BPM | HEIGHT: 63 IN | BODY MASS INDEX: 26.79 KG/M2 | WEIGHT: 151.19 LBS | SYSTOLIC BLOOD PRESSURE: 124 MMHG | RESPIRATION RATE: 16 BRPM | DIASTOLIC BLOOD PRESSURE: 76 MMHG

## 2021-05-06 DIAGNOSIS — I10 ESSENTIAL HYPERTENSION: Primary | ICD-10-CM

## 2021-05-06 DIAGNOSIS — T50.905D ADVERSE EFFECT OF DRUG, SUBSEQUENT ENCOUNTER: ICD-10-CM

## 2021-05-06 PROCEDURE — 99213 OFFICE O/P EST LOW 20 MIN: CPT | Performed by: PHYSICIAN ASSISTANT

## 2021-05-06 RX ORDER — AMLODIPINE BESYLATE 2.5 MG/1
TABLET ORAL
Qty: 10 TABLET | Refills: 0 | Status: SHIPPED | OUTPATIENT
Start: 2021-05-06 | End: 2021-06-03

## 2021-06-03 ENCOUNTER — OFFICE VISIT (OUTPATIENT)
Dept: RHEUMATOLOGY | Facility: CLINIC | Age: 71
End: 2021-06-03
Payer: MEDICARE

## 2021-06-03 VITALS
HEART RATE: 60 BPM | SYSTOLIC BLOOD PRESSURE: 152 MMHG | RESPIRATION RATE: 16 BRPM | HEIGHT: 63 IN | DIASTOLIC BLOOD PRESSURE: 80 MMHG | TEMPERATURE: 98 F | BODY MASS INDEX: 26.4 KG/M2 | WEIGHT: 149 LBS

## 2021-06-03 DIAGNOSIS — M35.3 PMR (POLYMYALGIA RHEUMATICA) (HCC): Primary | ICD-10-CM

## 2021-06-03 PROCEDURE — 99213 OFFICE O/P EST LOW 20 MIN: CPT | Performed by: INTERNAL MEDICINE

## 2021-06-03 RX ORDER — GARLIC EXTRACT 500 MG
1 CAPSULE ORAL DAILY
COMMUNITY

## 2021-06-03 NOTE — PATIENT INSTRUCTIONS
1) get blood work before the end of June. Dr. Zenobia Mojica still has to standing orders for the inflammation markers. 2) I would also get repeat blood work in September as well.

## 2021-06-03 NOTE — PROGRESS NOTES
Rheumatology Follow-up Note  =====================================================================================================      Date of visit: 6/3/2021  ? Patient presents with:  PMR: previous video visit of dr. Karina Adler pt. Feeling good.  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   • Esophageal reflux    • Essential Maternal Aunt [de-identified]     Social History:  Social History    Tobacco Use      Smoking status: Never Smoker      Smokeless tobacco: Never Used    Vaping Use      Vaping Use: Never used    Alcohol use:  Yes      Alcohol/week: 2.0 - 3.0 standard drinks      Types: EOABSO 0.37 04/12/2021    BAABSO 0.08 04/12/2021     Lab Results   Component Value Date     (H) 03/10/2021    BUN 20 (H) 03/10/2021    BUNCREA 19.0 03/10/2021    CREATSERUM 1.05 (H) 03/10/2021    ANIONGAP 7 03/10/2021    GFR 54 (L) 09/01/2017    GFR changes), PMR symptoms, fevers, chills, night sweats.      ?   Plan:  Diagnoses and all orders for this visit:    PMR (polymyalgia rheumatica) ([facility].)      F/u in 11/15/2021 with Dr. Julio Romero    The above plan of care, diagnosis, orders, and follow-up were disc

## 2021-07-06 NOTE — PROGRESS NOTES
[facility] HOSPITALIST  Progress Note     [place] Kar Patient Status:  Observation    1950 MRN [id number]   [facility] 8NE-A Attending Macy Paulino MD   Hosp Day # 0 PCP Wilson Alvares MD     Chief Complaint: chest pain    S: [place] Telephone Encounter by Paty Moralez RN at 7/6/2021  4:26 PM     Author: Paty Moralez RN Service: -- Author Type: Registered Nurse    Filed: 7/6/2021  4:26 PM Encounter Date: 7/6/2021 Status: Signed    : Paty Moralez RN (Registered Nurse)       RN cannot approve Refill Request    RN can NOT refill this medication med is not covered by policy/route to provider. Last office visit: 12/8/2020 Murali Sosa MD Last Physical: Visit date not found Last MTM visit: Visit date not found Last visit same specialty: 5/26/2021 Aristides Alegria MD.  Next visit within 3 mo: Visit date not found  Next physical within 3 mo: Visit date not found      Paty Moralez, [facility] Triage/Med Refill 7/6/2021    Requested Prescriptions   Pending Prescriptions Disp Refills   ? baclofen (LIORESAL) 10 MG tablet [Pharmacy Med Name: BACLOFEN 10 MG TABLET 10 Tablet] 180 tablet 0     Sig: TAKE 1 TABLET (10 MG TOTAL) BY MOUTH 2 (TWO) TIMES A DAY.       There is no refill protocol information for this order                 tomorrow. 2. HTN  3.  HL    Plan of care: As above    Quality:  · DVT Prophylaxis: Heparin  · CODE status: Full code  · Rowan: None  · Central line: None    Estimated date of discharge: TBD  Discharge is dependent on: Cath  At this point Ms. Trisha Zuniga is expe

## 2021-07-07 ENCOUNTER — LAB ENCOUNTER (OUTPATIENT)
Dept: LAB | Age: 71
End: 2021-07-07
Attending: INTERNAL MEDICINE
Payer: MEDICARE

## 2021-07-07 DIAGNOSIS — M35.3 PMR (POLYMYALGIA RHEUMATICA) (HCC): ICD-10-CM

## 2021-07-07 DIAGNOSIS — Z79.52 CURRENT CHRONIC USE OF SYSTEMIC STEROIDS: ICD-10-CM

## 2021-07-07 LAB
CRP SERPL-MCNC: <0.29 MG/DL (ref ?–0.3)
SED RATE-ML: 12 MM/HR

## 2021-07-07 PROCEDURE — 36415 COLL VENOUS BLD VENIPUNCTURE: CPT

## 2021-07-07 PROCEDURE — 86140 C-REACTIVE PROTEIN: CPT

## 2021-07-07 PROCEDURE — 85652 RBC SED RATE AUTOMATED: CPT

## 2021-08-03 ENCOUNTER — TELEPHONE (OUTPATIENT)
Dept: CARDIOLOGY | Age: 71
End: 2021-08-03

## 2021-08-10 NOTE — TELEPHONE ENCOUNTER
Please advise any further labs needed? Pt had labs completed prior this year as followed:    2/8/21-TSH  3/10/21- CBC, CMP, A1c and Vit B12  4/12/21- CBC    Lipid was ordered since pt hasnt completed.

## 2021-08-24 RX ORDER — TRIAMTERENE AND HYDROCHLOROTHIAZIDE 37.5; 25 MG/1; MG/1
TABLET ORAL
Qty: 90 TABLET | Refills: 0 | Status: SHIPPED | OUTPATIENT
Start: 2021-08-24 | End: 2021-11-18

## 2021-09-05 ENCOUNTER — LAB ENCOUNTER (OUTPATIENT)
Dept: LAB | Facility: HOSPITAL | Age: 71
End: 2021-09-05
Attending: INTERNAL MEDICINE
Payer: MEDICARE

## 2021-09-05 DIAGNOSIS — Z01.818 PREOP TESTING: ICD-10-CM

## 2021-09-07 LAB — SARS-COV-2 RNA RESP QL NAA+PROBE: NOT DETECTED

## 2021-09-08 PROBLEM — Z86.010 PERSONAL HISTORY OF COLONIC POLYPS: Status: ACTIVE | Noted: 2021-09-08

## 2021-09-08 PROBLEM — Z86.0100 PERSONAL HISTORY OF COLONIC POLYPS: Status: ACTIVE | Noted: 2021-09-08

## 2021-09-09 ENCOUNTER — LAB ENCOUNTER (OUTPATIENT)
Dept: LAB | Age: 71
End: 2021-09-09
Attending: INTERNAL MEDICINE
Payer: MEDICARE

## 2021-09-09 DIAGNOSIS — Z00.00 ROUTINE GENERAL MEDICAL EXAMINATION AT A HEALTH CARE FACILITY: ICD-10-CM

## 2021-09-09 DIAGNOSIS — E78.00 PURE HYPERCHOLESTEROLEMIA: ICD-10-CM

## 2021-09-09 DIAGNOSIS — R73.03 PRE-DIABETES: ICD-10-CM

## 2021-09-09 LAB
ALBUMIN SERPL-MCNC: 3.3 G/DL (ref 3.4–5)
ALBUMIN/GLOB SERPL: 0.9 {RATIO} (ref 1–2)
ALP LIVER SERPL-CCNC: 77 U/L
ALT SERPL-CCNC: 34 U/L
ANION GAP SERPL CALC-SCNC: 7 MMOL/L (ref 0–18)
AST SERPL-CCNC: 30 U/L (ref 15–37)
BILIRUB SERPL-MCNC: 0.8 MG/DL (ref 0.1–2)
BUN BLD-MCNC: 12 MG/DL (ref 7–18)
CALCIUM BLD-MCNC: 9.4 MG/DL (ref 8.5–10.1)
CHLORIDE SERPL-SCNC: 106 MMOL/L (ref 98–112)
CHOLEST SMN-MCNC: 161 MG/DL (ref ?–200)
CO2 SERPL-SCNC: 29 MMOL/L (ref 21–32)
CREAT BLD-MCNC: 0.97 MG/DL
EST. AVERAGE GLUCOSE BLD GHB EST-MCNC: 131 MG/DL (ref 68–126)
GLOBULIN PLAS-MCNC: 3.6 G/DL (ref 2.8–4.4)
GLUCOSE BLD-MCNC: 108 MG/DL (ref 70–99)
HBA1C MFR BLD HPLC: 6.2 % (ref ?–5.7)
HDLC SERPL-MCNC: 61 MG/DL (ref 40–59)
LDLC SERPL CALC-MCNC: 74 MG/DL (ref ?–100)
M PROTEIN MFR SERPL ELPH: 6.9 G/DL (ref 6.4–8.2)
NONHDLC SERPL-MCNC: 100 MG/DL (ref ?–130)
OSMOLALITY SERPL CALC.SUM OF ELEC: 294 MOSM/KG (ref 275–295)
PATIENT FASTING Y/N/NP: YES
PATIENT FASTING Y/N/NP: YES
POTASSIUM SERPL-SCNC: 3.5 MMOL/L (ref 3.5–5.1)
SODIUM SERPL-SCNC: 142 MMOL/L (ref 136–145)
TRIGL SERPL-MCNC: 154 MG/DL (ref 30–149)
VLDLC SERPL CALC-MCNC: 24 MG/DL (ref 0–30)

## 2021-09-09 PROCEDURE — 80061 LIPID PANEL: CPT

## 2021-09-09 PROCEDURE — 80053 COMPREHEN METABOLIC PANEL: CPT

## 2021-09-09 PROCEDURE — 83036 HEMOGLOBIN GLYCOSYLATED A1C: CPT

## 2021-09-09 PROCEDURE — 36415 COLL VENOUS BLD VENIPUNCTURE: CPT

## 2021-09-10 ENCOUNTER — OFFICE VISIT (OUTPATIENT)
Dept: INTERNAL MEDICINE CLINIC | Facility: CLINIC | Age: 71
End: 2021-09-10
Payer: MEDICARE

## 2021-09-10 VITALS
SYSTOLIC BLOOD PRESSURE: 120 MMHG | HEIGHT: 63 IN | DIASTOLIC BLOOD PRESSURE: 82 MMHG | BODY MASS INDEX: 26.93 KG/M2 | HEART RATE: 71 BPM | TEMPERATURE: 97 F | WEIGHT: 152 LBS | RESPIRATION RATE: 16 BRPM

## 2021-09-10 DIAGNOSIS — I10 ESSENTIAL HYPERTENSION: Primary | ICD-10-CM

## 2021-09-10 DIAGNOSIS — K63.5 POLYP OF COLON, UNSPECIFIED PART OF COLON, UNSPECIFIED TYPE: ICD-10-CM

## 2021-09-10 DIAGNOSIS — Z12.31 VISIT FOR SCREENING MAMMOGRAM: ICD-10-CM

## 2021-09-10 DIAGNOSIS — R10.84 GENERALIZED ABDOMINAL PAIN: ICD-10-CM

## 2021-09-10 DIAGNOSIS — Z95.5 PRESENCE OF DRUG COATED STENT IN RIGHT CORONARY ARTERY: ICD-10-CM

## 2021-09-10 DIAGNOSIS — Z00.00 ENCOUNTER FOR ANNUAL HEALTH EXAMINATION: ICD-10-CM

## 2021-09-10 DIAGNOSIS — E78.00 PURE HYPERCHOLESTEROLEMIA: ICD-10-CM

## 2021-09-10 DIAGNOSIS — Z85.3 HISTORY OF BREAST CANCER: ICD-10-CM

## 2021-09-10 DIAGNOSIS — H93.13 TINNITUS OF BOTH EARS: ICD-10-CM

## 2021-09-10 DIAGNOSIS — R73.03 PREDIABETES: ICD-10-CM

## 2021-09-10 DIAGNOSIS — I25.2 HISTORY OF NON-ST ELEVATION MYOCARDIAL INFARCTION (NSTEMI): ICD-10-CM

## 2021-09-10 DIAGNOSIS — Z78.0 POST-MENOPAUSAL: ICD-10-CM

## 2021-09-10 DIAGNOSIS — I25.10 CORONARY ARTERY DISEASE INVOLVING NATIVE CORONARY ARTERY OF NATIVE HEART WITHOUT ANGINA PECTORIS: ICD-10-CM

## 2021-09-10 DIAGNOSIS — E04.2 MULTIPLE THYROID NODULES: ICD-10-CM

## 2021-09-10 DIAGNOSIS — M35.3 PMR (POLYMYALGIA RHEUMATICA) (HCC): ICD-10-CM

## 2021-09-10 DIAGNOSIS — Z86.010 PERSONAL HISTORY OF COLONIC POLYPS: ICD-10-CM

## 2021-09-10 PROBLEM — Z87.19 HISTORY OF DIVERTICULITIS: Status: RESOLVED | Noted: 2017-06-17 | Resolved: 2021-09-10

## 2021-09-10 PROBLEM — R07.9 CHEST PAIN OF UNCERTAIN ETIOLOGY: Status: RESOLVED | Noted: 2019-09-10 | Resolved: 2021-09-10

## 2021-09-10 PROCEDURE — G0439 PPPS, SUBSEQ VISIT: HCPCS | Performed by: INTERNAL MEDICINE

## 2021-09-10 NOTE — PROGRESS NOTES
HPI:   Tommy Garrison is a 66year old female who presents for a Medicare Subsequent Annual Wellness visit (Pt already had Initial Annual Wellness). Here for awv. Doing well stable chronic issues.      Her last annual assessment has been over 1 year and discussion of advance directives standard forms performed Face to Face with patient and Family/surrogate (if present), and forms available to patient in AVS       She has never smoked tobacco.  Ms. Andreas Stephenson already takes aspirin and has it on her medicat DAY  PEG 3350-KCl-Na Bicarb-NaCl 420 g Oral Recon Soln, Take as directed by your physician. Acidophilus/Pectin Oral Cap, Take 1 capsule by mouth daily.   Flaxseed, Linseed, (FLAX SEED OIL) 1300 MG Oral Cap,   Calcium Carbonate-Vitamin D (CALCIUM-VITAMIN D3 (12/31/2018); appendectomy; part removal colon w end colostomy; hernia surgery; and bowel resection.     Her family history includes Breast Cancer in her maternal aunt and mother; Breast Cancer (age of onset: 54) in her self; Breast Cancer (age of onset: [de-identified] Neck: Supple, symmetrical, trachea midline, no adenopathy;  thyroid: not enlarged, symmetric, no tenderness/mass/nodules; no carotid bruit or JVD   Back:   Symmetric, no curvature, ROM normal, no CVA tenderness   Lungs:   Clear to auscultation bilaterall unspecified type  Stable contieu observation  Post-menopausal  Stable contiue oservation  PMR (polymyalgia rheumatica) (HCC)  In remission continue f/u with rheum  History of breast cancer  resolved  Prediabetes  Stable diet controlled  Generalized abdomin Panel  Cholesterol  Lipoprotein (HDL)  Triglycerides Covered every 5 years for all Medicare beneficiaries without apparent signs or symptoms of cardiovascular disease Lab Results   Component Value Date    CHOLEST 161 09/09/2021    HDL 61 (H) 09/09/2021 Prevnar 13: 08/13/2018    Denhjyylh35: 09/21/2019     No recommendations at this time    Hepatitis B One screening covered for patients with certain risk factors   -  No recommendations at this time    Tetanus Toxoid Not covered by Medicare Part B unless m

## 2021-09-10 NOTE — PATIENT INSTRUCTIONS
Emily Velasquez's SCREENING SCHEDULE   Tests on this list are recommended by your physician but may not be covered, or covered at this frequency, by your insurer. Please check with your insurance carrier before scheduling to verify coverage.    Belia Noble DEXA BONE DENSITOMETRY (CPT=77080) 01/28/2020      No recommendations at this time   Pap and Pelvic    Pap   Covered every 2 years for women at normal risk;  Annually if at high risk -  No recommendations at this time    Chlamydia Annually if high risk -  N Lab Results   Component Value Date    CREATSERUM 0.97 09/09/2021         BUN Annually Lab Results   Component Value Date    BUN 12 09/09/2021       Drug Serum Conc Annually No results found for: DIGOXIN, DIG, VALP              Recommended Websites for Maria De Jesus Cavazos

## 2021-09-28 ENCOUNTER — HOSPITAL ENCOUNTER (EMERGENCY)
Age: 71
Discharge: HOME | End: 2021-09-28
Attending: EMERGENCY MEDICINE
Payer: MEDICARE

## 2021-09-28 ENCOUNTER — APPOINTMENT (OUTPATIENT)
Dept: GENERAL RADIOLOGY | Age: 71
End: 2021-09-28
Attending: EMERGENCY MEDICINE
Payer: MEDICARE

## 2021-09-28 VITALS
WEIGHT: 147 LBS | HEART RATE: 75 BPM | DIASTOLIC BLOOD PRESSURE: 66 MMHG | RESPIRATION RATE: 16 BRPM | TEMPERATURE: 98 F | BODY MASS INDEX: 26.05 KG/M2 | HEIGHT: 63 IN | OXYGEN SATURATION: 97 % | SYSTOLIC BLOOD PRESSURE: 137 MMHG

## 2021-09-28 DIAGNOSIS — K59.00 CONSTIPATION, UNSPECIFIED CONSTIPATION TYPE: Primary | ICD-10-CM

## 2021-09-28 PROCEDURE — 99283 EMERGENCY DEPT VISIT LOW MDM: CPT

## 2021-09-28 PROCEDURE — 74018 RADEX ABDOMEN 1 VIEW: CPT | Performed by: EMERGENCY MEDICINE

## 2021-09-29 ENCOUNTER — TELEPHONE (OUTPATIENT)
Dept: INTERNAL MEDICINE CLINIC | Facility: CLINIC | Age: 71
End: 2021-09-29

## 2021-09-29 NOTE — TELEPHONE ENCOUNTER
Called PT to schedule HFU, ptt stated she doesn't need the HFU as it was only constipation. Pt added she will reach out if she needs an appt.   JERMAINE

## 2021-09-29 NOTE — ED INITIAL ASSESSMENT (HPI)
Had colonoscopy abut 3 weeks ago- then had diarrhea about 1 week and took immodium- now c/o constipation the last 2 days- tried fleet enema at home with no relief

## 2021-09-29 NOTE — ED PROVIDER NOTES
Patient Seen in: [facility] In [place]      History   Patient presents with:  Constipation    Stated Complaint: constipation    Subjective:   HPI    This is a 66year-old female that presents with constipation. No BM for 2 days.   She Uncomfortable fullness after meals     about a year   • Unspecified menopausal and postmenopausal disorder     treated with effexor - however pt would like to get off med   • Wears glasses     contact lenses              Past Surgical History:   Procedure SKIN: Normal, warm, and dry. HEENT:  Pupils equally round and reactive to light. Conjuctiva clear. Oropharynx is clear and moist.   Lungs: Clear to auscultation bilaterally with no rales, no retractions, and no wheezing.   HEART:  Regular rate and rhyth pm    Follow-up:  Nina Garland MD  [address] [place] [address]  [phone]    In 2 days            Medications Prescribed:  Current Discharge Medication List

## 2021-11-10 ENCOUNTER — HOSPITAL ENCOUNTER (OUTPATIENT)
Age: 71
Discharge: HOME | End: 2021-11-10
Payer: MEDICARE

## 2021-11-10 VITALS
OXYGEN SATURATION: 97 % | BODY MASS INDEX: 26.05 KG/M2 | RESPIRATION RATE: 18 BRPM | SYSTOLIC BLOOD PRESSURE: 143 MMHG | HEART RATE: 60 BPM | HEIGHT: 63 IN | WEIGHT: 147 LBS | TEMPERATURE: 98 F | DIASTOLIC BLOOD PRESSURE: 73 MMHG

## 2021-11-10 DIAGNOSIS — Z20.822 COVID-19 RULED OUT: Primary | ICD-10-CM

## 2021-11-10 PROCEDURE — 99212 OFFICE O/P EST SF 10 MIN: CPT | Performed by: PHYSICIAN ASSISTANT

## 2021-11-10 PROCEDURE — U0002 COVID-19 LAB TEST NON-CDC: HCPCS | Performed by: PHYSICIAN ASSISTANT

## 2021-11-10 NOTE — ED PROVIDER NOTES
Patient Seen in: [facility] [address] [facility]      History   Patient presents with:  Covid-19 Test    Stated Complaint: covid test    Subjective:   HPI    49-year-old female here for Covid testing due to close exposure.   Patient denies chest pain, marilyn The patient's medication list, past medical history and social history elements  as listed in today's nurse's notes are reviewed and agree.    The patient's family history is reviewed and is noncontributory to the presenting problem, except as indicated Wt 66.7 kg   LMP  (LMP Unknown)   SpO2 97%   BMI 26.04 kg/m²         Physical Exam  Vitals and nursing note reviewed. Constitutional:       Appearance: She is well-developed. HENT:      Head: Normocephalic.       Right Ear: External ear normal.      Lef discussed. Discharge medications are discussed. The patient is in good condition throughout the visit today and remains so upon discharge. I discuss the plan of care with the patient, who expresses understanding.  All questions and concerns are addressed to

## 2021-11-10 NOTE — ED INITIAL ASSESSMENT (HPI)
Patient states here for CV-19 testing  Patient states exposure to CV-19 positive individual- 6 days ago  Denies any symptoms of CV-19 or fever

## 2021-11-15 ENCOUNTER — OFFICE VISIT (OUTPATIENT)
Dept: RHEUMATOLOGY | Facility: CLINIC | Age: 71
End: 2021-11-15
Payer: MEDICARE

## 2021-11-15 VITALS
WEIGHT: 151 LBS | DIASTOLIC BLOOD PRESSURE: 88 MMHG | HEIGHT: 63 IN | HEART RATE: 52 BPM | OXYGEN SATURATION: 98 % | TEMPERATURE: 97 F | BODY MASS INDEX: 26.75 KG/M2 | SYSTOLIC BLOOD PRESSURE: 148 MMHG | RESPIRATION RATE: 16 BRPM

## 2021-11-15 DIAGNOSIS — M15.9 PRIMARY OSTEOARTHRITIS INVOLVING MULTIPLE JOINTS: ICD-10-CM

## 2021-11-15 DIAGNOSIS — Z13.820 SCREENING FOR OSTEOPOROSIS: ICD-10-CM

## 2021-11-15 DIAGNOSIS — M85.89 OSTEOPENIA OF MULTIPLE SITES: ICD-10-CM

## 2021-11-15 DIAGNOSIS — M35.3 PMR (POLYMYALGIA RHEUMATICA) (HCC): Primary | ICD-10-CM

## 2021-11-15 DIAGNOSIS — M54.2 NECK PAIN: ICD-10-CM

## 2021-11-15 PROCEDURE — 99215 OFFICE O/P EST HI 40 MIN: CPT | Performed by: INTERNAL MEDICINE

## 2021-11-15 NOTE — PROGRESS NOTES
?  RHEUMATOLOGY Follow up   Date of visit: 11/15/2021  ? Patient presents with:  PMR: 5 month f/u with dr. Yvonne Campos. Feeling good. Knee pain from osteoarthritis. No other joint pain or swelling.       ASSESSMENT, DISCUSSION & PLAN   Assessment:  PMR (kellyal symptoms of GCA for which she will call me if she develops. Patient verbalized understanding of above instructions. No further questions at this time.      Code selection for this visit was based on time spent (40-54min) on date of service in preparing to ADLs.   Denies overt weakness. Denies swelling. Can get some aching across the base of the neck and slightly in the shoulders. Denies significant shoulder or hip discomfort. Denies any new areas of joint pain or skin rashes.   Does have chronic knee p sensitivity, pt was hesitant to increase the prednisone further. Upon stopping the prednisone, pt had suffered another flare with bilateral shoulder as well as hip discomfort. Was recommended to take 30mg, however pt hesitant due to her previous reaction. sweats     on and off for several months   • Other and unspecified personal history of malignant neoplasm 2004    breast cancer   • Pain in joints    • Pain with bowel movements     straining more than pain   • Stented coronary artery 12/29/18   • Uncomfor Social History:  Social History    Tobacco Use      Smoking status: Never Smoker      Smokeless tobacco: Never Used    Vaping Use      Vaping Use: Never used    Alcohol use:  Yes      Alcohol/week: 2.0 - 3.0 standard drinks      Types: 2 - 3 Standard dr Gastrointestinal: Negative for abdominal pain, blood in stool, constipation, diarrhea, heartburn and nausea. Genitourinary: Negative for dysuria, frequency and hematuria. Musculoskeletal: Positive for joint pain, myalgias and neck pain.  Negative for joint tenderness of the 1st CMC bilaterally but slight squaring present. Minimal swelling of the MCPs diffusely - resolved   No swelling, tenderness, redness or restriction of motion of the DIPs, PIPs, wrists, elbows, ankles, or joints of the feet.   Ahmet Signs 80      Change from prior hip examination:  n/a  This places patient at [place] [facility]) classification of osteopenia.                Left FEMORAL NECK ANALYSIS RESULTS:        Bone mineral density (BMD) (g/cm2):  0.604      Femoral neck T-Sc 304.0 04/12/2021    MCV 86.9 04/12/2021    MCH 28.1 04/12/2021    MCHC 32.4 04/12/2021    RDW 13.6 04/12/2021    NEPRELIM 5.73 04/12/2021    NEPERCENT 65.7 04/12/2021    LYPERCENT 20.0 04/12/2021    MOPERCENT 8.9 04/12/2021    EOPERCENT 4.2 04/12/2021    B

## 2021-11-16 ENCOUNTER — HOSPITAL ENCOUNTER (OUTPATIENT)
Dept: GENERAL RADIOLOGY | Age: 71
Discharge: HOME | End: 2021-11-16
Attending: INTERNAL MEDICINE
Payer: MEDICARE

## 2021-11-16 ENCOUNTER — LAB ENCOUNTER (OUTPATIENT)
Dept: LAB | Age: 71
End: 2021-11-16
Attending: INTERNAL MEDICINE
Payer: MEDICARE

## 2021-11-16 DIAGNOSIS — M35.3 PMR (POLYMYALGIA RHEUMATICA) (HCC): ICD-10-CM

## 2021-11-16 DIAGNOSIS — M54.2 NECK PAIN: ICD-10-CM

## 2021-11-16 PROCEDURE — 86140 C-REACTIVE PROTEIN: CPT

## 2021-11-16 PROCEDURE — 72052 X-RAY EXAM NECK SPINE 6/>VWS: CPT | Performed by: INTERNAL MEDICINE

## 2021-11-16 PROCEDURE — 36415 COLL VENOUS BLD VENIPUNCTURE: CPT

## 2021-11-16 PROCEDURE — 85652 RBC SED RATE AUTOMATED: CPT

## 2021-11-18 RX ORDER — TRIAMTERENE AND HYDROCHLOROTHIAZIDE 37.5; 25 MG/1; MG/1
TABLET ORAL
Qty: 90 TABLET | Refills: 0 | Status: SHIPPED | OUTPATIENT
Start: 2021-11-18

## 2021-11-18 NOTE — TELEPHONE ENCOUNTER
PASSED per protocol, refill sent.   Last PE 9.10.21   Future Appointments   Date Time Provider Tay Padmini   1/18/2022 10:40 AM PF [facility] RM2 PF M[facility]MO [place]   4/6/2022  9:00 AM Bailey Lucio MD [facility]   11/14/2022 10:30 AM Ann N

## 2021-11-30 ENCOUNTER — TELEPHONE (OUTPATIENT)
Dept: RHEUMATOLOGY | Facility: CLINIC | Age: 71
End: 2021-11-30

## 2021-11-30 DIAGNOSIS — M54.2 NECK PAIN: Primary | ICD-10-CM

## 2021-12-07 ENCOUNTER — TELEPHONE (OUTPATIENT)
Dept: INTERNAL MEDICINE CLINIC | Facility: CLINIC | Age: 71
End: 2021-12-07

## 2021-12-07 NOTE — TELEPHONE ENCOUNTER
Future Appointments   Date Time Provider Tay [place]   1/7/2022  2:30 PM Ana Lieberman MD [facility]     Pt having L knee surgery w/Dr. Lisa Apodaca on 1/12/22-paperwork in AS red folder

## 2021-12-14 ENCOUNTER — HOSPITAL ENCOUNTER (EMERGENCY)
Age: 71
Discharge: LEFT BEFORE DISCHARGE | End: 2021-12-14
Payer: MEDICARE

## 2021-12-20 ENCOUNTER — HOSPITAL ENCOUNTER (OUTPATIENT)
Dept: PHYSICAL THERAPY | Facility: HOSPITAL | Age: 71
Discharge: HOME | End: 2021-12-20
Attending: ORTHOPAEDIC SURGERY
Payer: MEDICARE

## 2021-12-20 ENCOUNTER — LABORATORY ENCOUNTER (OUTPATIENT)
Dept: LAB | Facility: HOSPITAL | Age: 71
End: 2021-12-20
Attending: ORTHOPAEDIC SURGERY
Payer: MEDICARE

## 2021-12-20 DIAGNOSIS — M17.12 PRIMARY OSTEOARTHRITIS OF LEFT KNEE: ICD-10-CM

## 2021-12-20 PROCEDURE — 87081 CULTURE SCREEN ONLY: CPT

## 2021-12-20 PROCEDURE — 85610 PROTHROMBIN TIME: CPT

## 2021-12-20 PROCEDURE — 36415 COLL VENOUS BLD VENIPUNCTURE: CPT

## 2021-12-20 PROCEDURE — 81003 URINALYSIS AUTO W/O SCOPE: CPT

## 2021-12-20 PROCEDURE — 86850 RBC ANTIBODY SCREEN: CPT

## 2021-12-20 PROCEDURE — 86900 BLOOD TYPING SEROLOGIC ABO: CPT

## 2021-12-20 PROCEDURE — 85730 THROMBOPLASTIN TIME PARTIAL: CPT

## 2021-12-20 PROCEDURE — 80053 COMPREHEN METABOLIC PANEL: CPT

## 2021-12-20 PROCEDURE — 86901 BLOOD TYPING SEROLOGIC RH(D): CPT

## 2021-12-20 PROCEDURE — 85025 COMPLETE CBC W/AUTO DIFF WBC: CPT

## 2022-01-10 PROBLEM — H93.293 ABNORMAL AUDITORY PERCEPTION OF BOTH EARS: Status: ACTIVE | Noted: 2022-01-10

## 2022-01-10 PROBLEM — H90.3 SENSORY HEARING LOSS, BILATERAL: Status: ACTIVE | Noted: 2022-01-10

## 2022-01-25 ENCOUNTER — PATIENT OUTREACH (OUTPATIENT)
Dept: CASE MANAGEMENT | Age: 72
End: 2022-01-25

## 2022-01-27 ENCOUNTER — TELEPHONE (OUTPATIENT)
Dept: RHEUMATOLOGY | Facility: CLINIC | Age: 72
End: 2022-01-27

## 2022-01-27 DIAGNOSIS — M79.641 BILATERAL HAND PAIN: Primary | ICD-10-CM

## 2022-01-27 DIAGNOSIS — M19.042 PRIMARY OSTEOARTHRITIS OF BOTH HANDS: ICD-10-CM

## 2022-01-27 DIAGNOSIS — M19.041 PRIMARY OSTEOARTHRITIS OF BOTH HANDS: ICD-10-CM

## 2022-01-27 DIAGNOSIS — M79.642 BILATERAL HAND PAIN: Primary | ICD-10-CM

## 2022-02-08 RX ORDER — TRIAMTERENE AND HYDROCHLOROTHIAZIDE 37.5; 25 MG/1; MG/1
TABLET ORAL
Qty: 90 TABLET | Refills: 0 | Status: ON HOLD | OUTPATIENT
Start: 2022-02-08 | End: 2022-03-30

## 2022-02-24 ENCOUNTER — HOSPITAL ENCOUNTER (OUTPATIENT)
Dept: BONE DENSITY | Age: 72
Discharge: HOME | End: 2022-02-24
Attending: INTERNAL MEDICINE
Payer: MEDICARE

## 2022-02-24 ENCOUNTER — HOSPITAL ENCOUNTER (OUTPATIENT)
Dept: MAMMOGRAPHY | Age: 72
Discharge: HOME | End: 2022-02-24
Attending: INTERNAL MEDICINE
Payer: MEDICARE

## 2022-02-24 DIAGNOSIS — Z13.820 SCREENING FOR OSTEOPOROSIS: ICD-10-CM

## 2022-02-24 DIAGNOSIS — Z12.31 VISIT FOR SCREENING MAMMOGRAM: ICD-10-CM

## 2022-02-24 DIAGNOSIS — M85.89 OSTEOPENIA OF MULTIPLE SITES: ICD-10-CM

## 2022-02-24 PROCEDURE — 77067 SCR MAMMO BI INCL CAD: CPT | Performed by: INTERNAL MEDICINE

## 2022-02-24 PROCEDURE — 77080 DXA BONE DENSITY AXIAL: CPT | Performed by: INTERNAL MEDICINE

## 2022-02-24 PROCEDURE — 77063 BREAST TOMOSYNTHESIS BI: CPT | Performed by: INTERNAL MEDICINE

## 2022-03-07 ENCOUNTER — LABORATORY ENCOUNTER (OUTPATIENT)
Dept: LAB | Age: 72
End: 2022-03-07
Payer: MEDICARE

## 2022-03-07 ENCOUNTER — EKG ENCOUNTER (OUTPATIENT)
Dept: LAB | Age: 72
End: 2022-03-07
Payer: MEDICARE

## 2022-03-07 DIAGNOSIS — M17.12 PRIMARY OSTEOARTHRITIS OF LEFT KNEE: ICD-10-CM

## 2022-03-07 LAB
ALBUMIN SERPL-MCNC: 3.7 G/DL (ref 3.4–5)
ALBUMIN/GLOB SERPL: 1.1 {RATIO} (ref 1–2)
ALT SERPL-CCNC: 42 U/L
ANION GAP SERPL CALC-SCNC: 4 MMOL/L (ref 0–18)
ANTIBODY SCREEN: NEGATIVE
APTT PPP: 24.6 SECONDS (ref 23.3–35.6)
AST SERPL-CCNC: 40 U/L (ref 15–37)
BASOPHILS # BLD AUTO: 0.06 X10(3) UL (ref 0–0.2)
BASOPHILS NFR BLD AUTO: 0.6 %
BILIRUB SERPL-MCNC: 0.8 MG/DL (ref 0.1–2)
BILIRUB UR QL STRIP.AUTO: NEGATIVE
BUN BLD-MCNC: 13 MG/DL (ref 7–18)
CALCIUM BLD-MCNC: 9.8 MG/DL (ref 8.5–10.1)
CHLORIDE SERPL-SCNC: 104 MMOL/L (ref 98–112)
CO2 SERPL-SCNC: 32 MMOL/L (ref 21–32)
CREAT BLD-MCNC: 0.94 MG/DL
EOSINOPHIL # BLD AUTO: 0.55 X10(3) UL (ref 0–0.7)
EOSINOPHIL NFR BLD AUTO: 5.1 %
ERYTHROCYTE [DISTWIDTH] IN BLOOD BY AUTOMATED COUNT: 13.3 %
FASTING STATUS PATIENT QL REPORTED: YES
GLOBULIN PLAS-MCNC: 3.5 G/DL (ref 2.8–4.4)
GLUCOSE BLD-MCNC: 100 MG/DL (ref 70–99)
GLUCOSE UR STRIP.AUTO-MCNC: NEGATIVE MG/DL
HCT VFR BLD AUTO: 43.2 %
HGB BLD-MCNC: 13.8 G/DL
IMM GRANULOCYTES # BLD AUTO: 0.03 X10(3) UL (ref 0–1)
IMM GRANULOCYTES NFR BLD: 0.3 %
INR BLD: 0.96 (ref 0.8–1.2)
LEUKOCYTE ESTERASE UR QL STRIP.AUTO: NEGATIVE
LYMPHOCYTES # BLD AUTO: 1.75 X10(3) UL (ref 1–4)
LYMPHOCYTES NFR BLD AUTO: 16.1 %
MCH RBC QN AUTO: 27.8 PG (ref 26–34)
MCHC RBC AUTO-ENTMCNC: 31.9 G/DL (ref 31–37)
MCV RBC AUTO: 87.1 FL
MONOCYTES # BLD AUTO: 0.94 X10(3) UL (ref 0.1–1)
MONOCYTES NFR BLD AUTO: 8.6 %
NEUTROPHILS # BLD AUTO: 7.55 X10 (3) UL (ref 1.5–7.7)
NEUTROPHILS # BLD AUTO: 7.55 X10(3) UL (ref 1.5–7.7)
NEUTROPHILS NFR BLD AUTO: 69.3 %
NITRITE UR QL STRIP.AUTO: NEGATIVE
OSMOLALITY SERPL CALC.SUM OF ELEC: 290 MOSM/KG (ref 275–295)
PH UR STRIP.AUTO: 8 [PH] (ref 5–8)
PLATELET # BLD AUTO: 293 10(3)UL (ref 150–450)
POTASSIUM SERPL-SCNC: 4.3 MMOL/L (ref 3.5–5.1)
PROT SERPL-MCNC: 7.2 G/DL (ref 6.4–8.2)
PROT UR STRIP.AUTO-MCNC: NEGATIVE MG/DL
PROTHROMBIN TIME: 12.6 SECONDS (ref 11.6–14.8)
RBC # BLD AUTO: 4.96 X10(6)UL
RBC UR QL AUTO: NEGATIVE
RH BLOOD TYPE: POSITIVE
SODIUM SERPL-SCNC: 140 MMOL/L (ref 136–145)
UROBILINOGEN UR STRIP.AUTO-MCNC: <2 MG/DL
WBC # BLD AUTO: 10.9 X10(3) UL (ref 4–11)

## 2022-03-07 PROCEDURE — 87081 CULTURE SCREEN ONLY: CPT

## 2022-03-07 PROCEDURE — 93005 ELECTROCARDIOGRAM TRACING: CPT

## 2022-03-07 PROCEDURE — 36415 COLL VENOUS BLD VENIPUNCTURE: CPT

## 2022-03-07 PROCEDURE — 80053 COMPREHEN METABOLIC PANEL: CPT

## 2022-03-07 PROCEDURE — 85025 COMPLETE CBC W/AUTO DIFF WBC: CPT

## 2022-03-07 PROCEDURE — 93010 ELECTROCARDIOGRAM REPORT: CPT | Performed by: INTERNAL MEDICINE

## 2022-03-07 PROCEDURE — 85730 THROMBOPLASTIN TIME PARTIAL: CPT

## 2022-03-07 PROCEDURE — 81003 URINALYSIS AUTO W/O SCOPE: CPT

## 2022-03-07 PROCEDURE — 86850 RBC ANTIBODY SCREEN: CPT

## 2022-03-07 PROCEDURE — 86901 BLOOD TYPING SEROLOGIC RH(D): CPT

## 2022-03-07 PROCEDURE — 85610 PROTHROMBIN TIME: CPT

## 2022-03-07 PROCEDURE — 86900 BLOOD TYPING SEROLOGIC ABO: CPT

## 2022-03-08 LAB
ATRIAL RATE: 66 BPM
P AXIS: 69 DEGREES
P-R INTERVAL: 200 MS
Q-T INTERVAL: 428 MS
QRS DURATION: 80 MS
QTC CALCULATION (BEZET): 448 MS
R AXIS: 51 DEGREES
T AXIS: 62 DEGREES
VENTRICULAR RATE: 66 BPM

## 2022-03-12 ENCOUNTER — ANESTHESIA EVENT (OUTPATIENT)
Dept: SURGERY | Facility: HOSPITAL | Age: 72
End: 2022-03-12
Payer: MEDICARE

## 2022-03-18 ENCOUNTER — OFFICE VISIT (OUTPATIENT)
Dept: INTERNAL MEDICINE CLINIC | Facility: CLINIC | Age: 72
End: 2022-03-18
Payer: MEDICARE

## 2022-03-18 VITALS
WEIGHT: 152 LBS | HEART RATE: 68 BPM | DIASTOLIC BLOOD PRESSURE: 86 MMHG | SYSTOLIC BLOOD PRESSURE: 122 MMHG | HEIGHT: 63 IN | TEMPERATURE: 97 F | BODY MASS INDEX: 26.93 KG/M2

## 2022-03-18 DIAGNOSIS — E78.00 PURE HYPERCHOLESTEROLEMIA: ICD-10-CM

## 2022-03-18 DIAGNOSIS — M35.3 PMR (POLYMYALGIA RHEUMATICA) (HCC): ICD-10-CM

## 2022-03-18 DIAGNOSIS — I25.2 HISTORY OF NON-ST ELEVATION MYOCARDIAL INFARCTION (NSTEMI): ICD-10-CM

## 2022-03-18 DIAGNOSIS — I25.10 CORONARY ARTERY DISEASE INVOLVING NATIVE CORONARY ARTERY OF NATIVE HEART WITHOUT ANGINA PECTORIS: ICD-10-CM

## 2022-03-18 DIAGNOSIS — Z01.818 PRE-OP EVALUATION: Primary | ICD-10-CM

## 2022-03-18 DIAGNOSIS — M17.12 ARTHRITIS OF LEFT KNEE: ICD-10-CM

## 2022-03-18 DIAGNOSIS — Z95.5 PRESENCE OF DRUG COATED STENT IN RIGHT CORONARY ARTERY: ICD-10-CM

## 2022-03-18 DIAGNOSIS — I10 ESSENTIAL HYPERTENSION: ICD-10-CM

## 2022-03-18 PROCEDURE — 99214 OFFICE O/P EST MOD 30 MIN: CPT | Performed by: INTERNAL MEDICINE

## 2022-03-28 ENCOUNTER — LAB ENCOUNTER (OUTPATIENT)
Dept: LAB | Age: 72
End: 2022-03-28
Attending: ORTHOPAEDIC SURGERY
Payer: MEDICARE

## 2022-03-28 DIAGNOSIS — Z01.812 ENCOUNTER FOR PREOPERATIVE SCREENING LABORATORY TESTING FOR COVID-19 VIRUS: ICD-10-CM

## 2022-03-28 DIAGNOSIS — Z20.822 ENCOUNTER FOR PREOPERATIVE SCREENING LABORATORY TESTING FOR COVID-19 VIRUS: ICD-10-CM

## 2022-03-28 LAB — SARS-COV-2 RNA RESP QL NAA+PROBE: NOT DETECTED

## 2022-03-30 ENCOUNTER — HOSPITAL ENCOUNTER (OUTPATIENT)
Facility: HOSPITAL | Age: 72
Discharge: HOME HEALTH | End: 2022-03-31
Attending: ORTHOPAEDIC SURGERY | Admitting: ORTHOPAEDIC SURGERY
Payer: MEDICARE

## 2022-03-30 ENCOUNTER — ANESTHESIA (OUTPATIENT)
Dept: SURGERY | Facility: HOSPITAL | Age: 72
End: 2022-03-30
Payer: MEDICARE

## 2022-03-30 DIAGNOSIS — M17.12 PRIMARY OSTEOARTHRITIS OF LEFT KNEE: ICD-10-CM

## 2022-03-30 DIAGNOSIS — Z01.812 ENCOUNTER FOR PREOPERATIVE SCREENING LABORATORY TESTING FOR COVID-19 VIRUS: Primary | ICD-10-CM

## 2022-03-30 DIAGNOSIS — Z20.822 ENCOUNTER FOR PREOPERATIVE SCREENING LABORATORY TESTING FOR COVID-19 VIRUS: Primary | ICD-10-CM

## 2022-03-30 PROCEDURE — 88305 TISSUE EXAM BY PATHOLOGIST: CPT | Performed by: ORTHOPAEDIC SURGERY

## 2022-03-30 PROCEDURE — 0SRD0J9 REPLACEMENT OF LEFT KNEE JOINT WITH SYNTHETIC SUBSTITUTE, CEMENTED, OPEN APPROACH: ICD-10-PCS | Performed by: ORTHOPAEDIC SURGERY

## 2022-03-30 PROCEDURE — 76942 ECHO GUIDE FOR BIOPSY: CPT | Performed by: ANESTHESIOLOGY

## 2022-03-30 PROCEDURE — 88311 DECALCIFY TISSUE: CPT | Performed by: ORTHOPAEDIC SURGERY

## 2022-03-30 PROCEDURE — 97110 THERAPEUTIC EXERCISES: CPT

## 2022-03-30 PROCEDURE — 97161 PT EVAL LOW COMPLEX 20 MIN: CPT

## 2022-03-30 DEVICE — ATTUNE KNEE SYSTEM TIBIAL INSERT ROTATING PLATFORM POSTERIOR STABILIZED 5 6MM AOX
Type: IMPLANTABLE DEVICE | Site: KNEE | Status: FUNCTIONAL
Brand: ATTUNE

## 2022-03-30 DEVICE — ATTUNE KNEE SYSTEM TIBIAL BASE ROTATING PLATFORM SIZE 5 CEMENTED
Type: IMPLANTABLE DEVICE | Site: KNEE | Status: FUNCTIONAL
Brand: ATTUNE

## 2022-03-30 DEVICE — ATTUNE KNEE SYSTEM FEMORAL POSTERIOR STABILIZED NARROW SIZE 5N LEFT CEMENTED
Type: IMPLANTABLE DEVICE | Site: KNEE | Status: FUNCTIONAL
Brand: ATTUNE

## 2022-03-30 DEVICE — SMARTSET HV HIGH VISCOSITY BONE CEMENT 40G
Type: IMPLANTABLE DEVICE | Site: KNEE | Status: FUNCTIONAL
Brand: SMARTSET

## 2022-03-30 DEVICE — ATTUNE PATELLA MEDIALIZED DOME 38MM CEMENTED AOX
Type: IMPLANTABLE DEVICE | Site: KNEE | Status: FUNCTIONAL
Brand: ATTUNE

## 2022-03-30 RX ORDER — ACETAMINOPHEN 325 MG/1
650 TABLET ORAL ONCE
Status: COMPLETED | OUTPATIENT
Start: 2022-03-30 | End: 2022-03-30

## 2022-03-30 RX ORDER — POLYETHYLENE GLYCOL 3350 17 G/17G
17 POWDER, FOR SOLUTION ORAL DAILY PRN
Status: DISCONTINUED | OUTPATIENT
Start: 2022-03-30 | End: 2022-03-31

## 2022-03-30 RX ORDER — ACETAMINOPHEN 500 MG
1000 TABLET ORAL ONCE AS NEEDED
Status: DISCONTINUED | OUTPATIENT
Start: 2022-03-30 | End: 2022-03-30 | Stop reason: HOSPADM

## 2022-03-30 RX ORDER — CEFAZOLIN SODIUM/WATER 2 G/20 ML
2 SYRINGE (ML) INTRAVENOUS ONCE
Status: COMPLETED | OUTPATIENT
Start: 2022-03-30 | End: 2022-03-30

## 2022-03-30 RX ORDER — DIPHENHYDRAMINE HYDROCHLORIDE 50 MG/ML
25 INJECTION INTRAMUSCULAR; INTRAVENOUS ONCE AS NEEDED
Status: ACTIVE | OUTPATIENT
Start: 2022-03-30 | End: 2022-03-30

## 2022-03-30 RX ORDER — TRAMADOL HYDROCHLORIDE 50 MG/1
50 TABLET ORAL EVERY 6 HOURS
Status: DISCONTINUED | OUTPATIENT
Start: 2022-03-30 | End: 2022-03-31

## 2022-03-30 RX ORDER — METOCLOPRAMIDE HYDROCHLORIDE 5 MG/ML
10 INJECTION INTRAMUSCULAR; INTRAVENOUS AS NEEDED
Status: DISCONTINUED | OUTPATIENT
Start: 2022-03-30 | End: 2022-03-30 | Stop reason: HOSPADM

## 2022-03-30 RX ORDER — TRANEXAMIC ACID 10 MG/ML
INJECTION, SOLUTION INTRAVENOUS
Status: COMPLETED
Start: 2022-03-30 | End: 2022-03-30

## 2022-03-30 RX ORDER — BUPRENORPHINE HYDROCHLORIDE 0.32 MG/ML
INJECTION INTRAMUSCULAR; INTRAVENOUS AS NEEDED
Status: DISCONTINUED | OUTPATIENT
Start: 2022-03-30 | End: 2022-03-30 | Stop reason: SURG

## 2022-03-30 RX ORDER — SODIUM PHOSPHATE, DIBASIC AND SODIUM PHOSPHATE, MONOBASIC 7; 19 G/133ML; G/133ML
1 ENEMA RECTAL ONCE AS NEEDED
Status: DISCONTINUED | OUTPATIENT
Start: 2022-03-30 | End: 2022-03-31

## 2022-03-30 RX ORDER — CEFAZOLIN SODIUM/WATER 2 G/20 ML
2 SYRINGE (ML) INTRAVENOUS EVERY 8 HOURS
Status: COMPLETED | OUTPATIENT
Start: 2022-03-30 | End: 2022-03-30

## 2022-03-30 RX ORDER — EZETIMIBE 10 MG/1
10 TABLET ORAL EVERY OTHER DAY
Status: DISCONTINUED | OUTPATIENT
Start: 2022-03-30 | End: 2022-03-31

## 2022-03-30 RX ORDER — ONDANSETRON 2 MG/ML
INJECTION INTRAMUSCULAR; INTRAVENOUS AS NEEDED
Status: DISCONTINUED | OUTPATIENT
Start: 2022-03-30 | End: 2022-03-30 | Stop reason: SURG

## 2022-03-30 RX ORDER — DIPHENHYDRAMINE HYDROCHLORIDE 50 MG/ML
12.5 INJECTION INTRAMUSCULAR; INTRAVENOUS EVERY 4 HOURS PRN
Status: DISCONTINUED | OUTPATIENT
Start: 2022-03-30 | End: 2022-03-31

## 2022-03-30 RX ORDER — MIDAZOLAM HYDROCHLORIDE 1 MG/ML
1 INJECTION INTRAMUSCULAR; INTRAVENOUS EVERY 5 MIN PRN
Status: DISCONTINUED | OUTPATIENT
Start: 2022-03-30 | End: 2022-03-30 | Stop reason: HOSPADM

## 2022-03-30 RX ORDER — ASPIRIN 325 MG
325 TABLET, DELAYED RELEASE (ENTERIC COATED) ORAL 2 TIMES DAILY
Status: DISCONTINUED | OUTPATIENT
Start: 2022-03-30 | End: 2022-03-31

## 2022-03-30 RX ORDER — LIDOCAINE HYDROCHLORIDE 10 MG/ML
INJECTION, SOLUTION EPIDURAL; INFILTRATION; INTRACAUDAL; PERINEURAL AS NEEDED
Status: DISCONTINUED | OUTPATIENT
Start: 2022-03-30 | End: 2022-03-30 | Stop reason: SURG

## 2022-03-30 RX ORDER — HYDROMORPHONE HYDROCHLORIDE 1 MG/ML
0.4 INJECTION, SOLUTION INTRAMUSCULAR; INTRAVENOUS; SUBCUTANEOUS EVERY 2 HOUR PRN
Status: DISCONTINUED | OUTPATIENT
Start: 2022-03-30 | End: 2022-03-31

## 2022-03-30 RX ORDER — LABETALOL HYDROCHLORIDE 5 MG/ML
5 INJECTION, SOLUTION INTRAVENOUS EVERY 5 MIN PRN
Status: DISCONTINUED | OUTPATIENT
Start: 2022-03-30 | End: 2022-03-30 | Stop reason: HOSPADM

## 2022-03-30 RX ORDER — MELATONIN
325
Status: DISCONTINUED | OUTPATIENT
Start: 2022-03-30 | End: 2022-03-31

## 2022-03-30 RX ORDER — SENNOSIDES 8.6 MG
17.2 TABLET ORAL NIGHTLY
Status: DISCONTINUED | OUTPATIENT
Start: 2022-03-30 | End: 2022-03-31

## 2022-03-30 RX ORDER — NALOXONE HYDROCHLORIDE 0.4 MG/ML
80 INJECTION, SOLUTION INTRAMUSCULAR; INTRAVENOUS; SUBCUTANEOUS AS NEEDED
Status: DISCONTINUED | OUTPATIENT
Start: 2022-03-30 | End: 2022-03-30 | Stop reason: HOSPADM

## 2022-03-30 RX ORDER — ACETAMINOPHEN 325 MG/1
TABLET ORAL
Status: COMPLETED
Start: 2022-03-30 | End: 2022-03-30

## 2022-03-30 RX ORDER — SODIUM CHLORIDE, SODIUM LACTATE, POTASSIUM CHLORIDE, CALCIUM CHLORIDE 600; 310; 30; 20 MG/100ML; MG/100ML; MG/100ML; MG/100ML
INJECTION, SOLUTION INTRAVENOUS CONTINUOUS
Status: DISCONTINUED | OUTPATIENT
Start: 2022-03-30 | End: 2022-03-30 | Stop reason: HOSPADM

## 2022-03-30 RX ORDER — ONDANSETRON 2 MG/ML
4 INJECTION INTRAMUSCULAR; INTRAVENOUS EVERY 4 HOURS PRN
Status: DISCONTINUED | OUTPATIENT
Start: 2022-03-30 | End: 2022-03-31

## 2022-03-30 RX ORDER — TRIAMTERENE AND HYDROCHLOROTHIAZIDE 37.5; 25 MG/1; MG/1
1 TABLET ORAL DAILY
Status: ON HOLD | COMMUNITY
End: 2022-03-31

## 2022-03-30 RX ORDER — TRANEXAMIC ACID 10 MG/ML
1000 INJECTION, SOLUTION INTRAVENOUS ONCE
Status: COMPLETED | OUTPATIENT
Start: 2022-03-30 | End: 2022-03-30

## 2022-03-30 RX ORDER — HYDROMORPHONE HYDROCHLORIDE 1 MG/ML
0.2 INJECTION, SOLUTION INTRAMUSCULAR; INTRAVENOUS; SUBCUTANEOUS EVERY 2 HOUR PRN
Status: DISCONTINUED | OUTPATIENT
Start: 2022-03-30 | End: 2022-03-31

## 2022-03-30 RX ORDER — MEPERIDINE HYDROCHLORIDE 25 MG/ML
12.5 INJECTION INTRAMUSCULAR; INTRAVENOUS; SUBCUTANEOUS AS NEEDED
Status: DISCONTINUED | OUTPATIENT
Start: 2022-03-30 | End: 2022-03-30 | Stop reason: HOSPADM

## 2022-03-30 RX ORDER — SODIUM CHLORIDE, SODIUM LACTATE, POTASSIUM CHLORIDE, CALCIUM CHLORIDE 600; 310; 30; 20 MG/100ML; MG/100ML; MG/100ML; MG/100ML
INJECTION, SOLUTION INTRAVENOUS CONTINUOUS
Status: DISCONTINUED | OUTPATIENT
Start: 2022-03-30 | End: 2022-03-31

## 2022-03-30 RX ORDER — KETOROLAC TROMETHAMINE 15 MG/ML
15 INJECTION, SOLUTION INTRAMUSCULAR; INTRAVENOUS EVERY 6 HOURS
Status: COMPLETED | OUTPATIENT
Start: 2022-03-30 | End: 2022-03-31

## 2022-03-30 RX ORDER — ACETAMINOPHEN 500 MG
1000 TABLET ORAL ONCE
Status: DISCONTINUED | OUTPATIENT
Start: 2022-03-30 | End: 2022-03-30 | Stop reason: HOSPADM

## 2022-03-30 RX ORDER — KETOROLAC TROMETHAMINE 30 MG/ML
INJECTION, SOLUTION INTRAMUSCULAR; INTRAVENOUS AS NEEDED
Status: DISCONTINUED | OUTPATIENT
Start: 2022-03-30 | End: 2022-03-30 | Stop reason: SURG

## 2022-03-30 RX ORDER — DOCUSATE SODIUM 100 MG/1
100 CAPSULE, LIQUID FILLED ORAL 2 TIMES DAILY
Status: DISCONTINUED | OUTPATIENT
Start: 2022-03-30 | End: 2022-03-31

## 2022-03-30 RX ORDER — DEXAMETHASONE SODIUM PHOSPHATE 10 MG/ML
8 INJECTION, SOLUTION INTRAMUSCULAR; INTRAVENOUS ONCE
Status: COMPLETED | OUTPATIENT
Start: 2022-03-31 | End: 2022-03-31

## 2022-03-30 RX ORDER — BISACODYL 10 MG
10 SUPPOSITORY, RECTAL RECTAL
Status: DISCONTINUED | OUTPATIENT
Start: 2022-03-30 | End: 2022-03-31

## 2022-03-30 RX ORDER — ZOLPIDEM TARTRATE 5 MG/1
5 TABLET ORAL NIGHTLY PRN
Status: DISCONTINUED | OUTPATIENT
Start: 2022-03-30 | End: 2022-03-31

## 2022-03-30 RX ORDER — DIPHENHYDRAMINE HCL 25 MG
25 CAPSULE ORAL EVERY 4 HOURS PRN
Status: DISCONTINUED | OUTPATIENT
Start: 2022-03-30 | End: 2022-03-31

## 2022-03-30 RX ORDER — MIDAZOLAM HYDROCHLORIDE 1 MG/ML
INJECTION INTRAMUSCULAR; INTRAVENOUS AS NEEDED
Status: DISCONTINUED | OUTPATIENT
Start: 2022-03-30 | End: 2022-03-30 | Stop reason: SURG

## 2022-03-30 RX ORDER — HYDROCODONE BITARTRATE AND ACETAMINOPHEN 10; 325 MG/1; MG/1
1-2 TABLET ORAL EVERY 4 HOURS PRN
Qty: 60 TABLET | Refills: 0 | Status: SHIPPED | OUTPATIENT
Start: 2022-03-30

## 2022-03-30 RX ORDER — METOCLOPRAMIDE HYDROCHLORIDE 5 MG/ML
INJECTION INTRAMUSCULAR; INTRAVENOUS AS NEEDED
Status: DISCONTINUED | OUTPATIENT
Start: 2022-03-30 | End: 2022-03-30 | Stop reason: SURG

## 2022-03-30 RX ORDER — OXYCODONE HYDROCHLORIDE 5 MG/1
2.5 TABLET ORAL EVERY 4 HOURS PRN
Status: DISCONTINUED | OUTPATIENT
Start: 2022-03-30 | End: 2022-03-31

## 2022-03-30 RX ORDER — ACETAMINOPHEN 325 MG/1
650 TABLET ORAL 4 TIMES DAILY
Status: DISCONTINUED | OUTPATIENT
Start: 2022-03-30 | End: 2022-03-31

## 2022-03-30 RX ORDER — DEXAMETHASONE SODIUM PHOSPHATE 4 MG/ML
4 VIAL (ML) INJECTION AS NEEDED
Status: DISCONTINUED | OUTPATIENT
Start: 2022-03-30 | End: 2022-03-30 | Stop reason: HOSPADM

## 2022-03-30 RX ORDER — ONDANSETRON 2 MG/ML
4 INJECTION INTRAMUSCULAR; INTRAVENOUS AS NEEDED
Status: DISCONTINUED | OUTPATIENT
Start: 2022-03-30 | End: 2022-03-30 | Stop reason: HOSPADM

## 2022-03-30 RX ORDER — OXYCODONE HYDROCHLORIDE 5 MG/1
5 TABLET ORAL EVERY 4 HOURS PRN
Status: DISCONTINUED | OUTPATIENT
Start: 2022-03-30 | End: 2022-03-31

## 2022-03-30 RX ORDER — ATORVASTATIN CALCIUM 20 MG/1
20 TABLET, FILM COATED ORAL NIGHTLY
Status: DISCONTINUED | OUTPATIENT
Start: 2022-03-30 | End: 2022-03-31

## 2022-03-30 RX ORDER — HYDROCODONE BITARTRATE AND ACETAMINOPHEN 5; 325 MG/1; MG/1
1 TABLET ORAL AS NEEDED
Status: DISCONTINUED | OUTPATIENT
Start: 2022-03-30 | End: 2022-03-30 | Stop reason: HOSPADM

## 2022-03-30 RX ORDER — ATENOLOL 50 MG/1
50 TABLET ORAL
Status: DISCONTINUED | OUTPATIENT
Start: 2022-03-31 | End: 2022-03-31

## 2022-03-30 RX ORDER — HYDROCODONE BITARTRATE AND ACETAMINOPHEN 5; 325 MG/1; MG/1
2 TABLET ORAL AS NEEDED
Status: DISCONTINUED | OUTPATIENT
Start: 2022-03-30 | End: 2022-03-30 | Stop reason: HOSPADM

## 2022-03-30 RX ORDER — PROCHLORPERAZINE EDISYLATE 5 MG/ML
10 INJECTION INTRAMUSCULAR; INTRAVENOUS EVERY 6 HOURS PRN
Status: DISCONTINUED | OUTPATIENT
Start: 2022-03-30 | End: 2022-03-31

## 2022-03-30 RX ADMIN — KETOROLAC TROMETHAMINE 15 MG: 30 INJECTION, SOLUTION INTRAMUSCULAR; INTRAVENOUS at 08:28:00

## 2022-03-30 RX ADMIN — SODIUM CHLORIDE, SODIUM LACTATE, POTASSIUM CHLORIDE, CALCIUM CHLORIDE: 600; 310; 30; 20 INJECTION, SOLUTION INTRAVENOUS at 07:46:00

## 2022-03-30 RX ADMIN — ONDANSETRON 4 MG: 2 INJECTION INTRAMUSCULAR; INTRAVENOUS at 08:28:00

## 2022-03-30 RX ADMIN — SODIUM CHLORIDE, SODIUM LACTATE, POTASSIUM CHLORIDE, CALCIUM CHLORIDE: 600; 310; 30; 20 INJECTION, SOLUTION INTRAVENOUS at 08:26:00

## 2022-03-30 RX ADMIN — SODIUM CHLORIDE, SODIUM LACTATE, POTASSIUM CHLORIDE, CALCIUM CHLORIDE: 600; 310; 30; 20 INJECTION, SOLUTION INTRAVENOUS at 06:58:00

## 2022-03-30 RX ADMIN — BUPRENORPHINE HYDROCHLORIDE 150 MCG: 0.32 INJECTION INTRAMUSCULAR; INTRAVENOUS at 07:21:00

## 2022-03-30 RX ADMIN — MIDAZOLAM HYDROCHLORIDE 3 MG: 1 INJECTION INTRAMUSCULAR; INTRAVENOUS at 07:00:00

## 2022-03-30 RX ADMIN — METOCLOPRAMIDE HYDROCHLORIDE 10 MG: 5 INJECTION INTRAMUSCULAR; INTRAVENOUS at 08:29:00

## 2022-03-30 RX ADMIN — TRANEXAMIC ACID 1000 MG: 10 INJECTION, SOLUTION INTRAVENOUS at 07:24:00

## 2022-03-30 RX ADMIN — SODIUM CHLORIDE, SODIUM LACTATE, POTASSIUM CHLORIDE, CALCIUM CHLORIDE: 600; 310; 30; 20 INJECTION, SOLUTION INTRAVENOUS at 08:32:00

## 2022-03-30 RX ADMIN — LIDOCAINE HYDROCHLORIDE 25 MG: 10 INJECTION, SOLUTION EPIDURAL; INFILTRATION; INTRACAUDAL; PERINEURAL at 07:25:00

## 2022-03-30 RX ADMIN — MIDAZOLAM HYDROCHLORIDE 1 MG: 1 INJECTION INTRAMUSCULAR; INTRAVENOUS at 07:07:00

## 2022-03-30 RX ADMIN — CEFAZOLIN SODIUM/WATER 2 G: 2 G/20 ML SYRINGE (ML) INTRAVENOUS at 07:21:00

## 2022-03-30 NOTE — ANESTHESIA PROCEDURE NOTES
Regional Block  Performed by: Sonya Baca MD  Authorized by: Sonya Baca MD       General Information and Staff    Start Time:  3/30/2022 7:18 AM  End Time:  3/30/2022 7:21 AM  Anesthesiologist:  Sonya Baca MD  Performed by: Anesthesiologist  Patient Location:  OR    Block Placement: Post Induction  Site Identification: real time ultrasound guided and image stored and retrievable    Block site/laterality marked before start: site marked  Reason for Block: at surgeon's request and post-op pain management    Preanesthetic Checklist: 2 patient identifers, IV checked, site marked, risks and benefits discussed, monitors and equipment checked, pre-op evaluation, timeout performed, anesthesia consent, sterile technique used, no prohibitive neurological deficits and no local skin infection at insertion site      Procedure Details    Patient Position:  Supine  Prep: ChloraPrep    Monitoring:  Cardiac monitor, continuous pulse ox and blood pressure cuff  Block Type: Adductor canal  Laterality:  Left  Injection Technique:  Single-shot    Needle    Needle Type:  Short-bevel and echogenic  Needle Gauge:  20 G  Needle Length:  100 mm  Needle Localization:  Ultrasound guidance  Reason for Ultrasound Use: appropriate spread of the medication was noted in real time and no ultrasound evidence of intravascular and/or intraneural injection            Assessment    Injection Assessment:  Good spread noted, negative resistance, negative aspiration for heme, incremental injection, low pressure, local visualized surrounding nerve on ultrasound and no pain on injection  Heart Rate Change: No    - Patient tolerated block procedure well without evidence of immediate block related complications.      Medications      Additional Comments    Ropivicaine 0.375% 30ml + dexamethasone PF 2mg + buprenorphine 150mcg

## 2022-03-30 NOTE — ANESTHESIA POSTPROCEDURE EVALUATION
[address] Patient Status:  Outpatient in a Bed   Age/Gender 66year old female MRN [id number]   Location [address] [facility] Attending Gisele Saha MD   [facility] Day # 0 PCP Kimi Mejia MD       Anesthesia Post-op Note    LEFT TOTAL KNEE ARTHROPLASTY     Procedure Summary     Date: 03/30/22 Room / Location: [facility] MAIN OR 14 / [facility] OR    Anesthesia Start: 0700 Anesthesia Stop: 0139    Procedure: LEFT TOTAL KNEE ARTHROPLASTY (Left Knee) Diagnosis:       Primary osteoarthritis of left knee      (Primary osteoarthritis of left knee [M17.12])    Surgeons: Gisele Saha MD Anesthesiologist: Marlyn De Leon MD    Anesthesia Type: spinal ASA Status: 3          Anesthesia Type: spinal    Vitals Value Taken Time   BP 92/81 03/30/22 0900   Temp 98.2 03/30/22 0910   Pulse 68 03/30/22 0908   Resp 17 03/30/22 0908   SpO2 96 % 03/30/22 0908   Vitals shown include unvalidated device data.     Patient Location: PACU    Anesthesia Type: spinal    Airway Patency: patent    Postop Pain Control: adequate    Mental Status: mildly sedated but able to meaningfully participate in the post-anesthesia evaluation    Nausea/Vomiting: none    Cardiopulmonary/Hydration status: stable euvolemic    Complications: no apparent anesthesia related complications    Postop vital signs: stable    Dental Exam: Unchanged from Preop

## 2022-03-30 NOTE — PLAN OF CARE
Left knee pain 5/10, oxycodone given. Reports tingling to feet. Aquacel clean and dry, spandagrip and gel ice packs to LLE. Admission vitals stable on room air. IS teaching done and encouraged 10 times/hour while awake. SCD's bilaterally. Voided via bedpan. PT/OT to see. Plan of care discussed with patient who agrees.

## 2022-03-30 NOTE — ANESTHESIA PROCEDURE NOTES
Spinal Block  Performed by: Christine Joel MD  Authorized by: Christine Joel MD       General Information and Staff    Start Time:  3/30/2022 7:11 AM  End Time:  3/30/2022 7:16 AM  Anesthesiologist:  Christine Joel MD  Performed by:   Anesthesiologist  Patient Location:  OR  Site identification: surface landmarks    Reason for Block: surgical anesthesia    Preanesthetic Checklist: patient identified, IV checked, risks and benefits discussed, monitors and equipment checked, pre-op evaluation, timeout performed, anesthesia consent and sterile technique used      Procedure Details    Patient Position:  Sitting  Prep: Betadine    Monitoring:  Cardiac monitor, heart rate and continuous pulse ox  Approach:  Midline  Location:  L4-5  Injection Technique:  Single-shot    Needle    Needle Type:  Sprotte  Needle Gauge:  24 G  Needle Length:  3.5 in    Assessment    Sensory Level:  T8  Events: clear CSF, CSF aspirated, well tolerated and blood negative      Additional Comments

## 2022-03-30 NOTE — INTERVAL H&P NOTE
Pre-op Diagnosis: Primary osteoarthritis of left knee [M17.12]    The above referenced H&P was reviewed by Cristo Thomas MD on 3/30/2022, the patient was examined and no significant changes have occurred in the patient's condition since the H&P was performed. I discussed with the patient and/or legal representative the potential benefits, risks and side effects of this procedure; the likelihood of the patient achieving goals; and potential problems that might occur during recuperation. I discussed reasonable alternatives to the procedure, including risks, benefits and side effects related to the alternatives and risks related to not receiving this procedure. We will proceed with procedure as planned.

## 2022-03-30 NOTE — PROGRESS NOTES
Patient admitted via bed from PACU. Oriented to room. Safety precautions initiated. Call light in reach. Spouse at bedside.

## 2022-03-30 NOTE — OPERATIVE REPORT
DATE OF PROCEDURE:  3/30/2022  PREOPERATIVE DIAGNOSIS: Left knee osteoarthritis. POSTOPERATIVE DIAGNOSIS: Left knee osteoarthritis. PROCEDURE PERFORMED: Cemented left total knee arthroplasty. SURGEON:  Marleen Caceres M.D. FIRST ASSISTANT:  Rosio Rivera PA-C.   SECOND ASSISTANT:  Angeles Lipscomb    ANESTHESIA: Spinal plus femoral nerve block. INDICATIONS: The patient has severe knee osteoarthritis that has not responded to conservative treatment including antiinflammatories and injections. The patient's pain has limited activities of daily living including ambulation and exercise. DESCRIPTION OF PROCEDURE: The patient was brought to the operating room and under spinal anesthetic, the left lower extremity was sterilely prepped and draped. Preoperative antibiotics had been administered. A high thigh tourniquet was inflated to 300. It was medically necessary for the presence of the assistants listed for safe patient care. This included positioning of the leg, holding of retractors to protect the underlying neurovascular structures and soft tissues. It was required for the assistants to hold retractors to protect soft tissues while the primary surgeon made the bone cuts on the femur, the tibia, and the patella. The assistants also held the leg stable and positioned while the primary surgeon made the approach, and the bone cuts, and affixed the guides and later the components to the bones. An anterior incision was made, medial and lateral flaps were created. A medial parapatellar arthrotomy was created. The patella was everted, the knee was flexed. Severe arthritic changes with areas of eburnated bone were noted. A drill was placed in the distal femur and a 6-degree 10 mm cut was made. The [employer] rotating platform system was used. Sizing was performed and a size 5 cutting block was selected to make anterior, posterior, chamfer and box cuts for a cruciate-sacrificing knee.  Attention was turned to the tibia. An external alignment guide was utilized to take 10 mm off the less involved lateral side. Sizing was performed and a size 5 fit well. There were equal medial and lateral gaps when checked with a spacer. Equal flexion and extension gaps were obtained. The stem cut was made for the tibia and 10 mm was taken off the posterior patella. Sizing was performed and a size 38 patella was selected. Three drill holes were placed. Trial was performed with a 5N femur, 5 tibia, 6 mm insert and 38 mm patella. This gave good varus and valgus stability, good flexion and extension, good tracking of the patella. Drill holes were made in the distal femoral condyles in the trial template. Trial components were removed. Bony surfaces were irrigated and dried. Final components were precoated with cement which had been mixed under vacuum conditions. The bony surfaces were precoated as well. Components were impacted into place and excess cement removed. The knee was held in extension while the cement hardened. The tourniquet was let down, bleeders were cauterized. Lavage was performed. The arthrotomy was closed with a barbed running suture. Subcutaneous tissue was closed after further irrigation. This was closed with inverted 2-0 Vicryl for the subcutaneous tissue and staples for the skin. An aquacell dressing plus gauze covering was applied. A lightly compressive dressing from toe to groin was applied. Estimated blood loss was 150 mL. There were no complications. There were no specimens. The patient was brought to the PACU in stable condition.

## 2022-03-30 NOTE — PROGRESS NOTES
at bedside. Reviewed indications, side effects of pain medication/narcotics and constipation prevention. Stressed importance of increased fluids/roughage in diet, continued use of stool softeners along with laxatives and suppositories as needed while taking narcotics even when at home. Pt verbalized understanding. Teachback done on ankle pumps and incentive spirometry use 10 times every hour w/a for each. Reviewed dressing changes, showering, elevation and cold therapy. Encouraged them to [facility] discharge education video tomorrow. Reviewed discharge plan with patient. Solicited and answered any questions regarding care.

## 2022-03-31 VITALS
BODY MASS INDEX: 26.69 KG/M2 | HEIGHT: 63 IN | SYSTOLIC BLOOD PRESSURE: 147 MMHG | WEIGHT: 150.63 LBS | OXYGEN SATURATION: 99 % | TEMPERATURE: 99 F | HEART RATE: 62 BPM | DIASTOLIC BLOOD PRESSURE: 64 MMHG | RESPIRATION RATE: 20 BRPM

## 2022-03-31 LAB
HCT VFR BLD AUTO: 34.1 %
HGB BLD-MCNC: 10.8 G/DL

## 2022-03-31 PROCEDURE — 97535 SELF CARE MNGMENT TRAINING: CPT

## 2022-03-31 PROCEDURE — 97165 OT EVAL LOW COMPLEX 30 MIN: CPT

## 2022-03-31 PROCEDURE — 97116 GAIT TRAINING THERAPY: CPT

## 2022-03-31 PROCEDURE — 85014 HEMATOCRIT: CPT | Performed by: ORTHOPAEDIC SURGERY

## 2022-03-31 PROCEDURE — 97110 THERAPEUTIC EXERCISES: CPT

## 2022-03-31 PROCEDURE — 85018 HEMOGLOBIN: CPT | Performed by: ORTHOPAEDIC SURGERY

## 2022-03-31 RX ORDER — TRIAMTERENE AND HYDROCHLOROTHIAZIDE 37.5; 25 MG/1; MG/1
1 TABLET ORAL DAILY
Refills: 0 | Status: SHIPPED | COMMUNITY
Start: 2022-04-07

## 2022-03-31 NOTE — PLAN OF CARE
at bedside. Viewed discharge instruction video, verbal and written discharge instructions given. Verbalized understanding. Discharged via wheel chair, accompanied by transport staff. Prescriptions e-prescribed by ortho service.

## 2022-03-31 NOTE — PLAN OF CARE
A/O x 4,V/S stable,,room air. IS used reinforced,as well as foot and ankle pump exercises. Lt.knee pain well controlled with oral and TORADOL as ordered. WBAT,voids in the bathroom,up with ASSIST,STATES pain is well managed. Spandagrip to LLE intact. Plan for home later today with [facility]. Will continue to monitor,

## 2022-03-31 NOTE — PROGRESS NOTES
at bedside. Reviewed prevention of constipation side effect  from narcotics. Teachback done again on ankle pumps and incentive spriometry use. Reviewed spandagrip, dressing changes and showering beofre beginning discharge education video. Solicited and answered any questions pt had about care. Pt verbalized understanding.

## 2022-03-31 NOTE — PLAN OF CARE
Left knee dressing clean, dry, intact. Denies numbness, tingling, moves extremity well. States pain well controlled on oral pain medication. States ready  to go home today. Spouse at bedside, watching discharge instruction video. Instructed on plan of care, call don't fall protocol, call for all asst needed, discomfort felt. Verbalized understanding. Plan home with home health today.

## 2022-04-06 ENCOUNTER — OFFICE VISIT (OUTPATIENT)
Dept: INTERNAL MEDICINE CLINIC | Facility: CLINIC | Age: 72
End: 2022-04-06
Payer: MEDICARE

## 2022-04-06 ENCOUNTER — TELEPHONE (OUTPATIENT)
Dept: INTERNAL MEDICINE CLINIC | Facility: CLINIC | Age: 72
End: 2022-04-06

## 2022-04-06 VITALS
BODY MASS INDEX: 27.46 KG/M2 | TEMPERATURE: 98 F | SYSTOLIC BLOOD PRESSURE: 124 MMHG | HEART RATE: 74 BPM | WEIGHT: 155 LBS | HEIGHT: 63 IN | DIASTOLIC BLOOD PRESSURE: 72 MMHG

## 2022-04-06 DIAGNOSIS — D50.0 BLOOD LOSS ANEMIA: Primary | ICD-10-CM

## 2022-04-06 DIAGNOSIS — R73.03 PREDIABETES: ICD-10-CM

## 2022-04-06 DIAGNOSIS — I10 BENIGN ESSENTIAL HTN: ICD-10-CM

## 2022-04-06 DIAGNOSIS — Z96.652 STATUS POST TOTAL LEFT KNEE REPLACEMENT USING CEMENT: ICD-10-CM

## 2022-04-06 DIAGNOSIS — E78.00 PURE HYPERCHOLESTEROLEMIA: Primary | ICD-10-CM

## 2022-04-06 PROCEDURE — 1111F DSCHRG MED/CURRENT MED MERGE: CPT | Performed by: INTERNAL MEDICINE

## 2022-04-06 PROCEDURE — 99214 OFFICE O/P EST MOD 30 MIN: CPT | Performed by: INTERNAL MEDICINE

## 2022-04-06 NOTE — TELEPHONE ENCOUNTER
Future Appointments   Date Time Provider Tay [place]   10/12/2022 10:00 AM Renee Levy MD [facility]     Orders to edward- Pt informed that labs need to be completed no sooner than 2 weeks prior to the appt.  Pt aware to fast-no call back required

## 2022-04-06 NOTE — TELEPHONE ENCOUNTER
Pt unsure to enroll into CCM services, sending letter with provider support of program to patient via [employer].

## 2022-04-18 ENCOUNTER — TELEPHONE (OUTPATIENT)
Dept: INTERNAL MEDICINE CLINIC | Facility: CLINIC | Age: 72
End: 2022-04-18

## 2022-04-18 NOTE — TELEPHONE ENCOUNTER
LMTCB 4/18. Informed pt phones are off, will be back on tomorrow at 0800. Information also provided via [facility]. Hold for read/call back.

## 2022-04-18 NOTE — TELEPHONE ENCOUNTER
Pt had knee replacement a few weeks ago. Is feeling very weak still and she is wondering if this is normal? She states AS wanted her to have a blood test around May 4th. Pt is wondering if the blood test can be done sooner?

## 2022-04-19 ENCOUNTER — LAB ENCOUNTER (OUTPATIENT)
Dept: LAB | Age: 72
End: 2022-04-19
Attending: INTERNAL MEDICINE
Payer: MEDICARE

## 2022-04-19 DIAGNOSIS — R53.82 CHRONIC FATIGUE: ICD-10-CM

## 2022-04-19 DIAGNOSIS — D50.0 BLOOD LOSS ANEMIA: ICD-10-CM

## 2022-04-19 LAB
ALBUMIN SERPL-MCNC: 3.6 G/DL (ref 3.4–5)
ALBUMIN/GLOB SERPL: 1.1 {RATIO} (ref 1–2)
ALP LIVER SERPL-CCNC: 124 U/L
ALT SERPL-CCNC: 23 U/L
ANION GAP SERPL CALC-SCNC: 8 MMOL/L (ref 0–18)
AST SERPL-CCNC: 28 U/L (ref 15–37)
BASOPHILS # BLD AUTO: 0.07 X10(3) UL (ref 0–0.2)
BASOPHILS NFR BLD AUTO: 0.6 %
BILIRUB SERPL-MCNC: 0.8 MG/DL (ref 0.1–2)
BILIRUB UR QL STRIP.AUTO: NEGATIVE
BUN BLD-MCNC: 24 MG/DL (ref 7–18)
CALCIUM BLD-MCNC: 9.4 MG/DL (ref 8.5–10.1)
CHLORIDE SERPL-SCNC: 99 MMOL/L (ref 98–112)
CO2 SERPL-SCNC: 32 MMOL/L (ref 21–32)
COLOR UR AUTO: YELLOW
CREAT BLD-MCNC: 0.9 MG/DL
EOSINOPHIL # BLD AUTO: 0.16 X10(3) UL (ref 0–0.7)
EOSINOPHIL NFR BLD AUTO: 1.5 %
ERYTHROCYTE [DISTWIDTH] IN BLOOD BY AUTOMATED COUNT: 13.5 %
FASTING STATUS PATIENT QL REPORTED: YES
GLOBULIN PLAS-MCNC: 3.4 G/DL (ref 2.8–4.4)
GLUCOSE BLD-MCNC: 111 MG/DL (ref 70–99)
GLUCOSE UR STRIP.AUTO-MCNC: NEGATIVE MG/DL
HCT VFR BLD AUTO: 41.2 %
HGB BLD-MCNC: 12.8 G/DL
IMM GRANULOCYTES # BLD AUTO: 0.03 X10(3) UL (ref 0–1)
IMM GRANULOCYTES NFR BLD: 0.3 %
KETONES UR STRIP.AUTO-MCNC: NEGATIVE MG/DL
LYMPHOCYTES # BLD AUTO: 1.6 X10(3) UL (ref 1–4)
LYMPHOCYTES NFR BLD AUTO: 14.6 %
MCH RBC QN AUTO: 27.6 PG (ref 26–34)
MCHC RBC AUTO-ENTMCNC: 31.1 G/DL (ref 31–37)
MCV RBC AUTO: 88.8 FL
MONOCYTES # BLD AUTO: 0.84 X10(3) UL (ref 0.1–1)
MONOCYTES NFR BLD AUTO: 7.7 %
NEUTROPHILS # BLD AUTO: 8.28 X10 (3) UL (ref 1.5–7.7)
NEUTROPHILS # BLD AUTO: 8.28 X10(3) UL (ref 1.5–7.7)
NEUTROPHILS NFR BLD AUTO: 75.3 %
NITRITE UR QL STRIP.AUTO: NEGATIVE
OSMOLALITY SERPL CALC.SUM OF ELEC: 293 MOSM/KG (ref 275–295)
PH UR STRIP.AUTO: 6 [PH] (ref 5–8)
PLATELET # BLD AUTO: 445 10(3)UL (ref 150–450)
POTASSIUM SERPL-SCNC: 3.7 MMOL/L (ref 3.5–5.1)
PROT SERPL-MCNC: 7 G/DL (ref 6.4–8.2)
PROT UR STRIP.AUTO-MCNC: NEGATIVE MG/DL
RBC # BLD AUTO: 4.64 X10(6)UL
RBC UR QL AUTO: NEGATIVE
SODIUM SERPL-SCNC: 139 MMOL/L (ref 136–145)
SP GR UR STRIP.AUTO: 1.02 (ref 1–1.03)
UROBILINOGEN UR STRIP.AUTO-MCNC: <2 MG/DL
VIT B12 SERPL-MCNC: 629 PG/ML (ref 193–986)
VIT D+METAB SERPL-MCNC: 51.6 NG/ML (ref 30–100)
WBC # BLD AUTO: 11 X10(3) UL (ref 4–11)

## 2022-04-19 PROCEDURE — 87086 URINE CULTURE/COLONY COUNT: CPT

## 2022-04-19 PROCEDURE — 85025 COMPLETE CBC W/AUTO DIFF WBC: CPT

## 2022-04-19 PROCEDURE — 80053 COMPREHEN METABOLIC PANEL: CPT

## 2022-04-19 PROCEDURE — 36415 COLL VENOUS BLD VENIPUNCTURE: CPT

## 2022-04-19 PROCEDURE — 81001 URINALYSIS AUTO W/SCOPE: CPT

## 2022-04-19 PROCEDURE — 82306 VITAMIN D 25 HYDROXY: CPT

## 2022-04-19 PROCEDURE — 82607 VITAMIN B-12: CPT

## 2022-05-11 RX ORDER — TRIAMTERENE AND HYDROCHLOROTHIAZIDE 37.5; 25 MG/1; MG/1
1 TABLET ORAL DAILY
Qty: 90 TABLET | Refills: 1 | Status: SHIPPED | OUTPATIENT
Start: 2022-05-11

## 2022-05-11 RX ORDER — DICLOFENAC SODIUM 75 MG/1
75 TABLET, DELAYED RELEASE ORAL 2 TIMES DAILY
COMMUNITY
Start: 2022-04-15

## 2022-09-12 NOTE — TELEPHONE ENCOUNTER
Following labs completed:    Component      Latest Ref Rng & Units 4/19/2022   WBC      4.0 - 11.0 x10(3) uL 11.0   RBC      3.80 - 5.30 x10(6)uL 4.64   Hemoglobin      12.0 - 16.0 g/dL 12.8   Hematocrit      35.0 - 48.0 % 41.2   Platelet Count      137.4 - 450.0 10(3)uL 445.0   MCV      80.0 - 100.0 fL 88.8   MCH      26.0 - 34.0 pg 27.6   MCHC      31.0 - 37.0 g/dL 31.1   RDW      % 13.5   Prelim Neutrophil Abs      1.50 - 7.70 x10 (3) uL 8.28 (H)   Neutrophils Absolute      1.50 - 7.70 x10(3) uL 8.28 (H)   Lymphocytes Absolute      1.00 - 4.00 x10(3) uL 1.60   Monocytes Absolute      0.10 - 1.00 x10(3) uL 0.84   Eosinophils Absolute      0.00 - 0.70 x10(3) uL 0.16   Basophils Absolute      0.00 - 0.20 x10(3) uL 0.07   Immature Granulocyte Absolute      0.00 - 1.00 x10(3) uL 0.03   Neutrophils %      % 75.3   Lymphocytes %      % 14.6   Monocytes %      % 7.7   Eosinophils %      % 1.5   Basophils %      % 0.6   Immature Granulocyte %      % 0.3   Glucose      70 - 99 mg/dL 111 (H)   Sodium      136 - 145 mmol/L 139   Potassium      3.5 - 5.1 mmol/L 3.7   Chloride      98 - 112 mmol/L 99   Carbon Dioxide, Total      21.0 - 32.0 mmol/L 32.0   ANION GAP      0 - 18 mmol/L 8   BUN      7 - 18 mg/dL 24 (H)   CREATININE      0.55 - 1.02 mg/dL 0.90   CALCIUM      8.5 - 10.1 mg/dL 9.4   CALCULATED OSMOLALITY      275 - 295 mOsm/kg 293   eGFR NON-AFR. AMERICAN      >=60 64   eGFR AFRICAN AMERICAN      >=60 73   AST (SGOT)      15 - 37 U/L 28   ALT (SGPT)      13 - 56 U/L 23   ALKALINE PHOSPHATASE      55 - 142 U/L 124   Total Bilirubin      0.1 - 2.0 mg/dL 0.8   PROTEIN, TOTAL      6.4 - 8.2 g/dL 7.0   Albumin      3.4 - 5.0 g/dL 3.6   Globulin      2.8 - 4.4 g/dL 3.4   A/G Ratio      1.0 - 2.0 1.1   Patient Fasting for CMP? Yes     Lipid & TSH ordered ; please advise if you would like to order additional labs?

## 2022-09-27 NOTE — PROGRESS NOTES
Will discuss at Visit Detail Level: Generalized Recommendation Preamble: The following recommendations were made during the visit: try dermend bruise formula for bruising. Use a good moisturizer. Using Vaseline when or if there are skin tears helps healing. Render Risk Assessment In Note?: yes

## 2022-09-28 ENCOUNTER — IMMUNIZATION (OUTPATIENT)
Dept: LAB | Age: 72
End: 2022-09-28
Attending: EMERGENCY MEDICINE

## 2022-09-28 DIAGNOSIS — Z23 NEED FOR VACCINATION: Primary | ICD-10-CM

## 2022-09-28 PROCEDURE — 0124A SARSCOV2 VAC BVL 30MCG/0.3ML: CPT

## 2022-10-05 ENCOUNTER — LAB ENCOUNTER (OUTPATIENT)
Dept: LAB | Age: 72
End: 2022-10-05
Attending: INTERNAL MEDICINE
Payer: MEDICARE

## 2022-10-05 DIAGNOSIS — R73.03 PRE-DIABETES: ICD-10-CM

## 2022-10-05 DIAGNOSIS — E78.00 PURE HYPERCHOLESTEROLEMIA: ICD-10-CM

## 2022-10-05 DIAGNOSIS — I10 BENIGN ESSENTIAL HTN: ICD-10-CM

## 2022-10-05 DIAGNOSIS — R73.03 PREDIABETES: ICD-10-CM

## 2022-10-05 DIAGNOSIS — R73.03 PRE-DIABETES: Primary | ICD-10-CM

## 2022-10-05 DIAGNOSIS — R53.82 CHRONIC FATIGUE: ICD-10-CM

## 2022-10-05 LAB
ALBUMIN SERPL-MCNC: 3.8 G/DL (ref 3.4–5)
ALBUMIN/GLOB SERPL: 1 {RATIO} (ref 1–2)
ALP LIVER SERPL-CCNC: 100 U/L
ALT SERPL-CCNC: 41 U/L
ANION GAP SERPL CALC-SCNC: 8 MMOL/L (ref 0–18)
AST SERPL-CCNC: 40 U/L (ref 15–37)
BASOPHILS # BLD AUTO: 0.07 X10(3) UL (ref 0–0.2)
BASOPHILS NFR BLD AUTO: 0.9 %
BILIRUB SERPL-MCNC: 1.2 MG/DL (ref 0.1–2)
BUN BLD-MCNC: 13 MG/DL (ref 7–18)
BUN/CREAT SERPL: 12.6 (ref 10–20)
CALCIUM BLD-MCNC: 9.8 MG/DL (ref 8.5–10.1)
CHLORIDE SERPL-SCNC: 102 MMOL/L (ref 98–112)
CHOLEST SERPL-MCNC: 184 MG/DL (ref ?–200)
CO2 SERPL-SCNC: 29 MMOL/L (ref 21–32)
CREAT BLD-MCNC: 1.03 MG/DL
DEPRECATED RDW RBC AUTO: 41.7 FL (ref 35.1–46.3)
EOSINOPHIL # BLD AUTO: 0.27 X10(3) UL (ref 0–0.7)
EOSINOPHIL NFR BLD AUTO: 3.5 %
ERYTHROCYTE [DISTWIDTH] IN BLOOD BY AUTOMATED COUNT: 13.3 % (ref 11–15)
EST. AVERAGE GLUCOSE BLD GHB EST-MCNC: 120 MG/DL (ref 68–126)
FASTING PATIENT LIPID ANSWER: YES
FASTING STATUS PATIENT QL REPORTED: YES
GFR SERPLBLD BASED ON 1.73 SQ M-ARVRAT: 57 ML/MIN/1.73M2 (ref 60–?)
GLOBULIN PLAS-MCNC: 3.8 G/DL (ref 2.8–4.4)
GLUCOSE BLD-MCNC: 110 MG/DL (ref 70–99)
HBA1C MFR BLD: 5.8 % (ref ?–5.7)
HCT VFR BLD AUTO: 43.4 %
HDLC SERPL-MCNC: 64 MG/DL (ref 40–59)
HGB BLD-MCNC: 14.4 G/DL
IMM GRANULOCYTES # BLD AUTO: 0.03 X10(3) UL (ref 0–1)
IMM GRANULOCYTES NFR BLD: 0.4 %
LDLC SERPL CALC-MCNC: 88 MG/DL (ref ?–100)
LYMPHOCYTES # BLD AUTO: 1.35 X10(3) UL (ref 1–4)
LYMPHOCYTES NFR BLD AUTO: 17.6 %
MCH RBC QN AUTO: 28.5 PG (ref 26–34)
MCHC RBC AUTO-ENTMCNC: 33.2 G/DL (ref 31–37)
MCV RBC AUTO: 85.9 FL
MONOCYTES # BLD AUTO: 0.65 X10(3) UL (ref 0.1–1)
MONOCYTES NFR BLD AUTO: 8.5 %
NEUTROPHILS # BLD AUTO: 5.28 X10 (3) UL (ref 1.5–7.7)
NEUTROPHILS # BLD AUTO: 5.28 X10(3) UL (ref 1.5–7.7)
NEUTROPHILS NFR BLD AUTO: 69.1 %
NONHDLC SERPL-MCNC: 120 MG/DL (ref ?–130)
OSMOLALITY SERPL CALC.SUM OF ELEC: 289 MOSM/KG (ref 275–295)
PLATELET # BLD AUTO: 271 10(3)UL (ref 150–450)
POTASSIUM SERPL-SCNC: 4.1 MMOL/L (ref 3.5–5.1)
PROT SERPL-MCNC: 7.6 G/DL (ref 6.4–8.2)
RBC # BLD AUTO: 5.05 X10(6)UL
SODIUM SERPL-SCNC: 139 MMOL/L (ref 136–145)
TRIGL SERPL-MCNC: 190 MG/DL (ref 30–149)
TSI SER-ACNC: 1.08 MIU/ML (ref 0.36–3.74)
VLDLC SERPL CALC-MCNC: 31 MG/DL (ref 0–30)
WBC # BLD AUTO: 7.7 X10(3) UL (ref 4–11)

## 2022-10-05 PROCEDURE — 85025 COMPLETE CBC W/AUTO DIFF WBC: CPT

## 2022-10-05 PROCEDURE — 36415 COLL VENOUS BLD VENIPUNCTURE: CPT

## 2022-10-05 PROCEDURE — 84443 ASSAY THYROID STIM HORMONE: CPT

## 2022-10-05 PROCEDURE — 80053 COMPREHEN METABOLIC PANEL: CPT

## 2022-10-05 PROCEDURE — 80061 LIPID PANEL: CPT

## 2022-10-05 PROCEDURE — 83036 HEMOGLOBIN GLYCOSYLATED A1C: CPT

## 2022-10-05 NOTE — PROGRESS NOTES
A1c added to blood in lab for eval of pre-diabetes. All other labs are fairly stable. OK to hold for upcoming OV.

## 2022-10-06 NOTE — PROGRESS NOTES
Stable pre-diabetic A1c that continues to fall in the pre-diabetic range. OK to hold for upcoming OV.

## 2022-10-12 ENCOUNTER — OFFICE VISIT (OUTPATIENT)
Dept: INTERNAL MEDICINE CLINIC | Facility: CLINIC | Age: 72
End: 2022-10-12
Payer: MEDICARE

## 2022-10-12 VITALS
BODY MASS INDEX: 27.07 KG/M2 | HEIGHT: 63 IN | SYSTOLIC BLOOD PRESSURE: 130 MMHG | RESPIRATION RATE: 14 BRPM | HEART RATE: 64 BPM | OXYGEN SATURATION: 97 % | DIASTOLIC BLOOD PRESSURE: 80 MMHG | WEIGHT: 152.81 LBS

## 2022-10-12 DIAGNOSIS — Z12.31 VISIT FOR SCREENING MAMMOGRAM: ICD-10-CM

## 2022-10-12 DIAGNOSIS — Z95.5 PRESENCE OF DRUG COATED STENT IN RIGHT CORONARY ARTERY: ICD-10-CM

## 2022-10-12 DIAGNOSIS — M35.3 PMR (POLYMYALGIA RHEUMATICA) (HCC): ICD-10-CM

## 2022-10-12 DIAGNOSIS — H90.3 SENSORY HEARING LOSS, BILATERAL: ICD-10-CM

## 2022-10-12 DIAGNOSIS — I25.2 HISTORY OF NON-ST ELEVATION MYOCARDIAL INFARCTION (NSTEMI): ICD-10-CM

## 2022-10-12 DIAGNOSIS — I10 BENIGN ESSENTIAL HTN: ICD-10-CM

## 2022-10-12 DIAGNOSIS — Z00.00 ENCOUNTER FOR ANNUAL HEALTH EXAMINATION: Primary | ICD-10-CM

## 2022-10-12 DIAGNOSIS — Z86.010 PERSONAL HISTORY OF COLONIC POLYPS: ICD-10-CM

## 2022-10-12 DIAGNOSIS — E78.00 PURE HYPERCHOLESTEROLEMIA: ICD-10-CM

## 2022-10-12 DIAGNOSIS — R73.03 PREDIABETES: ICD-10-CM

## 2022-10-12 DIAGNOSIS — Z85.3 HISTORY OF BREAST CANCER: ICD-10-CM

## 2022-10-12 DIAGNOSIS — E04.2 MULTIPLE THYROID NODULES: ICD-10-CM

## 2022-10-12 DIAGNOSIS — I25.10 CORONARY ARTERY DISEASE INVOLVING NATIVE CORONARY ARTERY OF NATIVE HEART WITHOUT ANGINA PECTORIS: ICD-10-CM

## 2022-10-12 PROBLEM — K63.5 POLYP OF COLON: Status: RESOLVED | Noted: 2017-06-17 | Resolved: 2022-10-12

## 2022-10-12 PROCEDURE — 99213 OFFICE O/P EST LOW 20 MIN: CPT | Performed by: PHYSICIAN ASSISTANT

## 2022-10-12 PROCEDURE — 1125F AMNT PAIN NOTED PAIN PRSNT: CPT | Performed by: PHYSICIAN ASSISTANT

## 2022-10-12 PROCEDURE — G0439 PPPS, SUBSEQ VISIT: HCPCS | Performed by: PHYSICIAN ASSISTANT

## 2022-10-13 ENCOUNTER — TELEPHONE (OUTPATIENT)
Dept: INTERNAL MEDICINE CLINIC | Facility: CLINIC | Age: 72
End: 2022-10-13

## 2022-10-13 NOTE — TELEPHONE ENCOUNTER
Future Appointments   Date Time Provider Tay [place]   3/17/2023 12:30 PM Serge Graff MD [facility]     Patient scheduled for R Knee Replacement with Dr. Devonna Merlin. Faxed paperwork to [phone] for completion and put in brown folder.

## 2022-11-07 RX ORDER — TRIAMTERENE AND HYDROCHLOROTHIAZIDE 37.5; 25 MG/1; MG/1
TABLET ORAL
Qty: 90 TABLET | Refills: 1 | Status: SHIPPED | OUTPATIENT
Start: 2022-11-07

## 2022-11-14 ENCOUNTER — OFFICE VISIT (OUTPATIENT)
Dept: RHEUMATOLOGY | Facility: CLINIC | Age: 72
End: 2022-11-14
Payer: MEDICARE

## 2022-11-14 VITALS
TEMPERATURE: 96 F | SYSTOLIC BLOOD PRESSURE: 144 MMHG | BODY MASS INDEX: 27.29 KG/M2 | DIASTOLIC BLOOD PRESSURE: 100 MMHG | HEIGHT: 63 IN | RESPIRATION RATE: 16 BRPM | OXYGEN SATURATION: 98 % | HEART RATE: 74 BPM | WEIGHT: 154 LBS

## 2022-11-14 DIAGNOSIS — M19.041 PRIMARY OSTEOARTHRITIS OF BOTH HANDS: ICD-10-CM

## 2022-11-14 DIAGNOSIS — M79.642 BILATERAL HAND PAIN: ICD-10-CM

## 2022-11-14 DIAGNOSIS — M19.042 PRIMARY OSTEOARTHRITIS OF BOTH HANDS: ICD-10-CM

## 2022-11-14 DIAGNOSIS — M15.9 PRIMARY OSTEOARTHRITIS INVOLVING MULTIPLE JOINTS: ICD-10-CM

## 2022-11-14 DIAGNOSIS — M79.641 BILATERAL HAND PAIN: ICD-10-CM

## 2022-11-14 DIAGNOSIS — M35.3 PMR (POLYMYALGIA RHEUMATICA) (HCC): Primary | ICD-10-CM

## 2022-11-15 ENCOUNTER — TELEPHONE (OUTPATIENT)
Dept: INTERNAL MEDICINE CLINIC | Facility: CLINIC | Age: 72
End: 2022-11-15

## 2022-11-15 NOTE — TELEPHONE ENCOUNTER
Pt saw Rheum recently and BP was significantly elevated. Does pt have a way to check BP at home? If yes then she needs to check and send in readings with date, time, BP and HR. If she does not then OV for BP check.

## 2022-11-28 NOTE — PROGRESS NOTES
?  RHEUMATOLOGY Follow up   Date of visit: 11/20/2019  ? Patient presents with:  PMR: Pt states 'is down to 5 mg prednisone. Is having shoulders, neck, hips pain for the last couple of days. Doesn't know if it's related to the weather.'     ?   ASSESSMENT, times, including lung toxicities and she was educated on signs and symptoms such as fever, persistent cough, and shortness of breath. Due to potential lung toxicity with this medication, baseline CXR should be on file.   There is also a risk of depression of (around 2/20/2020). ? HPI   Suzie Sheth is a 68year old female with the following active problems who is seen for medically necessary follow-up today. She was seen as a new patient a few months ago for evaluation of PMR.  At that time, she was on 20m blood pressure as well as blood sugar, as well as headaches and nausea without vomiting. Did have to go to the ER due to symptoms. Was told likely reaction to steroids vs flu. Pt thinks more due to the steroids because symptoms improved within 24 hours. Surgical History:   Procedure Laterality Date   • ANGIOPLASTY (CORONARY)  12/31/2018    stent RCA Schuyler Godwin   • APPENDECTOMY  2005    at time of diverticulitis   • COLECTOMY     • COLONOSCOPY     • COLONOSCOPY,DIAGNOSTIC  2004    nl colonoscopy   • HIP SURGER ANAPHYLAXIS    Comment:angioedema  ? REVIEW OF SYSTEMS   ? Review of Systems   Constitutional: Negative for chills, fever, malaise/fatigue and weight loss. HENT: Positive for tinnitus. Negative for hearing loss.     Eyes: Negative for blurred vision, do regular rhythm, normal heart sounds and intact distal pulses. No murmur heard. Pulmonary/Chest: Effort normal and breath sounds normal. No respiratory distress. She has no wheezes. Abdominal: Soft. She exhibits no distension.  Musculoskeletal: 11/14/2019 at 16:19     Approved by: Elida Sandhoff, MD on 11/14/2019 at 16:20            Labs:  Lab Results   Component Value Date    WBC 18.6 (H) 09/10/2019    RBC 4.86 09/10/2019    HGB 13.7 09/10/2019    HCT 40.8 09/10/2019    .0 09/10/2019 Yes

## 2022-11-29 NOTE — TELEPHONE ENCOUNTER
Patient was contacted. Patient does have a BP device she will start to log BP, HR, time and date and call Thursday afternoon with some readings and dictate if needing visit, she abiel any symptoms or changes in health. She does report having white coat syndrome which is not new.  She denies any concerns and will call Thursday with a few readings

## 2022-12-01 NOTE — TELEPHONE ENCOUNTER
BP Readings;    11/29  129/58  8:30pm    11/30  122/67  8:00am    11/30  146/63  11:40am    11/30  130/70  7pm    12/1    131/60  9:30am    12/1    134/64   11:30am

## 2022-12-07 ENCOUNTER — HOSPITAL ENCOUNTER (OUTPATIENT)
Age: 72
Discharge: HOME | End: 2022-12-07
Attending: EMERGENCY MEDICINE
Payer: MEDICARE

## 2022-12-07 VITALS
HEART RATE: 70 BPM | BODY MASS INDEX: 26.22 KG/M2 | OXYGEN SATURATION: 98 % | WEIGHT: 148 LBS | RESPIRATION RATE: 18 BRPM | SYSTOLIC BLOOD PRESSURE: 148 MMHG | TEMPERATURE: 98 F | HEIGHT: 63 IN | DIASTOLIC BLOOD PRESSURE: 69 MMHG

## 2022-12-07 DIAGNOSIS — J01.90 ACUTE SINUSITIS, RECURRENCE NOT SPECIFIED, UNSPECIFIED LOCATION: Primary | ICD-10-CM

## 2022-12-07 LAB
POCT INFLUENZA A: NEGATIVE
POCT INFLUENZA B: NEGATIVE
SARS-COV-2 RNA RESP QL NAA+PROBE: NOT DETECTED

## 2022-12-07 PROCEDURE — 99214 OFFICE O/P EST MOD 30 MIN: CPT

## 2022-12-07 PROCEDURE — 87502 INFLUENZA DNA AMP PROBE: CPT | Performed by: EMERGENCY MEDICINE

## 2022-12-07 PROCEDURE — 99213 OFFICE O/P EST LOW 20 MIN: CPT

## 2022-12-07 RX ORDER — DOXYCYCLINE HYCLATE 100 MG/1
100 CAPSULE ORAL 2 TIMES DAILY
Qty: 20 CAPSULE | Refills: 0 | Status: SHIPPED | OUTPATIENT
Start: 2022-12-07 | End: 2022-12-17

## 2022-12-07 NOTE — ED INITIAL ASSESSMENT (HPI)
Patient presents to IC with c/o cough -nonproductive x 2 days. Had URI last week or so. No fever. Denies sob or chest pain.

## 2022-12-07 NOTE — DISCHARGE INSTRUCTIONS
Encourage fluids and rest at home  Return to the ER if symptoms worsen or if any other problems arise

## 2022-12-15 NOTE — TELEPHONE ENCOUNTER
Most readings look great. No changes at this time. Sorry for the delay, I though I had addressed this message.

## 2023-02-22 RX ORDER — ASPIRIN 81 MG/1
81 TABLET ORAL DAILY
COMMUNITY
End: 2023-03-23

## 2023-03-12 ENCOUNTER — EKG ENCOUNTER (OUTPATIENT)
Dept: LAB | Facility: HOSPITAL | Age: 73
End: 2023-03-12
Attending: ORTHOPAEDIC SURGERY
Payer: MEDICARE

## 2023-03-12 DIAGNOSIS — M79.641 BILATERAL HAND PAIN: ICD-10-CM

## 2023-03-12 DIAGNOSIS — M79.642 BILATERAL HAND PAIN: ICD-10-CM

## 2023-03-12 DIAGNOSIS — M17.11 OSTEOARTHRITIS OF RIGHT KNEE: ICD-10-CM

## 2023-03-12 DIAGNOSIS — M35.3 PMR (POLYMYALGIA RHEUMATICA) (HCC): ICD-10-CM

## 2023-03-12 LAB
ALBUMIN SERPL-MCNC: 3.9 G/DL (ref 3.4–5)
ALBUMIN/GLOB SERPL: 1.1 {RATIO} (ref 1–2)
ALP LIVER SERPL-CCNC: 93 U/L
ALT SERPL-CCNC: 43 U/L
ANION GAP SERPL CALC-SCNC: 4 MMOL/L (ref 0–18)
ANTIBODY SCREEN: NEGATIVE
APTT PPP: 26.1 SECONDS (ref 23.3–35.6)
AST SERPL-CCNC: 44 U/L (ref 15–37)
ATRIAL RATE: 73 BPM
BASOPHILS # BLD AUTO: 0.06 X10(3) UL (ref 0–0.2)
BASOPHILS NFR BLD AUTO: 0.8 %
BILIRUB SERPL-MCNC: 1.1 MG/DL (ref 0.1–2)
BUN BLD-MCNC: 23 MG/DL (ref 7–18)
CALCIUM BLD-MCNC: 9.5 MG/DL (ref 8.5–10.1)
CHLORIDE SERPL-SCNC: 103 MMOL/L (ref 98–112)
CO2 SERPL-SCNC: 32 MMOL/L (ref 21–32)
CREAT BLD-MCNC: 0.96 MG/DL
CRP SERPL-MCNC: <0.29 MG/DL (ref ?–0.3)
EOSINOPHIL # BLD AUTO: 0.32 X10(3) UL (ref 0–0.7)
EOSINOPHIL NFR BLD AUTO: 4 %
ERYTHROCYTE [DISTWIDTH] IN BLOOD BY AUTOMATED COUNT: 13.7 %
ERYTHROCYTE [SEDIMENTATION RATE] IN BLOOD: 24 MM/HR
FASTING STATUS PATIENT QL REPORTED: YES
GFR SERPLBLD BASED ON 1.73 SQ M-ARVRAT: 62 ML/MIN/1.73M2 (ref 60–?)
GLOBULIN PLAS-MCNC: 3.7 G/DL (ref 2.8–4.4)
GLUCOSE BLD-MCNC: 116 MG/DL (ref 70–99)
HCT VFR BLD AUTO: 43.3 %
HGB BLD-MCNC: 14.3 G/DL
IMM GRANULOCYTES # BLD AUTO: 0.03 X10(3) UL (ref 0–1)
IMM GRANULOCYTES NFR BLD: 0.4 %
INR BLD: 0.97 (ref 0.85–1.16)
LYMPHOCYTES # BLD AUTO: 1.57 X10(3) UL (ref 1–4)
LYMPHOCYTES NFR BLD AUTO: 19.8 %
MCH RBC QN AUTO: 28.4 PG (ref 26–34)
MCHC RBC AUTO-ENTMCNC: 33 G/DL (ref 31–37)
MCV RBC AUTO: 86.1 FL
MONOCYTES # BLD AUTO: 0.78 X10(3) UL (ref 0.1–1)
MONOCYTES NFR BLD AUTO: 9.8 %
NEUTROPHILS # BLD AUTO: 5.16 X10 (3) UL (ref 1.5–7.7)
NEUTROPHILS # BLD AUTO: 5.16 X10(3) UL (ref 1.5–7.7)
NEUTROPHILS NFR BLD AUTO: 65.2 %
OSMOLALITY SERPL CALC.SUM OF ELEC: 293 MOSM/KG (ref 275–295)
P AXIS: 50 DEGREES
P-R INTERVAL: 164 MS
PLATELET # BLD AUTO: 259 10(3)UL (ref 150–450)
POTASSIUM SERPL-SCNC: 3.8 MMOL/L (ref 3.5–5.1)
PROT SERPL-MCNC: 7.6 G/DL (ref 6.4–8.2)
PROTHROMBIN TIME: 12.9 SECONDS (ref 11.6–14.8)
Q-T INTERVAL: 440 MS
QRS DURATION: 72 MS
QTC CALCULATION (BEZET): 484 MS
R AXIS: 46 DEGREES
RBC # BLD AUTO: 5.03 X10(6)UL
RH BLOOD TYPE: POSITIVE
SODIUM SERPL-SCNC: 139 MMOL/L (ref 136–145)
T AXIS: 75 DEGREES
VENTRICULAR RATE: 73 BPM
WBC # BLD AUTO: 7.9 X10(3) UL (ref 4–11)

## 2023-03-12 PROCEDURE — 36415 COLL VENOUS BLD VENIPUNCTURE: CPT

## 2023-03-12 PROCEDURE — 85025 COMPLETE CBC W/AUTO DIFF WBC: CPT

## 2023-03-12 PROCEDURE — 93005 ELECTROCARDIOGRAM TRACING: CPT

## 2023-03-12 PROCEDURE — 87081 CULTURE SCREEN ONLY: CPT

## 2023-03-12 PROCEDURE — 86850 RBC ANTIBODY SCREEN: CPT

## 2023-03-12 PROCEDURE — 85652 RBC SED RATE AUTOMATED: CPT

## 2023-03-12 PROCEDURE — 86900 BLOOD TYPING SEROLOGIC ABO: CPT

## 2023-03-12 PROCEDURE — 86901 BLOOD TYPING SEROLOGIC RH(D): CPT

## 2023-03-12 PROCEDURE — 86140 C-REACTIVE PROTEIN: CPT

## 2023-03-12 PROCEDURE — 85610 PROTHROMBIN TIME: CPT

## 2023-03-12 PROCEDURE — 80053 COMPREHEN METABOLIC PANEL: CPT

## 2023-03-12 PROCEDURE — 85730 THROMBOPLASTIN TIME PARTIAL: CPT

## 2023-03-12 PROCEDURE — 93010 ELECTROCARDIOGRAM REPORT: CPT | Performed by: INTERNAL MEDICINE

## 2023-03-13 ENCOUNTER — HOSPITAL ENCOUNTER (OUTPATIENT)
Dept: PHYSICAL THERAPY | Facility: HOSPITAL | Age: 73
Discharge: HOME | End: 2023-03-13
Attending: ORTHOPAEDIC SURGERY
Payer: MEDICARE

## 2023-03-13 DIAGNOSIS — M17.11 OSTEOARTHRITIS OF RIGHT KNEE: ICD-10-CM

## 2023-03-15 ENCOUNTER — HOSPITAL ENCOUNTER (OUTPATIENT)
Dept: MAMMOGRAPHY | Age: 73
Discharge: HOME | End: 2023-03-15
Attending: PHYSICIAN ASSISTANT
Payer: MEDICARE

## 2023-03-15 DIAGNOSIS — Z85.3 HISTORY OF BREAST CANCER: ICD-10-CM

## 2023-03-15 DIAGNOSIS — Z12.31 VISIT FOR SCREENING MAMMOGRAM: ICD-10-CM

## 2023-03-15 PROCEDURE — 77067 SCR MAMMO BI INCL CAD: CPT | Performed by: PHYSICIAN ASSISTANT

## 2023-03-15 PROCEDURE — 77063 BREAST TOMOSYNTHESIS BI: CPT | Performed by: PHYSICIAN ASSISTANT

## 2023-03-17 ENCOUNTER — OFFICE VISIT (OUTPATIENT)
Dept: INTERNAL MEDICINE CLINIC | Facility: CLINIC | Age: 73
End: 2023-03-17
Payer: MEDICARE

## 2023-03-17 VITALS
RESPIRATION RATE: 18 BRPM | OXYGEN SATURATION: 97 % | DIASTOLIC BLOOD PRESSURE: 64 MMHG | BODY MASS INDEX: 27.29 KG/M2 | WEIGHT: 154 LBS | SYSTOLIC BLOOD PRESSURE: 118 MMHG | HEIGHT: 63 IN | HEART RATE: 70 BPM

## 2023-03-17 DIAGNOSIS — E78.00 PURE HYPERCHOLESTEROLEMIA: ICD-10-CM

## 2023-03-17 DIAGNOSIS — I25.10 CORONARY ARTERY DISEASE INVOLVING NATIVE CORONARY ARTERY OF NATIVE HEART WITHOUT ANGINA PECTORIS: ICD-10-CM

## 2023-03-17 DIAGNOSIS — I10 BENIGN ESSENTIAL HTN: ICD-10-CM

## 2023-03-17 DIAGNOSIS — Z01.818 PRE-OP EXAMINATION: ICD-10-CM

## 2023-03-17 DIAGNOSIS — I25.2 HISTORY OF NON-ST ELEVATION MYOCARDIAL INFARCTION (NSTEMI): ICD-10-CM

## 2023-03-17 DIAGNOSIS — M17.11 ARTHRITIS OF RIGHT KNEE: Primary | ICD-10-CM

## 2023-03-17 PROCEDURE — 99214 OFFICE O/P EST MOD 30 MIN: CPT | Performed by: INTERNAL MEDICINE

## 2023-03-20 ENCOUNTER — LAB ENCOUNTER (OUTPATIENT)
Dept: LAB | Age: 73
End: 2023-03-20
Attending: ORTHOPAEDIC SURGERY
Payer: MEDICARE

## 2023-03-20 DIAGNOSIS — M17.11 OSTEOARTHRITIS OF RIGHT KNEE: ICD-10-CM

## 2023-03-21 LAB — SARS-COV-2 RNA RESP QL NAA+PROBE: NOT DETECTED

## 2023-03-22 ENCOUNTER — ANESTHESIA (OUTPATIENT)
Dept: SURGERY | Facility: HOSPITAL | Age: 73
End: 2023-03-22
Payer: MEDICARE

## 2023-03-22 ENCOUNTER — TELEPHONE (OUTPATIENT)
Dept: INTERNAL MEDICINE CLINIC | Facility: CLINIC | Age: 73
End: 2023-03-22

## 2023-03-22 ENCOUNTER — ANESTHESIA EVENT (OUTPATIENT)
Dept: SURGERY | Facility: HOSPITAL | Age: 73
End: 2023-03-22
Payer: MEDICARE

## 2023-03-22 ENCOUNTER — HOSPITAL ENCOUNTER (OUTPATIENT)
Facility: HOSPITAL | Age: 73
Discharge: HOME HEALTH | End: 2023-03-23
Attending: ORTHOPAEDIC SURGERY | Admitting: ORTHOPAEDIC SURGERY
Payer: MEDICARE

## 2023-03-22 DIAGNOSIS — M17.11 OSTEOARTHRITIS OF RIGHT KNEE: Primary | ICD-10-CM

## 2023-03-22 PROCEDURE — 76942 ECHO GUIDE FOR BIOPSY: CPT | Performed by: ANESTHESIOLOGY

## 2023-03-22 PROCEDURE — 0SRC0J9 REPLACEMENT OF RIGHT KNEE JOINT WITH SYNTHETIC SUBSTITUTE, CEMENTED, OPEN APPROACH: ICD-10-PCS | Performed by: ORTHOPAEDIC SURGERY

## 2023-03-22 DEVICE — ATTUNE PATELLA MEDIALIZED DOME 38MM CEMENTED AOX
Type: IMPLANTABLE DEVICE | Site: KNEE | Status: FUNCTIONAL
Brand: ATTUNE

## 2023-03-22 DEVICE — SMARTSET HV HIGH VISCOSITY BONE CEMENT 40G
Type: IMPLANTABLE DEVICE | Site: KNEE | Status: FUNCTIONAL
Brand: SMARTSET

## 2023-03-22 DEVICE — ATTUNE KNEE SYSTEM FEMORAL POSTERIOR STABILIZED SIZE 5 RIGHT CEMENTED
Type: IMPLANTABLE DEVICE | Site: KNEE | Status: FUNCTIONAL
Brand: ATTUNE

## 2023-03-22 DEVICE — ATTUNE KNEE SYSTEM TIBIAL BASE ROTATING PLATFORM SIZE 4 CEMENTED
Type: IMPLANTABLE DEVICE | Site: KNEE | Status: FUNCTIONAL
Brand: ATTUNE

## 2023-03-22 DEVICE — ATTUNE KNEE SYSTEM TIBIAL INSERT ROTATING PLATFORM POSTERIOR STABILIZED 5 7MM AOX
Type: IMPLANTABLE DEVICE | Site: KNEE | Status: FUNCTIONAL
Brand: ATTUNE

## 2023-03-22 RX ORDER — NALOXONE HYDROCHLORIDE 0.4 MG/ML
80 INJECTION, SOLUTION INTRAMUSCULAR; INTRAVENOUS; SUBCUTANEOUS AS NEEDED
Status: DISCONTINUED | OUTPATIENT
Start: 2023-03-22 | End: 2023-03-22 | Stop reason: HOSPADM

## 2023-03-22 RX ORDER — ONDANSETRON 2 MG/ML
4 INJECTION INTRAMUSCULAR; INTRAVENOUS EVERY 6 HOURS PRN
Status: DISCONTINUED | OUTPATIENT
Start: 2023-03-22 | End: 2023-03-23

## 2023-03-22 RX ORDER — ONDANSETRON 2 MG/ML
INJECTION INTRAMUSCULAR; INTRAVENOUS AS NEEDED
Status: DISCONTINUED | OUTPATIENT
Start: 2023-03-22 | End: 2023-03-22 | Stop reason: SURG

## 2023-03-22 RX ORDER — ATENOLOL 50 MG/1
50 TABLET ORAL
Status: DISCONTINUED | OUTPATIENT
Start: 2023-03-23 | End: 2023-03-23

## 2023-03-22 RX ORDER — DIPHENHYDRAMINE HCL 25 MG
25 CAPSULE ORAL EVERY 4 HOURS PRN
Status: DISCONTINUED | OUTPATIENT
Start: 2023-03-22 | End: 2023-03-23

## 2023-03-22 RX ORDER — MELATONIN
325
Status: DISCONTINUED | OUTPATIENT
Start: 2023-03-23 | End: 2023-03-23

## 2023-03-22 RX ORDER — HYDROMORPHONE HYDROCHLORIDE 1 MG/ML
0.4 INJECTION, SOLUTION INTRAMUSCULAR; INTRAVENOUS; SUBCUTANEOUS EVERY 2 HOUR PRN
Status: DISCONTINUED | OUTPATIENT
Start: 2023-03-22 | End: 2023-03-23

## 2023-03-22 RX ORDER — METOCLOPRAMIDE HYDROCHLORIDE 5 MG/ML
10 INJECTION INTRAMUSCULAR; INTRAVENOUS EVERY 8 HOURS PRN
Status: DISCONTINUED | OUTPATIENT
Start: 2023-03-22 | End: 2023-03-23

## 2023-03-22 RX ORDER — ACETAMINOPHEN 325 MG/1
650 TABLET ORAL ONCE
Status: COMPLETED | OUTPATIENT
Start: 2023-03-22 | End: 2023-03-22

## 2023-03-22 RX ORDER — CYCLOBENZAPRINE HCL 10 MG
10 TABLET ORAL EVERY 8 HOURS PRN
Status: DISCONTINUED | OUTPATIENT
Start: 2023-03-22 | End: 2023-03-23

## 2023-03-22 RX ORDER — ASPIRIN 325 MG
325 TABLET, DELAYED RELEASE (ENTERIC COATED) ORAL 2 TIMES DAILY
Status: DISCONTINUED | OUTPATIENT
Start: 2023-03-22 | End: 2023-03-23

## 2023-03-22 RX ORDER — MIDAZOLAM HYDROCHLORIDE 1 MG/ML
INJECTION INTRAMUSCULAR; INTRAVENOUS AS NEEDED
Status: DISCONTINUED | OUTPATIENT
Start: 2023-03-22 | End: 2023-03-22 | Stop reason: SURG

## 2023-03-22 RX ORDER — ACETAMINOPHEN 500 MG
1000 TABLET ORAL ONCE
Status: DISCONTINUED | OUTPATIENT
Start: 2023-03-22 | End: 2023-03-22 | Stop reason: HOSPADM

## 2023-03-22 RX ORDER — SODIUM CHLORIDE, SODIUM LACTATE, POTASSIUM CHLORIDE, CALCIUM CHLORIDE 600; 310; 30; 20 MG/100ML; MG/100ML; MG/100ML; MG/100ML
INJECTION, SOLUTION INTRAVENOUS CONTINUOUS
Status: DISCONTINUED | OUTPATIENT
Start: 2023-03-22 | End: 2023-03-23

## 2023-03-22 RX ORDER — ATORVASTATIN CALCIUM 20 MG/1
20 TABLET, FILM COATED ORAL NIGHTLY
Status: DISCONTINUED | OUTPATIENT
Start: 2023-03-22 | End: 2023-03-23

## 2023-03-22 RX ORDER — DEXAMETHASONE SODIUM PHOSPHATE 10 MG/ML
INJECTION, SOLUTION INTRAMUSCULAR; INTRAVENOUS AS NEEDED
Status: DISCONTINUED | OUTPATIENT
Start: 2023-03-22 | End: 2023-03-22 | Stop reason: SURG

## 2023-03-22 RX ORDER — DEXAMETHASONE SODIUM PHOSPHATE 10 MG/ML
8 INJECTION, SOLUTION INTRAMUSCULAR; INTRAVENOUS ONCE
Status: COMPLETED | OUTPATIENT
Start: 2023-03-23 | End: 2023-03-23

## 2023-03-22 RX ORDER — DOCUSATE SODIUM 100 MG/1
100 CAPSULE, LIQUID FILLED ORAL 2 TIMES DAILY
Status: DISCONTINUED | OUTPATIENT
Start: 2023-03-22 | End: 2023-03-23

## 2023-03-22 RX ORDER — HYDROMORPHONE HYDROCHLORIDE 1 MG/ML
0.2 INJECTION, SOLUTION INTRAMUSCULAR; INTRAVENOUS; SUBCUTANEOUS EVERY 5 MIN PRN
Status: DISCONTINUED | OUTPATIENT
Start: 2023-03-22 | End: 2023-03-22 | Stop reason: HOSPADM

## 2023-03-22 RX ORDER — TRANEXAMIC ACID 10 MG/ML
INJECTION, SOLUTION INTRAVENOUS AS NEEDED
Status: DISCONTINUED | OUTPATIENT
Start: 2023-03-22 | End: 2023-03-22 | Stop reason: SURG

## 2023-03-22 RX ORDER — CEFAZOLIN SODIUM/WATER 2 G/20 ML
2 SYRINGE (ML) INTRAVENOUS EVERY 8 HOURS
Status: COMPLETED | OUTPATIENT
Start: 2023-03-22 | End: 2023-03-23

## 2023-03-22 RX ORDER — POLYETHYLENE GLYCOL 3350 17 G/17G
17 POWDER, FOR SOLUTION ORAL DAILY PRN
Status: DISCONTINUED | OUTPATIENT
Start: 2023-03-22 | End: 2023-03-23

## 2023-03-22 RX ORDER — ACETAMINOPHEN 325 MG/1
650 TABLET ORAL 4 TIMES DAILY
Status: DISCONTINUED | OUTPATIENT
Start: 2023-03-22 | End: 2023-03-23

## 2023-03-22 RX ORDER — EPHEDRINE SULFATE 50 MG/ML
INJECTION INTRAVENOUS AS NEEDED
Status: DISCONTINUED | OUTPATIENT
Start: 2023-03-22 | End: 2023-03-22 | Stop reason: SURG

## 2023-03-22 RX ORDER — DIPHENHYDRAMINE HYDROCHLORIDE 50 MG/ML
12.5 INJECTION INTRAMUSCULAR; INTRAVENOUS EVERY 4 HOURS PRN
Status: DISCONTINUED | OUTPATIENT
Start: 2023-03-22 | End: 2023-03-23

## 2023-03-22 RX ORDER — CEFAZOLIN SODIUM/WATER 2 G/20 ML
2 SYRINGE (ML) INTRAVENOUS ONCE
Status: COMPLETED | OUTPATIENT
Start: 2023-03-22 | End: 2023-03-22

## 2023-03-22 RX ORDER — OXYCODONE HYDROCHLORIDE 5 MG/1
5 TABLET ORAL EVERY 4 HOURS PRN
Status: DISCONTINUED | OUTPATIENT
Start: 2023-03-22 | End: 2023-03-23

## 2023-03-22 RX ORDER — ONDANSETRON 2 MG/ML
4 INJECTION INTRAMUSCULAR; INTRAVENOUS EVERY 6 HOURS PRN
Status: DISCONTINUED | OUTPATIENT
Start: 2023-03-22 | End: 2023-03-22 | Stop reason: HOSPADM

## 2023-03-22 RX ORDER — TRAMADOL HYDROCHLORIDE 50 MG/1
50 TABLET ORAL EVERY 12 HOURS SCHEDULED
Status: DISCONTINUED | OUTPATIENT
Start: 2023-03-22 | End: 2023-03-23

## 2023-03-22 RX ORDER — TRANEXAMIC ACID 10 MG/ML
1000 INJECTION, SOLUTION INTRAVENOUS ONCE
Status: COMPLETED | OUTPATIENT
Start: 2023-03-22 | End: 2023-03-22

## 2023-03-22 RX ORDER — HYDROCODONE BITARTRATE AND ACETAMINOPHEN 10; 325 MG/1; MG/1
1 TABLET ORAL EVERY 4 HOURS PRN
Qty: 60 TABLET | Refills: 0 | Status: SHIPPED | OUTPATIENT
Start: 2023-03-22

## 2023-03-22 RX ORDER — KETOROLAC TROMETHAMINE 15 MG/ML
15 INJECTION, SOLUTION INTRAMUSCULAR; INTRAVENOUS EVERY 6 HOURS
Status: COMPLETED | OUTPATIENT
Start: 2023-03-22 | End: 2023-03-23

## 2023-03-22 RX ORDER — DEXAMETHASONE SODIUM PHOSPHATE 4 MG/ML
VIAL (ML) INJECTION AS NEEDED
Status: DISCONTINUED | OUTPATIENT
Start: 2023-03-22 | End: 2023-03-22 | Stop reason: SURG

## 2023-03-22 RX ORDER — HYDROMORPHONE HYDROCHLORIDE 1 MG/ML
0.4 INJECTION, SOLUTION INTRAMUSCULAR; INTRAVENOUS; SUBCUTANEOUS EVERY 5 MIN PRN
Status: DISCONTINUED | OUTPATIENT
Start: 2023-03-22 | End: 2023-03-22 | Stop reason: HOSPADM

## 2023-03-22 RX ORDER — HYDROMORPHONE HYDROCHLORIDE 1 MG/ML
0.2 INJECTION, SOLUTION INTRAMUSCULAR; INTRAVENOUS; SUBCUTANEOUS EVERY 2 HOUR PRN
Status: DISCONTINUED | OUTPATIENT
Start: 2023-03-22 | End: 2023-03-23

## 2023-03-22 RX ORDER — DIPHENHYDRAMINE HYDROCHLORIDE 50 MG/ML
25 INJECTION INTRAMUSCULAR; INTRAVENOUS ONCE AS NEEDED
Status: ACTIVE | OUTPATIENT
Start: 2023-03-22 | End: 2023-03-22

## 2023-03-22 RX ORDER — ACETAMINOPHEN 325 MG/1
TABLET ORAL
Status: COMPLETED
Start: 2023-03-22 | End: 2023-03-22

## 2023-03-22 RX ORDER — SODIUM PHOSPHATE, DIBASIC AND SODIUM PHOSPHATE, MONOBASIC 7; 19 G/133ML; G/133ML
1 ENEMA RECTAL ONCE AS NEEDED
Status: DISCONTINUED | OUTPATIENT
Start: 2023-03-22 | End: 2023-03-23

## 2023-03-22 RX ORDER — SODIUM CHLORIDE, SODIUM LACTATE, POTASSIUM CHLORIDE, CALCIUM CHLORIDE 600; 310; 30; 20 MG/100ML; MG/100ML; MG/100ML; MG/100ML
INJECTION, SOLUTION INTRAVENOUS CONTINUOUS
Status: DISCONTINUED | OUTPATIENT
Start: 2023-03-22 | End: 2023-03-22 | Stop reason: HOSPADM

## 2023-03-22 RX ORDER — METOCLOPRAMIDE HYDROCHLORIDE 5 MG/ML
10 INJECTION INTRAMUSCULAR; INTRAVENOUS EVERY 8 HOURS PRN
Status: DISCONTINUED | OUTPATIENT
Start: 2023-03-22 | End: 2023-03-22 | Stop reason: HOSPADM

## 2023-03-22 RX ORDER — TRANEXAMIC ACID 10 MG/ML
INJECTION, SOLUTION INTRAVENOUS
Status: COMPLETED
Start: 2023-03-22 | End: 2023-03-22

## 2023-03-22 RX ORDER — CLONIDINE 100 UG/ML
INJECTION, SOLUTION EPIDURAL AS NEEDED
Status: DISCONTINUED | OUTPATIENT
Start: 2023-03-22 | End: 2023-03-22 | Stop reason: SURG

## 2023-03-22 RX ORDER — MEPERIDINE HYDROCHLORIDE 25 MG/ML
12.5 INJECTION INTRAMUSCULAR; INTRAVENOUS; SUBCUTANEOUS AS NEEDED
Status: DISCONTINUED | OUTPATIENT
Start: 2023-03-22 | End: 2023-03-22 | Stop reason: HOSPADM

## 2023-03-22 RX ORDER — OXYCODONE HYDROCHLORIDE 5 MG/1
2.5 TABLET ORAL EVERY 4 HOURS PRN
Status: DISCONTINUED | OUTPATIENT
Start: 2023-03-22 | End: 2023-03-23

## 2023-03-22 RX ORDER — HYDROMORPHONE HYDROCHLORIDE 1 MG/ML
0.6 INJECTION, SOLUTION INTRAMUSCULAR; INTRAVENOUS; SUBCUTANEOUS EVERY 5 MIN PRN
Status: DISCONTINUED | OUTPATIENT
Start: 2023-03-22 | End: 2023-03-22 | Stop reason: HOSPADM

## 2023-03-22 RX ORDER — SENNOSIDES 8.6 MG
17.2 TABLET ORAL NIGHTLY
Status: DISCONTINUED | OUTPATIENT
Start: 2023-03-22 | End: 2023-03-23

## 2023-03-22 RX ORDER — BISACODYL 10 MG
10 SUPPOSITORY, RECTAL RECTAL
Status: DISCONTINUED | OUTPATIENT
Start: 2023-03-22 | End: 2023-03-23

## 2023-03-22 RX ADMIN — ONDANSETRON 4 MG: 2 INJECTION INTRAMUSCULAR; INTRAVENOUS at 14:05:00

## 2023-03-22 RX ADMIN — CLONIDINE 50 MCG: 100 INJECTION, SOLUTION EPIDURAL at 14:52:00

## 2023-03-22 RX ADMIN — CEFAZOLIN SODIUM/WATER 2 G: 2 G/20 ML SYRINGE (ML) INTRAVENOUS at 14:02:00

## 2023-03-22 RX ADMIN — DEXAMETHASONE SODIUM PHOSPHATE 8 MG: 4 MG/ML VIAL (ML) INJECTION at 14:05:00

## 2023-03-22 RX ADMIN — EPHEDRINE SULFATE 10 MG: 50 INJECTION INTRAVENOUS at 14:49:00

## 2023-03-22 RX ADMIN — DEXAMETHASONE SODIUM PHOSPHATE 2 MG: 10 INJECTION, SOLUTION INTRAMUSCULAR; INTRAVENOUS at 14:52:00

## 2023-03-22 RX ADMIN — TRANEXAMIC ACID 1000 MG: 10 INJECTION, SOLUTION INTRAVENOUS at 14:02:00

## 2023-03-22 RX ADMIN — MIDAZOLAM HYDROCHLORIDE 2 MG: 1 INJECTION INTRAMUSCULAR; INTRAVENOUS at 13:57:00

## 2023-03-22 RX ADMIN — SODIUM CHLORIDE, SODIUM LACTATE, POTASSIUM CHLORIDE, CALCIUM CHLORIDE: 600; 310; 30; 20 INJECTION, SOLUTION INTRAVENOUS at 13:56:00

## 2023-03-22 RX ADMIN — EPHEDRINE SULFATE 10 MG: 50 INJECTION INTRAVENOUS at 14:46:00

## 2023-03-22 NOTE — CM/SW NOTE
Pt is s/p R TKA with Dr. Yamile Hernandes. She has a pre-op plan with [facility]. Betzaida Valencia, liaison with [facility], aware of admission and is following pt. PT/OT evals pending at this time. No additional discharge needs identified at this time. CM/SW to remain available for discharge planning.     SAMANTA ErwinN, RN-BC    [id number]

## 2023-03-22 NOTE — ANESTHESIA POSTPROCEDURE EVALUATION
[address] Patient Status:  Outpatient in a Bed   Age/Gender 74year old female MRN [id number]   Location [facility] Attending Delaney Saenz MD   Hosp Day # 0 PCP Jesusita Roy MD       Anesthesia Post-op Note    RIGHT TOTAL KNEE REPLACEMENT    Procedure Summary     Date: 03/22/23 Room / Location: 140[address] [facility] MAIN OR 14 / 1404 [facility] OR    Anesthesia Start: 7836 Anesthesia Stop: 0794    Procedure: RIGHT TOTAL KNEE REPLACEMENT (Right) Diagnosis: (PRIMARY OSTEOARTHRITIS OF RIGHT KNEE)    Surgeons: Delaney Saenz MD Anesthesiologist: Maxime Myers MD    Anesthesia Type: spinal ASA Status: 3          Anesthesia Type: spinal    Vitals Value Taken Time   /69 03/22/23 1515   Temp 97.6 03/22/23 1519   Pulse 88 03/22/23 1518   Resp 25 03/22/23 1518   SpO2 99 % 03/22/23 1518   Vitals shown include unvalidated device data.     Patient Location: PACU    Anesthesia Type: spinal    Airway Patency: patent    Postop Pain Control: adequate    Mental Status: preanesthetic baseline    Nausea/Vomiting: none    Cardiopulmonary/Hydration status: stable euvolemic    Complications: no apparent anesthesia related complications    Postop vital signs: stable    Dental Exam: Unchanged from Preop

## 2023-03-22 NOTE — ANESTHESIA PROCEDURE NOTES
Regional Block    Date/Time: 3/22/2023 2:52 PM    Performed by: Maureen Garza MD  Authorized by: Maureen Garza MD      General Information and Staff    Start Time:  3/22/2023 2:52 PM  End Time:  3/22/2023 2:55 PM  Anesthesiologist:  Maureen Garza MD  Performed by: Anesthesiologist  Patient Location:  OR      Site Identification: real time ultrasound guided and image stored and retrievable    Block site/laterality marked before start: site marked  Reason for Block: at surgeon's request and post-op pain management    Preanesthetic Checklist: 2 patient identifers, IV checked, risks and benefits discussed, monitors and equipment checked, pre-op evaluation, timeout performed, anesthesia consent, sterile technique used, no prohibitive neurological deficits and no local skin infection at insertion site      Procedure Details    Patient Position:   Prep: ChloraPrep    Monitoring:  Cardiac monitor, continuous pulse ox and blood pressure cuff  Block Type: Adductor canal  Injection Technique:  Single-shot    Needle    Needle Type:  Short-bevel and echogenic  Needle Localization:  Ultrasound guidance  Reason for Ultrasound Use: appropriate spread of the medication was noted in real time and no ultrasound evidence of intravascular and/or intraneural injection            Assessment    Injection Assessment:  Good spread noted, negative resistance, negative aspiration for heme, incremental injection and low pressure  Heart Rate Change: No    - Patient tolerated block procedure well without evidence of immediate block related complications.      Medications  3/22/2023 2:52 PM      Additional Comments    Medication:  Ropivacaine 0.25% 20mL

## 2023-03-22 NOTE — OPERATIVE REPORT
DATE OF PROCEDURE:  3/22/2023  PREOPERATIVE DIAGNOSIS: Right knee osteoarthritis. POSTOPERATIVE DIAGNOSIS: Right knee osteoarthritis. PROCEDURE PERFORMED: Cemented right total knee arthroplasty. SURGEON:  Monica Ortez M.D. FIRST ASSISTANT: Martha Rai PA-C.   SECOND ASSISTANT:  Lala To    ANESTHESIA: Spinal plus femoral nerve block. INDICATIONS: The patient has severe knee osteoarthritis that has not responded to conservative treatment including antiinflammatories and injections. The patient's pain has limited activities of daily living including ambulation and exercise. DESCRIPTION OF PROCEDURE: The patient was brought to the operating room and under spinal anesthetic, the right lower extremity was sterilely prepped and draped. Preoperative antibiotics had been administered. A high thigh tourniquet was inflated to 300. It was medically necessary for the presence of the assistants listed for safe patient care. This included positioning of the leg, holding of retractors to protect the underlying neurovascular structures and soft tissues. It was required for the assistants to hold retractors to protect soft tissues while the primary surgeon made the bone cuts on the femur, the tibia, and the patella. The assistants also held the leg stable and positioned while the primary surgeon made the approach, and the bone cuts, and affixed the guides and later the components to the bones. An anterior incision was made, medial and lateral flaps were created. A medial parapatellar arthrotomy was created. The patella was everted, the knee was flexed. Severe arthritic changes with areas of eburnated bone were noted. A drill was placed in the distal femur and a 6-degree 10 mm cut was made. The [employer]ate rotating platform system was used. Sizing was performed and a size 5 cutting block was selected to make anterior, posterior, chamfer and box cuts for a cruciate-sacrificing knee.  Attention was turned to the tibia. An external alignment guide was utilized to take 10 mm off the less involved lateral side. Sizing was performed and a size 4 fit well. There were equal medial and lateral gaps when checked with a spacer. Equal flexion and extension gaps were obtained. The stem cut was made for the tibia and 10 mm was taken off the posterior patella. Sizing was performed and a size 38 patella was selected. Three drill holes were placed. Trial was performed with a 5 femur, 4 tibia, 7 mm insert and 38 mm patella. This gave good varus and valgus stability, good flexion and extension, good tracking of the patella. Distal femoral condyle drill holes were made using the trial template. Final components the same size as the trials were utilized. Trial components were removed. Bony surfaces were irrigated and dried. Final components were precoated with cement which had been mixed under vacuum conditions. The bony surfaces were precoated as well. Components were impacted into place and excess cement removed. The knee was held in extension while the cement hardened. The tourniquet was let down, bleeders were cauterized. Lavage was performed. The arthrotomy was closed with a barbed running suture. Subcutaneous tissue was closed after further irrigation. This was closed with inverted 2-0 Vicryl for the subcutaneous tissue and staples for the skin. An aquacell dressing plus gauze covering was applied. A lightly compressive dressing from toe to groin was applied. Estimated blood loss was 150 mL. There were no complications. There were no specimens. The patient was brought to the PACU in stable condition.

## 2023-03-22 NOTE — ANESTHESIA PROCEDURE NOTES
Spinal Block    Date/Time: 3/22/2023 1:57 PM    Performed by: Vanessa White MD  Authorized by: Vanessa White MD      General Information and Staff    Start Time:  3/22/2023 1:57 PM  End Time:  3/22/2023 2:00 PM  Anesthesiologist:  Vanessa White MD  Performed by:   Anesthesiologist  Site identification: surface landmarks    Preanesthetic Checklist: patient identified, IV checked, risks and benefits discussed, monitors and equipment checked, pre-op evaluation, timeout performed, anesthesia consent and sterile technique used      Procedure Details    Patient Position:  Sitting  Prep: ChloraPrep    Monitoring:  Cardiac monitor, heart rate and continuous pulse ox  Approach:  Midline  Location:  L3-4  Injection Technique:  Single-shot    Needle    Needle Type:  Sprotte  Needle Gauge:  24 G  Needle Length:  3.5 in    Assessment    Sensory Level:   Events: clear CSF, CSF aspirated, well tolerated and blood negative      Additional Comments

## 2023-03-22 NOTE — INTERVAL H&P NOTE
Pre-op Diagnosis: PRIMARY OSTEOARTHRITIS OF RIGHT KNEE    The above referenced H&P was reviewed by Sherren Neighbor, MD on 3/22/2023, the patient was examined and no significant changes have occurred in the patient's condition since the H&P was performed. I discussed with the patient and/or legal representative the potential benefits, risks and side effects of this procedure; the likelihood of the patient achieving goals; and potential problems that might occur during recuperation. I discussed reasonable alternatives to the procedure, including risks, benefits and side effects related to the alternatives and risks related to not receiving this procedure. We will proceed with procedure as planned.

## 2023-03-23 ENCOUNTER — APPOINTMENT (OUTPATIENT)
Dept: ULTRASOUND IMAGING | Facility: HOSPITAL | Age: 73
End: 2023-03-23
Attending: ORTHOPAEDIC SURGERY
Payer: MEDICARE

## 2023-03-23 VITALS
RESPIRATION RATE: 19 BRPM | DIASTOLIC BLOOD PRESSURE: 66 MMHG | SYSTOLIC BLOOD PRESSURE: 139 MMHG | HEART RATE: 61 BPM | WEIGHT: 154.31 LBS | OXYGEN SATURATION: 99 % | HEIGHT: 63 IN | TEMPERATURE: 99 F | BODY MASS INDEX: 27.34 KG/M2

## 2023-03-23 LAB
HCT VFR BLD AUTO: 32.5 %
HGB BLD-MCNC: 11 G/DL

## 2023-03-23 PROCEDURE — 97161 PT EVAL LOW COMPLEX 20 MIN: CPT

## 2023-03-23 PROCEDURE — 85018 HEMOGLOBIN: CPT | Performed by: ORTHOPAEDIC SURGERY

## 2023-03-23 PROCEDURE — 97165 OT EVAL LOW COMPLEX 30 MIN: CPT

## 2023-03-23 PROCEDURE — 97530 THERAPEUTIC ACTIVITIES: CPT

## 2023-03-23 PROCEDURE — 97535 SELF CARE MNGMENT TRAINING: CPT

## 2023-03-23 PROCEDURE — 93971 EXTREMITY STUDY: CPT | Performed by: ORTHOPAEDIC SURGERY

## 2023-03-23 PROCEDURE — 85014 HEMATOCRIT: CPT | Performed by: ORTHOPAEDIC SURGERY

## 2023-03-23 RX ORDER — POLYETHYLENE GLYCOL 3350 17 G/17G
17 POWDER, FOR SOLUTION ORAL DAILY PRN
Qty: 10 PACKET | Refills: 0 | Status: SHIPPED | OUTPATIENT
Start: 2023-03-23

## 2023-03-23 RX ORDER — TRAMADOL HYDROCHLORIDE 50 MG/1
TABLET ORAL EVERY 6 HOURS PRN
Qty: 40 TABLET | Refills: 0 | Status: SHIPPED | OUTPATIENT
Start: 2023-03-23

## 2023-03-23 NOTE — PROGRESS NOTES
AVS completed, Dc video viewed, will dc home w/ [facility], IV dc'd.    1210- Requesting Tramadol script for dc

## 2023-03-23 NOTE — PLAN OF CARE
Patient awaiting discharge home. Vitals stable, pain controlled. Problem: PAIN - ADULT  Goal: Verbalizes/displays adequate comfort level or patient's stated pain goal  Description: INTERVENTIONS:  - Encourage pt to monitor pain and request assistance  - Assess pain using appropriate pain scale  - Administer analgesics based on type and severity of pain and evaluate response  - Implement non-pharmacological measures as appropriate and evaluate response  - Consider cultural and social influences on pain and pain management  - Manage/alleviate anxiety  - Utilize distraction and/or relaxation techniques  - Monitor for opioid side effects  - Notify MD/LIP if interventions unsuccessful or patient reports new pain  - Anticipate increased pain with activity and pre-medicate as appropriate  Outcome: Progressing     Problem: SAFETY ADULT - FALL  Goal: Free from fall injury  Description: INTERVENTIONS:  - Assess pt frequently for physical needs  - Identify cognitive and physical deficits and behaviors that affect risk of falls.   - [place] fall precautions as indicated by assessment.  - Educate pt/family on patient safety including physical limitations  - Instruct pt to call for assistance with activity based on assessment  - Modify environment to reduce risk of injury  - Provide assistive devices as appropriate  - Consider OT/PT consult to assist with strengthening/mobility  - Encourage toileting schedule  Outcome: Progressing

## 2023-03-23 NOTE — PLAN OF CARE
Aox4, reporting decreased sensation + tingling to RLE, aquacel dressing with scant drainage, Spandigrip to RLE, scds in place, started on ASA, on room air , up standby assist + WBAT, PT/OT to see, Pre-op with [facility]

## 2023-03-29 ENCOUNTER — TELEPHONE (OUTPATIENT)
Dept: INTERNAL MEDICINE CLINIC | Facility: CLINIC | Age: 73
End: 2023-03-29

## 2023-03-29 ENCOUNTER — OFFICE VISIT (OUTPATIENT)
Dept: INTERNAL MEDICINE CLINIC | Facility: CLINIC | Age: 73
End: 2023-03-29
Payer: MEDICARE

## 2023-03-29 VITALS
HEART RATE: 59 BPM | SYSTOLIC BLOOD PRESSURE: 112 MMHG | BODY MASS INDEX: 26.31 KG/M2 | TEMPERATURE: 97 F | HEIGHT: 62 IN | DIASTOLIC BLOOD PRESSURE: 56 MMHG | WEIGHT: 143 LBS | OXYGEN SATURATION: 99 % | RESPIRATION RATE: 16 BRPM

## 2023-03-29 DIAGNOSIS — I10 BENIGN ESSENTIAL HTN: Primary | ICD-10-CM

## 2023-03-29 DIAGNOSIS — D62 ACUTE BLOOD LOSS ANEMIA: ICD-10-CM

## 2023-03-29 DIAGNOSIS — Z96.651 S/P TOTAL KNEE ARTHROPLASTY, RIGHT: ICD-10-CM

## 2023-03-29 PROCEDURE — 1126F AMNT PAIN NOTED NONE PRSNT: CPT | Performed by: PHYSICIAN ASSISTANT

## 2023-03-29 PROCEDURE — 99214 OFFICE O/P EST MOD 30 MIN: CPT | Performed by: PHYSICIAN ASSISTANT

## 2023-03-29 PROCEDURE — 1111F DSCHRG MED/CURRENT MED MERGE: CPT | Performed by: PHYSICIAN ASSISTANT

## 2023-03-29 NOTE — TELEPHONE ENCOUNTER
Informed must fast no call back required.  Orders to Mancel Kosta    Future Appointments   Date Time Provider Tay [place]   10/6/2023 11:00 AM Erich Arroyo MD [facility]

## 2023-04-14 ENCOUNTER — LAB ENCOUNTER (OUTPATIENT)
Dept: LAB | Age: 73
End: 2023-04-14
Attending: PHYSICIAN ASSISTANT
Payer: MEDICARE

## 2023-04-14 ENCOUNTER — OFFICE VISIT (OUTPATIENT)
Dept: INTERNAL MEDICINE CLINIC | Facility: CLINIC | Age: 73
End: 2023-04-14
Payer: MEDICARE

## 2023-04-14 ENCOUNTER — TELEPHONE (OUTPATIENT)
Dept: INTERNAL MEDICINE CLINIC | Facility: CLINIC | Age: 73
End: 2023-04-14

## 2023-04-14 VITALS
SYSTOLIC BLOOD PRESSURE: 104 MMHG | WEIGHT: 149.81 LBS | OXYGEN SATURATION: 97 % | BODY MASS INDEX: 27.57 KG/M2 | HEIGHT: 62 IN | HEART RATE: 74 BPM | DIASTOLIC BLOOD PRESSURE: 68 MMHG | RESPIRATION RATE: 18 BRPM

## 2023-04-14 DIAGNOSIS — I10 BENIGN ESSENTIAL HTN: ICD-10-CM

## 2023-04-14 DIAGNOSIS — E78.00 PURE HYPERCHOLESTEROLEMIA: ICD-10-CM

## 2023-04-14 DIAGNOSIS — Z96.651 S/P TOTAL KNEE ARTHROPLASTY, RIGHT: Primary | ICD-10-CM

## 2023-04-14 DIAGNOSIS — Z00.00 ROUTINE GENERAL MEDICAL EXAMINATION AT A HEALTH CARE FACILITY: Primary | ICD-10-CM

## 2023-04-14 DIAGNOSIS — I10 ESSENTIAL HYPERTENSION: ICD-10-CM

## 2023-04-14 DIAGNOSIS — D62 ACUTE POSTOPERATIVE ANEMIA DUE TO EXPECTED BLOOD LOSS: ICD-10-CM

## 2023-04-14 DIAGNOSIS — R53.83 FATIGUE, UNSPECIFIED TYPE: ICD-10-CM

## 2023-04-14 DIAGNOSIS — R53.83 OTHER FATIGUE: ICD-10-CM

## 2023-04-14 LAB
BASOPHILS # BLD AUTO: 0.07 X10(3) UL (ref 0–0.2)
BASOPHILS NFR BLD AUTO: 0.8 %
EOSINOPHIL # BLD AUTO: 0.34 X10(3) UL (ref 0–0.7)
EOSINOPHIL NFR BLD AUTO: 3.7 %
ERYTHROCYTE [DISTWIDTH] IN BLOOD BY AUTOMATED COUNT: 13.8 %
HCT VFR BLD AUTO: 39.9 %
HGB BLD-MCNC: 12.7 G/DL
IMM GRANULOCYTES # BLD AUTO: 0.05 X10(3) UL (ref 0–1)
IMM GRANULOCYTES NFR BLD: 0.6 %
LYMPHOCYTES # BLD AUTO: 1.23 X10(3) UL (ref 1–4)
LYMPHOCYTES NFR BLD AUTO: 13.6 %
MCH RBC QN AUTO: 27.6 PG (ref 26–34)
MCHC RBC AUTO-ENTMCNC: 31.8 G/DL (ref 31–37)
MCV RBC AUTO: 86.7 FL
MONOCYTES # BLD AUTO: 0.79 X10(3) UL (ref 0.1–1)
MONOCYTES NFR BLD AUTO: 8.7 %
NEUTROPHILS # BLD AUTO: 6.59 X10 (3) UL (ref 1.5–7.7)
NEUTROPHILS # BLD AUTO: 6.59 X10(3) UL (ref 1.5–7.7)
NEUTROPHILS NFR BLD AUTO: 72.6 %
PLATELET # BLD AUTO: 425 10(3)UL (ref 150–450)
RBC # BLD AUTO: 4.6 X10(6)UL
WBC # BLD AUTO: 9.1 X10(3) UL (ref 4–11)

## 2023-04-14 PROCEDURE — 99214 OFFICE O/P EST MOD 30 MIN: CPT | Performed by: FAMILY MEDICINE

## 2023-04-14 PROCEDURE — 85025 COMPLETE CBC W/AUTO DIFF WBC: CPT

## 2023-04-14 PROCEDURE — 1111F DSCHRG MED/CURRENT MED MERGE: CPT | Performed by: FAMILY MEDICINE

## 2023-04-14 PROCEDURE — 36415 COLL VENOUS BLD VENIPUNCTURE: CPT

## 2023-04-14 RX ORDER — AMOXICILLIN 250 MG
1 CAPSULE ORAL 2 TIMES DAILY
Qty: 60 TABLET | Refills: 0 | Status: SHIPPED | OUTPATIENT
Start: 2023-04-14

## 2023-05-01 RX ORDER — TRIAMTERENE AND HYDROCHLOROTHIAZIDE 37.5; 25 MG/1; MG/1
TABLET ORAL
Qty: 90 TABLET | Refills: 0 | Status: SHIPPED | OUTPATIENT
Start: 2023-05-01

## 2023-08-13 ENCOUNTER — APPOINTMENT (OUTPATIENT)
Dept: ULTRASOUND IMAGING | Age: 73
End: 2023-08-13
Attending: PHYSICIAN ASSISTANT
Payer: MEDICARE

## 2023-08-13 ENCOUNTER — HOSPITAL ENCOUNTER (OUTPATIENT)
Age: 73
Discharge: HOME | End: 2023-08-13
Payer: MEDICARE

## 2023-08-13 ENCOUNTER — APPOINTMENT (OUTPATIENT)
Dept: GENERAL RADIOLOGY | Age: 73
End: 2023-08-13
Attending: PHYSICIAN ASSISTANT
Payer: MEDICARE

## 2023-08-13 VITALS
SYSTOLIC BLOOD PRESSURE: 150 MMHG | RESPIRATION RATE: 18 BRPM | OXYGEN SATURATION: 96 % | BODY MASS INDEX: 26.93 KG/M2 | WEIGHT: 152 LBS | TEMPERATURE: 97 F | DIASTOLIC BLOOD PRESSURE: 75 MMHG | HEART RATE: 58 BPM | HEIGHT: 63 IN

## 2023-08-13 DIAGNOSIS — T14.8XXA HEMATOMA: ICD-10-CM

## 2023-08-13 DIAGNOSIS — I82.621 ACUTE DEEP VEIN THROMBOSIS (DVT) OF BRACHIAL VEIN OF RIGHT UPPER EXTREMITY (HCC): Primary | ICD-10-CM

## 2023-08-13 LAB
#MXD IC: 0.8 X10ˆ3/UL (ref 0.1–1)
BUN BLD-MCNC: 17 MG/DL (ref 7–18)
CHLORIDE BLD-SCNC: 95 MMOL/L (ref 98–112)
CO2 BLD-SCNC: 29 MMOL/L (ref 21–32)
CREAT BLD-MCNC: 1 MG/DL
EGFRCR SERPLBLD CKD-EPI 2021: 59 ML/MIN/1.73M2 (ref 60–?)
GLUCOSE BLD-MCNC: 161 MG/DL (ref 70–99)
HCT VFR BLD AUTO: 41.2 %
HCT VFR BLD CALC: 41 %
HGB BLD-MCNC: 13.3 G/DL
ISTAT IONIZED CALCIUM FOR CHEM 8: 1.26 MMOL/L (ref 1.12–1.32)
LYMPHOCYTES # BLD AUTO: 1.1 X10ˆ3/UL (ref 1–4)
LYMPHOCYTES NFR BLD AUTO: 14.5 %
MCH RBC QN AUTO: 27.4 PG (ref 26–34)
MCHC RBC AUTO-ENTMCNC: 32.3 G/DL (ref 31–37)
MCV RBC AUTO: 84.9 FL (ref 80–100)
MIXED CELL %: 9.9 %
NEUTROPHILS # BLD AUTO: 5.7 X10ˆ3/UL (ref 1.5–7.7)
NEUTROPHILS NFR BLD AUTO: 75.6 %
PLATELET # BLD AUTO: 295 X10ˆ3/UL (ref 150–450)
POTASSIUM BLD-SCNC: 3.2 MMOL/L (ref 3.6–5.1)
RBC # BLD AUTO: 4.85 X10ˆ6/UL
SODIUM BLD-SCNC: 139 MMOL/L (ref 136–145)
WBC # BLD AUTO: 7.6 X10ˆ3/UL (ref 4–11)

## 2023-08-13 PROCEDURE — 99215 OFFICE O/P EST HI 40 MIN: CPT

## 2023-08-13 PROCEDURE — 93971 EXTREMITY STUDY: CPT | Performed by: PHYSICIAN ASSISTANT

## 2023-08-13 PROCEDURE — 73030 X-RAY EXAM OF SHOULDER: CPT | Performed by: PHYSICIAN ASSISTANT

## 2023-08-13 PROCEDURE — 80047 BASIC METABLC PNL IONIZED CA: CPT

## 2023-08-13 PROCEDURE — 85025 COMPLETE CBC W/AUTO DIFF WBC: CPT | Performed by: PHYSICIAN ASSISTANT

## 2023-08-13 PROCEDURE — 96374 THER/PROPH/DIAG INJ IV PUSH: CPT

## 2023-08-13 PROCEDURE — 73060 X-RAY EXAM OF HUMERUS: CPT | Performed by: PHYSICIAN ASSISTANT

## 2023-08-13 PROCEDURE — 99214 OFFICE O/P EST MOD 30 MIN: CPT

## 2023-08-13 RX ORDER — ASPIRIN 81 MG/1
81 TABLET ORAL DAILY
COMMUNITY
End: 2023-08-13

## 2023-08-13 NOTE — ED INITIAL ASSESSMENT (HPI)
Pt states approx 2 weeks ago felt a \"twinge\" to rt upper arm, and then 2 days later noticed bruising. States last couple days the bruising has increased in size.

## 2023-08-13 NOTE — DISCHARGE INSTRUCTIONS
Please return to the Emergency department/clinic if symptoms worsen or you develop new symptoms. Follow up with your primary care physician in 2 days. Take any medications prescribed to you as instructed. While on blood thinners, you may have difficulty stopping bleeding if you suffer injuries such as abrasions or small lacerations. If you fall, or suffer any sort of trauma to your head, you must going to the ER immediately for evaluation, as bleeding into or around the brain is more likely when on blood thinners. If your hematoma becomes much larger, or the area becomes hard to the touch, stop the blood thinner immediately and seek emergency treatment.

## 2023-08-14 ENCOUNTER — TELEPHONE (OUTPATIENT)
Dept: INTERNAL MEDICINE CLINIC | Facility: CLINIC | Age: 73
End: 2023-08-14

## 2023-08-14 NOTE — TELEPHONE ENCOUNTER
Called and spoke w/ pt. Pt stated she was in UC and was told to follow up here as soon as possible. Scheduled pt w/ CB 8/16 for UC f/u. Pt has been taking Xarelto as prescribed. Agreeable to plan.      JERMAINE CORADO

## 2023-08-14 NOTE — TELEPHONE ENCOUNTER
Result Date: 8/13/2023  CONCLUSION:  1. Deep venous thrombosis involving proximal mid brachial vein. Critical result is telephoned to Melissa Perez at 1237 hours on 8/13/2023. 2. Intramuscular hematoma in right upper arm corresponding to bruise. LOCATION:  [facility]   Dictated by (CST): Juliette Mejias MD on 8/13/2023 at 12:34 PM     Finalized by (CST): Juliette Mejias MD on 8/13/2023 at 12:38 PM      D/c from urgent care on Xarelto    [address] for apt 8/18?

## 2023-08-14 NOTE — TELEPHONE ENCOUNTER
Pt called stating she went to UC yesterday and has blood clot in her R arm-to fup this week-ok to wait until Friday HFU spot?

## 2023-08-16 ENCOUNTER — OFFICE VISIT (OUTPATIENT)
Dept: INTERNAL MEDICINE CLINIC | Facility: CLINIC | Age: 73
End: 2023-08-16
Payer: MEDICARE

## 2023-08-16 DIAGNOSIS — M79.601 PAIN OF RIGHT UPPER EXTREMITY: ICD-10-CM

## 2023-08-16 DIAGNOSIS — I82.621 ARM DVT (DEEP VENOUS THROMBOEMBOLISM), ACUTE, RIGHT (HCC): Primary | ICD-10-CM

## 2023-08-16 PROCEDURE — 99214 OFFICE O/P EST MOD 30 MIN: CPT | Performed by: PHYSICIAN ASSISTANT

## 2023-08-17 VITALS
OXYGEN SATURATION: 97 % | HEIGHT: 62 IN | WEIGHT: 153 LBS | TEMPERATURE: 97 F | BODY MASS INDEX: 28.16 KG/M2 | SYSTOLIC BLOOD PRESSURE: 138 MMHG | RESPIRATION RATE: 16 BRPM | DIASTOLIC BLOOD PRESSURE: 74 MMHG | HEART RATE: 64 BPM

## 2023-08-28 ENCOUNTER — OFFICE VISIT (OUTPATIENT)
Dept: HEMATOLOGY/ONCOLOGY | Facility: HOSPITAL | Age: 73
End: 2023-08-28
Attending: INTERNAL MEDICINE
Payer: MEDICARE

## 2023-08-28 VITALS
TEMPERATURE: 98 F | RESPIRATION RATE: 18 BRPM | HEART RATE: 78 BPM | OXYGEN SATURATION: 99 % | BODY MASS INDEX: 28 KG/M2 | WEIGHT: 155.38 LBS

## 2023-08-28 DIAGNOSIS — I82.621 ARM DVT (DEEP VENOUS THROMBOEMBOLISM), ACUTE, RIGHT (HCC): Primary | ICD-10-CM

## 2023-08-28 PROCEDURE — 99205 OFFICE O/P NEW HI 60 MIN: CPT | Performed by: INTERNAL MEDICINE

## 2023-08-28 NOTE — PROGRESS NOTES
Education Record    Learner:  Patient and Spouse    Disease / Diagnosis: DVT RUE    Barriers / Limitations:  None   Comments:    Method:  Discussion   Comments:    General Topics:  Medication, Pain, and Plan of care reviewed   Comments:    Outcome:  Shows understanding   Comments:    Here for R arm DVT. Taking Xarelto 20mg without issue. Has occasional hemorrhoids that have small amounts of blood. Otherwise, no abnormal bleeding/bruising. Her arm is no longer in pain, bruised, or swollen.

## 2023-09-25 ENCOUNTER — HOSPITAL ENCOUNTER (OUTPATIENT)
Age: 73
Discharge: HOME | End: 2023-09-25
Payer: MEDICARE

## 2023-09-25 VITALS
TEMPERATURE: 98 F | RESPIRATION RATE: 16 BRPM | WEIGHT: 150 LBS | BODY MASS INDEX: 27.6 KG/M2 | OXYGEN SATURATION: 98 % | HEIGHT: 62 IN | HEART RATE: 65 BPM | SYSTOLIC BLOOD PRESSURE: 156 MMHG | DIASTOLIC BLOOD PRESSURE: 69 MMHG

## 2023-09-25 DIAGNOSIS — U07.1 COVID-19: Primary | ICD-10-CM

## 2023-09-25 LAB — SARS-COV-2 RNA RESP QL NAA+PROBE: DETECTED

## 2023-09-25 PROCEDURE — 99212 OFFICE O/P EST SF 10 MIN: CPT

## 2023-09-25 PROCEDURE — 99213 OFFICE O/P EST LOW 20 MIN: CPT

## 2023-09-25 NOTE — ED INITIAL ASSESSMENT (HPI)
Headache/ Cough - started yesterday  denies fever, runny nose started last nigh.pt's spouse tested +covid  Friday pt requesting covid test.

## 2023-09-25 NOTE — DISCHARGE INSTRUCTIONS
Everyone, regardless of vaccination status after testing positive for COVID 19 should stay home and isolate for 5 days. If you have no symptoms or your symptoms are resolving after 5 days, you can leave your house. Continue to wear a mask around others for 5 additional days as you can potentially still be contagious. If you have a fever, continue to stay home until your fever resolves and you are fever free without fever reducind medications for 24 hours         People with COVID-19 should receive supportive care to help relieve symptoms.     Tylenol alternating with ibuprofen for fever/chills/body ache  Drink plenty of fluids and rest

## 2023-09-27 ENCOUNTER — TELEPHONE (OUTPATIENT)
Dept: INTERNAL MEDICINE CLINIC | Facility: CLINIC | Age: 73
End: 2023-09-27

## 2023-09-27 NOTE — TELEPHONE ENCOUNTER
Pt tested positive for covid on Mon. She is unable to take paxlovid. She is wondering what else she can take for the congestion?  She doesn't have a fever

## 2023-09-27 NOTE — TELEPHONE ENCOUNTER
FYI    Symptoms started Sunday. Tested + for covid Monday. Cannot take paxlovid due to meds. Discussed HTN safe OTC symptom management. Denies fever, cough, SOB. IC/ED warnings given.     AWV is sched for below:  Future Appointments   Date Time Provider Tay [place]   10/12/2023  9:40 AM Stefanie Tam MD [facility]

## 2023-10-12 ENCOUNTER — TELEPHONE (OUTPATIENT)
Dept: INTERNAL MEDICINE CLINIC | Facility: CLINIC | Age: 73
End: 2023-10-12

## 2023-10-12 ENCOUNTER — OFFICE VISIT (OUTPATIENT)
Dept: INTERNAL MEDICINE CLINIC | Facility: CLINIC | Age: 73
End: 2023-10-12
Payer: MEDICARE

## 2023-10-12 VITALS
HEIGHT: 62 IN | SYSTOLIC BLOOD PRESSURE: 118 MMHG | DIASTOLIC BLOOD PRESSURE: 60 MMHG | WEIGHT: 152.81 LBS | BODY MASS INDEX: 28.12 KG/M2 | OXYGEN SATURATION: 98 % | HEART RATE: 60 BPM

## 2023-10-12 DIAGNOSIS — Z00.00 ENCOUNTER FOR ANNUAL HEALTH EXAMINATION: Primary | ICD-10-CM

## 2023-10-12 DIAGNOSIS — Z95.5 PRESENCE OF DRUG COATED STENT IN RIGHT CORONARY ARTERY: ICD-10-CM

## 2023-10-12 DIAGNOSIS — I25.10 CORONARY ARTERY DISEASE INVOLVING NATIVE CORONARY ARTERY OF NATIVE HEART WITHOUT ANGINA PECTORIS: ICD-10-CM

## 2023-10-12 DIAGNOSIS — I10 BENIGN ESSENTIAL HTN: ICD-10-CM

## 2023-10-12 DIAGNOSIS — E78.00 PURE HYPERCHOLESTEROLEMIA: ICD-10-CM

## 2023-10-12 DIAGNOSIS — R73.03 PREDIABETES: ICD-10-CM

## 2023-10-12 DIAGNOSIS — E04.2 MULTIPLE THYROID NODULES: ICD-10-CM

## 2023-10-12 DIAGNOSIS — Z86.010 PERSONAL HISTORY OF COLONIC POLYPS: ICD-10-CM

## 2023-10-12 DIAGNOSIS — Z85.3 HISTORY OF BREAST CANCER: ICD-10-CM

## 2023-10-12 DIAGNOSIS — I82.621 ARM DVT (DEEP VENOUS THROMBOEMBOLISM), ACUTE, RIGHT (HCC): ICD-10-CM

## 2023-10-12 DIAGNOSIS — M35.3 PMR (POLYMYALGIA RHEUMATICA) (HCC): ICD-10-CM

## 2023-10-12 DIAGNOSIS — Z96.653 S/P TOTAL KNEE REPLACEMENT, BILATERAL: ICD-10-CM

## 2023-10-12 DIAGNOSIS — R09.82 PND (POST-NASAL DRIP): ICD-10-CM

## 2023-10-12 DIAGNOSIS — M75.101 ROTATOR CUFF SYNDROME OF RIGHT SHOULDER: ICD-10-CM

## 2023-10-12 DIAGNOSIS — I25.2 HISTORY OF NON-ST ELEVATION MYOCARDIAL INFARCTION (NSTEMI): ICD-10-CM

## 2023-10-12 DIAGNOSIS — H90.3 SENSORY HEARING LOSS, BILATERAL: ICD-10-CM

## 2023-10-12 PROBLEM — M17.11 ARTHRITIS OF RIGHT KNEE: Status: RESOLVED | Noted: 2023-03-17 | Resolved: 2023-10-12

## 2023-10-12 PROBLEM — R10.84 GENERALIZED ABDOMINAL PAIN: Status: RESOLVED | Noted: 2021-04-25 | Resolved: 2023-10-12

## 2023-10-12 PROBLEM — Z96.651 S/P TOTAL KNEE ARTHROPLASTY, RIGHT: Status: ACTIVE | Noted: 2023-10-12

## 2023-10-12 PROBLEM — M17.11 OSTEOARTHRITIS OF RIGHT KNEE: Status: RESOLVED | Noted: 2023-03-22 | Resolved: 2023-10-12

## 2023-10-12 PROBLEM — Z78.0 POST-MENOPAUSAL: Status: RESOLVED | Noted: 2017-06-17 | Resolved: 2023-10-12

## 2023-10-12 PROCEDURE — 99214 OFFICE O/P EST MOD 30 MIN: CPT | Performed by: FAMILY MEDICINE

## 2023-10-12 PROCEDURE — G0439 PPPS, SUBSEQ VISIT: HCPCS | Performed by: FAMILY MEDICINE

## 2023-10-12 PROCEDURE — 1126F AMNT PAIN NOTED NONE PRSNT: CPT | Performed by: FAMILY MEDICINE

## 2023-10-12 RX ORDER — ASPIRIN 81 MG/1
81 TABLET ORAL DAILY
COMMUNITY

## 2023-10-17 ENCOUNTER — LAB ENCOUNTER (OUTPATIENT)
Dept: LAB | Age: 73
End: 2023-10-17
Attending: FAMILY MEDICINE
Payer: MEDICARE

## 2023-10-17 DIAGNOSIS — Z00.00 ROUTINE GENERAL MEDICAL EXAMINATION AT A HEALTH CARE FACILITY: ICD-10-CM

## 2023-10-17 DIAGNOSIS — R73.03 PREDIABETES: ICD-10-CM

## 2023-10-17 DIAGNOSIS — I10 ESSENTIAL HYPERTENSION: ICD-10-CM

## 2023-10-17 DIAGNOSIS — R53.83 OTHER FATIGUE: ICD-10-CM

## 2023-10-17 DIAGNOSIS — E78.00 PURE HYPERCHOLESTEROLEMIA: ICD-10-CM

## 2023-10-17 LAB
ALBUMIN SERPL-MCNC: 3.3 G/DL (ref 3.4–5)
ALBUMIN/GLOB SERPL: 0.9 {RATIO} (ref 1–2)
ALP LIVER SERPL-CCNC: 82 U/L
ALT SERPL-CCNC: 39 U/L
ANION GAP SERPL CALC-SCNC: 10 MMOL/L (ref 0–18)
AST SERPL-CCNC: 40 U/L (ref 15–37)
BASOPHILS # BLD AUTO: 0.05 X10(3) UL (ref 0–0.2)
BASOPHILS NFR BLD AUTO: 0.7 %
BILIRUB SERPL-MCNC: 1 MG/DL (ref 0.1–2)
BUN BLD-MCNC: 12 MG/DL (ref 7–18)
CALCIUM BLD-MCNC: 8.8 MG/DL (ref 8.5–10.1)
CHLORIDE SERPL-SCNC: 102 MMOL/L (ref 98–112)
CHOLEST SERPL-MCNC: 142 MG/DL (ref ?–200)
CO2 SERPL-SCNC: 28 MMOL/L (ref 21–32)
CREAT BLD-MCNC: 1.04 MG/DL
EGFRCR SERPLBLD CKD-EPI 2021: 56 ML/MIN/1.73M2 (ref 60–?)
EOSINOPHIL # BLD AUTO: 0.3 X10(3) UL (ref 0–0.7)
EOSINOPHIL NFR BLD AUTO: 4.1 %
ERYTHROCYTE [DISTWIDTH] IN BLOOD BY AUTOMATED COUNT: 13.4 %
FASTING PATIENT LIPID ANSWER: YES
FASTING STATUS PATIENT QL REPORTED: YES
GLOBULIN PLAS-MCNC: 3.7 G/DL (ref 2.8–4.4)
GLUCOSE BLD-MCNC: 105 MG/DL (ref 70–99)
HCT VFR BLD AUTO: 41.8 %
HDLC SERPL-MCNC: 63 MG/DL (ref 40–59)
HGB BLD-MCNC: 13.1 G/DL
IMM GRANULOCYTES # BLD AUTO: 0.03 X10(3) UL (ref 0–1)
IMM GRANULOCYTES NFR BLD: 0.4 %
LDLC SERPL CALC-MCNC: 56 MG/DL (ref ?–100)
LYMPHOCYTES # BLD AUTO: 0.84 X10(3) UL (ref 1–4)
LYMPHOCYTES NFR BLD AUTO: 11.4 %
MCH RBC QN AUTO: 27.8 PG (ref 26–34)
MCHC RBC AUTO-ENTMCNC: 31.3 G/DL (ref 31–37)
MCV RBC AUTO: 88.6 FL
MONOCYTES # BLD AUTO: 0.78 X10(3) UL (ref 0.1–1)
MONOCYTES NFR BLD AUTO: 10.6 %
NEUTROPHILS # BLD AUTO: 5.37 X10 (3) UL (ref 1.5–7.7)
NEUTROPHILS # BLD AUTO: 5.37 X10(3) UL (ref 1.5–7.7)
NEUTROPHILS NFR BLD AUTO: 72.8 %
NONHDLC SERPL-MCNC: 79 MG/DL (ref ?–130)
OSMOLALITY SERPL CALC.SUM OF ELEC: 290 MOSM/KG (ref 275–295)
PLATELET # BLD AUTO: 278 10(3)UL (ref 150–450)
POTASSIUM SERPL-SCNC: 3.7 MMOL/L (ref 3.5–5.1)
PROT SERPL-MCNC: 7 G/DL (ref 6.4–8.2)
RBC # BLD AUTO: 4.72 X10(6)UL
SODIUM SERPL-SCNC: 140 MMOL/L (ref 136–145)
TRIGL SERPL-MCNC: 136 MG/DL (ref 30–149)
TSI SER-ACNC: 0.99 MIU/ML (ref 0.36–3.74)
VLDLC SERPL CALC-MCNC: 20 MG/DL (ref 0–30)
WBC # BLD AUTO: 7.4 X10(3) UL (ref 4–11)

## 2023-10-17 PROCEDURE — 80061 LIPID PANEL: CPT

## 2023-10-17 PROCEDURE — 36415 COLL VENOUS BLD VENIPUNCTURE: CPT

## 2023-10-17 PROCEDURE — 84443 ASSAY THYROID STIM HORMONE: CPT

## 2023-10-17 PROCEDURE — 85025 COMPLETE CBC W/AUTO DIFF WBC: CPT

## 2023-10-17 PROCEDURE — 80053 COMPREHEN METABOLIC PANEL: CPT

## 2023-10-18 LAB — HGBA1C: 6.1 %

## 2023-11-01 ENCOUNTER — HOSPITAL ENCOUNTER (OUTPATIENT)
Age: 73
Discharge: HOME | End: 2023-11-01
Payer: MEDICARE

## 2023-11-01 VITALS — TEMPERATURE: 97 F

## 2023-11-01 PROCEDURE — 90471 IMMUNIZATION ADMIN: CPT

## 2024-03-10 ENCOUNTER — HOSPITAL ENCOUNTER (OUTPATIENT)
Age: 74
Discharge: HOME | End: 2024-03-10
Attending: EMERGENCY MEDICINE
Payer: MEDICARE

## 2024-03-10 VITALS
SYSTOLIC BLOOD PRESSURE: 162 MMHG | HEART RATE: 82 BPM | BODY MASS INDEX: 26.58 KG/M2 | RESPIRATION RATE: 20 BRPM | WEIGHT: 150 LBS | HEIGHT: 63 IN | DIASTOLIC BLOOD PRESSURE: 80 MMHG | OXYGEN SATURATION: 96 % | TEMPERATURE: 99 F

## 2024-03-10 DIAGNOSIS — J02.9 VIRAL PHARYNGITIS: Primary | ICD-10-CM

## 2024-03-10 LAB
S PYO AG THROAT QL IA.RAPID: NEGATIVE
SARS-COV-2 RNA RESP QL NAA+PROBE: NOT DETECTED

## 2024-03-10 PROCEDURE — 99213 OFFICE O/P EST LOW 20 MIN: CPT

## 2024-03-10 PROCEDURE — 87651 STREP A DNA AMP PROBE: CPT | Performed by: EMERGENCY MEDICINE

## 2024-03-10 PROCEDURE — 99212 OFFICE O/P EST SF 10 MIN: CPT

## 2024-03-10 NOTE — ED PROVIDER NOTES
Patient Seen in: [facility]      History     Chief Complaint   Patient presents with    Sore Throat     Stated Complaint: sore throat    Subjective:   HPI    73-year-old female presents to the immediate care for complaints of sore throat.  New onset of symptoms since this morning.  Denies any fever chills myalgias.  Denies productive cough or sputum.  Denies any recent ill contact.  She denies any other exacerbating leaving factors.    Objective:   No pertinent past medical history.            No pertinent past surgical history.              No pertinent social history.            Review of Systems    Positive for stated complaint: sore throat  Other systems are as noted in HPI.  Constitutional and vital signs reviewed.      All other systems reviewed and negative except as noted above.    Physical Exam     ED Triage Vitals [03/10/24 1133]   BP (!) 162/80   Pulse 82   Resp 20   Temp 99 °F (37.2 °C)   Temp src Temporal   SpO2 96 %   O2 Device None (Room air)       Current:BP (!) 162/80   Pulse 82   Temp 99 °F (37.2 °C) (Temporal)   Resp 20   Ht 160 cm (5' 3\")   Wt 68 kg   LMP  (LMP Unknown)   SpO2 96%   BMI 26.57 kg/m²         Physical Exam    General: Alert and oriented. No acute distress.  HEENT: Normocephalic. No evidence of trauma. Extraocular movements are intact.  Cardiovascular exam: Regular rate and rhythm  Lungs: Clear to auscultation bilaterally.  Abdomen: Soft, nondistended, nontender.  Extremities: No evidence of deformity. No clubbing or cyanosis.  Neuro: No focal deficit is noted.    ED Course     Labs Reviewed   RAPID STREP A   RAPID SARS-COV-2 BY PCR     Patient will be swabbed for strep and COVID.         [facility]   Patient was screened and evaluated during this visit.   As a treating physician attending to the patient, I determined, within reasonable clinical confidence and prior to discharge, that an emergency medical condition was not or was no longer present.  There was no  indication for further evaluation, treatment or admission on an emergency basis.  Comprehensive verbal and written discharge and follow-up instructions were provided to help prevent relapse or worsening.  Patient was instructed to follow-up with her primary care provider for further evaluation and treatment, but to return immediately to the ER for worsening, concerning, new, changing or persisting symptoms.  I discussed the case with the patient and they had no questions, complaints, or concerns.  Patient felt comfortable going home.    ^^Please note that this report has been produced using speech recognition software and may contain errors related to that system including, but not limited to, errors in grammar, punctuation, and spelling, as well as words and phrases that possibly may have been recognized inappropriately.  If there are any questions or concerns, contact the dictating provider for clarification                                 Medical Decision Making    Disposition and Plan     Clinical Impression:  No diagnosis found.     Disposition:  There is no disposition on file for this visit.  There is no disposition time on file for this visit.    Follow-up:  No follow-up provider specified.        Medications Prescribed:  Current Discharge Medication List

## 2024-03-10 NOTE — DISCHARGE INSTRUCTIONS
Follow-up with your primary care provider as needed  Drink plenty of fluids to stay hydrated  Take Tylenol or Motrin for any pain  You may take over-the-counter throat lozenges as needed for comfort  Return if any worsening symptoms or new concerns

## 2024-03-21 NOTE — TELEPHONE ENCOUNTER
Patient states she had a knee replacement with Dr Hui Gil 2 1/2 weeks ago which she feels is healing fine, aches but only taking 1-2 Norco at night which helps her sleep, PT twice a week. Pt states she feels exhausted, fatigue, 'wiped out', no energy, gets up in the am takes a shower and ready for bed again. Patient believes she should be feeling better by now, states after surgery her hemoglobin was low at 10.8 on 3/31/22. Pt states AS wanted her to repeat a CBC at some point, wants to know if she should do it now? Pt denies dizziness, lightheadedness, fevers, no signs of infection at the surgical site. LOV 4/6/22 with AS.    AS,  appt to discuss, repeat CBC and any other labs? Seen your pt  for a physical . No concerns

## 2024-03-25 ENCOUNTER — HOSPITAL ENCOUNTER (OUTPATIENT)
Age: 74
Discharge: HOME | End: 2024-03-25
Attending: EMERGENCY MEDICINE
Payer: MEDICARE

## 2024-03-25 VITALS
SYSTOLIC BLOOD PRESSURE: 174 MMHG | RESPIRATION RATE: 16 BRPM | BODY MASS INDEX: 26.58 KG/M2 | TEMPERATURE: 97 F | HEART RATE: 69 BPM | HEIGHT: 63 IN | DIASTOLIC BLOOD PRESSURE: 82 MMHG | WEIGHT: 150 LBS | OXYGEN SATURATION: 97 %

## 2024-03-25 DIAGNOSIS — S70.02XA CONTUSION OF LEFT HIP, INITIAL ENCOUNTER: Primary | ICD-10-CM

## 2024-03-25 PROCEDURE — 99213 OFFICE O/P EST LOW 20 MIN: CPT

## 2024-03-25 PROCEDURE — 99212 OFFICE O/P EST SF 10 MIN: CPT

## 2024-03-25 NOTE — ED PROVIDER NOTES
Patient Seen in: [facility]      History     Chief Complaint   Patient presents with    Contusion     Stated Complaint: bruise on left side, fall    Subjective:   HPI    73-year-old female with a previous history of a DVT to her right upper arm after a biceps tendon rupture presents to the immediate care for evaluation after a fall.  Patient states that she was at a comedy club when she fell onto her buttock.  However she became concerned when she started developing ecchymosis and bruising to the lateral aspect of her left leg.  She is able to weight-bear and ambulate.  Denies any chest pain or shortness of breath.  She only takes a baby aspirin at this time.  She denies any distal numbness or tingling.  She is able to weight-bear and ambulate without difficulty.    Objective:   Past Medical History:   Diagnosis Date    Acute pain of both shoulders 08/12/2019    Arthritis     Atherosclerosis of coronary artery     Black stools     occasionally    Bloating     occasionally    Body piercing     ears    Breast cancer (HCC) 2004    dcis    Calculus of kidney     about 30 years ago    Cancer (HCC)     breast    Chest pain of uncertain etiology 09/10/2019    Constipation     occasionally    Diverticulitis     Ductal carcinoma in situ of breast 2004    DCIS    Esophageal reflux     Essential hypertension     Exposure to medical diagnostic radiation 2004    Flatulence/gas pain/belching     aternoons    Hearing impairment     tinnitis    Heart attack (HCC)     Hemorrhoids     occasionally    High blood pressure     High cholesterol     History of blood transfusion     many years ago    History of diverticulitis 06/17/2017    Hx of diseases NEC 2005    diverticulosis    Hx of diseases NEC 1990    had stress test - had some extra beats    Hx of diseases NEC     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    Hx of diseases NEC     utd with gyn    Hx of motion sickness     Hyperlipidemia     Hypokalemia 01/07/2019    Irregular  bowel habits     about a year    Leg swelling 08/05/2021    ankle    Night sweats     on and off for several months    Osteoarthritis     Other and unspecified personal history of malignant neoplasm 2004    breast cancer    Pain in joints     Pain with bowel movements     straining more than pain    Stented coronary artery 12/29/2018    Thyroid nodule     benign    Uncomfortable fullness after meals     about a year    Unspecified menopausal and postmenopausal disorder     treated with effexor - however pt would like to get off med    Visual impairment     contacts/glasses    Wears glasses     contact lenses              Past Surgical History:   Procedure Laterality Date    ANGIOPLASTY (CORONARY)  12/31/2018    stent RCA Johann    APPENDECTOMY  2005    at time of diverticulitis    APPENDECTOMY      appendix removed at 53 years old    BOWEL RESECTION      after diverticulosis    CATH PERCUTANEOUS  TRANSLUMINAL CORONARY ANGIOPLASTY      COLECTOMY      COLONOSCOPY      COLONOSCOPY,DIAGNOSTIC  2004    nl colonoscopy    FRACTURE SURGERY Left     hip    HERNIA SURGERY      had a patch put in lower abdomen at 53, after resection    HIP SURGERY Left     May 2018    KNEE REPLACEMENT SURGERY Left     3/2022    LUMPECTOMY RIGHT  2004    GERMÁN BIOPSY STEREO NODULE 1 SITE RIGHT (CPT=19081)  2004    OTHER Left     hip-minerva on place in femur    PART REMOVAL COLON W END COLOSTOMY      at 53 years old    RADIATION RIGHT  2004    SPECIAL SERVICE OR REPORT  2005    diverticulitis - had L sigmoidectomy    SPECIAL SERVICE OR REPORT  2005    ventral hernia repair                Social History     Socioeconomic History    Marital status:    Tobacco Use    Smoking status: Never    Smokeless tobacco: Never   Vaping Use    Vaping Use: Never used   Substance and Sexual Activity    Alcohol use: Yes     Alcohol/week: 7.0 standard drinks of alcohol     Types: 7 Glasses of wine per week    Drug use: No    Sexual activity: Yes     Partners:  Male   Other Topics Concern    Caffeine Concern Yes     Comment: 1 cup tea daily     Exercise Yes     Comment: 2-x weekly     Seat Belt Yes              Review of Systems    Positive for stated complaint: bruise on left side, fall  Other systems are as noted in HPI.  Constitutional and vital signs reviewed.      All other systems reviewed and negative except as noted above.    Physical Exam     ED Triage Vitals [03/25/24 1617]   BP (!) 174/82   Pulse 69   Resp 16   Temp 97.4 °F (36.3 °C)   Temp src Temporal   SpO2 97 %   O2 Device None (Room air)       Current:BP (!) 174/82   Pulse 69   Temp 97.4 °F (36.3 °C) (Temporal)   Resp 16   Ht 160 cm (5' 3\")   Wt 68 kg   LMP  (LMP Unknown)   SpO2 97%   BMI 26.57 kg/m²         Physical Exam    General: Alert and oriented. No acute distress.  HEENT: Normocephalic. No evidence of trauma. Extraocular movements are intact.  Pelvis: Stable to compression.  Patient has no pain with active range of motion to her left hip.  She is noted to have some ecchymosis and bruising to the lateral aspect of her left hip.  Muscle compartments are soft.  Extremities: No evidence of deformity. No clubbing or cyanosis.  Neuro: No focal deficit is noted.    ED Course   Labs Reviewed - No data to display  Discussed with the patient that her findings are consistent with a soft tissue contusion.  Would recommend conservative management.  Cold compresses.  Massage to the area but this should eventually dissipate.  Recommend follow-up with her primary care doctor.         [facility]   Patient was screened and evaluated during this visit.   As a treating physician attending to the patient, I determined, within reasonable clinical confidence and prior to discharge, that an emergency medical condition was not or was no longer present.  There was no indication for further evaluation, treatment or admission on an emergency basis.  Comprehensive verbal and written discharge and follow-up instructions were  provided to help prevent relapse or worsening.  Patient was instructed to follow-up with her primary care provider for further evaluation and treatment, but to return immediately to the ER for worsening, concerning, new, changing or persisting symptoms.  I discussed the case with the patient and they had no questions, complaints, or concerns.  Patient felt comfortable going home.    ^^Please note that this report has been produced using speech recognition software and may contain errors related to that system including, but not limited to, errors in grammar, punctuation, and spelling, as well as words and phrases that possibly may have been recognized inappropriately.  If there are any questions or concerns, contact the dictating provider for clarification                                   Medical Decision Making      Disposition and Plan     Clinical Impression:  1. Contusion of left hip, initial encounter         Disposition:  Discharge  3/25/2024  4:37 pm    Follow-up:  Gatito Rachel MD  [address] [address]  [phone]    Call   As needed, If symptoms worsen          Medications Prescribed:  Current Discharge Medication List

## 2024-04-04 ENCOUNTER — HOSPITAL ENCOUNTER (OUTPATIENT)
Dept: MAMMOGRAPHY | Age: 74
Discharge: HOME | End: 2024-04-04
Attending: FAMILY MEDICINE
Payer: MEDICARE

## 2024-04-04 DIAGNOSIS — Z12.31 ENCOUNTER FOR SCREENING MAMMOGRAM FOR MALIGNANT NEOPLASM OF BREAST: ICD-10-CM

## 2024-04-04 PROCEDURE — 77063 BREAST TOMOSYNTHESIS BI: CPT | Performed by: FAMILY MEDICINE

## 2024-04-04 PROCEDURE — 77067 SCR MAMMO BI INCL CAD: CPT | Performed by: FAMILY MEDICINE

## 2024-04-05 ENCOUNTER — TELEPHONE (OUTPATIENT)
Dept: INTERNAL MEDICINE CLINIC | Facility: CLINIC | Age: 74
End: 2024-04-05

## 2024-04-05 NOTE — TELEPHONE ENCOUNTER
Spoke to patient who states she is on a 5 day course of prednisone for shoulder with great results.  She has two more doses.  She was given Amlopidine to take with Prednisone, as when she previously got a cortisone injection, her BP became elevated.  She took her BP an hour ago and it was 95/58.  She states she feels fine, denies any symptoms of lightheadedness, dizziness, and is in the car on her way to breakfast.      Advised patient to check BP again when she gets home and give us a call so we can further evaluate.  Upon further chart review, no current rx of Amlopidine, need to confirm who told her to take this with the prednisone.

## 2024-04-05 NOTE — TELEPHONE ENCOUNTER
Patient called back and reports a BP of 124/65 taken at [facility] after taking prednisone this morning.  She states she took one of her 's Amlopidine 5 mg yesterday when she noticed her face stated feeling flushed. BP was 187/80.  She was not instructed to take it by Dr Vargas, but states she took it in the past after a cortisone shot and subsequent elevated BP.    Instructed patient not to take 's Amlopidine.  Advised patient to monitor for symptoms and check BP later today, call with any abnormal results.      Dr Rachel - please confirm this is ok, any further recommendations?

## 2024-04-05 NOTE — TELEPHONE ENCOUNTER
Pt on prednisone for bursitis in her shoulder from Dr. Vargas. She has 2 days left of it. Yesterday her BP was 187/80 so she took amlodipine in the afternoon. Today its 95-58. She would like to speak with a nurse about taking current BP medication.

## 2024-05-21 ENCOUNTER — TELEPHONE (OUTPATIENT)
Dept: RHEUMATOLOGY | Facility: CLINIC | Age: 74
End: 2024-05-21

## 2024-05-21 ENCOUNTER — LAB ENCOUNTER (OUTPATIENT)
Dept: LAB | Facility: HOSPITAL | Age: 74
End: 2024-05-21
Attending: INTERNAL MEDICINE

## 2024-05-21 DIAGNOSIS — M35.3 PMR (POLYMYALGIA RHEUMATICA) (HCC): ICD-10-CM

## 2024-05-21 DIAGNOSIS — M35.3 PMR (POLYMYALGIA RHEUMATICA) (HCC): Primary | ICD-10-CM

## 2024-05-21 LAB
CRP SERPL-MCNC: 3.59 MG/DL (ref ?–0.3)
ERYTHROCYTE [SEDIMENTATION RATE] IN BLOOD: 82 MM/HR

## 2024-05-21 PROCEDURE — 85652 RBC SED RATE AUTOMATED: CPT

## 2024-05-21 PROCEDURE — 36415 COLL VENOUS BLD VENIPUNCTURE: CPT

## 2024-05-21 PROCEDURE — 86140 C-REACTIVE PROTEIN: CPT

## 2024-05-21 NOTE — TELEPHONE ENCOUNTER
Pt notified by PSR and instructed to get labs done. Scheduled for tomorrow at 345pm.    Luna Juarez, DO  [facility]  5/21/2024

## 2024-05-21 NOTE — TELEPHONE ENCOUNTER
Future Appointments   Date Time Provider Department Center   10/14/2024 10:00 AM Gatito Rachel MD [facility]     Last office visit: 11/2022

## 2024-05-22 ENCOUNTER — OFFICE VISIT (OUTPATIENT)
Dept: RHEUMATOLOGY | Facility: CLINIC | Age: 74
End: 2024-05-22

## 2024-05-22 VITALS
SYSTOLIC BLOOD PRESSURE: 110 MMHG | HEART RATE: 83 BPM | OXYGEN SATURATION: 99 % | TEMPERATURE: 97 F | BODY MASS INDEX: 27.11 KG/M2 | WEIGHT: 153 LBS | HEIGHT: 63 IN | RESPIRATION RATE: 14 BRPM | DIASTOLIC BLOOD PRESSURE: 74 MMHG

## 2024-05-22 DIAGNOSIS — R79.82 ELEVATED C-REACTIVE PROTEIN (CRP): ICD-10-CM

## 2024-05-22 DIAGNOSIS — R20.0 NUMBNESS AND TINGLING IN BOTH HANDS: ICD-10-CM

## 2024-05-22 DIAGNOSIS — Z79.52 LONG TERM (CURRENT) USE OF SYSTEMIC STEROIDS: ICD-10-CM

## 2024-05-22 DIAGNOSIS — M15.9 PRIMARY OSTEOARTHRITIS INVOLVING MULTIPLE JOINTS: ICD-10-CM

## 2024-05-22 DIAGNOSIS — M35.3 PMR (POLYMYALGIA RHEUMATICA) (HCC): Primary | ICD-10-CM

## 2024-05-22 DIAGNOSIS — M85.89 OSTEOPENIA OF MULTIPLE SITES: ICD-10-CM

## 2024-05-22 DIAGNOSIS — I15.8 OTHER SECONDARY HYPERTENSION: ICD-10-CM

## 2024-05-22 DIAGNOSIS — M50.30 DDD (DEGENERATIVE DISC DISEASE), CERVICAL: ICD-10-CM

## 2024-05-22 DIAGNOSIS — R20.2 NUMBNESS AND TINGLING IN BOTH HANDS: ICD-10-CM

## 2024-05-22 DIAGNOSIS — R70.0 ELEVATED SED RATE: ICD-10-CM

## 2024-05-22 PROBLEM — M15.0 PRIMARY OSTEOARTHRITIS INVOLVING MULTIPLE JOINTS: Status: ACTIVE | Noted: 2024-05-22

## 2024-05-22 PROCEDURE — 99215 OFFICE O/P EST HI 40 MIN: CPT | Performed by: INTERNAL MEDICINE

## 2024-05-22 RX ORDER — ALENDRONATE SODIUM 70 MG/1
70 TABLET ORAL
Qty: 12 TABLET | Refills: 0 | Status: SHIPPED | OUTPATIENT
Start: 2024-05-22

## 2024-05-22 RX ORDER — PREDNISONE 10 MG/1
TABLET ORAL
Qty: 270 TABLET | Refills: 0 | Status: SHIPPED | OUTPATIENT
Start: 2024-05-22

## 2024-05-22 RX ORDER — PREDNISONE 20 MG/1
20 TABLET ORAL DAILY
COMMUNITY
Start: 2024-03-26

## 2024-05-22 RX ORDER — AMLODIPINE BESYLATE 5 MG/1
5 TABLET ORAL DAILY
Qty: 90 TABLET | Refills: 0 | Status: SHIPPED | OUTPATIENT
Start: 2024-05-22

## 2024-05-22 NOTE — PROGRESS NOTES
?  RHEUMATOLOGY RE-ESTABLISH CARE   Date of visit: 05/22/2024  ?  Chief Complaint   Patient presents with    Follow - Up     LOV  11/14/22 - polymyalgia rheumatica, discuss lab results - Pt started having symptoms again about 6mos ago. Pt complained of tingling and pain in both hands as well as bilateral shoulders and hip pain. Pt has been taking advil for the pain.  Pt had R knee replacement 3/22/23 and DVT in R arm 8/16/23      ASSESSMENT, DISCUSSION & PLAN   Assessment:  1. PMR (polymyalgia rheumatica) ([facility])    2. Primary osteoarthritis involving multiple joints    3. DDD (degenerative disc disease), cervical    4. Numbness and tingling in both hands    5. Elevated sed rate    6. Elevated C-reactive protein (CRP)    7. Other secondary hypertension    8. Osteopenia of multiple sites    9. Long term (current) use of systemic steroids        Discussion:  Mrs. Emily Velasquez is a 73 yo woman who initially presented with new onset of bilateral shoulder as well as hip pain and associated weakness due to the pain. Her exam and her inflammatory markers were consistent with diagnosis of PMR.  Previously discussed at length complications from the disease as well as the risk of development of giant cell arteritis down the road.  Discussed symptoms of headaches, blurred vision as well as jaw claudication. She required long terms steroid course due to flaring when trying to decrease. She was previously hesitant to start methotrexate due to issues her late- had when receiving for chemotherapy.  She had responded nicely to monotherapy with steroids. She was actually able to taper off of prednisone altogether. She was last seen in 2022 and even at that time, had been off steroids for over a year.    She had been doing well until recently.  Last year, she injured her right shoulder and developed and RUE DVT which required xarelto for 3 months.   More recently, she developed neck pain, shoulder pain, hip pain along with  worsened tingling in the hands. She developed worsened morning stiffness/pain and generalized fatigue over the past few days.   Inflammatory markers are higher than they were when she had PMR several years ago.  Discussed need for prednisone and how I worry that she will require at least some level of prednisone indefinitely. Pt is very hesitant to take medication but interested in feeling better. Offered in office depo shot, but she declined due to anxiety and concern for insomnia (appt today in late afternoon).  Will start with 40mg daily x 7 days then 30mg daily 7 days, then have her repeat labs and see if we can slowly taper further  She has had significant hypertension in the past with steroid exposure, so amlodipine was prescribed. Instructed pt to discuss this further with her cardiologist.   She also has hx of significant osteopenia, almost osteoporosis. Due to this, and likely long term steroid use, will start oral bisphosphonate for osteoporosis ppx. She is due for updated BMD so this was ordered as well.     Discussed steroid sparing agents like plaquenil vs methotrexate vs IL-6 inhibition. She has a hx of diverticulitis which previously required surgical intervention so would like to avoid IL6 inhibition. Still recommended methotrexate, but pt adamant in not taking due to side effects her ex- experienced. Discussed possible plaquenil use but pt still hesitant. Hand out provided and discussed risk/benefit in detail. Will consider adding depending on her initial response to the steroids.     Reminded pt of signs/symptoms of GCA which can be associated with PMR.     Follow up in 3 months or sooner as needed  We will be in communication often in the meantime.  Discussed with pt need for age appropriate cancer screening and to discuss this further with her PCP.   Also, some of her hand numbness/tingling is likely related to underlying severe DDD cervical spine and unlikely to get better with treatment  for PMR in long term.    Patient and pt's  verbalized understanding of above instructions. No further questions at this time.     Code selection for this visit was based on time spent (45min) on date of service in preparing to see the patient, obtaining and/or reviewing separately obtained history, performing a medically appropriate examination, counseling and educating the patient/family/caregiver, ordering medications or testing, referring and communicating with other healthcare providers, documenting clinical information in the E HR, independently interpreting results and communicating results to the patient/family/caregiver and care coordination with the patient's other providers.      Corticosteroids carry a risk of multiple side effects in long term use including but not limited to glaucoma, fluid retention, hypertension, mood disorders, weight gain, elevated blood sugars, diabetes, increased risk of infection, osteoporosis and fractures, suppressed adrenal gland hormone production as well as thinning of the skin and slower wound healing.      -We discussed the risks and benefits of Plaquenil therapy.  -Plaquenil is an antimalarial medication used for its anti-inflammatory properties in systemic lupus erythematosus for the reduction of flares and complications associated with the disease. This is also been demonstrated to help improve arthropathies. Side effects of Plaquenil include retinal deposition, which may reflect unchecked, may lead to visual changes. This was discussed as was the recommendation that an ophthalmologist be seen every 6-12 months for evaluation. Other side effects include gastro intestinal upset, including nausea, vomiting, and loose stools. Rarely, Plaquenil can also be associated with myopathy.     Due to patient's risk of osteoporotic fracture, decision was made to start oral bisphosphonate therapy. Risks of medication include but are not limited to headache, abdominal pain,  GERD, nausea, dysphagia, MSK pain, muscle cramps, and rarely osteonecrosis of the jaw. If the pt begins to experience any of these symptoms, he/she is to stop the medication and call our office immediately to discuss further. Prior to starting and during treatment, calcium, vitamin d, magnesium and phosphorus levels will need to be monitored.  Also recommended pt be seen by dentist prior to initiating medication to rule out oral infection and to have any necessary dental work performed.       ?  Plan:  Diagnoses and all orders for this visit:    PMR (polymyalgia rheumatica) ([facility])  -     CT ANGIOGRAPHY, CHEST (CPT=71275); Future  -     Sed Rate, Westergren (Automated) [E]; Standing  -     C-Reactive Protein [E]; Standing  -     predniSONE 10 MG Oral Tab; Take 40mg daily x 7 days, then 30mg daily x 7 days then get updated labs.  -     alendronate 70 MG Oral Tab; Take 1 tablet (70 mg total) by mouth every 7 days.    Primary osteoarthritis involving multiple joints    DDD (degenerative disc disease), cervical    Numbness and tingling in both hands  -     CT ANGIOGRAPHY, CHEST (CPT=71275); Future  -     Sed Rate, Westergren (Automated) [E]; Standing  -     C-Reactive Protein [E]; Standing    Elevated sed rate  -     CT ANGIOGRAPHY, CHEST (CPT=71275); Future  -     Sed Rate, Westergren (Automated) [E]; Standing  -     C-Reactive Protein [E]; Standing  -     predniSONE 10 MG Oral Tab; Take 40mg daily x 7 days, then 30mg daily x 7 days then get updated labs.    Elevated C-reactive protein (CRP)  -     CT ANGIOGRAPHY, CHEST (CPT=71275); Future  -     Sed Rate, Westergren (Automated) [E]; Standing  -     C-Reactive Protein [E]; Standing  -     predniSONE 10 MG Oral Tab; Take 40mg daily x 7 days, then 30mg daily x 7 days then get updated labs.    Other secondary hypertension  -     amLODIPine 5 MG Oral Tab; Take 1 tablet (5 mg total) by mouth daily.    Osteopenia of multiple sites  -     XR DEXA BONE DENSITOMETRY (CPT=77080);  Future  -     alendronate 70 MG Oral Tab; Take 1 tablet (70 mg total) by mouth every 7 days.    Long term (current) use of systemic steroids  -     alendronate 70 MG Oral Tab; Take 1 tablet (70 mg total) by mouth every 7 days.          Return in about 3 months (around 8/22/2024).  ?  HPI   Emily Velasquez is a 74 year old female with the following active problems who is seen for medically necessary follow-up today. She was seen as a new patient initially for evaluation of PMR. At that time, she was on 20mg of prednisone. She has been able to taper down slowly and presents for follow up today.     Last seen in 2022- was able to taper off steroids completely.   States last year in August, torn her biceps tendon and developed blood clot. Seen by orthopedics, no surgery required. 3m of xarelto. Had been seen by heme/onc and did not do further workup.   Started to get more tingling in the fingers over the past few months.   Had xrays of the neck showing some mild degenerative changes  Was given prednisone (20mg x 5 days) and helped a bit. When she stopped it, aching came back.   Continues to have tingling in the hands.   Is in PT which does feel some improvement with activity  Also has noticed some worsened bilateral hip pain/stiffness. More difficulty getting up.   Has hx of bilateral knee replacements over the past few years.  Told by ortho that hips showed no arthritis   Having increased aching in the hands.   Yesterday, when she woke up, she noted severe fatigue and difficulty getting out of bed. Improves as day progresses.   Friend of hers noticed some left shoulder swelling.     + tinnitus slightly worsened since taking the prednisone   Denies recent infections.   Denies any other joint pain or swelling.   Denies overt weakness.    Some left cheek pain a few weeks ago and resolved on it's own. Has not recurred. Felt more when biting/chewing food. Not tender to the touch. No other temporal pain/tenderness. Denies  vision loss.     Last mammogram was normal this year  Last cscope sometime between [age] - cannot remember details but told no need   Denies skin rashes or suspicion skin lesions       HPI from initial consultation  referred for rheumatologic evaluation due to elevated inflammatory markers and concern for PMR.       Last year, she had to get a left hip surgery with minerva placement after falling and femur fracture.  She has stays active and had been doing some pool exercises and had noticed increased pain/aching in her shoulders. States her symptoms continued and progressed to also have inability to lift her arms above her head to do her hair, etc. Noticed the shoulder symptoms first started end of June/beginning of July. Got more severe by the end of July/beginning of August. Then she noticed bilateral hip discomfort in the middle of August. States in the mornings, she had a difficult time bending as well as reaching.   She was initially started on oral prednisone 10mg with some improvement of symptoms. Would be on for about 10 days at a time.     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 Had undergone bilateral knee injections with cortisone. Had significant elevation in her blood pressure as well as blood sugar, as well as headaches and nausea without vomiting. Did have to go to the ER due to symptoms. Was told likely reaction to steroids vs flu. Pt thinks more due to the steroids because symptoms improved within 24 hours.      States due to this sensitivity, pt was hesitant to increase the prednisone further. Upon stopping the prednisone, pt had suffered another flare with bilateral shoulder as well as hip discomfort. Was recommended to take 30mg, however pt hesitant due to her previous reaction. So pt is currently taking 10mg twice daily with improvement. States she feels back to her normal self now.      Does feel like prednisone has made her more tired overall.  Does have hx of  osteopenia, does get some calcium/vit d in her multivitamin  Admits to being under increased stress over the past several months after requiring stent placement as well as recently moving.  Denies any current bitemporal headaches, jaw claudication or vision changes.   + hx of diverticulitis s/p colon resection     Denies family history of any autoimmune condition. Denies family history of PMR.  ?  Past Medical History:  Past Medical History:    Acute pain of both shoulders    Arthritis    Atherosclerosis of coronary artery    Black stools    occasionally    Bloating    occasionally    Body piercing    ears    Breast cancer (HCC)    dcis    Calculus of kidney    about 30 years ago    Cancer (HCC)    breast    Chest pain of uncertain etiology    Constipation    occasionally    Diverticulitis    Ductal carcinoma in situ of breast    DCIS    Esophageal reflux    Essential hypertension    Exposure to medical diagnostic radiation    Flatulence/gas pain/belching    aternoons    Hearing impairment    tinnitis    Heart attack (HCC)    Hemorrhoids    occasionally    High blood pressure    High cholesterol    History of blood transfusion    many years ago    History of diverticulitis    Hx of diseases NEC    diverticulosis    Hx of diseases NEC    had stress test - had some extra beats    Hx of diseases NEC    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    Hx of diseases NEC    utd with gyn    Hx of motion sickness    Hyperlipidemia    Hypokalemia    Irregular bowel habits    about a year    Leg swelling    ankle    Night sweats    on and off for several months    Osteoarthritis    Other and unspecified personal history of malignant neoplasm    breast cancer    Pain in joints    Pain with bowel movements    straining more than pain    Stented coronary artery    Thyroid nodule    benign    Uncomfortable fullness after meals    about a year    Unspecified menopausal and postmenopausal disorder    treated with effexor - however pt would like to get  off med    Visual impairment    contacts/glasses    Wears glasses    contact lenses     Past Surgical History:  Past Surgical History:   Procedure Laterality Date    Angioplasty (coronary)  2018    stent RCA Johann    Appendectomy  2005    at time of diverticulitis    Appendectomy      appendix removed at 53 years old    Bowel resection      after diverticulosis    Cath percutaneous  transluminal coronary angioplasty      Colectomy      Colonoscopy      Colonoscopy,diagnostic      nl colonoscopy    Fracture surgery Left     hip    Hernia surgery      had a patch put in lower abdomen at 53, after resection    Hip surgery Left     May 2018    Knee replacement surgery Left     3/2022    Lumpectomy right      Sofía biopsy stereo nodule 1 site right (cpt=19081)  2004    Other Left     hip-minerva on place in femur    Part removal colon w end colostomy      at 53 years old    Radiation right      Special service or report      diverticulitis - had L sigmoidectomy    Special service or report      ventral hernia repair    Total knee replacement Right 2023     Family History:  Family History   Problem Relation Age of Onset    Breast Cancer Self 53    DCIS Self 53    Hypertension Mother     Stroke Mother     Other (Other) Mother     Breast Cancer Mother 49    Heart Disorder Father         mi at 46 and  at 53 of 7th mi    Lipids Father     Heart Disease Father     Other (Other) Father     Heart Attack Father     Other (Other) Sister         spinal problems, prolactin problem, factor v leiden + (pt negative)    Other (Other) Brother         healthy    Other (Other) Maternal Grandmother          of aneurysm    Breast Cancer Maternal Aunt 52    Breast Cancer Paternal Cousin Female 50     Social History:  Social History     Socioeconomic History    Marital status:    Tobacco Use    Smoking status: Never    Smokeless tobacco: Never   Vaping Use    Vaping status: Never Used   Substance and  Sexual Activity    Alcohol use: Yes     Alcohol/week: 7.0 standard drinks of alcohol     Types: 7 Glasses of wine per week    Drug use: No    Sexual activity: Yes     Partners: Male   Other Topics Concern    Caffeine Concern Yes     Comment: 1 cup tea daily     Exercise Yes     Comment: 2-x weekly     Seat Belt Yes     Social Determinants of Health     Physical Activity: Sufficiently Active (9/29/2020)    Received from [facility]    Exercise Vital Sign     Days of Exercise per Week: 3 days     Minutes of Exercise per Session: 60 min     Medications:  Outpatient Medications Marked as Taking for the 5/22/24 encounter (Office Visit) with Luna Juarez DO   Medication Sig Dispense Refill    amLODIPine 5 MG Oral Tab Take 1 tablet (5 mg total) by mouth daily. 90 tablet 0    predniSONE 10 MG Oral Tab Take 40mg daily x 7 days, then 30mg daily x 7 days then get updated labs. 270 tablet 0    alendronate 70 MG Oral Tab Take 1 tablet (70 mg total) by mouth every 7 days. 12 tablet 0    aspirin 81 MG Oral Tab EC Take 1 tablet (81 mg total) by mouth daily.      TRIAMTERENE-HCTZ 37.5-25 MG Oral Tab TAKE 1 TABLET BY MOUTH EVERY DAY 90 tablet 0    Flaxseed, Linseed, (FLAX SEED OIL) 1300 MG Oral Cap 1,000 mg daily.      Calcium Carbonate-Vitamin D (CALCIUM-VITAMIN D3 OR) Take 1 tablet by mouth daily.      Multiple Vitamin (TAB-A-JIMBO) Oral Tab Take 1 tablet by mouth daily.      ezetimibe 10 MG Oral Tab Take 1 tablet (10 mg total) by mouth every other day.  4    atorvastatin 20 MG Oral Tab Take 1 tablet (20 mg total) by mouth nightly. 30 tablet 2    atenolol 50 MG Oral Tab Take 1 tablet (50 mg total) by mouth daily. 30 tablet 0     Modified Medications    No medications on file     Medications Discontinued During This Encounter   Medication Reason    polyethylene glycol, PEG 3350, 17 g Oral Powd Pack Therapy completed    rivaroxaban (XARELTO) 20 MG Oral Tab Therapy completed    sennosides-docusate  (SENNA S) 8.6-50 MG Oral Tab Therapy completed     ?  ?  Allergies:  Allergies   Allergen Reactions    Altace [Ramipril] ANAPHYLAXIS     angioedema    Dilaudid [Hydromorphone] NAUSEA AND VOMITING     severe    Ramipril ANAPHYLAXIS     Oral    Cortisone OTHER (SEE COMMENTS)     Headache, blood pressure became raised    No Known Allergies OTHER (SEE COMMENTS)     ?  REVIEW OF SYSTEMS   ?  Review of Systems   Constitutional:  Positive for malaise/fatigue (last couple days). Negative for chills, fever and weight loss.   HENT:  Positive for tinnitus. Negative for congestion and nosebleeds.    Eyes:  Negative for blurred vision, pain and redness.   Respiratory:  Positive for cough (dry per ). Negative for hemoptysis and shortness of breath.    Cardiovascular:  Negative for chest pain, palpitations and leg swelling.   Gastrointestinal:  Positive for constipation (chronic and unchanged). Negative for abdominal pain, blood in stool, diarrhea, heartburn and nausea.   Genitourinary:  Negative for dysuria, frequency, hematuria and urgency.   Musculoskeletal:  Positive for joint pain, myalgias and neck pain. Negative for back pain.   Skin:  Negative for itching and rash.   Neurological:  Positive for tingling. Negative for dizziness, seizures, weakness and headaches.   Endo/Heme/Allergies:  Does not bruise/bleed easily.   Psychiatric/Behavioral:  Negative for depression. The patient is not nervous/anxious and does not have insomnia.      PHYSICAL EXAM   Today's Vitals:  Temperature Blood Pressure Heart Rate Resp Rate SpO2   Temp: 96.8 °F (36 °C) BP: 110/74 Pulse: 83 Resp: 14 SpO2: 99 %   ?  Current Weight Height BMI BSA Pain   Wt Readings from Last 1 Encounters:   05/22/24 153 lb (69.4 kg)    Height: 5' 3\" (160 cm) Body mass index is 27.1 kg/m². Body surface area is 1.73 meters squared.         Physical Exam  Vitals and nursing note reviewed.   Constitutional:       General: She is not in acute distress.     Appearance:  Normal appearance. She is well-developed. She is not diaphoretic.   HENT:      Head: Normocephalic.   Eyes:      General: No scleral icterus.     Extraocular Movements: Extraocular movements intact.      Conjunctiva/sclera: Conjunctivae normal.   Neck:      Vascular: No JVD.      Trachea: No tracheal deviation.      Comments: + significant paraspinal muscle tightness and trapezius tightness.   Cardiovascular:      Rate and Rhythm: Normal rate and regular rhythm.      Heart sounds: Normal heart sounds. No murmur heard.  Pulmonary:      Effort: Pulmonary effort is normal. No respiratory distress.      Breath sounds: Normal breath sounds. No wheezing.   Musculoskeletal:         General: Tenderness and deformity present.      Cervical back: Neck supple.      Comments: Diffuse small heberden/brenda nodes of the fingers, no basilar joint tenderness of the 1st CMC bilaterally but slight squaring present.   No swelling, tenderness, redness or restriction of motion of the DIPs, PIPs, MCPs, wrists, elbows, ankles, or joints of the feet.  Bilateral shoulders with some hesitation ROM. Able to raise arms above head  Both knees s/p replacement    Lymphadenopathy:      Cervical: No cervical adenopathy.   Skin:     General: Skin is warm and dry.      Findings: No erythema or rash.      Comments: No periungal erythema    Neurological:      Mental Status: She is alert and oriented to person, place, and time.      Cranial Nerves: No cranial nerve deficit.      Gait: Gait normal.   Psychiatric:         Mood and Affect: Mood normal.         Behavior: Behavior normal.       Radiology review:         PROCEDURE:  XR DEXA BONE DENSITOMETRY (CPT=77080)       COMPARISON:  [facility], XR DEXA BONE DENSITOMETRY (CPT=77080), 1/28/2020, 11:24 AM.       INDICATIONS:    Z13.820 Screening for osteoporosis M85.89 Osteopenia of multiple sites       PATIENT STATED HISTORY: (As transcribed by Technologist)  Screening for osteoporosis.             LUMBAR SPINE ANALYSIS RESULTS:       Bone mineral density (BMD) (g/cm2):  1.076     Lumbar T-Score:  0.3       % young normals:  103       % age matched controls:  136       Change from prior spine examination:  0.6%                TOTAL HIP ANALYSIS RESULTS:         Bone mineral density (BMD) (g/cm2):  0.703       Total Hip T-Score:  -2.0       % young normals:  75       % age matched controls:  96       Change from prior hip examination:  -1.6%                FEMORAL NECK ANALYSIS RESULTS:         Bone mineral density (BMD) (g/cm2):  0.620       Femoral neck T-Score:  -2.1       % young normals:  73       % age matched controls:  99       Change from prior hip examination:  2.6%               ADDITIONAL FINDINGS:  FRAX not reported due to prior hip or vertebral fracture.                          Impression   CONCLUSION:  Bone mineral density values for right hip are compatible with the diagnosis of osteopenia by WHO definition (T score between -1.0 and -2.5).  If therapy is initiated, follow-up study is recommended in 2 years for further evaluation of   therapeutic efficacy.                 The World Health Organization has defined the following categories based on bone density:   Normal bone:  T-score better than -1   Osteopenia: T-score between -1 and -2.5   Osteoporosis:  T-score less than -2.5               Dictated by (CST): Marcelle Boo MD on 2/24/2022 at 1:01 PM       Finalized by (CST): Marcelle Boo MD on 2/24/2022 at 1:01 PM       PROCEDURE:  XR CERVICAL SPINE COMPLETE W/FLEX + EXT (CPT=72052)       TECHNIQUE:  AP, lateral, obliques, coned down view, and flexion/extension views of the spine were obtained.       COMPARISON:  None.       INDICATIONS:  M54.2 Neck pain       PATIENT STATED HISTORY: (As transcribed by Technologist)  Patient complains of intermittent tingling to her bilateral fingers X a few months.  She also has chronic neck stiffness.  Currently she feels some stiffness to her posterior neck  but denies any   significant pain at this time.  She has a history of polymyalgia rheumatica.            FINDINGS:  No acute fracture or subluxation in the cervical spine.  Moderate to severe degenerative disc space loss and endplate spurring most pronounced at C4-5 with scattered uncovertebral osteophytes noted.  Moderate facet arthropathy throughout the   cervical spine.  No abnormal motion with flexion or extension views.  Osteopenia.  C1-2 articulation intact.  Vascular calcifications.    Impression   CONCLUSION:  No acute fracture or subluxation in the cervical spine.  Degenerative changes as above.           Dictated by (CST): Panfilo Guardado MD on 11/16/2021 at 2:21 PM       Finalized by (CST): Panfilo Guardado MD on 11/16/2021 at 2:21 PM      04/2019  X-rays AP of the pelvis and AP and lateral of the right hip show mild to moderate osteoarthritic changes with spur formation and slight loss of clear space.  No fractures dislocations or loose bodies.     X-rays AP of the pelvis and AP and lateral of the left hip show mild osteoarthritis with spur formation in both sides of the joint and slight loss of clear space.  There are no acute changes.  There is a proximal intramedullary nail and lag screw device consistent with prior open reduction and internal fixation of a peritrochanteric fracture.  There is no residual fracture line visible.  This appears to have gone on to full healing.  No fractures dislocations or loose bodies.     X-rays 4 views of the right knee show moderate to severe osteoarthritis with significant tricompartmental spurring and significant loss of medial clear space.     X-rays 4 views of the left knee show severe osteoarthritis with near complete loss of medial clear space and tricompartmental spur formation.  There are no fractures dislocations or loose bodies.    Labs:  Lab Results   Component Value Date    WBC 7.4 10/17/2023    RBC 4.72 10/17/2023    HGB 13.1 10/17/2023    HCT 41.8  10/17/2023    .0 10/17/2023    MCV 88.6 10/17/2023    MCH 27.8 10/17/2023    MCHC 31.3 10/17/2023    RDW 13.4 10/17/2023    NEPRELIM 5.37 10/17/2023    NEPERCENT 72.8 10/17/2023    LYPERCENT 11.4 10/17/2023    MOPERCENT 10.6 10/17/2023    EOPERCENT 4.1 10/17/2023    BAPERCENT 0.7 10/17/2023    NE 5.37 10/17/2023    LYMABS 0.84 (L) 10/17/2023    MOABSO 0.78 10/17/2023    EOABSO 0.30 10/17/2023    BAABSO 0.05 10/17/2023     Lab Results   Component Value Date     (H) 10/17/2023    BUN 12 10/17/2023    BUNCREA 12.6 10/05/2022    CREATSERUM 1.04 (H) 10/17/2023    ANIONGAP 10 10/17/2023    GFR 54 (L) 09/01/2017    GFRNAA 64 04/19/2022    GFRAA 73 04/19/2022    CA 8.8 10/17/2023    OSMOCALC 290 10/17/2023    ALKPHO 82 10/17/2023    AST 40 (H) 10/17/2023    ALT 39 10/17/2023    BILT 1.0 10/17/2023    TP 7.0 10/17/2023    ALB 3.3 (L) 10/17/2023    GLOBULIN 3.7 10/17/2023    AGRATIO 1.6 11/27/2007     10/17/2023    K 3.7 10/17/2023     10/17/2023    CO2 28.0 10/17/2023       Additional Labs:  05/2024  CRP 3.59 elevated  ESR 82 elevated    11/2021  ESR 13 normal   CRP normal     07/2021  ESR 12 normal   CRP normal     04/2021  CRP normal  ESR 9 normal     08/2020  ESR 9 normal   CRP <0.29 normal     07/2020  ESR 9 normal   CRP 0.46 borderline     06/2020  ESR 11  CRP 0.32 borderline     03/2020  CRP 0.42 borderline  ESR 4 normal    02/2020  ESR 9 normal  CRP normal     01/2020  ESR 14 normal   CRP 0.67 borderline   Vit D 31.6 low normal    11/2019  ESR 16 normal   CRP 0.72 borderline     10/2019  ESR 7 normal   CRP <0.29 normal   Vit D 23.4     08/21/2019  CK normal   CRP 3.70  ESR 30     08/12/2019  CRP 0.78  ESR 14    Luna Juarez DO  [facility]  05/22/2024

## 2024-05-30 ENCOUNTER — OFFICE VISIT (OUTPATIENT)
Dept: INTERNAL MEDICINE CLINIC | Facility: CLINIC | Age: 74
End: 2024-05-30

## 2024-05-30 VITALS
HEART RATE: 72 BPM | BODY MASS INDEX: 28.02 KG/M2 | WEIGHT: 154.19 LBS | TEMPERATURE: 97 F | DIASTOLIC BLOOD PRESSURE: 80 MMHG | SYSTOLIC BLOOD PRESSURE: 132 MMHG | RESPIRATION RATE: 18 BRPM | HEIGHT: 62.21 IN | OXYGEN SATURATION: 97 %

## 2024-05-30 DIAGNOSIS — M35.3 PMR (POLYMYALGIA RHEUMATICA) (HCC): ICD-10-CM

## 2024-05-30 DIAGNOSIS — I10 ESSENTIAL HYPERTENSION: Primary | ICD-10-CM

## 2024-05-30 PROCEDURE — 99213 OFFICE O/P EST LOW 20 MIN: CPT | Performed by: FAMILY MEDICINE

## 2024-05-30 NOTE — PROGRESS NOTES
Emily Velasquez  4/29/1950    Chief Complaint   Patient presents with    Follow - Up     ES rm - 8 - f/u for BP       HPI:   Emily Velasquez is a 74 year old female who presents for BP follow-up. Recently started in prednisone for PMR exacerbation. BP has been labile. Has been using amlodipine daily and tolerating it well. Her PMR symptoms have greatly improved. Has follow-up tomorrow with Dr. Juarez.     Current Outpatient Medications   Medication Sig Dispense Refill    amLODIPine 5 MG Oral Tab Take 1 tablet (5 mg total) by mouth daily. 90 tablet 0    predniSONE 10 MG Oral Tab Take 40mg daily x 7 days, then 30mg daily x 7 days then get updated labs. 270 tablet 0    alendronate 70 MG Oral Tab Take 1 tablet (70 mg total) by mouth every 7 days. 12 tablet 0    aspirin 81 MG Oral Tab EC Take 1 tablet (81 mg total) by mouth daily.      TRIAMTERENE-HCTZ 37.5-25 MG Oral Tab TAKE 1 TABLET BY MOUTH EVERY DAY 90 tablet 0    Acidophilus/Pectin Oral Cap Take 1 capsule by mouth daily.      Flaxseed, Linseed, (FLAX SEED OIL) 1300 MG Oral Cap 1,000 mg daily.      Calcium Carbonate-Vitamin D (CALCIUM-VITAMIN D3 OR) Take 1 tablet by mouth daily.      Multiple Vitamin (TAB-A-JIMBO) Oral Tab Take 1 tablet by mouth daily.      ezetimibe 10 MG Oral Tab Take 1 tablet (10 mg total) by mouth every other day.  4    atorvastatin 20 MG Oral Tab Take 1 tablet (20 mg total) by mouth nightly. 30 tablet 2    atenolol 50 MG Oral Tab Take 1 tablet (50 mg total) by mouth daily. 30 tablet 0    predniSONE 20 MG Oral Tab Take 1 tablet (20 mg total) by mouth daily. (Patient not taking: Reported on 5/30/2024)        Allergies   Allergen Reactions    Altace [Ramipril] ANAPHYLAXIS     angioedema    Dilaudid [Hydromorphone] NAUSEA AND VOMITING     severe    Ramipril ANAPHYLAXIS     Oral    Cortisone OTHER (SEE COMMENTS)     Headache, blood pressure became raised    No Known Allergies OTHER (SEE COMMENTS)      Past Medical History:    Acute pain of both  shoulders    Arthritis    Atherosclerosis of coronary artery    Black stools    occasionally    Bloating    occasionally    Body piercing    ears    Breast cancer (HCC)    dcis    Calculus of kidney    about 30 years ago    Cancer (HCC)    breast    Chest pain of uncertain etiology    Constipation    occasionally    Diverticulitis    Ductal carcinoma in situ of breast    DCIS    Esophageal reflux    Essential hypertension    Exposure to medical diagnostic radiation    Flatulence/gas pain/belching    aternoons    Hearing impairment    tinnitis    Heart attack (HCC)    Hemorrhoids    occasionally    High blood pressure    High cholesterol    History of blood transfusion    many years ago    History of diverticulitis    Hx of diseases NEC    diverticulosis    Hx of diseases NEC    had stress test - had some extra beats    Hx of diseases NEC    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    Hx of diseases NEC    utd with gyn    Hx of motion sickness    Hyperlipidemia    Hypokalemia    Irregular bowel habits    about a year    Leg swelling    ankle    Night sweats    on and off for several months    Osteoarthritis    Other and unspecified personal history of malignant neoplasm    breast cancer    Pain in joints    Pain with bowel movements    straining more than pain    Stented coronary artery    Thyroid nodule    benign    Uncomfortable fullness after meals    about a year    Unspecified menopausal and postmenopausal disorder    treated with effexor - however pt would like to get off med    Visual impairment    contacts/glasses    Wears glasses    contact lenses      Patient Active Problem List   Diagnosis    Benign essential HTN    Pure hypercholesterolemia    Multiple thyroid nodules    History of breast cancer    CAD (coronary artery disease)    History of non-ST elevation myocardial infarction (NSTEMI)    Prediabetes    Presence of drug coated stent in right coronary artery    PMR (polymyalgia rheumatica) ([facility])    Personal history of  colonic polyps    Sensory hearing loss, bilateral    S/p total knee replacement, bilateral    Arm DVT (deep venous thromboembolism), acute, right (HCC)    Primary osteoarthritis involving multiple joints    Elevated sed rate    Elevated C-reactive protein (CRP)    Other secondary hypertension    Osteopenia of multiple sites    Long term (current) use of systemic steroids    DDD (degenerative disc disease), cervical      Past Surgical History:   Procedure Laterality Date    Angioplasty (coronary)  2018    stent RCA Johann    Appendectomy  2005    at time of diverticulitis    Appendectomy      appendix removed at 53 years old    Bowel resection      after diverticulosis    Cath percutaneous  transluminal coronary angioplasty      Colectomy      Colonoscopy      Colonoscopy,diagnostic  2004    nl colonoscopy    Fracture surgery Left     hip    Hernia surgery      had a patch put in lower abdomen at 53, after resection    Hip surgery Left     May 2018    Knee replacement surgery Left     3/2022    Lumpectomy right      Sofía biopsy stereo nodule 1 site right (cpt=19081)      Other Left     hip-minerva on place in femur    Part removal colon w end colostomy      at 53 years old    Radiation right      Special service or report      diverticulitis - had L sigmoidectomy    Special service or report      ventral hernia repair    Total knee replacement Right 2023      Family History   Problem Relation Age of Onset    Breast Cancer Self 53    DCIS Self 53    Hypertension Mother     Stroke Mother     Other (Other) Mother     Breast Cancer Mother 49    Heart Disorder Father         mi at 46 and  at 53 of 7th mi    Lipids Father     Heart Disease Father     Other (Other) Father     Heart Attack Father     Other (Other) Sister         spinal problems, prolactin problem, factor v leiden + (pt negative)    Other (Other) Brother         healthy    Other (Other) Maternal Grandmother          of  aneurysm    Breast Cancer Maternal Aunt 52    Breast Cancer Paternal Cousin Female 50      Social History     Socioeconomic History    Marital status:    Tobacco Use    Smoking status: Never    Smokeless tobacco: Never   Vaping Use    Vaping status: Never Used   Substance and Sexual Activity    Alcohol use: Yes     Alcohol/week: 7.0 standard drinks of alcohol     Types: 7 Glasses of wine per week    Drug use: No    Sexual activity: Yes     Partners: Male   Other Topics Concern    Caffeine Concern Yes     Comment: 1 cup tea daily     Exercise Yes     Comment: 2-x weekly     Seat Belt Yes     Social Determinants of Health     Physical Activity: Sufficiently Active (9/29/2020)    Received from [employer] [email or web address]    Exercise Vital Sign     Days of Exercise per Week: 3 days     Minutes of Exercise per Session: 60 min         REVIEW OF SYSTEMS:   GENERAL: feels well otherwise, no fever or chills.     EXAM:   /80 (BP Location: Left arm, Patient Position: Sitting, Cuff Size: large)   Pulse 72   Temp 97 °F (36.1 °C) (Temporal)   Resp 18   Ht 5' 2.21\" (1.58 m)   Wt 154 lb 3.2 oz (69.9 kg)   LMP  (LMP Unknown)   SpO2 97%   BMI 28.02 kg/m²   GENERAL: Well developed, well nourished,in no apparent distress  EYES: PERRLA, EOMI, conjunctiva are clear  HEENT: atraumatic, normocephalic  LUNGS: clear to auscultation  CARDIO: RRR without murmur    ASSESSMENT AND PLAN:   Emliy Velasquez is a 74 year old female who presents for follow-up    Essential hypertension  Labile but overall controlled with addition of amlodipine.  She will continue her amlodipine while she is on prednisone and we will follow-up in 4 weeks.     PMR (polymyalgia rheumatica) (HCC)  Has had significant grooving in symptoms since starting prednisone.  She has follow-up with Dr. Juarez tomorrow.       All questions were answered and the patient agrees with the plan.     Thank you,  Gatito Rachel MD

## 2024-06-04 ENCOUNTER — LAB ENCOUNTER (OUTPATIENT)
Dept: LAB | Age: 74
End: 2024-06-04
Attending: INTERNAL MEDICINE
Payer: MEDICARE

## 2024-06-04 DIAGNOSIS — R79.82 ELEVATED C-REACTIVE PROTEIN (CRP): ICD-10-CM

## 2024-06-04 DIAGNOSIS — R20.0 NUMBNESS AND TINGLING IN BOTH HANDS: ICD-10-CM

## 2024-06-04 DIAGNOSIS — M35.3 PMR (POLYMYALGIA RHEUMATICA) (HCC): ICD-10-CM

## 2024-06-04 DIAGNOSIS — R70.0 ELEVATED SED RATE: ICD-10-CM

## 2024-06-04 DIAGNOSIS — R20.2 NUMBNESS AND TINGLING IN BOTH HANDS: ICD-10-CM

## 2024-06-04 LAB
CRP SERPL-MCNC: <0.29 MG/DL (ref ?–0.3)
ERYTHROCYTE [SEDIMENTATION RATE] IN BLOOD: 19 MM/HR

## 2024-06-04 PROCEDURE — 86140 C-REACTIVE PROTEIN: CPT

## 2024-06-04 PROCEDURE — 36415 COLL VENOUS BLD VENIPUNCTURE: CPT

## 2024-06-04 PROCEDURE — 85652 RBC SED RATE AUTOMATED: CPT

## 2024-06-05 ENCOUNTER — TELEPHONE (OUTPATIENT)
Dept: RHEUMATOLOGY | Facility: CLINIC | Age: 74
End: 2024-06-05

## 2024-06-05 NOTE — TELEPHONE ENCOUNTER
Called pt and referred to result note instructions.   Pt stated has been a little jumpy but blood pressure has been normal.   Has not had any hand tingling or overall pain.   Pt verbalized understanding with no further questions at this time. Signing encounter.

## 2024-06-10 ENCOUNTER — HOSPITAL ENCOUNTER (OUTPATIENT)
Dept: CT IMAGING | Age: 74
Discharge: HOME | End: 2024-06-10
Attending: INTERNAL MEDICINE
Payer: MEDICARE

## 2024-06-10 ENCOUNTER — HOSPITAL ENCOUNTER (OUTPATIENT)
Dept: BONE DENSITY | Age: 74
Discharge: HOME | End: 2024-06-10
Attending: INTERNAL MEDICINE
Payer: MEDICARE

## 2024-06-10 DIAGNOSIS — R20.0 NUMBNESS AND TINGLING IN BOTH HANDS: ICD-10-CM

## 2024-06-10 DIAGNOSIS — M35.3 PMR (POLYMYALGIA RHEUMATICA) (HCC): ICD-10-CM

## 2024-06-10 DIAGNOSIS — R20.2 NUMBNESS AND TINGLING IN BOTH HANDS: ICD-10-CM

## 2024-06-10 DIAGNOSIS — R70.0 ELEVATED SED RATE: ICD-10-CM

## 2024-06-10 DIAGNOSIS — R79.82 ELEVATED C-REACTIVE PROTEIN (CRP): ICD-10-CM

## 2024-06-10 DIAGNOSIS — M85.89 OSTEOPENIA OF MULTIPLE SITES: ICD-10-CM

## 2024-06-10 PROCEDURE — 71275 CT ANGIOGRAPHY CHEST: CPT | Performed by: INTERNAL MEDICINE

## 2024-06-10 PROCEDURE — 82565 ASSAY OF CREATININE: CPT

## 2024-06-10 PROCEDURE — 77080 DXA BONE DENSITY AXIAL: CPT | Performed by: INTERNAL MEDICINE

## 2024-06-11 LAB
CREAT BLD-MCNC: 1 MG/DL
EGFRCR SERPLBLD CKD-EPI 2021: 58 ML/MIN/1.73M2 (ref 60–?)

## 2024-06-18 ENCOUNTER — LAB ENCOUNTER (OUTPATIENT)
Dept: LAB | Facility: HOSPITAL | Age: 74
End: 2024-06-18
Attending: INTERNAL MEDICINE

## 2024-06-18 DIAGNOSIS — R20.2 NUMBNESS AND TINGLING IN BOTH HANDS: ICD-10-CM

## 2024-06-18 DIAGNOSIS — R20.0 NUMBNESS AND TINGLING IN BOTH HANDS: ICD-10-CM

## 2024-06-18 DIAGNOSIS — M35.3 PMR (POLYMYALGIA RHEUMATICA) (HCC): ICD-10-CM

## 2024-06-18 DIAGNOSIS — R79.82 ELEVATED C-REACTIVE PROTEIN (CRP): ICD-10-CM

## 2024-06-18 DIAGNOSIS — R70.0 ELEVATED SED RATE: ICD-10-CM

## 2024-06-18 LAB
CRP SERPL-MCNC: 0.31 MG/DL (ref ?–0.3)
ERYTHROCYTE [SEDIMENTATION RATE] IN BLOOD: 14 MM/HR

## 2024-06-18 PROCEDURE — 86140 C-REACTIVE PROTEIN: CPT

## 2024-06-18 PROCEDURE — 85652 RBC SED RATE AUTOMATED: CPT

## 2024-06-18 PROCEDURE — 36415 COLL VENOUS BLD VENIPUNCTURE: CPT

## 2024-06-25 ENCOUNTER — OFFICE VISIT (OUTPATIENT)
Dept: INTERNAL MEDICINE CLINIC | Facility: CLINIC | Age: 74
End: 2024-06-25

## 2024-06-25 VITALS
OXYGEN SATURATION: 99 % | DIASTOLIC BLOOD PRESSURE: 70 MMHG | HEART RATE: 66 BPM | SYSTOLIC BLOOD PRESSURE: 130 MMHG | BODY MASS INDEX: 28 KG/M2 | WEIGHT: 155 LBS | TEMPERATURE: 97 F

## 2024-06-25 DIAGNOSIS — M35.3 PMR (POLYMYALGIA RHEUMATICA) (HCC): ICD-10-CM

## 2024-06-25 DIAGNOSIS — I10 ESSENTIAL HYPERTENSION: Primary | ICD-10-CM

## 2024-06-25 DIAGNOSIS — R91.1 PULMONARY NODULE SEEN ON IMAGING STUDY: ICD-10-CM

## 2024-06-25 DIAGNOSIS — E55.9 VITAMIN D DEFICIENCY: ICD-10-CM

## 2024-06-25 DIAGNOSIS — R53.82 CHRONIC FATIGUE: ICD-10-CM

## 2024-06-25 PROCEDURE — G2211 COMPLEX E/M VISIT ADD ON: HCPCS | Performed by: FAMILY MEDICINE

## 2024-06-25 PROCEDURE — 99214 OFFICE O/P EST MOD 30 MIN: CPT | Performed by: FAMILY MEDICINE

## 2024-06-25 NOTE — PROGRESS NOTES
Emily Velasquez  4/29/1950    Chief Complaint   Patient presents with    Follow - Up     BP check follow up   Patient states BP at home has been fine        HPI:   Emily Velasquez is a 74 year old female who presents for follow-up. She has stopped amlodipine as her BP has been controlled. Her prednisone has been reduced to 10 mg daily. She has had chronic fatigue for the last year, seemed to worsen when she started prednisone, but is now improving since reducing prednisone dose and stopping amlodipine.     Current Outpatient Medications   Medication Sig Dispense Refill    predniSONE 10 MG Oral Tab Take 40mg daily x 7 days, then 30mg daily x 7 days then get updated labs. 270 tablet 0    alendronate 70 MG Oral Tab Take 1 tablet (70 mg total) by mouth every 7 days. 12 tablet 0    aspirin 81 MG Oral Tab EC Take 1 tablet (81 mg total) by mouth daily.      TRIAMTERENE-HCTZ 37.5-25 MG Oral Tab TAKE 1 TABLET BY MOUTH EVERY DAY 90 tablet 0    Acidophilus/Pectin Oral Cap Take 1 capsule by mouth daily.      Flaxseed, Linseed, (FLAX SEED OIL) 1300 MG Oral Cap 1,000 mg daily.      Calcium Carbonate-Vitamin D (CALCIUM-VITAMIN D3 OR) Take 1 tablet by mouth daily.      Multiple Vitamin (TAB-A-JIMBO) Oral Tab Take 1 tablet by mouth daily.      ezetimibe 10 MG Oral Tab Take 1 tablet (10 mg total) by mouth every other day.  4    atorvastatin 20 MG Oral Tab Take 1 tablet (20 mg total) by mouth nightly. 30 tablet 2    atenolol 50 MG Oral Tab Take 1 tablet (50 mg total) by mouth daily. 30 tablet 0    amLODIPine 5 MG Oral Tab Take 1 tablet (5 mg total) by mouth daily. (Patient not taking: Reported on 6/25/2024) 90 tablet 0      Allergies   Allergen Reactions    Altace [Ramipril] ANAPHYLAXIS     angioedema    Dilaudid [Hydromorphone] NAUSEA AND VOMITING     severe    Ramipril ANAPHYLAXIS     Oral    Cortisone OTHER (SEE COMMENTS)     Headache, blood pressure became raised    No Known Allergies OTHER (SEE COMMENTS)      Past Medical  History:    Acute pain of both shoulders    Arthritis    Atherosclerosis of coronary artery    Black stools    occasionally    Bloating    occasionally    Body piercing    ears    Breast cancer (HCC)    dcis    Calculus of kidney    about 30 years ago    Cancer (HCC)    breast    Chest pain of uncertain etiology    Constipation    occasionally    Diverticulitis    Ductal carcinoma in situ of breast    DCIS    Esophageal reflux    Essential hypertension    Exposure to medical diagnostic radiation    Flatulence/gas pain/belching    aternoons    Hearing impairment    tinnitis    Heart attack (HCC)    Hemorrhoids    occasionally    High blood pressure    High cholesterol    History of blood transfusion    many years ago    History of diverticulitis    Hx of diseases NEC    diverticulosis    Hx of diseases NEC    had stress test - had some extra beats    Hx of diseases NEC    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    Hx of diseases NEC    utd with gyn    Hx of motion sickness    Hyperlipidemia    Hypokalemia    Irregular bowel habits    about a year    Leg swelling    ankle    Night sweats    on and off for several months    Osteoarthritis    Other and unspecified personal history of malignant neoplasm    breast cancer    Pain in joints    Pain with bowel movements    straining more than pain    Stented coronary artery    Thyroid nodule    benign    Uncomfortable fullness after meals    about a year    Unspecified menopausal and postmenopausal disorder    treated with effexor - however pt would like to get off med    Visual impairment    contacts/glasses    Wears glasses    contact lenses      Patient Active Problem List   Diagnosis    Benign essential HTN    Pure hypercholesterolemia    Multiple thyroid nodules    History of breast cancer    CAD (coronary artery disease)    History of non-ST elevation myocardial infarction (NSTEMI)    Prediabetes    Presence of drug coated stent in right coronary artery    PMR (polymyalgia  rheumatica) (HCC)    Personal history of colonic polyps    Sensory hearing loss, bilateral    S/p total knee replacement, bilateral    Arm DVT (deep venous thromboembolism), acute, right (HCC)    Primary osteoarthritis involving multiple joints    Elevated sed rate    Elevated C-reactive protein (CRP)    Other secondary hypertension    Osteopenia of multiple sites    Long term (current) use of systemic steroids    DDD (degenerative disc disease), cervical      Past Surgical History:   Procedure Laterality Date    Angioplasty (coronary)  2018    stent RCA Johann    Appendectomy      at time of diverticulitis    Appendectomy      appendix removed at 53 years old    Bowel resection      after diverticulosis    Cath percutaneous  transluminal coronary angioplasty      Colectomy      Colonoscopy      Colonoscopy,diagnostic      nl colonoscopy    Fracture surgery Left     hip    Hernia surgery      had a patch put in lower abdomen at 53, after resection    Hip surgery Left     May 2018    Knee replacement surgery Left     3/2022    Lumpectomy right      Sofía biopsy stereo nodule 1 site right (cpt=19081)      Other Left     hip-minerva on place in femur    Part removal colon w end colostomy      at 53 years old    Radiation right      Special service or report      diverticulitis - had L sigmoidectomy    Special service or report      ventral hernia repair    Total knee replacement Right 2023      Family History   Problem Relation Age of Onset    Breast Cancer Self 53    DCIS Self 53    Hypertension Mother     Stroke Mother     Other (Other) Mother     Breast Cancer Mother 49    Heart Disorder Father         mi at 46 and  at 53 of 7th mi    Lipids Father     Heart Disease Father     Other (Other) Father     Heart Attack Father     Other (Other) Sister         spinal problems, prolactin problem, factor v leiden + (pt negative)    Other (Other) Brother         healthy    Other (Other)  Maternal Grandmother          of aneurysm    Breast Cancer Maternal Aunt 52    Breast Cancer Paternal Cousin Female 50      Social History     Socioeconomic History    Marital status:    Tobacco Use    Smoking status: Never    Smokeless tobacco: Never   Vaping Use    Vaping status: Never Used   Substance and Sexual Activity    Alcohol use: Yes     Alcohol/week: 7.0 standard drinks of alcohol     Types: 7 Glasses of wine per week    Drug use: No    Sexual activity: Yes     Partners: Male   Other Topics Concern    Caffeine Concern Yes     Comment: 1 cup tea daily     Exercise Yes     Comment: 2-x weekly     Seat Belt Yes     Social Determinants of Health     Physical Activity: Sufficiently Active (2020)    Received from [employer]    Exercise Vital Sign     Days of Exercise per Week: 3 days     Minutes of Exercise per Session: 60 min         REVIEW OF SYSTEMS:   GENERAL: feels well otherwise, no fever or chills, no new CP or dyspnea    EXAM:   /70   Pulse 66   Temp 97 °F (36.1 °C) (Temporal)   Wt 155 lb (70.3 kg)   LMP  (LMP Unknown)   SpO2 99%   BMI 28.16 kg/m²   GENERAL: Well developed, well nourished,in no apparent distress  SKIN: No rash  EYES: PERRLA, EOMI, conjunctiva are clear  HEENT: atraumatic, normocephalic  LUNGS: clear to auscultation  CARDIO: RRR without murmur    ASSESSMENT AND PLAN:   Emily Velasquez is a 74 year old female who presents for follow-up    Essential hypertension  Home blood pressures controlled off amlodipine. Discussed parameters for restarting amlodipine if needed. Will continue home monitoring. Follow-up at [facility]PE as scheduled or sooner if needed.     PMR (polymyalgia rheumatica) ([facility])  Management per rheumatology.    Chronic fatigue  Multifactorial. Chronic, but improving with reduced prednisone dose and off amlodipine. Will check TSH and vit labs at next lab draw.   - TSH W Reflex To Free T4 [E]; Future  - Vitamin D [E];  Future  - Vitamin B12 [E]; Future    Pulmonary nodule seen on imaging study  Noted incidentally. 2 mm LLL nodule. Will monitor with repeat LDCT in 12 months.   - CT CHEST LD NO CAD(CPT=71250); Future        All questions were answered and the patient agrees with the plan.     Thank you,  Gatito Rachel MD

## 2024-07-12 ENCOUNTER — LAB ENCOUNTER (OUTPATIENT)
Dept: LAB | Facility: HOSPITAL | Age: 74
End: 2024-07-12
Attending: INTERNAL MEDICINE
Payer: MEDICARE

## 2024-07-12 DIAGNOSIS — R20.2 NUMBNESS AND TINGLING IN BOTH HANDS: ICD-10-CM

## 2024-07-12 DIAGNOSIS — M35.3 PMR (POLYMYALGIA RHEUMATICA) (HCC): ICD-10-CM

## 2024-07-12 DIAGNOSIS — R70.0 ELEVATED SED RATE: ICD-10-CM

## 2024-07-12 DIAGNOSIS — R20.0 NUMBNESS AND TINGLING IN BOTH HANDS: ICD-10-CM

## 2024-07-12 DIAGNOSIS — R79.82 ELEVATED C-REACTIVE PROTEIN (CRP): ICD-10-CM

## 2024-07-12 LAB
CRP SERPL-MCNC: 1.56 MG/DL (ref ?–0.3)
ERYTHROCYTE [SEDIMENTATION RATE] IN BLOOD: 28 MM/HR

## 2024-07-12 PROCEDURE — 85652 RBC SED RATE AUTOMATED: CPT

## 2024-07-12 PROCEDURE — 86140 C-REACTIVE PROTEIN: CPT

## 2024-07-12 PROCEDURE — 36415 COLL VENOUS BLD VENIPUNCTURE: CPT

## 2024-08-05 ENCOUNTER — LAB ENCOUNTER (OUTPATIENT)
Dept: LAB | Facility: HOSPITAL | Age: 74
End: 2024-08-05
Attending: INTERNAL MEDICINE
Payer: MEDICARE

## 2024-08-05 DIAGNOSIS — R20.2 NUMBNESS AND TINGLING IN BOTH HANDS: ICD-10-CM

## 2024-08-05 DIAGNOSIS — M35.3 PMR (POLYMYALGIA RHEUMATICA) (HCC): ICD-10-CM

## 2024-08-05 DIAGNOSIS — R79.82 ELEVATED C-REACTIVE PROTEIN (CRP): ICD-10-CM

## 2024-08-05 DIAGNOSIS — R20.0 NUMBNESS AND TINGLING IN BOTH HANDS: ICD-10-CM

## 2024-08-05 DIAGNOSIS — R70.0 ELEVATED SED RATE: ICD-10-CM

## 2024-08-05 LAB
CRP SERPL-MCNC: 1.5 MG/DL (ref ?–0.5)
ERYTHROCYTE [SEDIMENTATION RATE] IN BLOOD: 27 MM/HR

## 2024-08-05 PROCEDURE — 86140 C-REACTIVE PROTEIN: CPT

## 2024-08-05 PROCEDURE — 85652 RBC SED RATE AUTOMATED: CPT

## 2024-08-05 PROCEDURE — 36415 COLL VENOUS BLD VENIPUNCTURE: CPT

## 2024-08-20 ENCOUNTER — PATIENT MESSAGE (OUTPATIENT)
Dept: RHEUMATOLOGY | Facility: CLINIC | Age: 74
End: 2024-08-20

## 2024-08-20 ENCOUNTER — LAB ENCOUNTER (OUTPATIENT)
Dept: LAB | Facility: HOSPITAL | Age: 74
End: 2024-08-20
Attending: INTERNAL MEDICINE
Payer: MEDICARE

## 2024-08-20 DIAGNOSIS — R20.2 NUMBNESS AND TINGLING IN BOTH HANDS: ICD-10-CM

## 2024-08-20 DIAGNOSIS — M35.3 PMR (POLYMYALGIA RHEUMATICA) (HCC): ICD-10-CM

## 2024-08-20 DIAGNOSIS — R70.0 ELEVATED SED RATE: ICD-10-CM

## 2024-08-20 DIAGNOSIS — R79.82 ELEVATED C-REACTIVE PROTEIN (CRP): ICD-10-CM

## 2024-08-20 DIAGNOSIS — M35.3 PMR (POLYMYALGIA RHEUMATICA) (HCC): Primary | ICD-10-CM

## 2024-08-20 DIAGNOSIS — R20.0 NUMBNESS AND TINGLING IN BOTH HANDS: ICD-10-CM

## 2024-08-20 LAB
CRP SERPL-MCNC: <0.4 MG/DL (ref ?–0.5)
ERYTHROCYTE [SEDIMENTATION RATE] IN BLOOD: 12 MM/HR

## 2024-08-20 PROCEDURE — 36415 COLL VENOUS BLD VENIPUNCTURE: CPT

## 2024-08-20 PROCEDURE — 86140 C-REACTIVE PROTEIN: CPT

## 2024-08-20 PROCEDURE — 85652 RBC SED RATE AUTOMATED: CPT

## 2024-08-21 RX ORDER — PREDNISONE 2.5 MG/1
2.5 TABLET ORAL DAILY
Qty: 90 TABLET | Refills: 0 | Status: SHIPPED | OUTPATIENT
Start: 2024-08-21

## 2024-09-16 ENCOUNTER — LAB ENCOUNTER (OUTPATIENT)
Dept: LAB | Age: 74
End: 2024-09-16
Attending: FAMILY MEDICINE
Payer: MEDICARE

## 2024-09-16 DIAGNOSIS — R20.2 NUMBNESS AND TINGLING IN BOTH HANDS: ICD-10-CM

## 2024-09-16 DIAGNOSIS — R20.0 NUMBNESS AND TINGLING IN BOTH HANDS: ICD-10-CM

## 2024-09-16 DIAGNOSIS — R70.0 ELEVATED SED RATE: ICD-10-CM

## 2024-09-16 DIAGNOSIS — R79.82 ELEVATED C-REACTIVE PROTEIN (CRP): ICD-10-CM

## 2024-09-16 DIAGNOSIS — M35.3 PMR (POLYMYALGIA RHEUMATICA) (HCC): ICD-10-CM

## 2024-09-16 LAB
CRP SERPL-MCNC: <0.4 MG/DL (ref ?–0.5)
ERYTHROCYTE [SEDIMENTATION RATE] IN BLOOD: 7 MM/HR

## 2024-09-16 PROCEDURE — 36415 COLL VENOUS BLD VENIPUNCTURE: CPT

## 2024-09-16 PROCEDURE — 86140 C-REACTIVE PROTEIN: CPT

## 2024-09-16 PROCEDURE — 85652 RBC SED RATE AUTOMATED: CPT

## 2024-09-24 ENCOUNTER — TELEPHONE (OUTPATIENT)
Dept: INTERNAL MEDICINE CLINIC | Facility: CLINIC | Age: 74
End: 2024-09-24

## 2024-09-24 DIAGNOSIS — Z13.29 SCREENING FOR THYROID DISORDER: ICD-10-CM

## 2024-09-24 DIAGNOSIS — I10 BENIGN ESSENTIAL HTN: Primary | ICD-10-CM

## 2024-09-24 DIAGNOSIS — E78.00 PURE HYPERCHOLESTEROLEMIA: ICD-10-CM

## 2024-09-24 NOTE — TELEPHONE ENCOUNTER
Future Appointments   Date Time Provider Department Center   10/14/2024 10:00 AM Gatito Rachel MD [facility] [address] [facility]   2/19/2025  9:45 AM Luna Juarez DO [facility]   4/7/2025 10:30 AM PF CT [facility]1 PF [facility] [place]   5/21/2025  9:45 AM Luna Juarez DO [facility]     Labs placed per protocol.

## 2024-10-09 ENCOUNTER — LAB ENCOUNTER (OUTPATIENT)
Dept: LAB | Age: 74
End: 2024-10-09
Attending: FAMILY MEDICINE
Payer: MEDICARE

## 2024-10-09 DIAGNOSIS — I10 BENIGN ESSENTIAL HTN: ICD-10-CM

## 2024-10-09 DIAGNOSIS — R73.03 PREDIABETES: Primary | ICD-10-CM

## 2024-10-09 DIAGNOSIS — E55.9 VITAMIN D DEFICIENCY: ICD-10-CM

## 2024-10-09 DIAGNOSIS — R73.03 PREDIABETES: ICD-10-CM

## 2024-10-09 DIAGNOSIS — R53.82 CHRONIC FATIGUE: ICD-10-CM

## 2024-10-09 DIAGNOSIS — E78.00 PURE HYPERCHOLESTEROLEMIA: ICD-10-CM

## 2024-10-09 DIAGNOSIS — Z13.29 SCREENING FOR THYROID DISORDER: ICD-10-CM

## 2024-10-09 LAB
ALBUMIN SERPL-MCNC: 4.2 G/DL (ref 3.2–4.8)
ALBUMIN/GLOB SERPL: 1.5 {RATIO} (ref 1–2)
ALP LIVER SERPL-CCNC: 53 U/L
ALT SERPL-CCNC: 23 U/L
ANION GAP SERPL CALC-SCNC: 5 MMOL/L (ref 0–18)
AST SERPL-CCNC: 29 U/L (ref ?–34)
BASOPHILS # BLD AUTO: 0.08 X10(3) UL (ref 0–0.2)
BASOPHILS NFR BLD AUTO: 0.7 %
BILIRUB SERPL-MCNC: 0.9 MG/DL (ref 0.2–1.1)
BUN BLD-MCNC: 18 MG/DL (ref 9–23)
CALCIUM BLD-MCNC: 10 MG/DL (ref 8.7–10.4)
CHLORIDE SERPL-SCNC: 105 MMOL/L (ref 98–112)
CHOLEST SERPL-MCNC: 175 MG/DL (ref ?–200)
CO2 SERPL-SCNC: 31 MMOL/L (ref 21–32)
CREAT BLD-MCNC: 1.08 MG/DL
EGFRCR SERPLBLD CKD-EPI 2021: 53 ML/MIN/1.73M2 (ref 60–?)
EOSINOPHIL # BLD AUTO: 0.2 X10(3) UL (ref 0–0.7)
EOSINOPHIL NFR BLD AUTO: 1.8 %
ERYTHROCYTE [DISTWIDTH] IN BLOOD BY AUTOMATED COUNT: 13.9 %
EST. AVERAGE GLUCOSE BLD GHB EST-MCNC: 166 MG/DL (ref 68–126)
FASTING PATIENT LIPID ANSWER: YES
FASTING STATUS PATIENT QL REPORTED: YES
GLOBULIN PLAS-MCNC: 2.8 G/DL (ref 2–3.5)
GLUCOSE BLD-MCNC: 116 MG/DL (ref 70–99)
HBA1C MFR BLD: 7.4 % (ref ?–5.7)
HCT VFR BLD AUTO: 44.6 %
HDLC SERPL-MCNC: 82 MG/DL (ref 40–59)
HGB BLD-MCNC: 14.4 G/DL
IMM GRANULOCYTES # BLD AUTO: 0.1 X10(3) UL (ref 0–1)
IMM GRANULOCYTES NFR BLD: 0.9 %
LDLC SERPL CALC-MCNC: 69 MG/DL (ref ?–100)
LYMPHOCYTES # BLD AUTO: 1.9 X10(3) UL (ref 1–4)
LYMPHOCYTES NFR BLD AUTO: 17.3 %
MCH RBC QN AUTO: 28.6 PG (ref 26–34)
MCHC RBC AUTO-ENTMCNC: 32.3 G/DL (ref 31–37)
MCV RBC AUTO: 88.7 FL
MONOCYTES # BLD AUTO: 0.96 X10(3) UL (ref 0.1–1)
MONOCYTES NFR BLD AUTO: 8.8 %
NEUTROPHILS # BLD AUTO: 7.72 X10 (3) UL (ref 1.5–7.7)
NEUTROPHILS # BLD AUTO: 7.72 X10(3) UL (ref 1.5–7.7)
NEUTROPHILS NFR BLD AUTO: 70.5 %
NONHDLC SERPL-MCNC: 93 MG/DL (ref ?–130)
OSMOLALITY SERPL CALC.SUM OF ELEC: 295 MOSM/KG (ref 275–295)
PLATELET # BLD AUTO: 275 10(3)UL (ref 150–450)
POTASSIUM SERPL-SCNC: 3.5 MMOL/L (ref 3.5–5.1)
PROT SERPL-MCNC: 7 G/DL (ref 5.7–8.2)
RBC # BLD AUTO: 5.03 X10(6)UL
SODIUM SERPL-SCNC: 141 MMOL/L (ref 136–145)
TRIGL SERPL-MCNC: 141 MG/DL (ref 30–149)
TSI SER-ACNC: 1.07 MIU/ML (ref 0.55–4.78)
VIT B12 SERPL-MCNC: 339 PG/ML (ref 211–911)
VIT D+METAB SERPL-MCNC: 62.8 NG/ML (ref 30–100)
VLDLC SERPL CALC-MCNC: 21 MG/DL (ref 0–30)
WBC # BLD AUTO: 11 X10(3) UL (ref 4–11)

## 2024-10-09 PROCEDURE — 80061 LIPID PANEL: CPT

## 2024-10-09 PROCEDURE — 83036 HEMOGLOBIN GLYCOSYLATED A1C: CPT

## 2024-10-09 PROCEDURE — 84443 ASSAY THYROID STIM HORMONE: CPT

## 2024-10-09 PROCEDURE — 85025 COMPLETE CBC W/AUTO DIFF WBC: CPT

## 2024-10-09 PROCEDURE — 36415 COLL VENOUS BLD VENIPUNCTURE: CPT

## 2024-10-09 PROCEDURE — 82306 VITAMIN D 25 HYDROXY: CPT

## 2024-10-09 PROCEDURE — 82607 VITAMIN B-12: CPT

## 2024-10-09 PROCEDURE — 80053 COMPREHEN METABOLIC PANEL: CPT

## 2024-10-14 ENCOUNTER — HOSPITAL ENCOUNTER (OUTPATIENT)
Dept: GENERAL RADIOLOGY | Age: 74
Discharge: HOME | End: 2024-10-14
Payer: MEDICARE

## 2024-10-14 ENCOUNTER — PATIENT MESSAGE (OUTPATIENT)
Dept: RHEUMATOLOGY | Facility: CLINIC | Age: 74
End: 2024-10-14

## 2024-10-14 ENCOUNTER — OFFICE VISIT (OUTPATIENT)
Dept: INTERNAL MEDICINE CLINIC | Facility: CLINIC | Age: 74
End: 2024-10-14
Payer: MEDICARE

## 2024-10-14 VITALS
HEART RATE: 68 BPM | SYSTOLIC BLOOD PRESSURE: 116 MMHG | WEIGHT: 156.38 LBS | TEMPERATURE: 97 F | HEIGHT: 62.21 IN | OXYGEN SATURATION: 97 % | BODY MASS INDEX: 28.41 KG/M2 | DIASTOLIC BLOOD PRESSURE: 60 MMHG | RESPIRATION RATE: 18 BRPM

## 2024-10-14 DIAGNOSIS — Z12.31 ENCOUNTER FOR SCREENING MAMMOGRAM FOR MALIGNANT NEOPLASM OF BREAST: ICD-10-CM

## 2024-10-14 DIAGNOSIS — M50.30 DDD (DEGENERATIVE DISC DISEASE), CERVICAL: ICD-10-CM

## 2024-10-14 DIAGNOSIS — M54.50 LUMBAR PAIN WITH RADIATION DOWN RIGHT LEG: ICD-10-CM

## 2024-10-14 DIAGNOSIS — R73.03 PREDIABETES: ICD-10-CM

## 2024-10-14 DIAGNOSIS — M15.0 PRIMARY OSTEOARTHRITIS INVOLVING MULTIPLE JOINTS: ICD-10-CM

## 2024-10-14 DIAGNOSIS — M79.604 LUMBAR PAIN WITH RADIATION DOWN RIGHT LEG: ICD-10-CM

## 2024-10-14 DIAGNOSIS — R73.09 ELEVATED HEMOGLOBIN A1C: ICD-10-CM

## 2024-10-14 DIAGNOSIS — M85.89 OSTEOPENIA OF MULTIPLE SITES: ICD-10-CM

## 2024-10-14 DIAGNOSIS — M35.3 PMR (POLYMYALGIA RHEUMATICA) (HCC): ICD-10-CM

## 2024-10-14 DIAGNOSIS — I10 BENIGN ESSENTIAL HTN: ICD-10-CM

## 2024-10-14 DIAGNOSIS — E04.2 MULTIPLE THYROID NODULES: ICD-10-CM

## 2024-10-14 DIAGNOSIS — Z79.52 LONG TERM (CURRENT) USE OF SYSTEMIC STEROIDS: ICD-10-CM

## 2024-10-14 DIAGNOSIS — M35.3 PMR (POLYMYALGIA RHEUMATICA) (HCC): Primary | ICD-10-CM

## 2024-10-14 DIAGNOSIS — Z00.00 ANNUAL PHYSICAL EXAM: Primary | ICD-10-CM

## 2024-10-14 DIAGNOSIS — Z85.3 HISTORY OF BREAST CANCER: ICD-10-CM

## 2024-10-14 DIAGNOSIS — I25.10 CORONARY ARTERY DISEASE INVOLVING NATIVE CORONARY ARTERY OF NATIVE HEART WITHOUT ANGINA PECTORIS: ICD-10-CM

## 2024-10-14 DIAGNOSIS — E78.00 PURE HYPERCHOLESTEROLEMIA: ICD-10-CM

## 2024-10-14 PROCEDURE — 72100 X-RAY EXAM L-S SPINE 2/3 VWS: CPT

## 2024-10-14 NOTE — PROGRESS NOTES
Subjective:   Emily Velasquez is a 74 year old female who presents for a Subsequent Annual Wellness visit (Pt already had Initial Annual Wellness) and acute uncomplicated problem. Patient here today for medicare annual visit in addition she would like to discuss right sided lower back pain and pain radiating down right leg. Patient states history of PMR managed by Dr. Juarez. She is on 7.5 mg of prednisone daily which has been a gradual decrease since starting on 40 mg earlier in disease. Patient concerned if back pain related to PMR as this was one of the starting symptoms several years ago. She has follow up scheduled with Dr. Juarez next week to discuss further management. Patient denies recent injury/lifting/pushing/pulling. Other than this acute concern, patient reports overall doing well. She is motivated to improve diet and exercise to help improve glycemic control. No numbness or tingling or changes with ROM. Shingrix available at local pharmacy discussed with patient. COVID vaccinations discussed with patient.     History/Other:   Fall Risk Assessment:   She has been screened for Falls and is low risk.      Cognitive Assessment:   She had a completely normal cognitive assessment - see flowsheet entries     Functional Ability/Status:   Emily Velasquez has some abnormal functions as listed below:  She has Hearing problems based on screening of functional status.She has Vision problems based on screening of functional status.     Depression Screening (PHQ):  PHQ-2 SCORE: 0  , done 10/14/2024   If you checked off any problems, how difficult have these problems made it for you to do your work, take care of things at home, or get along with other people?: Not difficult at all      Advanced Directives:   She does NOT have a Living Will. [Do you have a living will?: (Patient-Rptd) No]  She does NOT have a Power of  for Health Care. [Do you have a healthcare power of ?: (Patient-Rptd)  No]  Discussed Advance Care Planning with patient (and family/surrogate if present). Standard forms made available to patient in After Visit Summary.      Patient Active Problem List   Diagnosis    Benign essential HTN    Pure hypercholesterolemia    Multiple thyroid nodules    History of breast cancer    CAD (coronary artery disease)    History of non-ST elevation myocardial infarction (NSTEMI)    Prediabetes    Presence of drug coated stent in right coronary artery    PMR (polymyalgia rheumatica) (HCC)    Personal history of colonic polyps    Sensory hearing loss, bilateral    S/p total knee replacement, bilateral    Arm DVT (deep venous thromboembolism), acute, right (HCC)    Primary osteoarthritis involving multiple joints    Elevated sed rate    Elevated C-reactive protein (CRP)    Other secondary hypertension    Osteopenia of multiple sites    Long term (current) use of systemic steroids    DDD (degenerative disc disease), cervical     Allergies:  She is allergic to altace [ramipril], dilaudid [hydromorphone], ramipril, cortisone, and no known allergies.    Current Medications:  Outpatient Medications Marked as Taking for the 10/14/24 encounter (Office Visit) with Monika Mcmanus APRN   Medication Sig    [DISCONTINUED] predniSONE 10 MG Oral Tab Take 40mg daily x 7 days, then 30mg daily x 7 days then get updated labs.    [DISCONTINUED] alendronate 70 MG Oral Tab Take 1 tablet (70 mg total) by mouth every 7 days.    aspirin 81 MG Oral Tab EC Take 1 tablet (81 mg total) by mouth daily.    TRIAMTERENE-HCTZ 37.5-25 MG Oral Tab TAKE 1 TABLET BY MOUTH EVERY DAY    Acidophilus/Pectin Oral Cap Take 1 capsule by mouth daily.    Flaxseed, Linseed, (FLAX SEED OIL) 1300 MG Oral Cap 1,000 mg daily.    Calcium Carbonate-Vitamin D (CALCIUM-VITAMIN D3 OR) Take 1 tablet by mouth daily.    Multiple Vitamin (TAB-A-JIMBO) Oral Tab Take 1 tablet by mouth daily.    ezetimibe 10 MG Oral Tab Take 1 tablet (10 mg total) by mouth every  other day.    atorvastatin 20 MG Oral Tab Take 1 tablet (20 mg total) by mouth nightly.    atenolol 50 MG Oral Tab Take 1 tablet (50 mg total) by mouth daily.   Surgical History:  She  has a past surgical history that includes appendectomy (2005); colonoscopy,diagnostic (2004); special service or report (2005); special service or report (2005); Colectomy; colonoscopy; lumpectomy right (2004); radiation right (2004); timoteo biopsy stereo nodule 1 site right (cpt=19081) (2004); hip surgery (Left); angioplasty (coronary) (12/31/2018); appendectomy; part removal colon w end colostomy; hernia surgery; bowel resection; other (Left); knee replacement surgery (Left); Fracture surgery (Left); cath percutaneous  transluminal coronary angioplasty; and total knee replacement (Right, 03/22/2023).   Family History:  Her family history includes Breast Cancer (age of onset: 49) in her mother; Breast Cancer (age of onset: 50) in her paternal cousin female; Breast Cancer (age of onset: 52) in her maternal aunt; Breast Cancer (age of onset: 55) in her self; DCIS (age of onset: 55) in her self; Heart Attack in her father; Heart Disease in her father; Heart Disorder in her father; Hypertension in her mother; Lipids in her father; Other in her brother, father, maternal grandmother, mother, and sister; Stroke in her mother.  Social History:  She  reports that she has never smoked. She has never used smokeless tobacco. She reports current alcohol use of about 7.0 standard drinks of alcohol per week. She reports that she does not use drugs.    Tobacco:  She has never smoked tobacco.    CAGE Alcohol Screen:   CAGE screening score of 0 on 10/7/2024, showing low risk of alcohol abuse.      Review of Systems      Objective:   Physical Exam    /60   Pulse 68   Temp 96.8 °F (36 °C) (Temporal)   Resp 18   Ht 5' 2.21\" (1.58 m)   Wt 156 lb 6.4 oz (70.9 kg)   LMP  (LMP Unknown)   SpO2 97%   BMI 28.41 kg/m²  Estimated body mass index is  28.41 kg/m² as calculated from the following:    Height as of this encounter: 5' 2.21\" (1.58 m).    Weight as of this encounter: 156 lb 6.4 oz (70.9 kg).    Medicare Hearing Assessment:   Hearing Screening    Time taken: 10/14/2024 11:32 AM  Entry User: Han Alvares CMA  Screening Method: Finger Rub  Finger Rub Result: Pass               Assessment & Plan:   Emily Velasquez is a 74 year old female who presents for a Medicare Assessment.   1. Annual physical exam    2. Lumbar pain with radiation down right leg  Check imaging- physical therapy pending findings.  - XR LUMBAR SPINE (MIN 2 VIEWS) (CPT=72100); Future    3. Elevated hemoglobin A1c  Labs in 3 months  - Comp Metabolic Panel (14) [E]; Future  - Hemoglobin A1C; Future  - Microalb/Creat Ratio, Random Urine; Future    4. Encounter for screening mammogram for malignant neoplasm of breast  Mammogram ordered  - [facility] KAROLINE 2D+3D SCREENING BILAT (CPT=77067/78318); Future    5. PMR (polymyalgia rheumatica) (HCC)  Following with Dr. Juarez with Rheum.     6. Benign essential HTN  BP controlled on current management    7. Coronary artery disease involving native coronary artery of native heart without angina pectoris  Follows with Dr. Holden    8. History of breast cancer  S/p lumpectomy and radiation     9. Osteopenia of multiple sites  DEXA completed 6/24, managed by Rheum    10. Primary osteoarthritis involving multiple joints  Follows with ortho- Dr. Gabriel    11. DDD (degenerative disc disease), cervical  Follows with ortho Dr. Gabriel- condition stable    12. Prediabetes  Repeat labs in 3 months- discussed working on diet/exercise. Discussed medical management pending 3 month laboratory findings.    13. Multiple thyroid nodules  Benign- TSH monitored yearly    14. Long term (current) use of systemic steroids  Follows with Rheum, PMR management    15. Pure hypercholesterolemia  Continue current treatment    The patient indicates understanding of these  issues and agrees to the plan.  Reinforced healthy diet, lifestyle, and exercise.    Monika Mcmanus, MARGIE, 10/14/2024     Supplementary Documentation:   General Health:  In the past six months, have you lost more than 10 pounds without trying?: (Patient-Rptd) 2 - No  Has your appetite been poor?: (Patient-Rptd) No  Type of Diet: (Patient-Rptd) Balanced  How does the patient maintain a good energy level?: (Patient-Rptd) Appropriate Exercise;Daily Walks  How would you describe your daily physical activity?: (Patient-Rptd) Moderate  How would you describe your current health state?: (Patient-Rptd) Fair  How do you maintain positive mental well-being?: (Patient-Rptd) Social Interaction;Puzzles;Games;Visiting Friends;Visiting Family  On a scale of 0 to 10, with 0 being no pain and 10 being severe pain, what is your pain level?: (Patient-Rptd) 3 - (Mild)  In the past six months, have you experienced urine leakage?: (Patient-Rptd) 0-No  At any time do you feel concerned for the safety/well-being of yourself and/or your children, in your home or elsewhere?: (Patient-Rptd) No  Have you had any immunizations at another office such as Influenza, Hepatitis B, Tetanus, or Pneumococcal?: (Patient-Rptd) No    Health Maintenance   Topic Date Due    Zoster Vaccines (1 of 2) Never done    COVID-19 Vaccine (5 - 2023-24 season) 09/01/2024    Influenza Vaccine (1) 10/01/2024    Annual Physical  10/12/2024    Colorectal Cancer Screening  09/08/2031    DEXA Scan  Completed    Annual Depression Screening  Completed    Fall Risk Screening (Annual)  Completed    Pneumococcal Vaccine: 65+ Years  Completed    Mammogram  Discontinued

## 2024-10-15 ENCOUNTER — PATIENT MESSAGE (OUTPATIENT)
Dept: INTERNAL MEDICINE CLINIC | Facility: CLINIC | Age: 74
End: 2024-10-15

## 2024-10-16 ENCOUNTER — TELEPHONE (OUTPATIENT)
Dept: INTERNAL MEDICINE CLINIC | Facility: CLINIC | Age: 74
End: 2024-10-16

## 2024-10-16 NOTE — TELEPHONE ENCOUNTER
Muscle relaxer would be a good option. Nightly but can be taken up to 3 times daily. Patient open to trying a flexeril? If so I will send to pharmacy. Physical therapy that was ordered in result note recommended. She can start with the exercises and stretches I provided her in visit. I'd be interested on her inflammatory markers given the decrease in prednisone related to her PMR. When is she doing labs ordered by Dr. Juarez?

## 2024-10-16 NOTE — TELEPHONE ENCOUNTER
Triage call transferred.   Spoke with pt f/u on XR results. Pt is still limping, and leaving out of town next week. Pt inquiring on advice on what to do next.  Informed will relay to BD to review and provide recommendations.   No further questions or concerns. Pt verbalized understanding and agreed with POC.

## 2024-10-17 ENCOUNTER — LAB ENCOUNTER (OUTPATIENT)
Dept: LAB | Age: 74
End: 2024-10-17
Attending: INTERNAL MEDICINE
Payer: MEDICARE

## 2024-10-17 DIAGNOSIS — M35.3 PMR (POLYMYALGIA RHEUMATICA) (HCC): ICD-10-CM

## 2024-10-17 DIAGNOSIS — Z79.52 LONG TERM (CURRENT) USE OF SYSTEMIC STEROIDS: ICD-10-CM

## 2024-10-17 LAB
CRP SERPL-MCNC: <0.4 MG/DL (ref ?–0.5)
ERYTHROCYTE [SEDIMENTATION RATE] IN BLOOD: 19 MM/HR

## 2024-10-17 PROCEDURE — 85652 RBC SED RATE AUTOMATED: CPT

## 2024-10-17 PROCEDURE — 86140 C-REACTIVE PROTEIN: CPT

## 2024-10-17 PROCEDURE — 36415 COLL VENOUS BLD VENIPUNCTURE: CPT

## 2024-10-17 NOTE — TELEPHONE ENCOUNTER
Patient aware of XR results and physical therapy recommendation, please see 10/15 MC.     Called and spoke to pt, muscle relaxer discussed. Patient would like to try muscle relaxer, advised to take nightly but can be taken up to 3 times daily. Advised to start with exercises/stretches Monika Mcmanus provided until able to start physical therapy. Patient stated understanding and agreed to plan. Patient had labs for Dr Juarez done today.     Routing to Monika Mcmanus to update, pt agreeable to muscle relaxer. Labs completed today.

## 2024-10-17 NOTE — TELEPHONE ENCOUNTER
Monika Mcmanus, APRN  10/16/2024  4:50 PM CDT       Recommend physical therapy for degenerative changes with multilevel foraminal narrowing of lumbar spine. Mild scoliosis seen as well.

## 2024-10-18 RX ORDER — CYCLOBENZAPRINE HCL 10 MG
10 TABLET ORAL NIGHTLY
Qty: 30 TABLET | Refills: 0 | Status: SHIPPED | OUTPATIENT
Start: 2024-10-18

## 2024-10-18 NOTE — TELEPHONE ENCOUNTER
Medication sent to pharmacy. Can cause drowsiness so recommend to take 1 hour before bedtime to see how medication impacts her. No driving or drinking alcohol with medication.

## 2024-10-21 ENCOUNTER — OFFICE VISIT (OUTPATIENT)
Dept: RHEUMATOLOGY | Facility: CLINIC | Age: 74
End: 2024-10-21
Payer: MEDICARE

## 2024-10-21 VITALS
RESPIRATION RATE: 16 BRPM | DIASTOLIC BLOOD PRESSURE: 80 MMHG | TEMPERATURE: 98 F | OXYGEN SATURATION: 96 % | BODY MASS INDEX: 27.99 KG/M2 | WEIGHT: 156 LBS | SYSTOLIC BLOOD PRESSURE: 158 MMHG | HEIGHT: 62.5 IN | HEART RATE: 68 BPM

## 2024-10-21 DIAGNOSIS — M51.362 DEGENERATION OF INTERVERTEBRAL DISC OF LUMBAR REGION WITH DISCOGENIC BACK PAIN AND LOWER EXTREMITY PAIN: ICD-10-CM

## 2024-10-21 DIAGNOSIS — Z79.52 LONG TERM (CURRENT) USE OF SYSTEMIC STEROIDS: ICD-10-CM

## 2024-10-21 DIAGNOSIS — M15.0 PRIMARY OSTEOARTHRITIS INVOLVING MULTIPLE JOINTS: ICD-10-CM

## 2024-10-21 DIAGNOSIS — M35.3 PMR (POLYMYALGIA RHEUMATICA) (HCC): Primary | ICD-10-CM

## 2024-10-21 PROCEDURE — G2211 COMPLEX E/M VISIT ADD ON: HCPCS | Performed by: INTERNAL MEDICINE

## 2024-10-21 PROCEDURE — 99214 OFFICE O/P EST MOD 30 MIN: CPT | Performed by: INTERNAL MEDICINE

## 2024-10-21 RX ORDER — PREDNISONE 2.5 MG/1
2.5 TABLET ORAL DAILY
Qty: 90 TABLET | Refills: 0 | Status: SHIPPED | OUTPATIENT
Start: 2024-10-21

## 2024-10-21 RX ORDER — PREDNISONE 5 MG/1
5 TABLET ORAL DAILY
Qty: 90 TABLET | Refills: 0 | Status: SHIPPED | OUTPATIENT
Start: 2024-10-21

## 2024-10-21 NOTE — PROGRESS NOTES
?  RHEUMATOLOGY RE-ESTABLISH CARE   Date of visit: 10/21/2024  ?  Chief Complaint   Patient presents with    PMR     5 month f/u. Was feeling ok until the last few weeks. Back pain that has caused some hip pain. Currently taking 7.5 mg of prednisone. Converted rapid score of 2.3      ASSESSMENT, DISCUSSION & PLAN   Assessment:  1. PMR (polymyalgia rheumatica) (HCC)    2. Long term (current) use of systemic steroids    3. Primary osteoarthritis involving multiple joints    4. Degeneration of intervertebral disc of lumbar region with discogenic back pain and lower extremity pain          Discussion:  Mrs. Emily Velasquez is a 73 yo woman who initially presented with new onset of bilateral shoulder as well as hip pain and associated weakness due to the pain. Her exam and her inflammatory markers were consistent with diagnosis of PMR.  Previously discussed at length complications from the disease as well as the risk of development of giant cell arteritis down the road.  Discussed symptoms of headaches, blurred vision as well as jaw claudication. She required long terms steroid course due to flaring when trying to decrease. She was previously hesitant to start methotrexate due to issues her late- had when receiving for chemotherapy.  She had responded nicely to monotherapy with steroids. She was actually able to taper off of prednisone altogether. She was last seen in 2022 and even at that time, had been off steroids for over a year.    She had been doing well until recently, suffered a flare of her PMR earlier this year.   Last year, she injured her right shoulder and developed and RUE DVT which required xarelto for 3 months.   She developed neck pain, shoulder pain, hip pain along with worsened tingling in the hands. She developed worsened morning stiffness/pain and generalized fatigue over the past few days.   Inflammatory markers are higher than they were when she had PMR several years ago.  She was started on  oral prednisone, 40mg and slowly weaning since May of this year. She is currently on 7.5mg which she started 5 days ago.   Seems like her PMR has been better controlled  Does have worsened lower back pain- xrays negative for fracture. There are signs of advanced DDD of the lumbar spine. Recommended PT as recommended by her PCP's office.   If symptoms persist despite PT, will consider MRI imaging and pain management evaluation.  Of note, she was given oral bisphosphonate for osteoporosis ppx for while on high doses of steroids. However, had to stop bisphosphonate d/t GI intolerance. She is on lower dose so will likely be okay to hold med. Will repeat BMD next year instead of waiting until 2026.   She still has elevated BP, likely related to the steroids. She is following with cardiology- next appt in December.     Discussed again steroid sparing agents like plaquenil vs methotrexate vs IL-6 inhibition. She has a hx of diverticulitis which previously required surgical intervention so would like to avoid IL6 inhibition. Still recommended methotrexate, but pt adamant in not taking due to side effects her ex- experienced. Discussed possible plaquenil use but pt still hesitant. Hand out provided and discussed risk/benefit in detail. Will consider adding depending on her initial response to the steroids.     Reminded pt of signs/symptoms of GCA which can be associated with PMR.     Follow up in 3 months or sooner as needed  We will be in communication often in the meantime.  Previously discussed with pt need for age appropriate cancer screening and to discuss this further with her PCP.   Also, some of her hand numbness/tingling is likely related to underlying severe DDD cervical spine and unlikely to get better with treatment for PMR in long term.    Patient verbalized understanding of above instructions. No further questions at this time.     Code selection for this visit was based on time spent (35min) on date of  service in preparing to see the patient, obtaining and/or reviewing separately obtained history, performing a medically appropriate examination, counseling and educating the patient/family/caregiver, ordering medications or testing, referring and communicating with other healthcare providers, documenting clinical information in the E HR, independently interpreting results and communicating results to the patient/family/caregiver and care coordination with the patient's other providers.      Corticosteroids carry a risk of multiple side effects in long term use including but not limited to glaucoma, fluid retention, hypertension, mood disorders, weight gain, elevated blood sugars, diabetes, increased risk of infection, osteoporosis and fractures, suppressed adrenal gland hormone production as well as thinning of the skin and slower wound healing.    ?  Plan:  Diagnoses and all orders for this visit:    PMR (polymyalgia rheumatica) (HCC)  -     predniSONE 5 MG Oral Tab; Take 1 tablet (5 mg total) by mouth daily.  -     predniSONE 2.5 MG Oral Tab; Take 1 tablet (2.5 mg total) by mouth daily.    Long term (current) use of systemic steroids    Primary osteoarthritis involving multiple joints    Degeneration of intervertebral disc of lumbar region with discogenic back pain and lower extremity pain        Return in about 3 months (around 1/21/2025).  ?  HPI   Emily Velasquez is a 74 year old female with the following active problems who is seen for medically necessary follow-up today. She was seen as a new patient initially for evaluation of PMR. At that time, she was on 20mg of prednisone. Previously seen in 2022- was able to taper off steroids completely. Represented in may and had worsened PMR flare symptoms so started back at 40mg and has been slowly weaning. Currently on 7.5mg (as of last week). Presents for follow up today.    Having worsened low back pain with radiation outside of the hip and upper thigh. Can get  pain down the thigh.  Is limping due to the severity of pain.   Symptoms primarily on the right but has on the left.   Had developed callous under left foot and had adjusted gait but had worsened pain since that time  Denies falls or injury. Denies heavy lifting.   Denies numbness/tingling.  Pain worsened prior to decreasing the prednisone  States difficulty straightening up and getting up from seated position.   Denies symptoms while at rest- sitting or laying down. Denies pain waking from sleep at night.     Feels the pain is different than her PMR but is getting some stiffness in the morning for a few steps  Not feeling like the prednisone is helping.     Having worsened hair thinning overall. Feels much more amplified with this round of the prednisone compared to before.  + puffiness of the face  + some tremors of the hands  + recent A1c elevated at 7.4. told related to prednisone and plans to repeat in 3 months.   + not sleeping well, which she thinks is aggravated by the prednisone  + fatigue stable and some generalized weakness. Denies focal weakness   + tinnitus slightly worsened since taking the prednisone     Stopped alendronate due to upset stomach which has since resolved since stopping medication     Denies HA, blurred vision, temple pain or jaw pain.   Denies shoulder pain/stiffness  Denies any other joint pain or swelling.   Denies current tingling in the hands like before.   Denies recent infections.     Has hx of bilateral knee replacements over the past few years.  Told by ortho that hips showed no arthritis. Hx of left hip fracture s/p minerva placement.     Last mammogram was normal this year  Last cscope sometime between [age] - cannot remember details but told no need   Denies skin rashes or suspicion skin lesions   States last year in August, torn her biceps tendon and developed blood clot. Seen by orthopedics, no surgery required. 3m of xarelto. Had been seen by heme/onc and did not do further  workup.   Has plans to see cardiology- Dr. Holden in December with plans to get stress test      HPI from initial consultation  referred for rheumatologic evaluation due to elevated inflammatory markers and concern for PMR.       Last year, she had to get a left hip surgery with minerva placement after falling and femur fracture.  She has stays active and had been doing some pool exercises and had noticed increased pain/aching in her shoulders. States her symptoms continued and progressed to also have inability to lift her arms above her head to do her hair, etc. Noticed the shoulder symptoms first started end of June/beginning of July. Got more severe by the end of July/beginning of August. Then she noticed bilateral hip discomfort in the middle of August. States in the mornings, she had a difficult time bending as well as reaching.   She was initially started on oral prednisone 10mg with some improvement of symptoms. Would be on for about 10 days at a time.     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 Had undergone bilateral knee injections with cortisone. Had significant elevation in her blood pressure as well as blood sugar, as well as headaches and nausea without vomiting. Did have to go to the ER due to symptoms. Was told likely reaction to steroids vs flu. Pt thinks more due to the steroids because symptoms improved within 24 hours.      States due to this sensitivity, pt was hesitant to increase the prednisone further. Upon stopping the prednisone, pt had suffered another flare with bilateral shoulder as well as hip discomfort. Was recommended to take 30mg, however pt hesitant due to her previous reaction. So pt is currently taking 10mg twice daily with improvement. States she feels back to her normal self now.      Does feel like prednisone has made her more tired overall.  Does have hx of osteopenia, does get some calcium/vit d in her multivitamin  Admits to being under increased  stress over the past several months after requiring stent placement as well as recently moving.  Denies any current bitemporal headaches, jaw claudication or vision changes.   + hx of diverticulitis s/p colon resection     Denies family history of any autoimmune condition. Denies family history of PMR.  ?  Past Medical History:  Past Medical History:    Acute pain of both shoulders    Arthritis    Atherosclerosis of coronary artery    Black stools    occasionally    Bloating    occasionally    Body piercing    ears    Breast cancer (HCC)    dcis    Calculus of kidney    about 30 years ago    Cancer (HCC)    breast    Chest pain of uncertain etiology    Constipation    occasionally    Diverticulitis    Ductal carcinoma in situ of breast    DCIS    Esophageal reflux    Essential hypertension    Exposure to medical diagnostic radiation    Flatulence/gas pain/belching    aternoons    Hearing impairment    tinnitis    Heart attack (HCC)    Hemorrhoids    occasionally    High blood pressure    High cholesterol    History of blood transfusion    many years ago    History of diverticulitis    Hx of diseases NEC    diverticulosis    Hx of diseases NEC    had stress test - had some extra beats    Hx of diseases NEC    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    Hx of diseases NEC    utd with gyn    Hx of motion sickness    Hyperlipidemia    Hypokalemia    Irregular bowel habits    about a year    Leg swelling    ankle    Night sweats    on and off for several months    Osteoarthritis    Other and unspecified personal history of malignant neoplasm    breast cancer    Pain in joints    Pain with bowel movements    straining more than pain    Stented coronary artery    Thyroid nodule    benign    Uncomfortable fullness after meals    about a year    Unspecified menopausal and postmenopausal disorder    treated with effexor - however pt would like to get off med    Visual impairment    contacts/glasses    Wears glasses    contact lenses     Past  Surgical History:  Past Surgical History:   Procedure Laterality Date    Angioplasty (coronary)  2018    stent RCA Johann    Appendectomy  2005    at time of diverticulitis    Appendectomy      appendix removed at 53 years old    Bowel resection      after diverticulosis    Cath percutaneous  transluminal coronary angioplasty      Colectomy      Colonoscopy      Colonoscopy,diagnostic      nl colonoscopy    Fracture surgery Left     hip    Hernia surgery      had a patch put in lower abdomen at 53, after resection    Hip surgery Left     May 2018    Knee replacement surgery Left     3/2022    Lumpectomy right      Sofía biopsy stereo nodule 1 site right (cpt=19081)      Other Left     hip-minerva on place in femur    Part removal colon w end colostomy      at 53 years old    Radiation right      Special service or report      diverticulitis - had L sigmoidectomy    Special service or report      ventral hernia repair    Total knee replacement Right 2023     Family History:  Family History   Problem Relation Age of Onset    Breast Cancer Self 53    DCIS Self 53    Hypertension Mother     Stroke Mother     Other (Other) Mother     Breast Cancer Mother 49    Heart Disorder Father         mi at 46 and  at 53 of 7th mi    Lipids Father     Heart Disease Father     Other (Other) Father     Heart Attack Father     Other (Other) Sister         spinal problems, prolactin problem, factor v leiden + (pt negative)    Other (Other) Brother         healthy    Other (Other) Maternal Grandmother          of aneurysm    Breast Cancer Maternal Aunt 52    Breast Cancer Paternal Cousin Female 50     Social History:  Social History     Socioeconomic History    Marital status:    Tobacco Use    Smoking status: Never    Smokeless tobacco: Never   Vaping Use    Vaping status: Never Used   Substance and Sexual Activity    Alcohol use: Yes     Alcohol/week: 7.0 standard drinks of alcohol      Types: 7 Glasses of wine per week    Drug use: No    Sexual activity: Yes     Partners: Male   Other Topics Concern    Caffeine Concern Yes     Comment: 1 cup tea daily     Exercise Yes     Comment: 2-x weekly     Seat Belt Yes     Social Drivers of Health     Physical Activity: Sufficiently Active (9/29/2020)    Received from [facility]    Exercise Vital Sign     Days of Exercise per Week: 3 days     Minutes of Exercise per Session: 60 min     Medications:  Outpatient Medications Marked as Taking for the 10/21/24 encounter (Office Visit) with Luna Juarez DO   Medication Sig Dispense Refill    predniSONE 5 MG Oral Tab Take 1 tablet (5 mg total) by mouth daily. 90 tablet 0    predniSONE 2.5 MG Oral Tab Take 1 tablet (2.5 mg total) by mouth daily. 90 tablet 0    aspirin 81 MG Oral Tab EC Take 1 tablet (81 mg total) by mouth daily.      TRIAMTERENE-HCTZ 37.5-25 MG Oral Tab TAKE 1 TABLET BY MOUTH EVERY DAY 90 tablet 0    Acidophilus/Pectin Oral Cap Take 1 capsule by mouth daily.      Flaxseed, Linseed, (FLAX SEED OIL) 1300 MG Oral Cap 1,000 mg daily.      Calcium Carbonate-Vitamin D (CALCIUM-VITAMIN D3 OR) Take 1 tablet by mouth daily.      Multiple Vitamin (TAB-A-JIMBO) Oral Tab Take 1 tablet by mouth daily.      ezetimibe 10 MG Oral Tab Take 1 tablet (10 mg total) by mouth every other day.  4    atorvastatin 20 MG Oral Tab Take 1 tablet (20 mg total) by mouth nightly. 30 tablet 2    atenolol 50 MG Oral Tab Take 1 tablet (50 mg total) by mouth daily. 30 tablet 0     Modified Medications    No medications on file     Medications Discontinued During This Encounter   Medication Reason    alendronate 70 MG Oral Tab     amLODIPine 5 MG Oral Tab     predniSONE 2.5 MG Oral Tab     predniSONE 10 MG Oral Tab      ?  ?  Allergies:  Allergies   Allergen Reactions    Altace [Ramipril] ANAPHYLAXIS     angioedema    Dilaudid [Hydromorphone] NAUSEA AND VOMITING     severe    Ramipril ANAPHYLAXIS      Oral    Cortisone OTHER (SEE COMMENTS)     Headache, blood pressure became raised    No Known Allergies OTHER (SEE COMMENTS)     ?  REVIEW OF SYSTEMS   ?  Review of Systems   Constitutional:  Positive for malaise/fatigue (last couple days). Negative for chills, fever and weight loss.   HENT:  Positive for tinnitus.    Eyes:  Negative for blurred vision, pain and redness.   Respiratory:  Negative for cough, hemoptysis and shortness of breath.    Cardiovascular:  Negative for chest pain, palpitations and leg swelling.   Gastrointestinal:  Positive for constipation (chronic and unchanged). Negative for abdominal pain, blood in stool, diarrhea, heartburn, nausea and vomiting.   Genitourinary:  Negative for dysuria, frequency, hematuria and urgency.   Musculoskeletal:  Positive for back pain and joint pain. Negative for myalgias and neck pain.   Skin:  Negative for itching and rash.   Neurological:  Positive for tingling and tremors. Negative for dizziness, seizures, weakness and headaches.   Endo/Heme/Allergies:  Does not bruise/bleed easily.   Psychiatric/Behavioral:  Positive for depression. The patient has insomnia. The patient is not nervous/anxious.      PHYSICAL EXAM   Today's Vitals:  Temperature Blood Pressure Heart Rate Resp Rate SpO2   Temp: 97.9 °F (36.6 °C) BP: 158/80 Pulse: 68 Resp: 16 SpO2: 96 %   ?  Current Weight Height BMI BSA Pain   Wt Readings from Last 1 Encounters:   10/21/24 156 lb (70.8 kg)    Height: 5' 2.5\" (158.8 cm) Body mass index is 28.08 kg/m². Body surface area is 1.73 meters squared.         Physical Exam  Vitals and nursing note reviewed.   Constitutional:       General: She is not in acute distress.     Appearance: She is well-developed. She is not diaphoretic.   HENT:      Head: Normocephalic.   Eyes:      General: No scleral icterus.     Extraocular Movements: Extraocular movements intact.      Conjunctiva/sclera: Conjunctivae normal.   Neck:      Vascular: No JVD.      Trachea:  No tracheal deviation.      Comments: + significant paraspinal muscle tightness and trapezius tightness.   Cardiovascular:      Rate and Rhythm: Normal rate and regular rhythm.      Heart sounds: Normal heart sounds. No murmur heard.  Pulmonary:      Effort: Pulmonary effort is normal. No respiratory distress.      Breath sounds: Normal breath sounds. No wheezing.   Musculoskeletal:         General: Tenderness and deformity present.      Cervical back: Neck supple.      Comments: Diffuse small heberden/brenda nodes of the fingers, no basilar joint tenderness of the 1st CMC bilaterally but slight squaring present.   No swelling, tenderness, redness or restriction of motion of the DIPs, PIPs, MCPs, wrists, elbows, ankles, or joints of the feet.  Bilateral shoulders with some hesitation ROM. Able to raise arms above head  Both knees s/p replacement   Lumbar spinous process tenderness   Lymphadenopathy:      Cervical: No cervical adenopathy.   Skin:     General: Skin is warm and dry.      Findings: No erythema or rash.      Comments: No periungal erythema    Neurological:      Mental Status: She is alert and oriented to person, place, and time.      Cranial Nerves: No cranial nerve deficit.      Gait: Gait normal.   Psychiatric:         Mood and Affect: Mood normal.         Behavior: Behavior normal.       Radiology review:  XR LUMBAR SPINE (MIN 2 VIEWS) (CPT=72100)    Result Date: 10/14/2024  CONCLUSION:   There are 5 lumbar-type vertebral bodies which maintain normal height.  Mild straightening of the usual lumbar lordosis.  Mild levoconvex scoliosis centered within the mid lumbar spine.  No spondylolisthesis.  Advanced multilevel disc, facet, and endplate degenerative change with suspected multilevel osseous foraminal narrowing of the lower lumbar spine.  No appreciable pars defects.  No destructive osseous lesions.   LOCATION:  [facility]   Dictated by (CST): Apolinar Solis MD on 10/14/2024 at 1:58 PM     Finalized by  (CST): Apolinar Solis MD on 10/14/2024 at 1:59 PM         PROCEDURE:  XR DEXA BONE DENSITOMETRY (CPT=77080)       COMPARISON:  [facility], XR, XR DEXA BONE DENSITOMETRY (CPT=77080), 1/28/2020, 11:24 AM.       INDICATIONS:    Z13.820 Screening for osteoporosis M85.89 Osteopenia of multiple sites       PATIENT STATED HISTORY: (As transcribed by Technologist)  Screening for osteoporosis.            LUMBAR SPINE ANALYSIS RESULTS:       Bone mineral density (BMD) (g/cm2):  1.076     Lumbar T-Score:  0.3       % young normals:  103       % age matched controls:  136       Change from prior spine examination:  0.6%                TOTAL HIP ANALYSIS RESULTS:         Bone mineral density (BMD) (g/cm2):  0.703       Total Hip T-Score:  -2.0       % young normals:  75       % age matched controls:  96       Change from prior hip examination:  -1.6%                FEMORAL NECK ANALYSIS RESULTS:         Bone mineral density (BMD) (g/cm2):  0.620       Femoral neck T-Score:  -2.1       % young normals:  73       % age matched controls:  99       Change from prior hip examination:  2.6%               ADDITIONAL FINDINGS:  FRAX not reported due to prior hip or vertebral fracture.                          Impression   CONCLUSION:  Bone mineral density values for right hip are compatible with the diagnosis of osteopenia by WHO definition (T score between -1.0 and -2.5).  If therapy is initiated, follow-up study is recommended in 2 years for further evaluation of   therapeutic efficacy.                 The World Health Organization has defined the following categories based on bone density:   Normal bone:  T-score better than -1   Osteopenia: T-score between -1 and -2.5   Osteoporosis:  T-score less than -2.5               Dictated by (CST): Marcelle Boo MD on 2/24/2022 at 1:01 PM       Finalized by (CST): Marcelle Boo MD on 2/24/2022 at 1:01 PM       PROCEDURE:  XR CERVICAL SPINE COMPLETE W/FLEX + EXT (CPT=72052)       TECHNIQUE:  AP,  lateral, obliques, coned down view, and flexion/extension views of the spine were obtained.       COMPARISON:  None.       INDICATIONS:  M54.2 Neck pain       PATIENT STATED HISTORY: (As transcribed by Technologist)  Patient complains of intermittent tingling to her bilateral fingers X a few months.  She also has chronic neck stiffness.  Currently she feels some stiffness to her posterior neck but denies any   significant pain at this time.  She has a history of polymyalgia rheumatica.            FINDINGS:  No acute fracture or subluxation in the cervical spine.  Moderate to severe degenerative disc space loss and endplate spurring most pronounced at C4-5 with scattered uncovertebral osteophytes noted.  Moderate facet arthropathy throughout the   cervical spine.  No abnormal motion with flexion or extension views.  Osteopenia.  C1-2 articulation intact.  Vascular calcifications.    Impression   CONCLUSION:  No acute fracture or subluxation in the cervical spine.  Degenerative changes as above.           Dictated by (CST): Panfilo Guardado MD on 11/16/2021 at 2:21 PM       Finalized by (CST): Panfilo Guardado MD on 11/16/2021 at 2:21 PM      04/2019  X-rays AP of the pelvis and AP and lateral of the right hip show mild to moderate osteoarthritic changes with spur formation and slight loss of clear space.  No fractures dislocations or loose bodies.     X-rays AP of the pelvis and AP and lateral of the left hip show mild osteoarthritis with spur formation in both sides of the joint and slight loss of clear space.  There are no acute changes.  There is a proximal intramedullary nail and lag screw device consistent with prior open reduction and internal fixation of a peritrochanteric fracture.  There is no residual fracture line visible.  This appears to have gone on to full healing.  No fractures dislocations or loose bodies.     X-rays 4 views of the right knee show moderate to severe osteoarthritis with significant  tricompartmental spurring and significant loss of medial clear space.     X-rays 4 views of the left knee show severe osteoarthritis with near complete loss of medial clear space and tricompartmental spur formation.  There are no fractures dislocations or loose bodies.    Labs:  Lab Results   Component Value Date    WBC 11.0 10/09/2024    RBC 5.03 10/09/2024    HGB 14.4 10/09/2024    HCT 44.6 10/09/2024    .0 10/09/2024    MCV 88.7 10/09/2024    MCH 28.6 10/09/2024    MCHC 32.3 10/09/2024    RDW 13.9 10/09/2024    NEPRELIM 7.72 (H) 10/09/2024    NEPERCENT 70.5 10/09/2024    LYPERCENT 17.3 10/09/2024    MOPERCENT 8.8 10/09/2024    EOPERCENT 1.8 10/09/2024    BAPERCENT 0.7 10/09/2024    NE 7.72 (H) 10/09/2024    LYMABS 1.90 10/09/2024    MOABSO 0.96 10/09/2024    EOABSO 0.20 10/09/2024    BAABSO 0.08 10/09/2024     Lab Results   Component Value Date     (H) 10/09/2024    BUN 18 10/09/2024    BUNCREA 12.6 10/05/2022    CREATSERUM 1.08 (H) 10/09/2024    ANIONGAP 5 10/09/2024    GFR 54 (L) 09/01/2017    GFRNAA 64 04/19/2022    GFRAA 73 04/19/2022    CA 10.0 10/09/2024    OSMOCALC 295 10/09/2024    ALKPHO 53 (L) 10/09/2024    AST 29 10/09/2024    ALT 23 10/09/2024    BILT 0.9 10/09/2024    TP 7.0 10/09/2024    ALB 4.2 10/09/2024    GLOBULIN 2.8 10/09/2024    AGRATIO 1.6 11/27/2007     10/09/2024    K 3.5 10/09/2024     10/09/2024    CO2 31.0 10/09/2024       Additional Labs:   SED RATE C-REACTIVE PROTEIN   Latest Ref Rng 0 - 30 mm/Hr <=0.50 mg/dL   5/21/2024 82 (H)  3.59 (H)    6/4/2024 19  <0.29    6/10/2024     6/18/2024 14  0.31 (H)    7/12/2024 28  1.56 (H)    8/5/2024 27  1.50 (H)    8/20/2024 12  <0.40    9/16/2024 7  <0.40    10/17/2024 19  <0.40      05/2024  CRP 3.59 elevated  ESR 82 elevated    11/2021  ESR 13 normal   CRP normal     07/2021  ESR 12 normal   CRP normal     04/2021  CRP normal  ESR 9 normal     08/2020  ESR 9 normal   CRP <0.29 normal     07/2020  ESR 9 normal   CRP 0.46  borderline     06/2020  ESR 11  CRP 0.32 borderline     03/2020  CRP 0.42 borderline  ESR 4 normal    02/2020  ESR 9 normal  CRP normal     01/2020  ESR 14 normal   CRP 0.67 borderline   Vit D 31.6 low normal    11/2019  ESR 16 normal   CRP 0.72 borderline     10/2019  ESR 7 normal   CRP <0.29 normal   Vit D 23.4     08/21/2019  CK normal   CRP 3.70  ESR 30     08/12/2019  CRP 0.78  ESR 14    Luna Juarez DO  [facility]  10/21/2024

## 2024-10-28 PROBLEM — R70.0 ELEVATED SED RATE: Status: RESOLVED | Noted: 2024-05-22 | Resolved: 2024-10-28

## 2024-10-28 PROBLEM — I15.8 OTHER SECONDARY HYPERTENSION: Status: RESOLVED | Noted: 2024-05-22 | Resolved: 2024-10-28

## 2024-11-04 RX ORDER — ATENOLOL 50 MG/1
50 TABLET ORAL DAILY
Qty: 30 TABLET | Refills: 0 | OUTPATIENT
Start: 2024-11-04

## 2024-11-04 NOTE — TELEPHONE ENCOUNTER
Per office visit on 10/14/2024 with Marietta Frank     7. Coronary artery disease involving native coronary artery of native heart without angina pectoris  Follows with Dr. Navin Kaur 50 Dispensed 8/01/2024 Written 04/19/2024 Quantity: 90 Archie Richardson MD [facility] #[id number]

## 2024-11-07 ENCOUNTER — OFFICE VISIT (OUTPATIENT)
Dept: PHYSICAL THERAPY | Age: 74
End: 2024-11-07
Payer: MEDICARE

## 2024-11-07 DIAGNOSIS — M51.361 DEGENERATION OF INTERVERTEBRAL DISC OF LUMBAR REGION WITH LOWER EXTREMITY PAIN: Primary | ICD-10-CM

## 2024-11-07 DIAGNOSIS — M25.551 ACUTE PAIN OF RIGHT HIP: ICD-10-CM

## 2024-11-07 PROCEDURE — 97110 THERAPEUTIC EXERCISES: CPT

## 2024-11-07 PROCEDURE — 97162 PT EVAL MOD COMPLEX 30 MIN: CPT

## 2024-11-07 NOTE — PROGRESS NOTES
SPINE EVALUATION:     Diagnosis:   Acute right hip pain (M25.551), Lumbar DDD with lower extremity pain (M51.361)   Referring Provider: MARGIE Reardon Date of Evaluation:    11/7/2024    Precautions:  None Next MD visit:   none scheduled  Date of Surgery: n/a  Symptom onset: Oct 2024     PATIENT SUMMARY   Emily Velasquez is a 74 year old female who presents to therapy today with complaints of R hip pain for past ~4 wks. She got a callus on her foot and started to limp, developed pain R lateral hip wrapping into groin. She recently noted that seated R hip AROM IR/ER eases the hip pain somewhat. Lately she has felt achiness B lateral hips. Her doctor's office also gave her ex's for SIJ, which she has been hesitant to do before starting PT. Denies N/T. [place] like RLE was going to give out on her last week. Does not awaken her overnight; mostly back sleeper. She is currently on prednisone for polymyalgia rheumatica, which flared up August 2024. No pain sitting or supine.    Pt describes pain level at best 0/10, at worst 6/10.   Current functional limitations include decr walking tolerance, difficulty c stairs.     Emily describes prior level of function full and painfree. Pt goals include increased walking tolerance s pain.  Past medical history was reviewed with Emily. Significant findings include breast CA, L sigmoidectomy, Htn, hyperlipidemia, stented coronary artery, B TKR, ventral hernia repair 2005, 1o OA involving multiple joints, polymyalgia rheumatica  Pt denies diplopia, dysarthria, dysphasia, dizziness, drop attacks, bowel/bladder changes, saddle anesthesia, and EDILBERTO LE N/T.    ASSESSMENT  Emily presents to physical therapy evaluation with primary c/o R>L hip pain. The results of the objective tests and measures show painful lumbar AROM, painful ROM R hip, soft tissue dysfunction, decr hip strength.  Functional deficits include but are not limited to decr walking tolerance, difficulty c stairs.   Signs and symptoms are consistent with rehab diagnosis. Pt and PT discussed evaluation findings, pathology, POC and HEP.  Pt voiced understanding and performs HEP correctly without reported pain. Skilled Physical Therapy is medically necessary to address the above impairments and reach functional goals.     OBJECTIVE:   Observation/Posture: mild L sidebend in standing  Neuro Screen: denies N/T    Lumbar AROM: (* denotes performed with pain)  Flexion: fingertips to toes  Extension: mod limitation*  Sidebending: R 59cm* R SIJ; L 57cm* L postlat buttock  Rotation: mild restriction B    Hip PROM:  Hip flexion: R 100* lat hip/ L 110  Hip abd: R 40* / L 40  Hip ER: 45 B  Hip IR: R 30* / L 30    Accessory motion: painfree PA B SIJ, mild hypomobile to central PA lower lumbar spine c pain  Palpation: non-TTP lumbar paraspinals or spinous processes, TTP R>L greater trochanter, distal piriformis B, R SIJ    Strength: (* denotes performed with pain)  @eval 11-7-24   Hip flexion (L2): R 4-*lat hip/5; L 4/5  Hip abduction: R 4-/5; L 4-/5  Hip Extension: R 3+/5; L 3+/5   Hip ER: R 4/5; L 4-/5  Hip IR: R 4/5; L 4-/5  Hamstring: R 5/5; L 5/5   Quad (L3): R 4+/5; L 4+/5   DF (L4): R 5/5; L 5/5  PF (S1): R 5/5; L 5/5     Flexibility:   Hamstrings: R 60; L 50*lat hip    Special tests:   + grind test R hip    Gait: pt ambulates on level ground with  decr step length LLE, decr speed, genu valgum .  Balance: NT    Recent Imaging:  10/14/2024 XR LUMBAR SPINE (MIN 2 VIEWS) (CPT=72100)  CONCLUSION:   There are 5 lumbar-type vertebral bodies which maintain normal height.  Mild straightening of the usual lumbar lordosis.  Mild levoconvex scoliosis centered within the mid lumbar spine.  No spondylolisthesis.  Advanced multilevel disc, facet, and endplate degenerative change with suspected multilevel osseous foraminal narrowing of the lower lumbar spine.  No appreciable pars defects.  No destructive osseous lesions.    Today’s Treatment and  Response:   Pt education was provided on exam findings, treatment diagnosis, treatment plan, expectations, and prognosis. Pt was also provided recommendations for activity modifications and importance of remaining active  Patient was instructed in and issued a HEP for:   Access Code: [email or web address] ; URL: [email or web address] ; Prepared by: eRyna Holbrook  Date: 11/07/2024  Exercises  - Supine Lower Trunk Rotation  - 2 x daily - 1-2 minutes  - Supine Bridge  - 2 x daily - 10 reps - 5 hold  - Bent Knee Fallouts  - 2 x daily - 2 sets - 10 reps  - Sidelying Hip Abduction  - 2 x daily - 2 sets - 10 reps    Charges: PT Eval Moderate Complexity, therex-1      Total Timed Treatment: 10 min     Total Treatment Time: 40 min     Based on clinical rationale and outcome measures, this evaluation involved Moderate Complexity decision making due to 1-2 personal factors/comorbidities, 3 body structures involved/activity limitations, and evolving symptoms including changing pain levels.  PLAN OF CARE:    Goals: (to be met in 8 visits)   Consistently decr pain < or = 1/10 intermittent for incr QOL and activity tolerance  Overall incr in function as indicated by decr Oswestry at least 10 pts  Incr LE MMT at least 1/2 grade painfree for incr mm support for WB activities and lack of buckling  Painfree lumbar and hip ROM to allow painfree gait cycle and indicate decr irritability of symptoms  Pt reports incr walking tolerance , without restriction and minimal to no pain c shopping trips  Indep HEP to promote cont progress toward functional goals    Frequency / Duration: Patient will be seen for 1-2 x/week or a total of 8 visits over a 90 day period. Treatment will include: Manual Therapy, Neuromuscular Re-education, Therapeutic Exercise, and Home Exercise Program instruction    Education or treatment limitation: None  Rehab Potential:good    Oswestry Disability Index Score  Score: (Patient-Rptd) 24 % (10/31/2024  9:35  AM)      Patient/Family/Caregiver was advised of these findings, precautions, and treatment options and has agreed to actively participate in planning and for this course of care.    Thank you for your referral. Please co-sign or sign and return this letter via fax as soon as possible to [phone]. If you have any questions, please contact me at Dept: [phone]    Sincerely,  Electronically signed by therapist: Reyna Holbrook PT    Physician's certification required: Yes  I certify the need for these services furnished under this plan of treatment and while under my care.    X___________________________________________________ Date____________________    Certification From: 11/7/2024  To:2/5/2025

## 2024-11-12 ENCOUNTER — LAB ENCOUNTER (OUTPATIENT)
Dept: LAB | Age: 74
End: 2024-11-12
Attending: INTERNAL MEDICINE
Payer: MEDICARE

## 2024-11-12 DIAGNOSIS — Z79.52 LONG TERM (CURRENT) USE OF SYSTEMIC STEROIDS: ICD-10-CM

## 2024-11-12 DIAGNOSIS — M35.3 PMR (POLYMYALGIA RHEUMATICA) (HCC): ICD-10-CM

## 2024-11-12 LAB
CRP SERPL-MCNC: 0.4 MG/DL (ref ?–0.5)
ERYTHROCYTE [SEDIMENTATION RATE] IN BLOOD: 28 MM/HR

## 2024-11-12 PROCEDURE — 85652 RBC SED RATE AUTOMATED: CPT

## 2024-11-12 PROCEDURE — 36415 COLL VENOUS BLD VENIPUNCTURE: CPT

## 2024-11-12 PROCEDURE — 86140 C-REACTIVE PROTEIN: CPT

## 2024-11-13 ENCOUNTER — PATIENT MESSAGE (OUTPATIENT)
Dept: RHEUMATOLOGY | Facility: CLINIC | Age: 74
End: 2024-11-13

## 2024-11-14 ENCOUNTER — NURSE TRIAGE (OUTPATIENT)
Dept: INTERNAL MEDICINE CLINIC | Facility: CLINIC | Age: 74
End: 2024-11-14

## 2024-11-14 ENCOUNTER — OFFICE VISIT (OUTPATIENT)
Dept: PHYSICAL THERAPY | Age: 74
End: 2024-11-14
Payer: MEDICARE

## 2024-11-14 ENCOUNTER — HOSPITAL ENCOUNTER (OUTPATIENT)
Dept: GENERAL RADIOLOGY | Age: 74
Discharge: HOME | End: 2024-11-14
Attending: PHYSICIAN ASSISTANT
Payer: MEDICARE

## 2024-11-14 ENCOUNTER — OFFICE VISIT (OUTPATIENT)
Dept: INTERNAL MEDICINE CLINIC | Facility: CLINIC | Age: 74
End: 2024-11-14
Payer: MEDICARE

## 2024-11-14 VITALS
BODY MASS INDEX: 28.16 KG/M2 | TEMPERATURE: 97 F | HEIGHT: 62.21 IN | RESPIRATION RATE: 16 BRPM | HEART RATE: 64 BPM | OXYGEN SATURATION: 98 % | SYSTOLIC BLOOD PRESSURE: 132 MMHG | WEIGHT: 155 LBS | DIASTOLIC BLOOD PRESSURE: 81 MMHG

## 2024-11-14 DIAGNOSIS — M25.511 ACUTE PAIN OF RIGHT SHOULDER: ICD-10-CM

## 2024-11-14 DIAGNOSIS — M25.511 ACUTE PAIN OF RIGHT SHOULDER: Primary | ICD-10-CM

## 2024-11-14 DIAGNOSIS — M25.551 ACUTE PAIN OF RIGHT HIP: Primary | ICD-10-CM

## 2024-11-14 DIAGNOSIS — M51.361 DEGENERATION OF INTERVERTEBRAL DISC OF LUMBAR REGION WITH LOWER EXTREMITY PAIN: ICD-10-CM

## 2024-11-14 PROCEDURE — 73030 X-RAY EXAM OF SHOULDER: CPT | Performed by: PHYSICIAN ASSISTANT

## 2024-11-14 PROCEDURE — 97140 MANUAL THERAPY 1/> REGIONS: CPT

## 2024-11-14 PROCEDURE — 97110 THERAPEUTIC EXERCISES: CPT

## 2024-11-14 PROCEDURE — 99213 OFFICE O/P EST LOW 20 MIN: CPT | Performed by: PHYSICIAN ASSISTANT

## 2024-11-14 RX ORDER — MULTIVIT-MIN/IRON FUM/FOLIC AC 7.5 MG-4
1 TABLET ORAL DAILY
COMMUNITY

## 2024-11-14 NOTE — PROGRESS NOTES
"        Dina Adorno Hucferny   2017 8:40 AM   Office Visit   MRN: [id number]    Department:  [facility]   Dept Phone:  [phone]    Description:  Female : 1949   Provider:  Romy Ivy M.D.           Reason for Visit     Establish Care           Allergies as of 2017     Allergen Noted Reactions    Penicillins 2010   Rash, Unspecified    Itchy rash    Codeine 2014   Vomiting    Morphine 2010   Rash    Localized red rash after morphine administration    Vicodin [Hydrocodone-Acetaminophen] 10/09/2012   Itching      You were diagnosed with     JUNIOR (generalized anxiety disorder)   [591533]       Diverticulitis of large intestine with perforation without bleeding (CMS-HCC)   [5887549]       H/O small bowel obstruction   [733657]       H/O diverticulitis of colon   [768078]       Hx of adenomatous colonic polyps   [137916]       Irritable bowel syndrome without diarrhea   [590598]       Moderate episode of recurrent major depressive disorder (CMS-[facility])   [4663178]       Neuropathy (CMS-[facility])   [189901]       JUNIOR (generalized anxiety disorder)   [891347]    Continue sertraline.       Vital Signs     Blood Pressure Pulse Temperature Height Weight Body Mass Index    130/82 mmHg 71 36.2 °C (97.1 °F) 1.575 m (5' 2.01\") 75.751 kg (167 lb) 30.54 kg/m2    Oxygen Saturation Last Menstrual Period Breastfeeding? 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   10/19/2011 - Present    Hyperlipidemia E78.5   10/19/2011 - Present    GERD (gastroesophageal reflux disease) K21.9   10/29/2011 - Present    Osteoporosis, post-menopausal M81.0   2012 - Present   " PT DAILY NOTE  Diagnosis:   Acute right hip pain (M25.551), Lumbar DDD with lower extremity pain (M51.361)   Referring Provider: MARGIE Reardon Date of Evaluation:    11/7/2024    Precautions:  None Next MD visit:   none scheduled  Date of Surgery: n/a  Symptom onset: Oct 2024     Insurance Primary/Secondary: MEDICARE     Visit 2 of 8   Date POC Expires: 2-5-25       Subjective: Pt states pain varies. Hip has been achy this morning, and leg feels like it's buckling somewhat. She stopped doing hip abd exercise because that really bothered her. She isn't getting the groin pain as much, more in lateral hip/thigh. But some days it's better. Hip IR/ER really helps. No pain in sitting.  Pain:    3/10 at start of session  @eval: Emily Velasquez is a 74 year old female who presents to therapy today with complaints of R hip pain for past ~4 wks. She got a callus on her foot and started to limp, developed pain R lateral hip wrapping into groin. She recently noted that seated R hip AROM IR/ER eases the hip pain somewhat. Lately she has felt achiness B lateral hips. Her doctor's office also gave her ex's for SIJ, which she has been hesitant to do before starting PT. Denies N/T. [place] like RLE was going to give out on her last week. Does not awaken her overnight; mostly back sleeper. She is currently on prednisone for polymyalgia rheumatica, which flared up August 2024. No pain sitting or supine.  Pt describes pain level at best 0/10, at worst 6/10.   Current functional limitations include decr walking tolerance, difficulty c stairs.   Emily describes prior level of function full and painfree. Pt goals include increased walking tolerance s pain.  Past medical history was reviewed with Emily. Significant findings include breast CA, L sigmoidectomy, Htn, hyperlipidemia, stented coronary artery, B TKR, ventral hernia repair 2005, 1o OA involving multiple joints, polymyalgia rheumatica  Pt denies diplopia, dysarthria,  H/O diverticulitis of colon Z87.19   4/24/2013 - Present    JUNIOR (generalized anxiety disorder) F41.1   12/3/2013 - Present    S/P partial colectomy Z90.49   4/22/2014 - Present    Obesity (BMI 30.0-34.9) E66.9   10/22/2014 - Present    Sleep disturbances G47.9   5/7/2015 - Present    Moderate episode of recurrent major depressive disorder (CMS-HCC) F33.1   11/9/2015 - Present    IBS (irritable bowel syndrome) K58.9   11/9/2015 - Present    Hx of adenomatous colonic polyps Z86.010   12/5/2015 - Present    Neuropathy (CMS-HCC) G62.9   1/14/2016 - Present    H/O small bowel obstruction Z87.19   5/3/2016 - Present    Generalized muscle ache M79.1   2/24/2017 - Present    Smoking greater than 30 pack years F17.210   4/14/2017 - Present    Primary osteoarthritis involving multiple joints M15.0   4/14/2017 - Present    History of total hysterectomy Z90.710   9/8/2008 - Present    History of malignant neoplasm of skin Z85.828   1/7/2008 - Present    Cataract of both eyes, managed by Dr. Caicedo H26.9   4/27/2017 - Present    Right knee pain M25.561   5/11/2017 - Present    Elevated TSH R94.6   5/30/2017 - Present    Hypokalemia E87.6   6/2/2017 - Present      Health Maintenance        Date Due Completion Dates    IMM DTaP/Tdap/Td Vaccine (1 - Tdap) 7/12/1966 ---    IMM ZOSTER VACCINE 7/12/2007 ---    MAMMOGRAM 4/8/2017 4/8/2016, 11/7/2013, 8/8/2012    IMM INFLUENZA (1) 9/1/2017 ---    BONE DENSITY 8/9/2018 8/9/2016, 8/8/2012    COLONOSCOPY 12/2/2020 12/2/2015            Current Immunizations     13-VALENT PCV PREVNAR 8/1/2016    Pneumococcal polysaccharide vaccine (PPSV-23) 12/7/2012    Tetanus Vaccine 4/2/2010      Below and/or attached are the medications your provider expects you to take. Review all of your home medications and newly ordered medications with your provider and/or pharmacist. Follow medication instructions as directed by your provider and/or pharmacist. Please keep your medication list with you and share  dysphasia, dizziness, drop attacks, bowel/bladder changes, saddle anesthesia, and EDILBERTO LE N/T.    Objective:   Pt ambulates avoiding R hip extension past neutral, therefore decr step length RLE    Recent Imaging:  10/14/2024 XR LUMBAR SPINE (MIN 2 VIEWS) (CPT=72100)  CONCLUSION:   There are 5 lumbar-type vertebral bodies which maintain normal height.  Mild straightening of the usual lumbar lordosis.  Mild levoconvex scoliosis centered within the mid lumbar spine.  No spondylolisthesis.  Advanced multilevel disc, facet, and endplate degenerative change with suspected multilevel osseous foraminal narrowing of the lower lumbar spine.  No appreciable pars defects.  No destructive osseous lesions.    Treatment:  (\"NP\" indicates Not Performed this date)  Manual Therapy:  Inferolateral R hip distraction c belt  FR to R ITB in S/L  STM R glutes/piriformis c ball    Neuromuscular Re-education:    Therapeutic Exercise: Eval 11-7-24 11-14-24   Hooklying B hip ER AROM x10 2x10    LTR 1' 2'    bridges   5\"x10   R clamshell in S/L  5\"x10    Hooklying B hip add vs ball  5\"x10    Sit to stand   2x10   Standing bentover hip ext R,L   2x10ea   Standing R hipflexor stretch (LLE on 8\" step)   10\"x3                    future sessions: BKTC c SB for spinal flexion, B hip strength all planes, PPT    HEP:   Review of her previous HEP from MD: HS stretch supine, piriformis stretch, LTR, SKTC, DKTC, HS nerve glides   Access Code: [email or web address] ; URL: [email or web address] ; Prepared by: Reyna Holbrook  Date: 11/07/2024 Exercises  - Supine Lower Trunk Rotation  - 2 x daily - 1-2 minutes  - Supine Bridge  - 2 x daily - 10 reps - 5 hold  - Bent Knee Fallouts  - 2 x daily - 2 sets - 10 reps  - Sidelying Hip Abduction  - 2 x daily - 2 sets - 10 reps  11-14: add - Clamshell  - 2 x daily - 1-2 sets - 10 reps - 5 hold ; - Sit to Stand Without Arm Support  - 2 x daily - 2 sets - 10 reps    Assessment/Plan: Added manual tx today to  promote R hip joint and soft tissue mobility. She then tolerated several new ex's s c/o, but she continued at end of session to have painfully interrupted gait, avoiding R hip extension. She was then able to gently stretch hipflexor in standing but again without change to pain she had intermittently c FWB RLE in gait cycle. Pt to attempt heat or ice at home before HEP, and continue to monitor symptoms and report back next week.    PLAN OF CARE:    Goals: (to be met in 8 visits)   Consistently decr pain < or = 1/10 intermittent for incr QOL and activity tolerance  Overall incr in function as indicated by decr Oswestry at least 10 pts  Incr LE MMT at least 1/2 grade painfree for incr mm support for WB activities and lack of buckling  Painfree lumbar and hip ROM to allow painfree gait cycle and indicate decr irritability of symptoms  Pt reports incr walking tolerance , without restriction and minimal to no pain c shopping trips  Indep HEP to promote cont progress toward functional goals    Frequency / Duration: Patient will be seen for 1-2 x/week or a total of 8 visits over a 90 day period.     All Treatments performed at distinct and separate times during the therapy session.  Treatment Minutes  Units charged   Manual Therapy 17 minutes 1   Therapeutic Activity 0 minutes    Neuromuscular Re-education 0 minutes    Therapeutic Exercise 23 minutes 2   Total Direct Treatment Time 40 minutes       with your provider. Update the information when medications are discontinued, doses are changed, or new medications (including over-the-counter products) are added; and carry medication information at all times in the event of emergency situations     Allergies:  PENICILLINS - Rash,Unspecified     CODEINE - Vomiting     MORPHINE - Rash     VICODIN - Itching               Medications  Valid as of: July 05, 2017 -  9:53 AM    Generic Name Brand Name Tablet Size Instructions for use    Escitalopram Oxalate (Tab) LEXAPRO 20 MG Take 1 Tab by mouth every day.        Gabapentin (Cap) NEURONTIN 300 MG One at bedtime, may increase to BID after one  week        LORazepam (Tab) ATIVAN 1 MG Take 1 Tab by mouth at bedtime as needed.        Oxycodone-Acetaminophen (Tab) PERCOCET 5-325 MG Take 1 Tab by mouth every 8 hours as needed.        Pantoprazole Sodium (Tablet Delayed Response) PROTONIX 40 MG Take 1 Tab by mouth every day.        .                 Medicines prescribed today were sent to:     [facility] [address] [place] [address], [place] - [address]    [address] [facility] [place] [address]    Phone: [phone] Fax: [phone]    Open 24 Hours?: No      Medication refill instructions:       If your prescription bottle indicates you have medication refills left, it is not necessary to call your provider’s office. Please contact your pharmacy and they will refill your medication.    If your prescription bottle indicates you do not have any refills left, you may request refills at any time through one of the following ways: The online [employer] system (except Urgent Care), by calling your provider’s office, or by asking your pharmacy to contact your provider’s office with a refill request. Medication refills are processed only during regular business hours and may not be available until the next business day. Your provider may request additional information or to have a follow-up visit with you prior to refilling your  medication.   *Please Note: Medication refills are assigned a new Rx number when refilled electronically. Your pharmacy may indicate that no refills were authorized even though a new prescription for the same medication is available at the pharmacy. Please request the medicine by name with the pharmacy before contacting your provider for a refill.        Referral     A referral request has been sent to our patient care coordination department. Please allow 3-5 business days for us to process this request and contact you either by phone or mail. If you do not hear from us by the 5th business day, please call us at [phone].           [employer] Access Code: Activation code not generated  Current [employer] Status: Active          Quit Tobacco Information     Do you want to quit using tobacco?    Quitting tobacco decreases risks of cancer, heart and lung disease, increases life expectancy, improves sense of taste and smell, and increases spending money, among other benefits.    If you are thinking about quitting, we can help.  • [facility] Quit Tobacco Program: 626.715.4136  o Program occurs weekly for four weeks and includes pharmacist consultation on products to support quitting smoking or chewing tobacco. A provider referral is needed for pharmacist consultation.  • Tobacco Users Help Hotline: 0-731-QUIT-NOW (024-2527) or https://[place].quitlogix.org/  o Free, confidential telephone and online coaching for [place] residents. Sessions are designed on a schedule that is convenient for you. Eligible clients receive free nicotine replacement therapy.  • Nationally: www.smokefree.gov  o Information and professional assistance to support both immediate and long-term needs as you become, and remain, a non-smoker. Smokefree.gov allows you to choose the help that best fits your needs.

## 2024-11-14 NOTE — TELEPHONE ENCOUNTER
Action Requested: Summary for Provider     []  Critical Lab, Recommendations Needed  [] Need Additional Advice  []   FYI    []   Need Orders  [] Need Medications Sent to Pharmacy  []  Other     SUMMARY: Scheduled OV today. Pt agreeable to plan.     Future Appointments   Date Time Provider Department Center   11/14/2024 10:20 AM Nunu Nicholson PA-C [facility]   11/14/2024 11:45 AM Reyna Holbrook, PT [facility] [place]   11/19/2024 12:45 PM Reyna Holbrook, PT [facility] [place]   11/21/2024 12:45 PM Reyna Holbrook, PT [facility] [place]   11/26/2024 12:45 PM Reyna Holbrook, PT [facility] [place]   12/2/2024  1:15 PM Reyna Holbrook, PT [facility] [place]   12/5/2024 12:45 PM Reyna Holbrook, PT [facility] [place]   12/9/2024 12:30 PM Reyna Holbrook, PT [facility] [place]   2/19/2025  9:45 AM Luna Juarez, DO [facility] [place]   4/7/2025 10:30 AM PF CT RM1 PF CT [place]   4/8/2025 11:20 AM PF GERMÁN RM2 PF MAMMO [place]   5/21/2025  9:45 AM Luna Juarez, DO [facility] [place]   6/18/2025 10:15 AM AnnLuna barkley DO [facility] [place]   10/14/2025 10:00 AM Gatito Rachel MD [facility]     Reason for call: Shoulder Pain  Onset: Weekend   Reason for Disposition   MODERATE pain (e.g., interferes with normal activities) and present > 3 days   Patient wants to be seen    Protocols used: Shoulder Pain-A-OH      C/o pain to R shoulder   Had trouble getting out of chair  Arm feels weak/sore  Able to pick things up and blow dry hair  No numbness or tingling   Denies redness and swelling   In July 2023 tore R shoulder  Wants xray

## 2024-11-14 NOTE — PROGRESS NOTES
Emily Velasquez is a 74 year old female.   Chief Complaint   Patient presents with    Pain     Rm 15 SS. R shoulder pain post standing up chair during the weekend.      HPI:    Pt presents with right shoulder pain x 1 week. Began after she was sitting in a foldable camping chair and used her arms to boost herself out of the chair. Pain is in posterior and lateral shoulder. No pain at rest. Pain with movement of shoulder. No difficulty sleeping.   H/o torn biceps tendon 1 year ago and completed PT for this.   H/o PMR and is currently weaning down on prednisone for recent acute flare. She is followed by rheum - Dr Juarez.       Allergies:  Allergies[1]   Current Meds:  Current Outpatient Medications   Medication Sig Dispense Refill    Multiple Vitamins-Minerals (MULTI-VITAMIN/MINERALS) Oral Tab Take 1 tablet by mouth daily.      predniSONE 5 MG Oral Tab Take 1 tablet (5 mg total) by mouth daily. 90 tablet 0    predniSONE 2.5 MG Oral Tab Take 1 tablet (2.5 mg total) by mouth daily. 90 tablet 0    aspirin 81 MG Oral Tab EC Take 1 tablet (81 mg total) by mouth daily.      TRIAMTERENE-HCTZ 37.5-25 MG Oral Tab TAKE 1 TABLET BY MOUTH EVERY DAY 90 tablet 0    Acidophilus/Pectin Oral Cap Take 1 capsule by mouth daily.      Flaxseed, Linseed, (FLAX SEED OIL) 1300 MG Oral Cap 1,000 mg daily.      Calcium Carbonate-Vitamin D (CALCIUM-VITAMIN D3 OR) Take 1 tablet by mouth daily.      ezetimibe 10 MG Oral Tab Take 1 tablet (10 mg total) by mouth every other day.  4    atorvastatin 20 MG Oral Tab Take 1 tablet (20 mg total) by mouth nightly. 30 tablet 2    atenolol 50 MG Oral Tab Take 1 tablet (50 mg total) by mouth daily. 30 tablet 0    cyclobenzaprine 10 MG Oral Tab Take 1 tablet (10 mg total) by mouth nightly. (Patient not taking: Reported on 11/14/2024) 30 tablet 0    Multiple Vitamin (TAB-A-JIMBO) Oral Tab Take 1 tablet by mouth daily. (Patient not taking: Reported on 11/14/2024)          PMH:     Past Medical History:     Acute pain of both shoulders    Arthritis    Atherosclerosis of coronary artery    Black stools    occasionally    Bloating    occasionally    Body piercing    ears    Breast cancer (HCC)    dcis    Calculus of kidney    about 30 years ago    Cancer (HCC)    breast    Chest pain of uncertain etiology    Constipation    occasionally    Diverticulitis    Ductal carcinoma in situ of breast    DCIS    Esophageal reflux    Essential hypertension    Exposure to medical diagnostic radiation    Flatulence/gas pain/belching    aternoons    Hearing impairment    tinnitis    Heart attack (HCC)    Hemorrhoids    occasionally    High blood pressure    High cholesterol    History of blood transfusion    many years ago    History of diverticulitis    Hx of diseases NEC    diverticulosis    Hx of diseases NEC    had stress test - had some extra beats    Hx of diseases NEC    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    Hx of diseases NEC    utd with gyn    Hx of motion sickness    Hyperlipidemia    Hypokalemia    Irregular bowel habits    about a year    Leg swelling    ankle    Night sweats    on and off for several months    Osteoarthritis    Other and unspecified personal history of malignant neoplasm    breast cancer    Pain in joints    Pain with bowel movements    straining more than pain    Stented coronary artery    Thyroid nodule    benign    Uncomfortable fullness after meals    about a year    Unspecified menopausal and postmenopausal disorder    treated with effexor - however pt would like to get off med    Visual impairment    contacts/glasses    Wears glasses    contact lenses       ROS:   GENERAL: Negative for fever, chills and fatigue. NAD.  HENT: Negative for congestion, sore throat, and ear pain.  RESPIRATORY: Negative for cough, chest tightness, shortness of breath and wheezing.    CV: Negative for chest pain, palpitations and leg swelling.   GI: Negative for nausea, vomiting, abdominal pain, diarrhea, and blood in stool.   :  Negative for dysuria, hematuria and difficulty urinating.   MUSCULOSKELETAL: Negative for myalgias, back pain, joint swelling, arthralgias and gait problem.   NEURO: Negative for dizziness, syncope, weakness, numbness, tingling and headaches.   PSYCH: The patient is not nervous/anxious. No depression.      PHYSICAL EXAM:    /81   Pulse 64   Temp 96.7 °F (35.9 °C) (Temporal)   Resp 16   Ht 5' 2.21\" (1.58 m)   Wt 155 lb (70.3 kg)   LMP  (LMP Unknown)   SpO2 98%   BMI 28.16 kg/m²     GENERAL: NAD. A&Ox3  RESPIRATORY: CTAB, no R/R/W  CV: RRR, no murmurs.   UE: no tenderness on palpation of right shoulder; right shoulder with pain with abduction, internal rotation, and external rotation; strength 5/5 b/l   PSYCH: Appropriate mood and affect.      ASSESSMENT/ PLAN:   1. Acute pain of right shoulder  Suspect soft tissue strain   Check xray to further evaluate the joint   May benefit from home exercises vs PT   Tylenol prn   - XR SHOULDER, COMPLETE (MIN 2 VIEWS), RIGHT (CPT=73030); Future       Health Maintenance Due   Topic Date Due    Zoster Vaccines (1 of 2) Never done    COVID-19 Vaccine (5 - 2024-25 season) 09/01/2024    Influenza Vaccine (1) 10/01/2024           Pt indicates understanding and agrees to the plan.     No follow-ups on file.    Nunu Nicholson PA-C           [1]   Allergies  Allergen Reactions    Altace [Ramipril] ANAPHYLAXIS     angioedema    Dilaudid [Hydromorphone] NAUSEA AND VOMITING     severe    Ramipril ANAPHYLAXIS     Oral    Cortisone OTHER (SEE COMMENTS)     Headache, blood pressure became raised    No Known Allergies OTHER (SEE COMMENTS)

## 2024-11-18 ENCOUNTER — TELEPHONE (OUTPATIENT)
Dept: FAMILY MEDICINE CLINIC | Facility: CLINIC | Age: 74
End: 2024-11-18

## 2024-11-18 NOTE — TELEPHONE ENCOUNTER
Patient agreeable to appointment this Thursday at 5:30    Future Appointments   Date Time Provider Department Center   11/19/2024 12:45 PM Reyna Holbrook, PT [facility] [place]   11/21/2024 12:45 PM Reyna Holbrook, PT [facility] [place]   11/21/2024  5:30 PM Gatito Rachel MD [facility]   11/26/2024 12:45 PM Reyna Holbrook, PT [facility] [place]   12/2/2024  1:15 PM Reyna Holbrook, PT [facility] [place]   12/5/2024 12:45 PM Reyna Holbrook, PT [facility] [place]   12/9/2024 12:30 PM Reyna Holbrook, PT [facility] [place]   2/19/2025  9:45 AM Luna Juarez, DO [facility] [place]   4/7/2025 10:30 AM PF CT RM1 PF [facility] [place]   4/8/2025 11:20 AM PF GERMÁN RM2 PF M[facility] [place]   5/21/2025  9:45 AM Luna Juarez, DO [facility] [place]   6/18/2025 10:15 AM Luna Juarez, DO [facility] [place]   10/14/2025 10:00 AM Gatito Rachel MD [facility]

## 2024-11-18 NOTE — TELEPHONE ENCOUNTER
Pt calling with persistent R shoulder and R hip pain despite doing Physical Therapy and increased Prednisone rx to 10mg from Rheumatology MD without noticeable improvement.  States she recently saw mid-level providers but would like direction from MD Wilson for recommended next steps.      RN offered to schedule pt for OV, pt asking if she can be accommodated for sooner evaluation than soonest available.  Please advise.    11/14/24 LOV note:    ASSESSMENT/ PLAN:   1. Acute pain of right shoulder  Suspect soft tissue strain   Check xray to further evaluate the joint   May benefit from home exercises vs PT   Tylenol prn   - XR SHOULDER, COMPLETE (MIN 2 VIEWS), RIGHT (CPT=73030); Future           Health Maintenance Due   Topic Date Due    Zoster Vaccines (1 of 2) Never done    COVID-19 Vaccine (5 - 2024-25 season) 09/01/2024    Influenza Vaccine (1) 10/01/2024      Pt indicates understanding and agrees to the plan.         X-ray Result Note:    XR SHOULDER, COMPLETE (MIN 2 VIEWS), RIGHT (CPT=73030): Patient Communication     Append Comments   Seen    Anny Diaz shoulder xray shows mild/moderate arthritis. I recommend physical therapy for this.     Nunu Nicholson PA-C   Written by Nunu Nicholson PA-C on 11/15/2024  9:37 PM CST  Seen by patient Emily Velasquez on 11/16/2024  7:29 AM

## 2024-11-19 ENCOUNTER — OFFICE VISIT (OUTPATIENT)
Dept: PHYSICAL THERAPY | Age: 74
End: 2024-11-19
Payer: MEDICARE

## 2024-11-19 DIAGNOSIS — M51.361 DEGENERATION OF INTERVERTEBRAL DISC OF LUMBAR REGION WITH LOWER EXTREMITY PAIN: ICD-10-CM

## 2024-11-19 DIAGNOSIS — M25.551 ACUTE PAIN OF RIGHT HIP: Primary | ICD-10-CM

## 2024-11-19 PROCEDURE — 97110 THERAPEUTIC EXERCISES: CPT

## 2024-11-19 PROCEDURE — 97140 MANUAL THERAPY 1/> REGIONS: CPT

## 2024-11-19 NOTE — PROGRESS NOTES
PT DAILY NOTE  Diagnosis:   Acute right hip pain (M25.551), Lumbar DDD with lower extremity pain (M51.361)   Referring Provider: MARGIE Reardon Date of Evaluation:    11/7/2024    Precautions:  None Next MD visit:   none scheduled  Date of Surgery: n/a  Symptom onset: Oct 2024     Insurance Primary/Secondary: MEDICARE     Visit 3 of 8   Date POC Expires: 2-5-25       Subjective: Pt states she has an appt with her PCP tomorrow to talk about both her hip and her arm, since she started doing the doctor's ex's about a month ago without improvement. Rheumatology recommended increasing prednisone for 5 days which seems to be helping so far (1st day today). Sit to stand and clamshell ex's really seem to bother her, so skipped them yesterday.   Pain:    4/10 at start of session when walking  @eval: Emilyteri Velasquez is a 74 year old female who presents to therapy today with complaints of R hip pain for past ~4 wks. She got a callus on her foot and started to limp, developed pain R lateral hip wrapping into groin. She recently noted that seated R hip AROM IR/ER eases the hip pain somewhat. Lately she has felt achiness B lateral hips. Her doctor's office also gave her ex's for SIJ, which she has been hesitant to do before starting PT. Denies N/T. [place] like RLE was going to give out on her last week. Does not awaken her overnight; mostly back sleeper. She is currently on prednisone for polymyalgia rheumatica, which flared up August 2024. No pain sitting or supine.  Pt describes pain level at best 0/10, at worst 6/10.   Current functional limitations include decr walking tolerance, difficulty c stairs.   Emily describes prior level of function full and painfree. Pt goals include increased walking tolerance s pain.  Past medical history was reviewed with Emily. Significant findings include breast CA, L sigmoidectomy, Htn, hyperlipidemia, stented coronary artery, B TKR, ventral hernia repair 2005, 1o OA involving  multiple joints, polymyalgia rheumatica  Pt denies diplopia, dysarthria, dysphasia, dizziness, drop attacks, bowel/bladder changes, saddle anesthesia, and EDILBERTO LE N/T.    Objective:   Pt ambulates maintaining R hip flexion > or = 10 degrees throughout gait cycle at start of session; improved to at least neutral extension by end of session.    Recent Imaging:  10/14/2024 XR LUMBAR SPINE (MIN 2 VIEWS) (CPT=72100)  CONCLUSION:   There are 5 lumbar-type vertebral bodies which maintain normal height.  Mild straightening of the usual lumbar lordosis.  Mild levoconvex scoliosis centered within the mid lumbar spine.  No spondylolisthesis.  Advanced multilevel disc, facet, and endplate degenerative change with suspected multilevel osseous foraminal narrowing of the lower lumbar spine.  No appreciable pars defects.  No destructive osseous lesions.    Treatment:  (\"NP\" indicates Not Performed this date)  Manual Therapy:  Inferolateral R hip distraction c belt  FR to R ITB in S/L  STM R glutes/piriformis c ball  RLE long axis distraction    Neuromuscular Re-education:    Therapeutic Exercise: Eval 11-7-24 11-14-24 11-19-24   Hooklying B hip ER AROM x10 2x10  2x10   LTR 1' 2'     bridges   5\"x10 5\"x10   R clamshell in S/L  5\"x10  hold   Hooklying B hip add vs ball  5\"x10  5\"x10   Sit to stand   2x10 1x10   Standing bentover hip ext R,L   2x10ea 2x10ea   Standing R hipflexor stretch (LLE on 8\" step)   10\"x3     sidestepping     90sec    fwd stepup R,L     4\"x20ea   R TKE ball on wall   5\"x20                future sessions: BKTC c SB for spinal flexion, B hip strength all planes, PPT    HEP:   Review of her previous HEP from MD: HS stretch supine, piriformis stretch, LTR, SKTC, DKTC, HS nerve glides   Access Code: [email or web address] ; URL: [email or web address] ; Prepared by: Reyna Holbrook  Date: 11/07/2024 Exercises  - Supine Lower Trunk Rotation  - 2 x daily - 1-2 minutes  - Supine Bridge  - 2 x daily - 10 reps - 5  hold  - Bent Knee Fallouts  - 2 x daily - 2 sets - 10 reps  - Sidelying Hip Abduction  - 2 x daily - 2 sets - 10 reps  11-14: add - Clamshell  - 2 x daily - 1-2 sets - 10 reps - 5 hold ; - Sit to Stand Without Arm Support  - 2 x daily - 2 sets - 10 reps  11-19: replace clamshell c sidestepping    Assessment/Plan: Pt reports incr in pain past couple of days. When she has discomfort she localizes it to R SIJ primarily. Her polymyalgia rheumatica has been managed by prednisone for past 3 months; rheum instructed her to incr her dosage for next 5 days and if she feels improvement, then her pain can be attributed to PMR. Today is day 1 and she thinks she might feel improvement. She does not tolerate clamshell exercise per pt report,so held today. She did tolerate other hip strengthening ex's including stepups and sidestepping which were new today. Her hip extension during gait cycle improved by end of session but remained limited to about neutral. Continue to monitor symptoms and progress as tolerated.    PLAN OF CARE:    Goals: (to be met in 8 visits)   Consistently decr pain < or = 1/10 intermittent for incr QOL and activity tolerance  Overall incr in function as indicated by decr Oswestry at least 10 pts  Incr LE MMT at least 1/2 grade painfree for incr mm support for WB activities and lack of buckling  Painfree lumbar and hip ROM to allow painfree gait cycle and indicate decr irritability of symptoms  Pt reports incr walking tolerance , without restriction and minimal to no pain c shopping trips  Indep HEP to promote cont progress toward functional goals    Frequency / Duration: Patient will be seen for 1-2 x/week or a total of 8 visits over a 90 day period.     All Treatments performed at distinct and separate times during the therapy session.  Treatment Minutes  Units charged   Manual Therapy 20 minutes 1   Therapeutic Activity 0 minutes    Neuromuscular Re-education 0 minutes    Therapeutic Exercise 25 minutes 2    Total Direct Treatment Time 45 minutes

## 2024-11-21 ENCOUNTER — OFFICE VISIT (OUTPATIENT)
Dept: INTERNAL MEDICINE CLINIC | Facility: CLINIC | Age: 74
End: 2024-11-21
Payer: MEDICARE

## 2024-11-21 ENCOUNTER — OFFICE VISIT (OUTPATIENT)
Dept: PHYSICAL THERAPY | Age: 74
End: 2024-11-21
Payer: MEDICARE

## 2024-11-21 VITALS
HEART RATE: 70 BPM | TEMPERATURE: 98 F | BODY MASS INDEX: 28.67 KG/M2 | RESPIRATION RATE: 18 BRPM | DIASTOLIC BLOOD PRESSURE: 80 MMHG | WEIGHT: 155.81 LBS | HEIGHT: 61.81 IN | OXYGEN SATURATION: 96 % | SYSTOLIC BLOOD PRESSURE: 132 MMHG

## 2024-11-21 DIAGNOSIS — M54.16 LUMBAR RADICULOPATHY: ICD-10-CM

## 2024-11-21 DIAGNOSIS — M47.816 LUMBAR SPONDYLOSIS: Primary | ICD-10-CM

## 2024-11-21 DIAGNOSIS — M35.3 PMR (POLYMYALGIA RHEUMATICA) (HCC): ICD-10-CM

## 2024-11-21 DIAGNOSIS — M25.551 ACUTE PAIN OF RIGHT HIP: Primary | ICD-10-CM

## 2024-11-21 DIAGNOSIS — M51.361 DEGENERATION OF INTERVERTEBRAL DISC OF LUMBAR REGION WITH LOWER EXTREMITY PAIN: ICD-10-CM

## 2024-11-21 DIAGNOSIS — M19.011 PRIMARY OSTEOARTHRITIS OF RIGHT SHOULDER: ICD-10-CM

## 2024-11-21 PROCEDURE — 97110 THERAPEUTIC EXERCISES: CPT

## 2024-11-21 PROCEDURE — 97140 MANUAL THERAPY 1/> REGIONS: CPT

## 2024-11-21 PROCEDURE — 99214 OFFICE O/P EST MOD 30 MIN: CPT | Performed by: FAMILY MEDICINE

## 2024-11-21 NOTE — PROGRESS NOTES
Emily Velasquez  4/29/1950    Chief Complaint   Patient presents with    Joint Pain     ES rm - 9 - joint and muscle pain       HPI:   Emily Velasuqez is a 74 year old female who presents for follow-up regarding her right shoulder and right lower extremity pain.  Symptoms began initially as right sided low back/hip pain radiating to the thigh.  She was seen in office and had lumbar radiographs that showed advanced lumbar spondylosis.  She then developed right shoulder pain after decorating a [place] tree where she was doing a lot of reaching.  She had evaluation here with radiographs that showed mild to moderate osteoarthritis and evidence of chronic rotator cuff tear.  She had reached out to her rheumatologist at that time as she was concerned for a possible flare of her polymyalgia.  She had her prednisone increased and is currently completing a 5-day course of 20 mg daily.  This has significantly improved her right lower extremity radicular type symptoms and minimally improved her right shoulder pain.  During this time she has also been participating in physical therapy for her right lower extremity pain    Current Outpatient Medications   Medication Sig Dispense Refill    Multiple Vitamins-Minerals (MULTI-VITAMIN/MINERALS) Oral Tab Take 1 tablet by mouth daily.      predniSONE 5 MG Oral Tab Take 1 tablet (5 mg total) by mouth daily. (Patient taking differently: Take 4 tablets (20 mg total) by mouth daily. 20 mg daily every am) 90 tablet 0    predniSONE 2.5 MG Oral Tab Take 1 tablet (2.5 mg total) by mouth daily. 90 tablet 0    aspirin 81 MG Oral Tab EC Take 1 tablet (81 mg total) by mouth daily.      TRIAMTERENE-HCTZ 37.5-25 MG Oral Tab TAKE 1 TABLET BY MOUTH EVERY DAY 90 tablet 0    Acidophilus/Pectin Oral Cap Take 1 capsule by mouth daily.      Flaxseed, Linseed, (FLAX SEED OIL) 1300 MG Oral Cap 1,000 mg daily.      Calcium Carbonate-Vitamin D (CALCIUM-VITAMIN D3 OR) Take 1 tablet by mouth daily.       Multiple Vitamin (TAB-A-JIMBO) Oral Tab Take 1 tablet by mouth daily.      ezetimibe 10 MG Oral Tab Take 1 tablet (10 mg total) by mouth every other day.  4    atorvastatin 20 MG Oral Tab Take 1 tablet (20 mg total) by mouth nightly. 30 tablet 2    atenolol 50 MG Oral Tab Take 1 tablet (50 mg total) by mouth daily. 30 tablet 0    cyclobenzaprine 10 MG Oral Tab Take 1 tablet (10 mg total) by mouth nightly. (Patient not taking: Reported on 11/21/2024) 30 tablet 0      Allergies[1]   Past Medical History:    Acute pain of both shoulders    Arthritis    Atherosclerosis of coronary artery    Black stools    occasionally    Bloating    occasionally    Body piercing    ears    Breast cancer (HCC)    dcis    Calculus of kidney    about 30 years ago    Cancer (HCC)    breast    Chest pain of uncertain etiology    Constipation    occasionally    Diverticulitis    Ductal carcinoma in situ of breast    DCIS    Esophageal reflux    Essential hypertension    Exposure to medical diagnostic radiation    Flatulence/gas pain/belching    aternoons    Hearing impairment    tinnitis    Heart attack (HCC)    Hemorrhoids    occasionally    High blood pressure    High cholesterol    History of blood transfusion    many years ago    History of diverticulitis    Hx of diseases NEC    diverticulosis    Hx of diseases NEC    had stress test - had some extra beats    Hx of diseases NEC    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    Hx of diseases NEC    utd with gyn    Hx of motion sickness    Hyperlipidemia    Hypokalemia    Irregular bowel habits    about a year    Leg swelling    ankle    Night sweats    on and off for several months    Osteoarthritis    Other and unspecified personal history of malignant neoplasm    breast cancer    Pain in joints    Pain with bowel movements    straining more than pain    Stented coronary artery    Thyroid nodule    benign    Uncomfortable fullness after meals    about a year    Unspecified menopausal and postmenopausal  disorder    treated with effexor - however pt would like to get off med    Visual impairment    contacts/glasses    Wears glasses    contact lenses      Patient Active Problem List   Diagnosis    Benign essential HTN    Pure hypercholesterolemia    Multiple thyroid nodules    History of breast cancer    CAD (coronary artery disease)    History of non-ST elevation myocardial infarction (NSTEMI)    Prediabetes    Presence of drug coated stent in right coronary artery    PMR (polymyalgia rheumatica) ([facility])    Personal history of colonic polyps    Sensory hearing loss, bilateral    S/p total knee replacement, bilateral    Arm DVT (deep venous thromboembolism), acute, right ([facility])    Primary osteoarthritis involving multiple joints    Osteopenia of multiple sites    Long term (current) use of systemic steroids    DDD (degenerative disc disease), cervical      Past Surgical History:   Procedure Laterality Date    Angioplasty (coronary)  12/31/2018    stent RCA Johann    Appendectomy  2005    at time of diverticulitis    Appendectomy      appendix removed at 53 years old    Bowel resection      after diverticulosis    Cath percutaneous  transluminal coronary angioplasty      Colectomy      Colonoscopy      Colonoscopy,diagnostic  2004    nl colonoscopy    Fracture surgery Left     hip    Hernia surgery      had a patch put in lower abdomen at 53, after resection    Hip surgery Left     May 2018    Knee replacement surgery Left     3/2022    Lumpectomy right  2004    Sofía biopsy stereo nodule 1 site right (cpt=19081)  2004    Other Left     hip-minerva on place in femur    Part removal colon w end colostomy      at 53 years old    Radiation right  2004    Special service or report  2005    diverticulitis - had L sigmoidectomy    Special service or report  2005    ventral hernia repair    Total knee replacement Right 03/22/2023      Family History   Problem Relation Age of Onset    Breast Cancer Self 53    DCIS Self 53     Hypertension Mother     Stroke Mother     Other (Other) Mother     Breast Cancer Mother 49    Heart Disorder Father         mi at 46 and  at 53 of 7th mi    Lipids Father     Heart Disease Father     Other (Other) Father     Heart Attack Father     Other (Other) Sister         spinal problems, prolactin problem, factor v leiden + (pt negative)    Other (Other) Brother         healthy    Other (Other) Maternal Grandmother          of aneurysm    Breast Cancer Maternal Aunt 52    Breast Cancer Paternal Cousin Female 50      Social History     Socioeconomic History    Marital status:    Tobacco Use    Smoking status: Never    Smokeless tobacco: Never   Vaping Use    Vaping status: Never Used   Substance and Sexual Activity    Alcohol use: Yes     Alcohol/week: 7.0 standard drinks of alcohol     Types: 7 Glasses of wine per week    Drug use: No    Sexual activity: Yes     Partners: Male   Other Topics Concern    Caffeine Concern Yes     Comment: 1 cup tea daily     Exercise Yes     Comment: 2-x weekly     Seat Belt Yes     Social Drivers of Health     Physical Activity: Sufficiently Active (2020)    Received from [employer] [employer]    Exercise Vital Sign     Days of Exercise per Week: 3 days     Minutes of Exercise per Session: 60 min         REVIEW OF SYSTEMS:   GENERAL: no recent illness, no new dyspnea or fatigue.     EXAM:   /80 (BP Location: Left arm, Patient Position: Sitting, Cuff Size: large)   Pulse 70   Temp 97.8 °F (36.6 °C) (Temporal)   Resp 18   Ht 5' 1.81\" (1.57 m)   Wt 155 lb 12.8 oz (70.7 kg)   LMP  (LMP Unknown)   SpO2 96%   BMI 28.67 kg/m²   GENERAL: Well developed, well nourished,in no apparent distress  MSK: Evaluation of the patient's lumbar spine reveals no significant midline or paraspinal tenderness.  She has no significant neural tension on exam today.  Generally preserved hip range of motion and she walks with a nonantalgic gait.   Evaluation of her right shoulder reveals restricted range of motion actively and passively in all planes.  She has reproducible pain with impingement testing and resisted rotator cuff testing with strength deficits in resisted abduction and external rotation.    Imaging:   I personally viewed the images and report of patient's lumbar x-ray from 10/14/2024 that demonstrates advanced degenerative disc disease and facet arthropathy.  I also reviewed the images report of her right shoulder x-ray from 11/14/2024 that demonstrates moderate glenohumeral arthritis and superior migration of the humeral head suspicious for chronic rotator cuff tear.     ASSESSMENT AND PLAN:   Emily Velasquez is a 74 year old female who presents for evaluation.    Lumbar spondylosis  Lumbar radiculopathy  Primary osteoarthritis of right shoulder  PMR (polymyalgia rheumatica) (HCC)  Overall, I suspect her symptoms are orthopedic in nature and less likely related to exacerbation of her polymyalgia.  Her unilateral right lower extremity symptoms in addition to her lumbar radiographs support lumbar radiculopathy which is improving with physical therapy and her current increased steroid dose.  Her right shoulder pain is likely secondary to her moderate osteoarthritis with suspected chronic rotator cuff tear.  We discussed current treatment with continuing physical therapy for her right lower extremity pain and provided a home rehabilitation and strengthening program for her right shoulder.  She will complete her oral prednisone course and taper down based on Dr. Juarez's guidance.  We discussed trial of right shoulder corticosteroid injection should her shoulder pain not improve and discussed advanced imaging of her lumbar spine and evaluation with our physiatry team should her radicular type symptoms return.        All questions were answered and the patient agrees with the plan.     Thank you,  Gatito Rachel MD         [1]    Allergies  Allergen Reactions    Altace [Ramipril] ANAPHYLAXIS     angioedema    Dilaudid [Hydromorphone] NAUSEA AND VOMITING     severe    Ramipril ANAPHYLAXIS     Oral    Cortisone OTHER (SEE COMMENTS)     Headache, blood pressure became raised    No Known Allergies OTHER (SEE COMMENTS)

## 2024-11-21 NOTE — PROGRESS NOTES
PT DAILY NOTE  Diagnosis:   Acute right hip pain (M25.551), Lumbar DDD with lower extremity pain (M51.361)   Referring Provider: MARGIE Reardon Date of Evaluation:    11/7/2024    Precautions:  None Next MD visit:   none scheduled  Date of Surgery: n/a  Symptom onset: Oct 2024     Insurance Primary/Secondary: MEDICARE     Visit 4 of 8   Date POC Expires: 2-5-25       Subjective: Pt states after last visit, she [facility] Could have walked yesterday but weather wasn't good.  This morning she felt like she couldn't go up stairs using RLE, but now she probably could. This is her 3rd day on incr dose of prednisone and will be discussing c rheum tomorrow.  Pain:    1/10 at start of session when walking  @eval: Emily Velasquez is a 74 year old female who presents to therapy today with complaints of R hip pain for past ~4 wks. She got a callus on her foot and started to limp, developed pain R lateral hip wrapping into groin. She recently noted that seated R hip AROM IR/ER eases the hip pain somewhat. Lately she has felt achiness B lateral hips. Her doctor's office also gave her ex's for SIJ, which she has been hesitant to do before starting PT. Denies N/T. [place] like RLE was going to give out on her last week. Does not awaken her overnight; mostly back sleeper. She is currently on prednisone for polymyalgia rheumatica, which flared up August 2024. No pain sitting or supine.  Pt describes pain level at best 0/10, at worst 6/10.   Current functional limitations include decr walking tolerance, difficulty c stairs.   Emily describes prior level of function full and painfree. Pt goals include increased walking tolerance s pain.  Past medical history was reviewed with Emily. Significant findings include breast CA, L sigmoidectomy, Htn, hyperlipidemia, stented coronary artery, B TKR, ventral hernia repair 2005, 1o OA involving multiple joints, polymyalgia rheumatica  Pt denies diplopia,  dysarthria, dysphasia, dizziness, drop attacks, bowel/bladder changes, saddle anesthesia, and EDILBERTO LE N/T.    Objective:   Prone quad flexibility R 80 deg, L 90 deg. R improved > or = 10 degrees post-stretching    Recent Imaging:  10/14/2024 XR LUMBAR SPINE (MIN 2 VIEWS) (CPT=72100)  CONCLUSION:   There are 5 lumbar-type vertebral bodies which maintain normal height.  Mild straightening of the usual lumbar lordosis.  Mild levoconvex scoliosis centered within the mid lumbar spine.  No spondylolisthesis.  Advanced multilevel disc, facet, and endplate degenerative change with suspected multilevel osseous foraminal narrowing of the lower lumbar spine.  No appreciable pars defects.  No destructive osseous lesions.    Treatment:  (\"NP\" indicates Not Performed this date)  Manual Therapy:  Inferolateral R hip distraction c belt  FR to R ITB in S/L  STM R glutes/piriformis c ball  RLE long axis distraction  Prone R quad stretch 10\"x10    Neuromuscular Re-education:    Therapeutic Exercise: Eval 11-7-24 11-14-24 11-19-24 11-21-24   Hooklying B hip ER AROM x10 2x10  2x10 2x10   LTR 1' 2'      bridges   5\"x10 5\"x10 5\"x10   R clamshell in S/L  5\"x10  hold    Hooklying B hip add vs ball  5\"x10  5\"x10 5\"x20   Sit to stand   2x10 1x10 2x10   Standing bentover hip ext R,L   2x10ea 2x10ea 2x10ea   Standing R hipflexor stretch (LLE on 8\" step)   10\"x3  10\"x4    sidestepping     90sec 2min    fwd stepup R,L     4\"x20ea 4\"x20ea   R TKE ball on wall   5\"x20 5\"x20                  future sessions: BKTC c SB for spinal flexion, B hip strength all planes, PPT    HEP:   Review of her previous HEP from MD: HS stretch supine, piriformis stretch, LTR, SKTC, DKTC, HS nerve glides   Access Code: [email or web address] ; URL: [email or web address] ; Prepared by: Reyna Holbrook  Date: 11/07/2024 Exercises  - Supine Lower Trunk Rotation  - 2 x daily - 1-2 minutes  - Supine Bridge  - 2 x daily - 10 reps - 5 hold  - Bent Knee Fallouts  - 2 x  daily - 2 sets - 10 reps  - Sidelying Hip Abduction  - 2 x daily - 2 sets - 10 reps  11-14: add - Clamshell  - 2 x daily - 1-2 sets - 10 reps - 5 hold ; - Sit to Stand Without Arm Support  - 2 x daily - 2 sets - 10 reps  11-19: replace clamshell c sidestepping  11-21: add - Hip Flexor Stretch on Step  - 2 x daily - 3-4 reps - 10-15 hold    Assessment/Plan: Pt states she felt great after her last visit and was able to walk 1.5 miles afterward; this was also her 1st day on incr dose of prednisone. Today is her 3rd day and she continues to feel good. She extends R hip to ~5 degrees past neutral when cued, feels tightness at endrange, not pain. In prone pt has significant hip flexor restriction, bilaterally. Added manual stretches on R side, which improved her flexibility and her comfort c ambulation when cued for longer strides. Continue to monitor symptoms and address deficits. Pt to add standing hip flexor stretch to HEP to promote carryover of flexibility.    PLAN OF CARE:    Goals: (to be met in 8 visits)   Consistently decr pain < or = 1/10 intermittent for incr QOL and activity tolerance  Overall incr in function as indicated by decr Oswestry at least 10 pts  Incr LE MMT at least 1/2 grade painfree for incr mm support for WB activities and lack of buckling  Painfree lumbar and hip ROM to allow painfree gait cycle and indicate decr irritability of symptoms  Pt reports incr walking tolerance , without restriction and minimal to no pain c shopping trips  Indep HEP to promote cont progress toward functional goals    Frequency / Duration: Patient will be seen for 1-2 x/week or a total of 8 visits over a 90 day period.     All Treatments performed at distinct and separate times during the therapy session.  Treatment Minutes  Units charged   Manual Therapy 22 minutes 1   Therapeutic Activity 0 minutes    Neuromuscular Re-education 0 minutes    Therapeutic Exercise 23 minutes 2   Total Direct Treatment Time 45 minutes

## 2024-11-26 ENCOUNTER — OFFICE VISIT (OUTPATIENT)
Dept: PHYSICAL THERAPY | Age: 74
End: 2024-11-26
Payer: MEDICARE

## 2024-11-26 DIAGNOSIS — M25.551 ACUTE PAIN OF RIGHT HIP: Primary | ICD-10-CM

## 2024-11-26 DIAGNOSIS — M51.361 DEGENERATION OF INTERVERTEBRAL DISC OF LUMBAR REGION WITH LOWER EXTREMITY PAIN: ICD-10-CM

## 2024-11-26 PROCEDURE — 97110 THERAPEUTIC EXERCISES: CPT

## 2024-11-26 PROCEDURE — 97140 MANUAL THERAPY 1/> REGIONS: CPT

## 2024-11-26 NOTE — PROGRESS NOTES
PT DAILY NOTE  Diagnosis:   Acute right hip pain (M25.551), Lumbar DDD with lower extremity pain (M51.361)   Referring Provider: MARGIE Reardon Date of Evaluation:    11/7/2024    Precautions:  None Next MD visit:   none scheduled  Date of Surgery: n/a  Symptom onset: Oct 2024     Insurance Primary/Secondary: MEDICARE     Visit 5 of 8   Date POC Expires: 2-5-25       Subjective: Pt states her hip is still feeling pretty good. Her 5-day incr prednisone dose at 20mg ended, has been reduced to 15mg/day for 3 days, tomorrow starts 10mg for 3 days, then returns to her normal daily dose of 7.5mg. She hasn't had the giving out of the hip, but still have intermittent aching in R groin and SIJ. Took the stairs 2 flights yesterday and felt ok.  Pain:    1/10 at start of session when walking  @eval: Emily Georgia Velasquez is a 74 year old female who presents to therapy today with complaints of R hip pain for past ~4 wks. She got a callus on her foot and started to limp, developed pain R lateral hip wrapping into groin. She recently noted that seated R hip AROM IR/ER eases the hip pain somewhat. Lately she has felt achiness B lateral hips. Her doctor's office also gave her ex's for SIJ, which she has been hesitant to do before starting PT. Denies N/T. [place] like RLE was going to give out on her last week. Does not awaken her overnight; mostly back sleeper. She is currently on prednisone for polymyalgia rheumatica, which flared up August 2024. No pain sitting or supine.  Pt describes pain level at best 0/10, at worst 6/10.   Current functional limitations include decr walking tolerance, difficulty c stairs.   Emily describes prior level of function full and painfree. Pt goals include increased walking tolerance s pain.  Past medical history was reviewed with Emily. Significant findings include breast CA, L sigmoidectomy, Htn, hyperlipidemia, stented coronary artery, B TKR, ventral hernia repair 2005, 1o OA involving multiple  joints, polymyalgia rheumatica  Pt denies diplopia, dysarthria, dysphasia, dizziness, drop attacks, bowel/bladder changes, saddle anesthesia, and EDILBERTO LE N/T.    Objective:   Level R/L ASIS's but quite TTP on R side; TTP overlying R innominate in supine at TFL    Recent Imaging:  10/14/2024 XR LUMBAR SPINE (MIN 2 VIEWS) (CPT=72100)  CONCLUSION:   There are 5 lumbar-type vertebral bodies which maintain normal height.  Mild straightening of the usual lumbar lordosis.  Mild levoconvex scoliosis centered within the mid lumbar spine.  No spondylolisthesis.  Advanced multilevel disc, facet, and endplate degenerative change with suspected multilevel osseous foraminal narrowing of the lower lumbar spine.  No appreciable pars defects.  No destructive osseous lesions.    Treatment:  (\"NP\" indicates Not Performed this date)  Manual Therapy:  Inferolateral R hip distraction c belt-NP  FR to R ITB in S/L  STM R TFL/anterior hip c ball  RLE long axis distraction  Prone R quad stretch 10\"x10  Unilat PA R and L SIJ gr 2    Neuromuscular Re-education:    Therapeutic Exercise: Eval 11-7-24 11-14-24 11-19-24 11-21-24 11-26-24   Hooklying B hip ER AROM x10 2x10  2x10 2x10 x20   LTR 1' 2'       bridges   5\"x10 5\"x10 5\"x10 5\"x15   Prone heel push     5\"x10   R clamshell in S/L  5\"x10  hold     Hooklying B hip add vs ball  5\"x10  5\"x10 5\"x20 5\"x20   Sit to stand   2x10 1x10 2x10    Standing bentover hip ext R,L   2x10ea 2x10ea 2x10ea x20ea   Standing R hipflexor stretch (LLE on 8\" step)   10\"x3  10\"x4 10\"x4    sidestepping     90sec 2min 2min    fwd stepup R,L     4\"x20ea 4\"x20ea    R TKE ball on wall   5\"x20 5\"x20    Treadmill walk at self-selected speed     2.0mph 5min            future sessions: BKTC c SB for spinal flexion, B hip strength all planes, PPT    HEP:   Review of her previous HEP from MD: HS stretch supine, piriformis stretch, LTR, SKTC, DKTC, HS nerve glides   Access Code: [email or web address] ; URL:  [email or web address] ; Prepared by: Reyna Holbrook  Date: 11/07/2024 Exercises  - Supine Lower Trunk Rotation  - 2 x daily - 1-2 minutes  - Supine Bridge  - 2 x daily - 10 reps - 5 hold  - Bent Knee Fallouts  - 2 x daily - 2 sets - 10 reps  - Sidelying Hip Abduction  - 2 x daily - 2 sets - 10 reps  11-14: add - Clamshell  - 2 x daily - 1-2 sets - 10 reps - 5 hold ; - Sit to Stand Without Arm Support  - 2 x daily - 2 sets - 10 reps  11-19: replace clamshell c sidestepping  11-21: add - Hip Flexor Stretch on Step  - 2 x daily - 3-4 reps - 10-15 hold    Assessment/Plan: Pt's pain has improved over the past week, but she has also had temporary increase in her normal daily prednisone dosage. Today marked TTP was noted at R TFL, added STM to help decrease tone, after which pt did note improved flexibility during walking gait. Continue to work on anterior hip flexibility/posterior hip strength to promote increased excursion and decreased pain c R hip extension during gait.    PLAN OF CARE:    Goals: (to be met in 8 visits)   Consistently decr pain < or = 1/10 intermittent for incr QOL and activity tolerance  Overall incr in function as indicated by decr Oswestry at least 10 pts  Incr LE MMT at least 1/2 grade painfree for incr mm support for WB activities and lack of buckling  Painfree lumbar and hip ROM to allow painfree gait cycle and indicate decr irritability of symptoms  Pt reports incr walking tolerance , without restriction and minimal to no pain c shopping trips  Indep HEP to promote cont progress toward functional goals    Frequency / Duration: Patient will be seen for 1-2 x/week or a total of 8 visits over a 90 day period.     All Treatments performed at distinct and separate times during the therapy session.  Treatment Minutes  Units charged   Manual Therapy 25 minutes 2   Therapeutic Activity 0 minutes    Neuromuscular Re-education 0 minutes    Therapeutic Exercise 20 minutes 1   Total Direct  Treatment Time 45 minutes

## 2024-11-30 ENCOUNTER — HOSPITAL ENCOUNTER (EMERGENCY)
Age: 74
Discharge: HOME | End: 2024-11-30
Attending: EMERGENCY MEDICINE
Payer: MEDICARE

## 2024-11-30 VITALS
HEART RATE: 60 BPM | BODY MASS INDEX: 28.16 KG/M2 | SYSTOLIC BLOOD PRESSURE: 166 MMHG | RESPIRATION RATE: 14 BRPM | HEIGHT: 62 IN | OXYGEN SATURATION: 97 % | DIASTOLIC BLOOD PRESSURE: 72 MMHG | TEMPERATURE: 97 F | WEIGHT: 153 LBS

## 2024-11-30 DIAGNOSIS — H81.13 BENIGN PAROXYSMAL POSITIONAL VERTIGO DUE TO BILATERAL VESTIBULAR DISORDER: Primary | ICD-10-CM

## 2024-11-30 LAB
ATRIAL RATE: 57 BPM
P AXIS: 47 DEGREES
P-R INTERVAL: 182 MS
Q-T INTERVAL: 458 MS
QRS DURATION: 96 MS
QTC CALCULATION (BEZET): 445 MS
R AXIS: 41 DEGREES
T AXIS: 57 DEGREES
VENTRICULAR RATE: 57 BPM

## 2024-11-30 PROCEDURE — 99284 EMERGENCY DEPT VISIT MOD MDM: CPT

## 2024-11-30 PROCEDURE — 93010 ELECTROCARDIOGRAM REPORT: CPT

## 2024-11-30 PROCEDURE — S0119 ONDANSETRON 4 MG: HCPCS | Performed by: EMERGENCY MEDICINE

## 2024-11-30 PROCEDURE — 93005 ELECTROCARDIOGRAM TRACING: CPT

## 2024-11-30 RX ORDER — MECLIZINE HYDROCHLORIDE 25 MG/1
25 TABLET ORAL ONCE
Status: COMPLETED | OUTPATIENT
Start: 2024-11-30 | End: 2024-11-30

## 2024-11-30 RX ORDER — ONDANSETRON 4 MG/1
4 TABLET, ORALLY DISINTEGRATING ORAL ONCE
Status: COMPLETED | OUTPATIENT
Start: 2024-11-30 | End: 2024-11-30

## 2024-11-30 RX ORDER — MECLIZINE HYDROCHLORIDE 25 MG/1
25 TABLET ORAL 3 TIMES DAILY PRN
Qty: 15 TABLET | Refills: 0 | Status: SHIPPED | OUTPATIENT
Start: 2024-11-30 | End: 2024-12-05

## 2024-11-30 RX ORDER — ONDANSETRON 4 MG/1
4 TABLET, ORALLY DISINTEGRATING ORAL EVERY 4 HOURS PRN
Qty: 10 TABLET | Refills: 0 | Status: SHIPPED | OUTPATIENT
Start: 2024-11-30 | End: 2024-12-07

## 2024-11-30 NOTE — ED PROVIDER NOTES
Patient Seen in: [facility] [place]      History     Chief Complaint   Patient presents with    Dizziness     Stated Complaint: LIGHTHEADED X 1 HOUR, ALSO NAUSEATED    Subjective:   HPI      74-year-old female who presents to the emergency department complaint of having dizziness and nausea.  The patient said the symptoms been fast for the past hour.  She has had a history of tinnitus and says her ears have been ringing.  She is recently been on a tapering dose of prednisone for polymyalgia rheumatic.  Reviewing her record she was seen on 11/21/2024 for lumbar spondylosis and lumbar radiculopathy as well as her polymyalgia.  Denies any chest pain or palpitations.  No headaches.  No visual change.  Notices when she turns her head or moves the symptoms seem to worsen.    Objective:     Past Medical History:    Acute pain of both shoulders    Arthritis    Atherosclerosis of coronary artery    Black stools    occasionally    Bloating    occasionally    Body piercing    ears    Breast cancer (HCC)    dcis    Calculus of kidney    about 30 years ago    Cancer (HCC)    breast    Chest pain of uncertain etiology    Constipation    occasionally    Diverticulitis    Ductal carcinoma in situ of breast    DCIS    Esophageal reflux    Essential hypertension    Exposure to medical diagnostic radiation    Flatulence/gas pain/belching    aternoons    Hearing impairment    tinnitis    Heart attack (HCC)    Hemorrhoids    occasionally    High blood pressure    High cholesterol    History of blood transfusion    many years ago    History of diverticulitis    Hx of diseases NEC    diverticulosis    Hx of diseases NEC    had stress test - had some extra beats    Hx of diseases NEC    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    Hx of diseases NEC    utd with gyn    Hx of motion sickness    Hyperlipidemia    Hypokalemia    Irregular bowel habits    about a year    Leg swelling    ankle    Night sweats    on and off for several  months    Osteoarthritis    Other and unspecified personal history of malignant neoplasm    breast cancer    Pain in joints    Pain with bowel movements    straining more than pain    Stented coronary artery    Thyroid nodule    benign    Uncomfortable fullness after meals    about a year    Unspecified menopausal and postmenopausal disorder    treated with effexor - however pt would like to get off med    Visual impairment    contacts/glasses    Wears glasses    contact lenses              Past Surgical History:   Procedure Laterality Date    Angioplasty (coronary)  12/31/2018    stent RCA Johann    Appendectomy  2005    at time of diverticulitis    Appendectomy      appendix removed at 53 years old    Bowel resection      after diverticulosis    Cath percutaneous  transluminal coronary angioplasty      Colectomy      Colonoscopy      Colonoscopy,diagnostic  2004    nl colonoscopy    Fracture surgery Left     hip    Hernia surgery      had a patch put in lower abdomen at 53, after resection    Hip surgery Left     May 2018    Knee replacement surgery Left     3/2022    Lumpectomy right  2004    Sofía biopsy stereo nodule 1 site right (cpt=19081)  2004    Other Left     hip-minerva on place in femur    Part removal colon w end colostomy      at 53 years old    Radiation right  2004    Special service or report  2005    diverticulitis - had L sigmoidectomy    Special service or report  2005    ventral hernia repair    Total knee replacement Right 03/22/2023                Social History     Socioeconomic History    Marital status:    Tobacco Use    Smoking status: Never    Smokeless tobacco: Never   Vaping Use    Vaping status: Never Used   Substance and Sexual Activity    Alcohol use: Yes     Alcohol/week: 7.0 standard drinks of alcohol     Types: 7 Glasses of wine per week    Drug use: No    Sexual activity: Yes     Partners: Male   Other Topics Concern    Caffeine Concern Yes     Comment: 1 cup tea daily      Exercise Yes     Comment: 2-x weekly     Seat Belt Yes     Social Drivers of Health     Physical Activity: Sufficiently Active (9/29/2020)    Received from [facility], [facility]    Exercise Vital Sign     Days of Exercise per Week: 3 days     Minutes of Exercise per Session: 60 min                  Physical Exam     ED Triage Vitals [11/30/24 1335]   /71   Pulse 71   Resp 16   Temp 97.2 °F (36.2 °C)   Temp src Temporal   SpO2 97 %   O2 Device None (Room air)       Current Vitals:   Vital Signs  BP: 160/79  Pulse: 59  Resp: 16  Temp: 97.2 °F (36.2 °C)  Temp src: Temporal    Oxygen Therapy  SpO2: 97 %  O2 Device: None (Room air)        Physical Exam  General: This a pleasant nontoxic appearing patient in no apparent distress alert and oriented ×3  HEENT: Pupils are equal reactive to light.  Extra ocular motions are intact.  She does have nystagmus when looking to the left.  No scleral icterus or conjunctival pallor: Neck is supple without tenderness on palpation.  Head is atraumatic normocephalic.  Oral mucosa moist.  Tongue is midline.  No posterior pharyngeal lesions.  Tympanic membranes are intact.  There is some serous fluid behind the right TM.  No JVD or adenopathy.  No facial asymmetry.  Lungs: Clear to auscultation bilaterally.  No wheezes, rhonchi, or rales appreciated.  No accessory muscle use noted for breathing.  Cardiac: Regular rate and rhythm.  Normal S1 and 2 without murmurs or ectopy appreciated  Abdomen: Soft on examination without tenderness to deep palpation or to percussion.  No masses appreciated.  Bowel sounds are normoactive.  No CVA tenderness.  Extremities:  Heel-to-shin examination is normal.  No dysdiadochokinesia.   strength is 5 out of 5.  No pronator drift on examination of the extremities.  Dorsiflexion plantarflexion for feet is 5 out of 5.  Flexion extension strength her knees is 5 out of 5.  Reproducible vertigo symptoms on performing the  Carbon-Hallpike maneuver left ear down.     ED Course     Labs Reviewed   RAINBOW DRAW LAVENDER   RAINBOW DRAW LIGHT GREEN   RAINBOW DRAW BLUE     EKG    Rate, intervals and axes as noted on EKG Report.  Rate: 57  Rhythm: Sinus Rhythm  Reading: Sinus bradycardia nonspecific ST-T wave changes                       MDM    Differential diagnosis includes but is not limited to benign positional vertigo, Ménière's disease, stroke.  Patient has no focal findings examination.  Symptoms appear to be more peripheral vertigo related.  She is encouraged to follow-up with her neurologist as an outpatient as well as ENT.  She does have an appointment to ENT regarding her tenderness.  She was written for Antivert.  If she develops any worsening symptomology or focal findings return to the ER immediately.  They explained the patient may need vestibular therapy to help with her symptomology and can have her workup continued as an outpatient.  Imaging was undertaken at this time considering she has no focal findings on examination symptoms are reproducible with her tenderness.  This appears to be middle ear related.  Note to Patient  The 21st Century Cures Act makes medical notes like these available to patients in the interest of transparency. However, be advised this is a medical document and is intended as gvoh-xz-nxoy communication; it is written in medical language and may appear blunt, direct, or contain abbreviations or verbiage that are unfamiliar. Medical documents are intended to carry relevant information, facts as evident, and the clinical opinion of the practitioner.  Patient was evaluated and a screening exam was performed.   As a treating physician attending to the patient, I determined, within reasonable clinical confidence and prior to discharge, that an emergency medical condition was not or was no longer present.  There was no indication for further evaluation, treatment or admission on an emergency basis.   Comprehensive verbal and written discharge and follow-up instructions were provided to help prevent relapse or worsening.  Patient was instructed to follow-up with their primary care provider for further evaluation and treatment, but to return immediately to the ER for worsening, concerning, new, changing or persisting symptoms.  I discussed the case with the patient and they had no questions, complaints, or concerns.  Patient felt comfortable going home.  ^^Please note that this report has been produced using speech recognition software and may contain errors related to that system including, but not limited to, errors in grammar, punctuation, and spelling, as well as words and phrases that possibly may have been recognized inappropriately.  If there are any questions or concerns, contact the dictating provider for clarification.  Medical Decision Making      Disposition and Plan     Clinical Impression:  1. Benign paroxysmal positional vertigo due to bilateral vestibular disorder         Disposition:  Discharge  11/30/2024  2:27 pm    Follow-up:  Gatito Rachel MD  [address]  [address]  [place] [address]  [phone]    Schedule an appointment as soon as possible for a visit in 2 day(s)      Isaac Bishop DO  [address] [facility] [address]  [place] [address]  [phone]    Schedule an appointment as soon as possible for a visit in 2 day(s)            Medications Prescribed:  Current Discharge Medication List        START taking these medications    Details   meclizine 25 MG Oral Tab Take 1 tablet (25 mg total) by mouth 3 (three) times daily as needed.  Qty: 15 tablet, Refills: 0      ondansetron 4 MG Oral Tablet Dispersible Take 1 tablet (4 mg total) by mouth every 4 (four) hours as needed for Nausea.  Qty: 10 tablet, Refills: 0                 Supplementary Documentation:

## 2024-11-30 NOTE — ED QUICK NOTES
To ER for eval of feeling dizzy and \"like my eyes are jumping\",as per the triage note. The pt denies any head injury and feels this may be d/t her tapering of the steroids. She denies any change in her thought process/speech/cognition. He speech is clear and she has equal strength to all extremities.She has dizziness with any head movement.

## 2024-11-30 NOTE — ED INITIAL ASSESSMENT (HPI)
Has been tapering off Prednisone this week- while playing on tablet this afternoon began feeling dizziness with nausea- state she feels like her eyes are \"twitching\"

## 2024-12-02 ENCOUNTER — OFFICE VISIT (OUTPATIENT)
Dept: PHYSICAL THERAPY | Age: 74
End: 2024-12-02
Payer: MEDICARE

## 2024-12-02 ENCOUNTER — TELEPHONE (OUTPATIENT)
Dept: NEUROLOGY | Facility: CLINIC | Age: 74
End: 2024-12-02

## 2024-12-02 ENCOUNTER — OFFICE VISIT (OUTPATIENT)
Dept: INTERNAL MEDICINE CLINIC | Facility: CLINIC | Age: 74
End: 2024-12-02
Payer: MEDICARE

## 2024-12-02 VITALS
HEIGHT: 62 IN | OXYGEN SATURATION: 100 % | SYSTOLIC BLOOD PRESSURE: 138 MMHG | RESPIRATION RATE: 14 BRPM | DIASTOLIC BLOOD PRESSURE: 80 MMHG | TEMPERATURE: 98 F | WEIGHT: 154 LBS | HEART RATE: 65 BPM | BODY MASS INDEX: 28.34 KG/M2

## 2024-12-02 DIAGNOSIS — R42 VERTIGO: Primary | ICD-10-CM

## 2024-12-02 DIAGNOSIS — M51.361 DEGENERATION OF INTERVERTEBRAL DISC OF LUMBAR REGION WITH LOWER EXTREMITY PAIN: ICD-10-CM

## 2024-12-02 DIAGNOSIS — M25.551 ACUTE PAIN OF RIGHT HIP: Primary | ICD-10-CM

## 2024-12-02 DIAGNOSIS — H93.13 TINNITUS OF BOTH EARS: ICD-10-CM

## 2024-12-02 PROCEDURE — 97110 THERAPEUTIC EXERCISES: CPT

## 2024-12-02 PROCEDURE — 97140 MANUAL THERAPY 1/> REGIONS: CPT

## 2024-12-02 PROCEDURE — 99213 OFFICE O/P EST LOW 20 MIN: CPT | Performed by: FAMILY MEDICINE

## 2024-12-02 NOTE — PROGRESS NOTES
Emily Velasquez  4/29/1950    Chief Complaint   Patient presents with    Follow - Up     Rm 8 yb Er follow up  vertigo        HPI:   Emily Velasquez is a 74 year old female who presents for ER follow-up. Was seen in the ER for dizziness and nausea that began the day of presentation. She described the dizziness as room spinning and lightheadedness. There was no aggravating or alleviating factors. She was given meclizine and zofran in the ER but not has not needed the medication since. Her dizziness as since resolved. She did have PT today without symptoms.     Current Outpatient Medications   Medication Sig Dispense Refill    meclizine 25 MG Oral Tab Take 1 tablet (25 mg total) by mouth 3 (three) times daily as needed. 15 tablet 0    ondansetron 4 MG Oral Tablet Dispersible Take 1 tablet (4 mg total) by mouth every 4 (four) hours as needed for Nausea. 10 tablet 0    Multiple Vitamins-Minerals (MULTI-VITAMIN/MINERALS) Oral Tab Take 1 tablet by mouth daily.      predniSONE 5 MG Oral Tab Take 1 tablet (5 mg total) by mouth daily. (Patient taking differently: Take 4 tablets (20 mg total) by mouth daily. 20 mg daily every am) 90 tablet 0    predniSONE 2.5 MG Oral Tab Take 1 tablet (2.5 mg total) by mouth daily. 90 tablet 0    cyclobenzaprine 10 MG Oral Tab Take 1 tablet (10 mg total) by mouth nightly. 30 tablet 0    aspirin 81 MG Oral Tab EC Take 1 tablet (81 mg total) by mouth daily.      TRIAMTERENE-HCTZ 37.5-25 MG Oral Tab TAKE 1 TABLET BY MOUTH EVERY DAY 90 tablet 0    Acidophilus/Pectin Oral Cap Take 1 capsule by mouth daily.      Flaxseed, Linseed, (FLAX SEED OIL) 1300 MG Oral Cap 1,000 mg daily.      Calcium Carbonate-Vitamin D (CALCIUM-VITAMIN D3 OR) Take 1 tablet by mouth daily.      Multiple Vitamin (TAB-A-JIMBO) Oral Tab Take 1 tablet by mouth daily.      ezetimibe 10 MG Oral Tab Take 1 tablet (10 mg total) by mouth every other day.  4    atorvastatin 20 MG Oral Tab Take 1 tablet (20 mg total) by mouth  nightly. 30 tablet 2    atenolol 50 MG Oral Tab Take 1 tablet (50 mg total) by mouth daily. 30 tablet 0      Allergies[1]   Past Medical History:    Acute pain of both shoulders    Arthritis    Atherosclerosis of coronary artery    Black stools    occasionally    Bloating    occasionally    Body piercing    ears    Breast cancer (HCC)    dcis    Calculus of kidney    about 30 years ago    Cancer (HCC)    breast    Chest pain of uncertain etiology    Constipation    occasionally    Diverticulitis    Ductal carcinoma in situ of breast    DCIS    Esophageal reflux    Essential hypertension    Exposure to medical diagnostic radiation    Flatulence/gas pain/belching    aternoons    Hearing impairment    tinnitis    Heart attack (HCC)    Hemorrhoids    occasionally    High blood pressure    High cholesterol    History of blood transfusion    many years ago    History of diverticulitis    Hx of diseases NEC    diverticulosis    Hx of diseases NEC    had stress test - had some extra beats    Hx of diseases NEC    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    Hx of diseases NEC    utd with gyn    Hx of motion sickness    Hyperlipidemia    Hypokalemia    Irregular bowel habits    about a year    Leg swelling    ankle    Night sweats    on and off for several months    Osteoarthritis    Other and unspecified personal history of malignant neoplasm    breast cancer    Pain in joints    Pain with bowel movements    straining more than pain    Stented coronary artery    Thyroid nodule    benign    Uncomfortable fullness after meals    about a year    Unspecified menopausal and postmenopausal disorder    treated with effexor - however pt would like to get off med    Visual impairment    contacts/glasses    Wears glasses    contact lenses      Patient Active Problem List   Diagnosis    Benign essential HTN    Pure hypercholesterolemia    Multiple thyroid nodules    History of breast cancer    CAD (coronary artery disease)    History of non-ST  elevation myocardial infarction (NSTEMI)    Prediabetes    Presence of drug coated stent in right coronary artery    PMR (polymyalgia rheumatica) ([facility])    Personal history of colonic polyps    Sensory hearing loss, bilateral    S/p total knee replacement, bilateral    Arm DVT (deep venous thromboembolism), acute, right ([facility])    Primary osteoarthritis involving multiple joints    Osteopenia of multiple sites    Long term (current) use of systemic steroids    DDD (degenerative disc disease), cervical      Past Surgical History:   Procedure Laterality Date    Angioplasty (coronary)  2018    stent RCA Johann    Appendectomy      at time of diverticulitis    Appendectomy      appendix removed at 53 years old    Bowel resection      after diverticulosis    Cath percutaneous  transluminal coronary angioplasty      Colectomy      Colonoscopy      Colonoscopy,diagnostic  2004    nl colonoscopy    Fracture surgery Left     hip    Hernia surgery      had a patch put in lower abdomen at 53, after resection    Hip surgery Left     May 2018    Knee replacement surgery Left     3/2022    Lumpectomy right      Sofía biopsy stereo nodule 1 site right (cpt=19081)      Other Left     hip-minerva on place in femur    Part removal colon w end colostomy      at 53 years old    Radiation right      Special service or report      diverticulitis - had L sigmoidectomy    Special service or report      ventral hernia repair    Total knee replacement Right 2023      Family History   Problem Relation Age of Onset    Breast Cancer Self 53    DCIS Self 53    Hypertension Mother     Stroke Mother     Other (Other) Mother     Breast Cancer Mother 49    Heart Disorder Father         mi at 46 and  at 53 of 7th mi    Lipids Father     Heart Disease Father     Other (Other) Father     Heart Attack Father     Other (Other) Sister         spinal problems, prolactin problem, factor v leiden + (pt negative)    Other  (Other) Brother         healthy    Other (Other) Maternal Grandmother          of aneurysm    Breast Cancer Maternal Aunt 52    Breast Cancer Paternal Cousin Female 50      Social History     Socioeconomic History    Marital status:    Tobacco Use    Smoking status: Never    Smokeless tobacco: Never   Vaping Use    Vaping status: Never Used   Substance and Sexual Activity    Alcohol use: Yes     Alcohol/week: 7.0 standard drinks of alcohol     Types: 7 Glasses of wine per week    Drug use: No    Sexual activity: Yes     Partners: Male   Other Topics Concern    Caffeine Concern Yes     Comment: 1 cup tea daily     Exercise Yes     Comment: 2-x weekly     Seat Belt Yes     Social Drivers of Health     Physical Activity: Sufficiently Active (2020)    Received from [facility] [employer] [employer]    Exercise Vital Sign     Days of Exercise per Week: 3 days     Minutes of Exercise per Session: 60 min         REVIEW OF SYSTEMS:   GENERAL: feels well otherwise, no new ear pain or fullness, no associated chest pain, dyspnea or palpitations.     EXAM:   /80   Pulse 65   Temp 98 °F (36.7 °C) (Temporal)   Resp 14   Ht 5' 2\" (1.575 m)   Wt 154 lb (69.9 kg)   LMP  (LMP Unknown)   SpO2 100%   BMI 28.17 kg/m²   GENERAL: Well developed, well nourished,in no apparent distress  EYES: PERRLA, EOMI, conjunctiva are clear  HEENT: atraumatic, normocephalic,ears and throat are clear  NECK: supple,no adenopathy,no bruits  LUNGS: clear to auscultation  CARDIO: RRR without murmur  NEURO: CN II-XII intact, no focal deficits, neg Mary Halpike today.     ASSESSMENT AND PLAN:   Emily Velasquez is a 74 year old female who presents for ER follow-up    Vertigo  Tinnitus of both ears  Her dizziness was likely related to BPPV based on history. Symptoms have resolved since her ER visit and her neurologic and otologic exam are normal today. Recommend monitoring for symptom recurrence and keep upcoming  follow-up with ENT for evaluation of her tinnitus.       All questions were answered and the patient agrees with the plan.     Thank you,  Gatito Rachel MD         [1]   Allergies  Allergen Reactions    Altace [Ramipril] ANAPHYLAXIS     angioedema    Dilaudid [Hydromorphone] NAUSEA AND VOMITING     severe    Ramipril ANAPHYLAXIS     Oral    Cortisone OTHER (SEE COMMENTS)     Headache, blood pressure became raised    No Known Allergies OTHER (SEE COMMENTS)

## 2024-12-02 NOTE — PROGRESS NOTES
PT DAILY NOTE  Diagnosis:   Acute right hip pain (M25.551), Lumbar DDD with lower extremity pain (M51.361)   Referring Provider: MARGIE Reardon Date of Evaluation:    11/7/2024    Precautions:  None Next MD visit:   none scheduled  Date of Surgery: n/a  Symptom onset: Oct 2024     Insurance Primary/Secondary: MEDICARE     Visit 6 of 8   Date POC Expires: 2-5-25       Subjective: Pt states she had sudden onset of vertigo 3 days ago. She went to ER, had a maneuver done, and was given a medication to take PRN, hasn't taken it since. Today she doesn't feel exactly like herself, but does not feel dizzy or nauseated. She has to leave in 30 min due to another appointment.  Pain:    0/10 at start of session when walking  @eval: Emily Velasquez is a 74 year old female who presents to therapy today with complaints of R hip pain for past ~4 wks. She got a callus on her foot and started to limp, developed pain R lateral hip wrapping into groin. She recently noted that seated R hip AROM IR/ER eases the hip pain somewhat. Lately she has felt achiness B lateral hips. Her doctor's office also gave her ex's for SIJ, which she has been hesitant to do before starting PT. Denies N/T. [place] like RLE was going to give out on her last week. Does not awaken her overnight; mostly back sleeper. She is currently on prednisone for polymyalgia rheumatica, which flared up August 2024. No pain sitting or supine.  Pt describes pain level at best 0/10, at worst 6/10.   Current functional limitations include decr walking tolerance, difficulty c stairs.   Emily describes prior level of function full and painfree. Pt goals include increased walking tolerance s pain.  Past medical history was reviewed with Emily. Significant findings include breast CA, L sigmoidectomy, Htn, hyperlipidemia, stented coronary artery, B TKR, ventral hernia repair 2005, 1o OA involving multiple joints, polymyalgia rheumatica  Pt denies diplopia, dysarthria,  dysphasia, dizziness, drop attacks, bowel/bladder changes, saddle anesthesia, and EDILBERTO LE N/T.    Objective:       Recent Imaging:  10/14/2024 XR LUMBAR SPINE (MIN 2 VIEWS) (CPT=72100)  CONCLUSION:   There are 5 lumbar-type vertebral bodies which maintain normal height.  Mild straightening of the usual lumbar lordosis.  Mild levoconvex scoliosis centered within the mid lumbar spine.  No spondylolisthesis.  Advanced multilevel disc, facet, and endplate degenerative change with suspected multilevel osseous foraminal narrowing of the lower lumbar spine.  No appreciable pars defects.  No destructive osseous lesions.    Treatment:  (\"NP\" indicates Not Performed this date)  Manual Therapy:  Inferolateral R hip distraction c belt-NP  FR to R ITB in S/L  STM R TFL/anterior hip c ball  RLE long axis distraction-NP  Prone R quad stretch 10\"x10  Unilat PA R and L SIJ gr 2-NP  Added AP femoral head in supine gr 2-3    Neuromuscular Re-education:    Therapeutic Exercise: Eval 11-7-24 11-14-24 11-19-24 11-21-24 11-26-24 12-2-24   Hooklying B hip ER AROM x10 2x10  2x10 2x10 x20 Vs GTB 5\"x20   LTR 1' 2'        bridges   5\"x10 5\"x10 5\"x10 5\"x15 5\"x15   Prone heel push     5\"x10    R clamshell in S/L  5\"x10  hold      Hooklying B hip add vs ball  5\"x10  5\"x10 5\"x20 5\"x20 5\"x20   Sit to stand   2x10 1x10 2x10     Standing bentover hip ext R,L   2x10ea 2x10ea 2x10ea x20ea x20ea   Standing R hipflexor stretch (LLE on 8\" step)   10\"x3  10\"x4 10\"x4 10\"x4    sidestepping     90sec 2min 2min 2min    fwd stepup R,L     4\"x20ea 4\"x20ea     R TKE ball on wall   5\"x20 5\"x20     Treadmill walk at self-selected speed     2.0mph 5min 1.8mph 5min   Standing hip abd R,L      x20ea    future sessions: BKTC c SB for spinal flexion, B hip strength all planes, PPT    HEP:   Review of her previous HEP from MD: HS stretch supine, piriformis stretch, LTR, SKTC, DKTC, HS nerve glides   Access Code: [email or web address] ; URL:  [email or web address] ; Prepared by: Reyna Holbrook  Date: 11/07/2024 Exercises  - Supine Lower Trunk Rotation  - 2 x daily - 1-2 minutes  - Supine Bridge  - 2 x daily - 10 reps - 5 hold  - Bent Knee Fallouts  - 2 x daily - 2 sets - 10 reps  - Sidelying Hip Abduction  - 2 x daily - 2 sets - 10 reps  11-14: add - Clamshell  - 2 x daily - 1-2 sets - 10 reps - 5 hold ; - Sit to Stand Without Arm Support  - 2 x daily - 2 sets - 10 reps  11-19: replace clamshell c sidestepping  11-21: add - Hip Flexor Stretch on Step  - 2 x daily - 3-4 reps - 10-15 hold    Assessment/Plan: Pt has shortened session today 2o another appointment. She tolerated 2 exercise progressions today s c/o. No pain noted during session, but she does continue to avoid significant R hip extension past neutral. Added AP mobs of femoral head today in supine to assist in capsular stretching. Attempt retro ambulation next session.      PLAN OF CARE:    Goals: (to be met in 8 visits)   Consistently decr pain < or = 1/10 intermittent for incr QOL and activity tolerance  Overall incr in function as indicated by decr Oswestry at least 10 pts  Incr LE MMT at least 1/2 grade painfree for incr mm support for WB activities and lack of buckling  Painfree lumbar and hip ROM to allow painfree gait cycle and indicate decr irritability of symptoms  Pt reports incr walking tolerance , without restriction and minimal to no pain c shopping trips  Indep HEP to promote cont progress toward functional goals    Frequency / Duration: Patient will be seen for 1-2 x/week or a total of 8 visits over a 90 day period.     All Treatments performed at distinct and separate times during the therapy session.  Treatment Minutes  Units charged   Manual Therapy 12 minutes 1   Therapeutic Activity 0 minutes    Neuromuscular Re-education 0 minutes    Therapeutic Exercise 21 minutes 1   Total Direct Treatment Time 33 minutes

## 2024-12-02 NOTE — TELEPHONE ENCOUNTER
Spoke to patient, She states she is doing much better and will await a new patient appointment.    06-Jun-2023 22:14

## 2024-12-02 NOTE — TELEPHONE ENCOUNTER
Patient is requesting a sooner appointment with Dr. Bishop due to vertigo. Nick was in the ED on 11/30/24, patient is currently scheduled for 02/06/25.

## 2024-12-04 NOTE — PROGRESS NOTES
PT DAILY NOTE  Diagnosis:   Acute right hip pain (M25.551), Lumbar DDD with lower extremity pain (M51.361)   Referring Provider: MARGIE Reardon Date of Evaluation:    11/7/2024    Precautions:  None Next MD visit:   none scheduled  Date of Surgery: n/a  Symptom onset: Oct 2024     Insurance Primary/Secondary: MEDICARE     Visit 7 of 8   Date POC Expires: 2-5-25       Subjective: Pt states she is doing daily HEP, but still feels the RLE wants to buckle on occasion. She feels no worse after returning back down to her normal prednisone dose.   she had sudden onset of vertigo 3 days ago. She went to ER, had a maneuver done, and was given a medication to take PRN, hasn't taken it since. Today she doesn't feel exactly like herself, but does not feel dizzy or nauseated. She has to leave in 30 min due to another appointment.  Pain:    0/10 at start of session when walking  @eval: Emily Georgia Velasquez is a 74 year old female who presents to therapy today with complaints of R hip pain for past ~4 wks. She got a callus on her foot and started to limp, developed pain R lateral hip wrapping into groin. She recently noted that seated R hip AROM IR/ER eases the hip pain somewhat. Lately she has felt achiness B lateral hips. Her doctor's office also gave her ex's for SIJ, which she has been hesitant to do before starting PT. Denies N/T. [place] like RLE was going to give out on her last week. Does not awaken her overnight; mostly back sleeper. She is currently on prednisone for polymyalgia rheumatica, which flared up August 2024. No pain sitting or supine.  Pt describes pain level at best 0/10, at worst 6/10.   Current functional limitations include decr walking tolerance, difficulty c stairs.   Emily describes prior level of function full and painfree. Pt goals include increased walking tolerance s pain.  Past medical history was reviewed with Emily. Significant findings include breast CA, L sigmoidectomy, Htn, hyperlipidemia,  stented coronary artery, B TKR, ventral hernia repair 2005, 1o OA involving multiple joints, polymyalgia rheumatica  Pt denies diplopia, dysarthria, dysphasia, dizziness, drop attacks, bowel/bladder changes, saddle anesthesia, and EDILBERTO LE N/T.    Objective:   Ascends reciprocally c mild vaulting RLE and Horace 1 railing ; descends reciprocally c incr R genu valgum, mild decr eccentric quad control on R, painfree both directions    Recent Imaging:  10/14/2024 XR LUMBAR SPINE (MIN 2 VIEWS) (CPT=72100)  CONCLUSION:   There are 5 lumbar-type vertebral bodies which maintain normal height.  Mild straightening of the usual lumbar lordosis.  Mild levoconvex scoliosis centered within the mid lumbar spine.  No spondylolisthesis.  Advanced multilevel disc, facet, and endplate degenerative change with suspected multilevel osseous foraminal narrowing of the lower lumbar spine.  No appreciable pars defects.  No destructive osseous lesions.    Treatment:  (\"NP\" indicates Not Performed this date)  Manual Therapy:  FR to R ITB in S/L-NP  STM R TFL/anterior hip c ball-NP  RLE long axis distraction  Prone R quad stretch   AP femoral head in supine gr 2-3    Neuromuscular Re-education:    Therapeutic Exercise: Eval 11-7-24 11-14-24 11-19-24 11-21-24 11-26-24 12-2-24 12-5-24   Hooklying B hip ER AROM x10 2x10  2x10 2x10 x20 Vs GTB 5\"x20    LTR 1' 2'         bridges   5\"x10 5\"x10 5\"x10 5\"x15 5\"x15 5\"x10   Prone heel push     5\"x10  5\"x10   R clamshell in S/L  5\"x10  hold       Hooklying B hip add vs ball  5\"x10  5\"x10 5\"x20 5\"x20 5\"x20 5\"x20   Sit to stand   2x10 1x10 2x10      Standing bentover hip ext R,L   2x10ea 2x10ea 2x10ea x20ea x20ea x20ea   Standing R hipflexor stretch (LLE on 8\" step)   10\"x3  10\"x4 10\"x4 10\"x4 10\"x4    sidestepping     90sec 2min 2min 2min 2min    fwd stepup R,L     4\"x20ea 4\"x20ea      R TKE ball on wall   5\"x20 5\"x20      Treadmill walk at self-selected speed     2.0mph 5min 1.8mph 5min 2.5mph 5min    Standing hip abd R,L      x20ea x20ea   Pelvic MET R hip flex/L hip ext       5\"x5   B hip ER vs pilates ring       5\"x20   R HS stretch c strap       20\"x3   Static lunge R,L       2x10ea   R clamshell vs TB       Y5\"x10   6\" fwd step up-and-over R,L       x15ea   Up/down reciprocal stairwell       1 flight               HEP:   Review of her previous HEP from MD: HS stretch supine, piriformis stretch, LTR, SKTC, DKTC, HS nerve glides   Access Code: [email or web address] ; URL: [email or web address] ; Prepared by: Reyna Holbrook  Date: 11/07/2024 Exercises  - Supine Lower Trunk Rotation  - 2 x daily - 1-2 minutes  - Supine Bridge  - 2 x daily - 10 reps - 5 hold  - Bent Knee Fallouts  - 2 x daily - 2 sets - 10 reps  - Sidelying Hip Abduction  - 2 x daily - 2 sets - 10 reps  11-14: add - Clamshell  - 2 x daily - 1-2 sets - 10 reps - 5 hold ; - Sit to Stand Without Arm Support  - 2 x daily - 2 sets - 10 reps  11-19: replace clamshell c sidestepping  11-21: add - Hip Flexor Stretch on Step  - 2 x daily - 3-4 reps - 10-15 hold    Assessment/Plan: Pt continues to ambulate c decr R hip extension vs L. However her gait speed is improving, and frequency/severity of pain are decreasing. Added MET to correct possible pelvic obliquity, and additional ex's to further stabilize pelvis by strengthening hip musculature. No pain c/o during session. Reassess next session.       PLAN OF CARE:    Goals: (to be met in 8 visits)   Consistently decr pain < or = 1/10 intermittent for incr QOL and activity tolerance  Overall incr in function as indicated by decr Oswestry at least 10 pts  Incr LE MMT at least 1/2 grade painfree for incr mm support for WB activities and lack of buckling  Painfree lumbar and hip ROM to allow painfree gait cycle and indicate decr irritability of symptoms  Pt reports incr walking tolerance , without restriction and minimal to no pain c shopping trips  Indep HEP to promote cont progress toward functional  goals    Frequency / Duration: Patient will be seen for 1-2 x/week or a total of 8 visits over a 90 day period.     All Treatments performed at distinct and separate times during the therapy session.  Treatment Minutes  Units charged   Manual Therapy 12 minutes 1   Therapeutic Activity 0 minutes    Neuromuscular Re-education 0 minutes    Therapeutic Exercise 35 minutes 2   Total Direct Treatment Time 47 minutes

## 2024-12-05 ENCOUNTER — OFFICE VISIT (OUTPATIENT)
Dept: PHYSICAL THERAPY | Age: 74
End: 2024-12-05
Payer: MEDICARE

## 2024-12-05 DIAGNOSIS — M25.551 ACUTE PAIN OF RIGHT HIP: Primary | ICD-10-CM

## 2024-12-05 DIAGNOSIS — M51.361 DEGENERATION OF INTERVERTEBRAL DISC OF LUMBAR REGION WITH LOWER EXTREMITY PAIN: ICD-10-CM

## 2024-12-05 PROCEDURE — 97110 THERAPEUTIC EXERCISES: CPT

## 2024-12-05 PROCEDURE — 97140 MANUAL THERAPY 1/> REGIONS: CPT

## 2024-12-09 ENCOUNTER — OFFICE VISIT (OUTPATIENT)
Dept: PHYSICAL THERAPY | Age: 74
End: 2024-12-09
Payer: MEDICARE

## 2024-12-09 DIAGNOSIS — M51.361 DEGENERATION OF INTERVERTEBRAL DISC OF LUMBAR REGION WITH LOWER EXTREMITY PAIN: ICD-10-CM

## 2024-12-09 DIAGNOSIS — M25.551 ACUTE PAIN OF RIGHT HIP: Primary | ICD-10-CM

## 2024-12-09 PROCEDURE — 97110 THERAPEUTIC EXERCISES: CPT

## 2024-12-09 NOTE — PROGRESS NOTES
Progress Summary  Pt has attended 8 visits in Physical Therapy.     Diagnosis:   Acute right hip pain (M25.551), Lumbar DDD with lower extremity pain (M51.361)   Referring Provider: MARGIE Reardon Date of Evaluation:    11/7/2024    Precautions:  None Next MD visit:   none scheduled  Date of Surgery: n/a  Symptom onset: Oct 2024     Insurance Primary/Secondary: MEDICARE     Visit 8 of 8   Date POC Expires: 2-5-25       Subjective: Pt states she feels good after walking, and after PT exercises, but after a period of sitting, her pain in R buttock/lateral hip returns. She used to get RLE buckling multiple times an hour, and now it only happens maybe 4-5x/wk. Her tolerance for community ambulation has improved; [place]. Her normal prednisone dosage was increased for a week, during which her symptoms were better, and after returning to her normal dosage she reported she didn't feel any worsening of hip symptoms.   Pain:    2/10 at start of session, overall max of 3/10  (vs eval worst 6/10)   Assessment/Plan: Pt has shown improvement since starting PT. Her quality and speed of gait is improving, LE strength improving, and decr frequency of RLE buckling. She also tolerates longer bouts of community ambulation s pain. However she does continue to have mild pain in hip, and while improved she still demonstrates decreased R hip extension during gait. She has hx of polymyalgia rheumatica, and recently rheumatology increased her ongoing prednisone dosage for a short period, during which she did see an improvement in her hip pain. Thus,her pain may be related to her PMR diagnosis, rather than orthopedic dysfunction. At this time, tigist pt self-manage c PT HEP for 1 month, follow with rheumatology. If she feels her hip symptoms persist during that time, can return to reassess. Pt in agreement c this plan and all questions answered.    @eval: Emily Georgia Velasquez is a 74 year old  female who presents to therapy today with complaints of R hip pain for past ~4 wks. She got a callus on her foot and started to limp, developed pain R lateral hip wrapping into groin. She recently noted that seated R hip AROM IR/ER eases the hip pain somewhat. Lately she has felt achiness B lateral hips. Her doctor's office also gave her ex's for SIJ, which she has been hesitant to do before starting PT. Denies N/T. [place] like RLE was going to give out on her last week. Does not awaken her overnight; mostly back sleeper. She is currently on prednisone for polymyalgia rheumatica, which flared up August 2024. No pain sitting or supine.  Pt describes pain level at best 0/10, at worst 6/10.   Current functional limitations include decr walking tolerance, difficulty c stairs.   Emily describes prior level of function full and painfree. Pt goals include increased walking tolerance s pain.  Past medical history was reviewed with Emily. Significant findings include breast CA, L sigmoidectomy, Htn, hyperlipidemia, stented coronary artery, B TKR, ventral hernia repair 2005, 1o OA involving multiple joints, polymyalgia rheumatica  Pt denies diplopia, dysarthria, dysphasia, dizziness, drop attacks, bowel/bladder changes, saddle anesthesia, and EDILBERTO LE N/T.    Objective:   Lumbar AROM: (* denotes performed with pain)  Flexion: fingertips to toes -- no change vs eval  Extension: WNL (vs eval: mod limitation*)  Sidebending: R 57cm / L 56cm (vs eval: R 59cm* R SIJ; L 57cm* L postlat buttock)  Rotation: WFL B (Vs eval; mild restriction B)     Hip PROM:  Hip flexion: R 100* lat hip/ L 110 -- no change vs eval  Hip abd: R 40* / L 40 -- no change vs eval  Hip ER: 45 B  Hip IR: R 30* / L 30     Strength: (* denotes performed with pain)  @eval 11-7-24 12-9-24   Hip flexion (L2): R 4-*lat hip/5; L 4/5  Hip abduction: R 4-/5; L 4-/5  Hip Extension: R 3+/5; L 3+/5            Hip ER: R 4/5; L 4-/5  Hip IR: R 4/5; L 4-/5  Hamstring: R 5/5; L  5/5            Quad (L3): R 4+/5; L 4+/5            DF (L4): R 5/5; L 5/5  PF (S1): R 5/5; L 5/5  R 4-   R 4-   R 3+   R 4   R 4     R 4+        Gait: pt ambulates on level ground with  decr step length LLE, decr speed, genu valgum .  Stairs: Ascends reciprocally c mild vaulting RLE and Horace 1 railing ; descends reciprocally c incr R genu valgum, mild decr eccentric quad control on R, painfree both directions       Recent Imaging:  10/14/2024 XR LUMBAR SPINE (MIN 2 VIEWS) (CPT=72100)  CONCLUSION:   There are 5 lumbar-type vertebral bodies which maintain normal height.  Mild straightening of the usual lumbar lordosis.  Mild levoconvex scoliosis centered within the mid lumbar spine.  No spondylolisthesis.  Advanced multilevel disc, facet, and endplate degenerative change with suspected multilevel osseous foraminal narrowing of the lower lumbar spine.  No appreciable pars defects.  No destructive osseous lesions.    Treatment:  (\"NP\" indicates Not Performed this date)  Manual Therapy:    Neuromuscular Re-education:    Therapeutic Exercise: 11-21-24 11-26-24 12-2-24 12-5-24 12-9-24   Hooklying B hip ER AROM 2x10 x20 Vs GTB 5\"x20     bridges 5\"x10 5\"x15 5\"x15 5\"x10 5\"x10   Prone heel push  5\"x10  5\"x10    Hooklying B hip add vs ball 5\"x20 5\"x20 5\"x20 5\"x20 5\"x20   Sit to stand 2x10       Standing bentover hip ext R,L 2x10ea x20ea x20ea x20ea    Standing R hipflexor stretch (LLE on 8\" step) 10\"x4 10\"x4 10\"x4 10\"x4     sidestepping 2min 2min 2min 2min     fwd stepup R,L 4\"x20ea       R TKE ball on wall 5\"x20       Treadmill walk at self-selected speed  2.0mph 5min 1.8mph 5min 2.5mph 5min 2.5mph 5min   Standing hip abd R,L   x20ea x20ea    Pelvic MET R hip flex/L hip ext    5\"x5 5\"x5   B hip ER vs pilates ring    5\"x20 5\"x20   R HS stretch c strap    20\"x3 20\"x3   Static lunge R,L    2x10ea 2x10ea   R clamshell vs TB    Y5\"x10    6\" fwd step up-and-over R,L    x15ea    Up/down reciprocal stairwell    1 flight     Reassessment/Hep review/revise/reissue     20'     HEP:   Review of her previous HEP from MD: HS stretch supine, piriformis stretch, LTR, SKTC, DKTC, HS nerve glides   Access Code: [email or web address] ; URL: [email or web address] ; Prepared by: Reyna Holbrook  upDate: 12/09/2024  - Supine Lower Trunk Rotation  - 1 x daily - 1-2 minutes  - Prone Heel Squeeze  - 1 x daily - 1-2 sets - 10 reps - 5 hold  - Supine Bridge  - 1 x daily - 10 reps - 5 hold  - Sit to Stand Without Arm Support  - 1 x daily - 2 sets - 10 reps  - Hip Flexor Stretch on Step  - 1 x daily - 3-4 reps - 10-15 hold  - Hooklying Sacroiliac Joint Isometric  - 1 x daily - 5-10 reps - 5 hold  - Side Stepping with Counter Support  - 1 x daily - 2 reps - 1 minutes  - Hooklying Isometric Clamshell  - 1 x daily - 20 reps - 5 hold  - Supine Hip Adduction Isometric with Ball  - 1 x daily - 2 sets - 20 reps - 5 hold  - Standing Hip Extension  - 1 x daily - 2 sets - 10 reps    PLAN OF CARE:    Goals: (to be met in 8 visits)   Consistently decr pain < or = 1/10 intermittent for incr QOL and activity tolerance--progressing 12-9-24  Overall incr in function as indicated by decr Oswestry at least 10 pts--progressing 12-9-24  Incr LE MMT at least 1/2 grade painfree for incr mm support for WB activities and lack of buckling--strength progressing and buckling much less frequent 12-9-24  Painfree lumbar and hip ROM to allow painfree gait cycle and indicate decr irritability of symptoms  Pt reports incr walking tolerance , without restriction and minimal to no pain c shopping trips--met as long as minimal static standing in place 12-9-24  Indep HEP to promote cont progress toward functional goals--met 12-9-24    All Treatments performed at distinct and separate times during the therapy session.  Treatment Minutes  Units charged   Manual Therapy 0 minutes    Therapeutic Activity 0 minutes    Neuromuscular Re-education 0 minutes    Therapeutic Exercise 45 minutes 3    Total Direct Treatment Time 45 minutes      Post Oswestry Disability Index Score  Post Score: (Proxy-Rptd) 20 % (12/9/2024 12:27 PM)    4 % improvement    Plan: Pt to self-manage for 1 month as well as f/u c rheumatologist regarding updated labs/assessment of prednisone tx plan. Pt to return to PT for reassessment PRN if she does not continue to feel improvement in symptom severity and frequency.    Patient/Family/Caregiver was advised of these findings, precautions, and treatment options and has agreed to actively participate in planning and for this course of care.    Thank you for your referral. If you have any questions, please contact me at Dept: [phone].    Sincerely,  Electronically signed by therapist: Reyna Holbrook, PT     Physician's certification required:  Yes  Please co-sign or sign and return this letter via fax as soon as possible to [phone].   I certify the need for these services furnished under this plan of treatment and while under my care.    X___________________________________________________ Date____________________    Certification From: 12/9/2024  To:3/9/2025

## 2024-12-16 ENCOUNTER — PATIENT MESSAGE (OUTPATIENT)
Dept: RHEUMATOLOGY | Facility: CLINIC | Age: 74
End: 2024-12-16

## 2024-12-16 DIAGNOSIS — M35.3 PMR (POLYMYALGIA RHEUMATICA) (HCC): Primary | ICD-10-CM

## 2024-12-16 DIAGNOSIS — R79.82 ELEVATED C-REACTIVE PROTEIN (CRP): ICD-10-CM

## 2024-12-16 DIAGNOSIS — R70.0 ELEVATED SED RATE: ICD-10-CM

## 2024-12-18 ENCOUNTER — LAB ENCOUNTER (OUTPATIENT)
Dept: LAB | Facility: HOSPITAL | Age: 74
End: 2024-12-18
Attending: INTERNAL MEDICINE
Payer: MEDICARE

## 2024-12-18 DIAGNOSIS — M35.3 PMR (POLYMYALGIA RHEUMATICA) (HCC): ICD-10-CM

## 2024-12-18 DIAGNOSIS — Z79.52 LONG TERM (CURRENT) USE OF SYSTEMIC STEROIDS: ICD-10-CM

## 2024-12-18 LAB
CRP SERPL-MCNC: <0.4 MG/DL (ref ?–0.5)
ERYTHROCYTE [SEDIMENTATION RATE] IN BLOOD: 25 MM/HR

## 2024-12-18 PROCEDURE — 85652 RBC SED RATE AUTOMATED: CPT

## 2024-12-18 PROCEDURE — 36415 COLL VENOUS BLD VENIPUNCTURE: CPT

## 2024-12-18 PROCEDURE — 86140 C-REACTIVE PROTEIN: CPT

## 2024-12-19 ENCOUNTER — TELEPHONE (OUTPATIENT)
Facility: CLINIC | Age: 74
End: 2024-12-19

## 2024-12-20 RX ORDER — PREDNISONE 1 MG/1
2 TABLET ORAL
Qty: 90 TABLET | Refills: 0 | Status: SHIPPED | OUTPATIENT
Start: 2024-12-20

## 2025-01-08 ENCOUNTER — LAB ENCOUNTER (OUTPATIENT)
Dept: LAB | Age: 75
End: 2025-01-08
Attending: INTERNAL MEDICINE
Payer: MEDICARE

## 2025-01-08 DIAGNOSIS — R73.09 ELEVATED HEMOGLOBIN A1C: ICD-10-CM

## 2025-01-08 DIAGNOSIS — E87.6 HYPOKALEMIA: ICD-10-CM

## 2025-01-08 DIAGNOSIS — M35.3 PMR (POLYMYALGIA RHEUMATICA) (HCC): ICD-10-CM

## 2025-01-08 DIAGNOSIS — Z79.52 LONG TERM (CURRENT) USE OF SYSTEMIC STEROIDS: ICD-10-CM

## 2025-01-08 LAB
ALBUMIN SERPL-MCNC: 4 G/DL (ref 3.2–4.8)
ALBUMIN/GLOB SERPL: 1.3 {RATIO} (ref 1–2)
ALP LIVER SERPL-CCNC: 59 U/L
ALT SERPL-CCNC: 14 U/L
ANION GAP SERPL CALC-SCNC: 9 MMOL/L (ref 0–18)
AST SERPL-CCNC: 23 U/L (ref ?–34)
BILIRUB SERPL-MCNC: 0.7 MG/DL (ref 0.2–1.1)
BUN BLD-MCNC: 16 MG/DL (ref 9–23)
CALCIUM BLD-MCNC: 9.8 MG/DL (ref 8.7–10.4)
CHLORIDE SERPL-SCNC: 100 MMOL/L (ref 98–112)
CO2 SERPL-SCNC: 30 MMOL/L (ref 21–32)
CREAT BLD-MCNC: 0.97 MG/DL
CREAT UR-SCNC: 195.8 MG/DL
CRP SERPL-MCNC: 0.5 MG/DL (ref ?–0.5)
EGFRCR SERPLBLD CKD-EPI 2021: 61 ML/MIN/1.73M2 (ref 60–?)
ERYTHROCYTE [SEDIMENTATION RATE] IN BLOOD: 28 MM/HR
EST. AVERAGE GLUCOSE BLD GHB EST-MCNC: 146 MG/DL (ref 68–126)
FASTING STATUS PATIENT QL REPORTED: NO
GLOBULIN PLAS-MCNC: 3 G/DL (ref 2–3.5)
GLUCOSE BLD-MCNC: 172 MG/DL (ref 70–99)
HBA1C MFR BLD: 6.7 % (ref ?–5.7)
MAGNESIUM SERPL-MCNC: 1.8 MG/DL (ref 1.6–2.6)
MICROALBUMIN UR-MCNC: 1.8 MG/DL
MICROALBUMIN/CREAT 24H UR-RTO: 9.2 UG/MG (ref ?–30)
OSMOLALITY SERPL CALC.SUM OF ELEC: 293 MOSM/KG (ref 275–295)
POTASSIUM SERPL-SCNC: 3.2 MMOL/L (ref 3.5–5.1)
PROT SERPL-MCNC: 7 G/DL (ref 5.7–8.2)
SODIUM SERPL-SCNC: 139 MMOL/L (ref 136–145)

## 2025-01-08 PROCEDURE — 82043 UR ALBUMIN QUANTITATIVE: CPT

## 2025-01-08 PROCEDURE — 83036 HEMOGLOBIN GLYCOSYLATED A1C: CPT

## 2025-01-08 PROCEDURE — 80053 COMPREHEN METABOLIC PANEL: CPT

## 2025-01-08 PROCEDURE — 85652 RBC SED RATE AUTOMATED: CPT

## 2025-01-08 PROCEDURE — 82570 ASSAY OF URINE CREATININE: CPT

## 2025-01-08 PROCEDURE — 83735 ASSAY OF MAGNESIUM: CPT

## 2025-01-08 PROCEDURE — 86140 C-REACTIVE PROTEIN: CPT

## 2025-01-08 PROCEDURE — 36415 COLL VENOUS BLD VENIPUNCTURE: CPT

## 2025-01-09 ENCOUNTER — OFFICE VISIT (OUTPATIENT)
Dept: INTERNAL MEDICINE CLINIC | Facility: CLINIC | Age: 75
End: 2025-01-09
Payer: MEDICARE

## 2025-01-09 VITALS
DIASTOLIC BLOOD PRESSURE: 82 MMHG | HEART RATE: 76 BPM | SYSTOLIC BLOOD PRESSURE: 130 MMHG | TEMPERATURE: 98 F | WEIGHT: 155 LBS | HEIGHT: 62 IN | BODY MASS INDEX: 28.52 KG/M2 | RESPIRATION RATE: 16 BRPM

## 2025-01-09 DIAGNOSIS — M54.16 LUMBAR RADICULOPATHY: ICD-10-CM

## 2025-01-09 DIAGNOSIS — M35.3 PMR (POLYMYALGIA RHEUMATICA) (HCC): Primary | ICD-10-CM

## 2025-01-09 DIAGNOSIS — M19.011 PRIMARY OSTEOARTHRITIS OF RIGHT SHOULDER: ICD-10-CM

## 2025-01-09 PROCEDURE — 99214 OFFICE O/P EST MOD 30 MIN: CPT

## 2025-01-09 NOTE — PROGRESS NOTES
Emily Velasquez is a 74 year old female.   Chief Complaint   Patient presents with    Arm Pain     HPI:    Patinet here today to follow up on right arm pain with right lower extremity pain. Has done physical therapy with improvement. She would like to discuss options for management. Her  was recently diagnosed with non hodgkin's lymphoma and will be starting treatment and she will be helping him more in the upcoming future. She has continued management for PMR with Dr. Juarez and recent inflammatory markers normal on 7.5 mg daily prednisone. She feels symptoms equivalent to before she went on steroids for PMR. Seen by Dr. Rachel in last few months for same concerns. Injection or right shoulder, MRI lumbar spine and physiatry referral discussed.   PMHX: PMR, HTN, CAD, Arm DVT, osteopenia, hx of breast cancer, OA.         Allergies:  Allergies[1]   Current Meds:  Current Outpatient Medications   Medication Sig Dispense Refill    Potassium Chloride ER 20 MEQ Oral Tab CR Take 20 mEq by mouth daily for 5 days. 5 tablet 0    predniSONE 1 MG Oral Tab Take 2 tablets (2 mg total) by mouth daily with breakfast. 90 tablet 0    predniSONE 5 MG Oral Tab Take 1 tablet (5 mg total) by mouth daily. (Patient taking differently: Take 4 tablets (20 mg total) by mouth daily. 20 mg daily every am) 90 tablet 0    aspirin 81 MG Oral Tab EC Take 1 tablet (81 mg total) by mouth daily.      TRIAMTERENE-HCTZ 37.5-25 MG Oral Tab TAKE 1 TABLET BY MOUTH EVERY DAY 90 tablet 0    Acidophilus/Pectin Oral Cap Take 1 capsule by mouth daily.      Flaxseed, Linseed, (FLAX SEED OIL) 1300 MG Oral Cap 1,000 mg daily.      Calcium Carbonate-Vitamin D (CALCIUM-VITAMIN D3 OR) Take 1 tablet by mouth daily.      ezetimibe 10 MG Oral Tab Take 1 tablet (10 mg total) by mouth every other day.  4    atorvastatin 20 MG Oral Tab Take 1 tablet (20 mg total) by mouth nightly. 30 tablet 2    atenolol 50 MG Oral Tab Take 1 tablet (50 mg total) by mouth daily.  30 tablet 0        PMH:     Past Medical History:    Acute pain of both shoulders    Arthritis    Atherosclerosis of coronary artery    Black stools    occasionally    Bloating    occasionally    Body piercing    ears    Breast cancer (HCC)    dcis    Calculus of kidney    about 30 years ago    Cancer (HCC)    breast    Chest pain of uncertain etiology    Constipation    occasionally    Diverticulitis    Ductal carcinoma in situ of breast    DCIS    Esophageal reflux    Essential hypertension    Exposure to medical diagnostic radiation    Flatulence/gas pain/belching    aternoons    Hearing impairment    tinnitis    Heart attack (HCC)    Hemorrhoids    occasionally    High blood pressure    High cholesterol    History of blood transfusion    many years ago    History of diverticulitis    Hx of diseases NEC    diverticulosis    Hx of diseases NEC    had stress test - had some extra beats    Hx of diseases NEC    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    Hx of diseases NEC    utd with gyn    Hx of motion sickness    Hyperlipidemia    Hypokalemia    Irregular bowel habits    about a year    Leg swelling    ankle    Night sweats    on and off for several months    Osteoarthritis    Other and unspecified personal history of malignant neoplasm    breast cancer    Pain in joints    Pain with bowel movements    straining more than pain    Stented coronary artery    Thyroid nodule    benign    Uncomfortable fullness after meals    about a year    Unspecified menopausal and postmenopausal disorder    treated with effexor - however pt would like to get off med    Visual impairment    contacts/glasses    Wears glasses    contact lenses       ROS:   Review of Systems   Constitutional: Negative.    Respiratory: Negative.     Cardiovascular: Negative.    Musculoskeletal:  Positive for arthralgias.   Skin: Negative.    Neurological: Negative.             PHYSICAL EXAM:    /82   Pulse 76   Temp 97.6 °F (36.4 °C) (Temporal)   Resp 16    Ht 5' 2\" (1.575 m)   Wt 155 lb (70.3 kg)   LMP  (LMP Unknown)   BMI 28.35 kg/m²   Physical Exam  Constitutional:       Appearance: Normal appearance.   Pulmonary:      Effort: Pulmonary effort is normal.   Musculoskeletal:         General: No swelling, deformity or signs of injury.   Neurological:      Mental Status: She is alert.        ASSESSMENT/ PLAN:   1. Lumbar radiculopathy  Discussed physical therapy likely best option, I will discuss with Dr. Rachel on further recommendations.     2. Primary osteoarthritis of right shoulder  Exercise and stretches discussed. Steroid injection previously discussed with Dr. Rachel. I will discuss with Dr. Rachel and reach out to patient on further recommendations.     3. PMR (polymyalgia rheumatica) (HCC)  Continue management per Dr. Juarez, currently stable on 7.5 mg dosing.       Health Maintenance Due   Topic Date Due    Zoster Vaccines (1 of 2) Never done    COVID-19 Vaccine (5 - 2024-25 season) 09/01/2024    Influenza Vaccine (1) 10/01/2024    Annual Depression Screening  01/01/2025    Fall Risk Screening (Annual)  01/01/2025     Pt indicates understanding and agrees to the plan.     Follow up as needed     MARGIE Reardon          [1]   Allergies  Allergen Reactions    Altace [Ramipril] ANAPHYLAXIS     angioedema    Dilaudid [Hydromorphone] NAUSEA AND VOMITING     severe    Ramipril ANAPHYLAXIS     Oral    Cortisone OTHER (SEE COMMENTS)     Headache, blood pressure became raised    No Known Allergies OTHER (SEE COMMENTS)

## 2025-01-13 ENCOUNTER — PATIENT MESSAGE (OUTPATIENT)
Dept: INTERNAL MEDICINE CLINIC | Facility: CLINIC | Age: 75
End: 2025-01-13

## 2025-01-14 ENCOUNTER — LAB ENCOUNTER (OUTPATIENT)
Dept: LAB | Age: 75
End: 2025-01-14
Payer: MEDICARE

## 2025-01-14 DIAGNOSIS — E87.6 HYPOKALEMIA: ICD-10-CM

## 2025-01-14 LAB
ANION GAP SERPL CALC-SCNC: 6 MMOL/L (ref 0–18)
BUN BLD-MCNC: 15 MG/DL (ref 9–23)
CALCIUM BLD-MCNC: 9.9 MG/DL (ref 8.7–10.6)
CHLORIDE SERPL-SCNC: 99 MMOL/L (ref 98–112)
CO2 SERPL-SCNC: 31 MMOL/L (ref 21–32)
CREAT BLD-MCNC: 0.99 MG/DL
EGFRCR SERPLBLD CKD-EPI 2021: 59 ML/MIN/1.73M2 (ref 60–?)
FASTING STATUS PATIENT QL REPORTED: NO
GLUCOSE BLD-MCNC: 175 MG/DL (ref 70–99)
OSMOLALITY SERPL CALC.SUM OF ELEC: 287 MOSM/KG (ref 275–295)
POTASSIUM SERPL-SCNC: 4 MMOL/L (ref 3.5–5.1)
SODIUM SERPL-SCNC: 136 MMOL/L (ref 136–145)

## 2025-01-14 PROCEDURE — 36415 COLL VENOUS BLD VENIPUNCTURE: CPT

## 2025-01-14 PROCEDURE — 80048 BASIC METABOLIC PNL TOTAL CA: CPT

## 2025-01-14 NOTE — TELEPHONE ENCOUNTER
Pt asking if you had a chance to conference with Dr. Rachel?  Please advise.    1/9/25 LOV note:    ASSESSMENT/ PLAN:   1. Lumbar radiculopathy  Discussed physical therapy likely best option, I will discuss with Dr. Rachel on further recommendations.      2. Primary osteoarthritis of right shoulder  Exercise and stretches discussed. Steroid injection previously discussed with Dr. Rachel. I will discuss with Dr. Rachel and reach out to patient on further recommendations.

## 2025-01-15 NOTE — TELEPHONE ENCOUNTER
Spoke with Dr. Rachel, he is agreeable to injection of shoulder. Ok to schedule with Dr. Rachel.    Regarding lower back and radiating pain would recommend consult with Dr. Heath or can continue physical therapy since she reports physical therapy helped in the past.

## 2025-01-15 NOTE — TELEPHONE ENCOUNTER
Patient called for update on this request. Discussed below information/recommendation    Patient is agreeable to the injection in her shoulder, however she notes she is currently taking 7mg of prednisone a day, this was prescribed by her rheumatologist. She wants to make sure it would be ok for this injection while taking prednisone.     Discussed a referral to Dr. Heath or PT and patient was leaning toward Dr. Heath however she was unsure what she should do.     Would you like to have patient schedule an appointment to discuss her options further?

## 2025-01-16 DIAGNOSIS — M35.3 PMR (POLYMYALGIA RHEUMATICA) (HCC): ICD-10-CM

## 2025-01-16 RX ORDER — PREDNISONE 5 MG/1
5 TABLET ORAL DAILY
Qty: 90 TABLET | Refills: 0 | Status: SHIPPED | OUTPATIENT
Start: 2025-01-16

## 2025-01-16 NOTE — TELEPHONE ENCOUNTER
Patient called to schedule cortisone shot w/Dr. Gatito Rachel and I told her first opening is week of 1/27 and she hung up the phone. Did not want to wait until then.

## 2025-01-16 NOTE — TELEPHONE ENCOUNTER
Future Appointments   Date Time Provider Department Center   2/6/2025  1:40 PM Isaac Bishop DO [id number]   2/19/2025  9:45 AM Luna Juarez DO [facility]   4/7/2025 10:30 AM PF CT RM1 PF [facility] [place]   4/8/2025 11:20 AM PF [facility] RM2 [facility] [place]   5/21/2025  9:45 AM Luna Juarez DO [facility]   6/18/2025 10:15 AM Luna Juarez DO [facility] [place]   10/14/2025 10:00 AM Gatito Rachel MD [facility] [address] [facility]     Last office visit: 10/21/2024    Last fill: 10/21/2024 90 tab, 0 refills

## 2025-01-17 ENCOUNTER — PATIENT MESSAGE (OUTPATIENT)
Dept: RHEUMATOLOGY | Facility: CLINIC | Age: 75
End: 2025-01-17

## 2025-01-17 DIAGNOSIS — G89.29 CHRONIC RIGHT SHOULDER PAIN: ICD-10-CM

## 2025-01-17 DIAGNOSIS — M35.3 PMR (POLYMYALGIA RHEUMATICA) (HCC): Primary | ICD-10-CM

## 2025-01-17 DIAGNOSIS — M51.362 DEGENERATION OF INTERVERTEBRAL DISC OF LUMBAR REGION WITH DISCOGENIC BACK PAIN AND LOWER EXTREMITY PAIN: ICD-10-CM

## 2025-01-17 DIAGNOSIS — M25.511 CHRONIC RIGHT SHOULDER PAIN: ICD-10-CM

## 2025-01-17 DIAGNOSIS — M54.41 CHRONIC MIDLINE LOW BACK PAIN WITH RIGHT-SIDED SCIATICA: ICD-10-CM

## 2025-01-17 DIAGNOSIS — Z87.39 HISTORY OF ROTATOR CUFF TEAR: ICD-10-CM

## 2025-01-17 DIAGNOSIS — G89.29 CHRONIC MIDLINE LOW BACK PAIN WITH RIGHT-SIDED SCIATICA: ICD-10-CM

## 2025-01-17 NOTE — TELEPHONE ENCOUNTER
Called pt  States she has been having continued right shoulder and right hip/leg pain  Is having trouble walking due to the pain.   Pain currently located in lower part of the hip and down the leg into the knee.   Also has hx of torn biceps tendon last year- went to PT for that and felt better until recently.   Discussed that inflammatory markers are normal.   Currently taking 7mg of prednisone  Is under stress with her 's recently lymphoma diagnosis and having a hard time with having to care for him and drive him places.   Expresses frustration with not feeling well.   Recommended she get MRI's of the shoulder and lumbar spine.    Patient verbalized understanding of above instructions. No further questions at this time.    Luna Juarez, DO  [facility]  1/17/2025

## 2025-01-24 DIAGNOSIS — M35.3 PMR (POLYMYALGIA RHEUMATICA) (HCC): Primary | ICD-10-CM

## 2025-01-24 DIAGNOSIS — R79.82 ELEVATED C-REACTIVE PROTEIN (CRP): ICD-10-CM

## 2025-01-24 DIAGNOSIS — R70.0 ELEVATED SED RATE: ICD-10-CM

## 2025-01-24 RX ORDER — PREDNISONE 1 MG/1
2 TABLET ORAL
Qty: 90 TABLET | Refills: 0 | Status: SHIPPED | OUTPATIENT
Start: 2025-01-24

## 2025-01-24 NOTE — TELEPHONE ENCOUNTER
Last office visit: 10/21/24    Next Rheum Apt:2/19/2025 Ann DO Luna    Last fill: 12/20/24    Labs:   Lab Results   Component Value Date    CREATSERUM 0.99 01/14/2025    GFR 54 (L) 09/01/2017    ALKPHO 59 01/08/2025    AST 23 01/08/2025    ALT 14 01/08/2025    BILT 0.7 01/08/2025    TP 7.0 01/08/2025    ALB 4.0 01/08/2025       Lab Results   Component Value Date    WBC 11.0 10/09/2024    HGB 14.4 10/09/2024    .0 10/09/2024    NEPRELIM 7.72 (H) 10/09/2024    NEPERCENT 70.5 10/09/2024    LYPERCENT 17.3 10/09/2024    NE 7.72 (H) 10/09/2024    LYMABS 1.90 10/09/2024

## 2025-01-29 ENCOUNTER — LAB ENCOUNTER (OUTPATIENT)
Dept: LAB | Age: 75
End: 2025-01-29
Attending: INTERNAL MEDICINE
Payer: MEDICARE

## 2025-01-29 DIAGNOSIS — Z79.52 LONG TERM (CURRENT) USE OF SYSTEMIC STEROIDS: ICD-10-CM

## 2025-01-29 DIAGNOSIS — M35.3 PMR (POLYMYALGIA RHEUMATICA) (HCC): ICD-10-CM

## 2025-01-29 LAB
CRP SERPL-MCNC: <0.4 MG/DL (ref ?–0.5)
ERYTHROCYTE [SEDIMENTATION RATE] IN BLOOD: 15 MM/HR

## 2025-01-29 PROCEDURE — 36415 COLL VENOUS BLD VENIPUNCTURE: CPT

## 2025-01-29 PROCEDURE — 86140 C-REACTIVE PROTEIN: CPT

## 2025-01-29 PROCEDURE — 85652 RBC SED RATE AUTOMATED: CPT

## 2025-02-05 ENCOUNTER — HOSPITAL ENCOUNTER (EMERGENCY)
Facility: HOSPITAL | Age: 75
Discharge: HOME | End: 2025-02-05
Attending: EMERGENCY MEDICINE
Payer: MEDICARE

## 2025-02-05 ENCOUNTER — APPOINTMENT (OUTPATIENT)
Dept: CT IMAGING | Facility: HOSPITAL | Age: 75
End: 2025-02-05
Attending: EMERGENCY MEDICINE
Payer: MEDICARE

## 2025-02-05 VITALS
RESPIRATION RATE: 14 BRPM | HEART RATE: 70 BPM | TEMPERATURE: 97 F | OXYGEN SATURATION: 100 % | DIASTOLIC BLOOD PRESSURE: 77 MMHG | HEIGHT: 62 IN | WEIGHT: 151 LBS | BODY MASS INDEX: 27.79 KG/M2 | SYSTOLIC BLOOD PRESSURE: 148 MMHG

## 2025-02-05 DIAGNOSIS — R10.9 ABDOMINAL PAIN, ACUTE: Primary | ICD-10-CM

## 2025-02-05 LAB
ALBUMIN SERPL-MCNC: 4.2 G/DL (ref 3.2–4.8)
ALBUMIN/GLOB SERPL: 1.4 {RATIO} (ref 1–2)
ALP LIVER SERPL-CCNC: 60 U/L
ALT SERPL-CCNC: 20 U/L
ANION GAP SERPL CALC-SCNC: 10 MMOL/L (ref 0–18)
AST SERPL-CCNC: 29 U/L (ref ?–34)
BASOPHILS # BLD AUTO: 0.07 X10(3) UL (ref 0–0.2)
BASOPHILS NFR BLD AUTO: 0.4 %
BILIRUB SERPL-MCNC: 0.7 MG/DL (ref 0.2–1.1)
BILIRUB UR QL STRIP.AUTO: NEGATIVE
BUN BLD-MCNC: 16 MG/DL (ref 9–23)
CALCIUM BLD-MCNC: 9.8 MG/DL (ref 8.7–10.6)
CHLORIDE SERPL-SCNC: 97 MMOL/L (ref 98–112)
CLARITY UR REFRACT.AUTO: CLEAR
CO2 SERPL-SCNC: 29 MMOL/L (ref 21–32)
CREAT BLD-MCNC: 0.96 MG/DL
EGFRCR SERPLBLD CKD-EPI 2021: 61 ML/MIN/1.73M2 (ref 60–?)
EOSINOPHIL # BLD AUTO: 0.04 X10(3) UL (ref 0–0.7)
EOSINOPHIL NFR BLD AUTO: 0.2 %
ERYTHROCYTE [DISTWIDTH] IN BLOOD BY AUTOMATED COUNT: 13.3 %
GLOBULIN PLAS-MCNC: 3.1 G/DL (ref 2–3.5)
GLUCOSE BLD-MCNC: 134 MG/DL (ref 70–99)
GLUCOSE UR STRIP.AUTO-MCNC: NORMAL MG/DL
HCT VFR BLD AUTO: 42.5 %
HGB BLD-MCNC: 14.3 G/DL
IMM GRANULOCYTES # BLD AUTO: 0.09 X10(3) UL (ref 0–1)
IMM GRANULOCYTES NFR BLD: 0.5 %
KETONES UR STRIP.AUTO-MCNC: NEGATIVE MG/DL
LEUKOCYTE ESTERASE UR QL STRIP.AUTO: 75
LIPASE SERPL-CCNC: 43 U/L (ref 12–53)
LYMPHOCYTES # BLD AUTO: 1.58 X10(3) UL (ref 1–4)
LYMPHOCYTES NFR BLD AUTO: 9.1 %
MCH RBC QN AUTO: 28.8 PG (ref 26–34)
MCHC RBC AUTO-ENTMCNC: 33.6 G/DL (ref 31–37)
MCV RBC AUTO: 85.7 FL
MONOCYTES # BLD AUTO: 1.11 X10(3) UL (ref 0.1–1)
MONOCYTES NFR BLD AUTO: 6.4 %
NEUTROPHILS # BLD AUTO: 14.49 X10 (3) UL (ref 1.5–7.7)
NEUTROPHILS # BLD AUTO: 14.49 X10(3) UL (ref 1.5–7.7)
NEUTROPHILS NFR BLD AUTO: 83.4 %
NITRITE UR QL STRIP.AUTO: NEGATIVE
OSMOLALITY SERPL CALC.SUM OF ELEC: 285 MOSM/KG (ref 275–295)
PH UR STRIP.AUTO: 7 [PH] (ref 5–8)
PLATELET # BLD AUTO: 290 10(3)UL (ref 150–450)
POTASSIUM SERPL-SCNC: 3.2 MMOL/L (ref 3.5–5.1)
PROT SERPL-MCNC: 7.3 G/DL (ref 5.7–8.2)
PROT UR STRIP.AUTO-MCNC: NEGATIVE MG/DL
RBC # BLD AUTO: 4.96 X10(6)UL
RBC UR QL AUTO: NEGATIVE
SODIUM SERPL-SCNC: 136 MMOL/L (ref 136–145)
SP GR UR STRIP.AUTO: >1.03 (ref 1–1.03)
UROBILINOGEN UR STRIP.AUTO-MCNC: NORMAL MG/DL
WBC # BLD AUTO: 17.4 X10(3) UL (ref 4–11)

## 2025-02-05 PROCEDURE — 74177 CT ABD & PELVIS W/CONTRAST: CPT | Performed by: EMERGENCY MEDICINE

## 2025-02-05 PROCEDURE — 99284 EMERGENCY DEPT VISIT MOD MDM: CPT

## 2025-02-05 PROCEDURE — 85025 COMPLETE CBC W/AUTO DIFF WBC: CPT | Performed by: EMERGENCY MEDICINE

## 2025-02-05 PROCEDURE — 81001 URINALYSIS AUTO W/SCOPE: CPT | Performed by: EMERGENCY MEDICINE

## 2025-02-05 PROCEDURE — 80053 COMPREHEN METABOLIC PANEL: CPT | Performed by: EMERGENCY MEDICINE

## 2025-02-05 PROCEDURE — 87086 URINE CULTURE/COLONY COUNT: CPT | Performed by: EMERGENCY MEDICINE

## 2025-02-05 PROCEDURE — 83690 ASSAY OF LIPASE: CPT | Performed by: EMERGENCY MEDICINE

## 2025-02-05 PROCEDURE — 99285 EMERGENCY DEPT VISIT HI MDM: CPT

## 2025-02-05 PROCEDURE — 36415 COLL VENOUS BLD VENIPUNCTURE: CPT

## 2025-02-06 NOTE — ED INITIAL ASSESSMENT (HPI)
Pt c/o on and off diarrhea for over a week, mid abd pain, \"bloating and hard.\" Pt denies n/v, she denies urinary sxs

## 2025-02-06 NOTE — ED PROVIDER NOTES
Patient Seen in: [facility] Emergency Department      History     Chief Complaint   Patient presents with    Abdomen/Flank Pain     Stated Complaint: abdominal pain and diarrhea since last night, hx of partial colectomy.    Subjective:   HPI      74-year-old female with a past medical history as below including hypertension, hyperlipidemia, CAD, GERD, remote bowel perforation status post colostomy and reversal presents with periumbilical abdominal pain starting this afternoon.  Patient states abdominal pain was severe for a couple hours before improved and has been waxing waning since then.  She states pain significantly improved while in the waiting room.  She reports intermittent nonbloody diarrhea for the past week.  Last episode of diarrhea was last night.  Denies nausea or vomiting.  Denies fever or chills.  Denies cough or cold symptoms.  Denies urinary symptoms.    Objective:     Past Medical History:    Acute pain of both shoulders    Arthritis    Atherosclerosis of coronary artery    Black stools    occasionally    Bloating    occasionally    Body piercing    ears    Breast cancer (HCC)    dcis    Calculus of kidney    about 30 years ago    Cancer (HCC)    breast    Chest pain of uncertain etiology    Constipation    occasionally    Diverticulitis    Ductal carcinoma in situ of breast    DCIS    Esophageal reflux    Essential hypertension    Exposure to medical diagnostic radiation    Flatulence/gas pain/belching    aternoons    Hearing impairment    tinnitis    Heart attack (HCC)    Hemorrhoids    occasionally    High blood pressure    High cholesterol    History of blood transfusion    many years ago    History of diverticulitis    Hx of diseases NEC    diverticulosis    Hx of diseases NEC    had stress test - had some extra beats    Hx of diseases NEC    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    Hx of diseases NEC    utd with gyn    Hx of motion sickness    Hyperlipidemia    Hypokalemia    Irregular bowel  habits    about a year    Leg swelling    ankle    Night sweats    on and off for several months    Osteoarthritis    Other and unspecified personal history of malignant neoplasm    breast cancer    Pain in joints    Pain with bowel movements    straining more than pain    Stented coronary artery    Thyroid nodule    benign    Uncomfortable fullness after meals    about a year    Unspecified menopausal and postmenopausal disorder    treated with effexor - however pt would like to get off med    Visual impairment    contacts/glasses    Wears glasses    contact lenses              No pertinent past surgical history.              No pertinent social history.                Physical Exam     ED Triage Vitals [02/05/25 1941]   /75   Pulse 71   Resp 20   Temp 97 °F (36.1 °C)   Temp src Temporal   SpO2 96 %   O2 Device None (Room air)       Current Vitals:   Vital Signs  BP: 148/77  Pulse: 70  Resp: 21  Temp: 97 °F (36.1 °C)  Temp src: Temporal  MAP (mmHg): 95    Oxygen Therapy  SpO2: 100 %  O2 Device: None (Room air)        Physical Exam  Vitals and nursing note reviewed.   Constitutional:       Appearance: She is well-developed.   HENT:      Head: Normocephalic and atraumatic.      Mouth/Throat:      Mouth: Mucous membranes are moist.   Eyes:      General: No scleral icterus.  Cardiovascular:      Rate and Rhythm: Normal rate and regular rhythm.   Pulmonary:      Effort: Pulmonary effort is normal.      Breath sounds: Normal breath sounds.   Abdominal:      General: There is no distension.      Palpations: Abdomen is soft.      Tenderness: There is abdominal tenderness. There is no guarding.      Comments: Minimal periumbilical tenderness   Skin:     General: Skin is warm and dry.   Neurological:      General: No focal deficit present.      Mental Status: She is alert and oriented to person, place, and time.      Cranial Nerves: No cranial nerve deficit.      Motor: No weakness.   Psychiatric:         Mood and  Affect: Mood normal.         Behavior: Behavior normal.             ED Course     Labs Reviewed   COMP METABOLIC PANEL (14) - Abnormal; Notable for the following components:       Result Value    Glucose 134 (*)     Potassium 3.2 (*)     Chloride 97 (*)     All other components within normal limits   CBC WITH DIFFERENTIAL WITH PLATELET - Abnormal; Notable for the following components:    WBC 17.4 (*)     Neutrophil Absolute Prelim 14.49 (*)     Neutrophil Absolute 14.49 (*)     Monocyte Absolute 1.11 (*)     All other components within normal limits   URINALYSIS WITH CULTURE REFLEX - Abnormal; Notable for the following components:    Spec Gravity >1.030 (*)     Leukocyte Esterase Urine 75 (*)     Bacteria Urine Rare (*)     Squamous Epi. Cells Few (*)     All other components within normal limits   LIPASE - Normal   RAINBOW DRAW BLUE   GI STOOL PANEL BY PCR   C. DIFFICILE(TOXIGENIC)PCR   URINE CULTURE, ROUTINE            CT ABDOMEN+PELVIS(CONTRAST ONLY)(CPT=74177)    Result Date: 2/5/2025  CONCLUSION:  1. No acute abnormality in the abdomen or pelvis. 2. Colonic diverticulosis. 3. Coronary artery calcifications. 4. Please see above for further details.   LOCATION:  [facility]   Dictated by (CST): Polo Rice MD on 2/05/2025 at 9:50 PM     Finalized by (CST): Polo Rice MD on 2/05/2025 at 9:53 PM             MDM      74-year-old female with a past medical history as below including hypertension, hyperlipidemia, CAD, GERD, remote bowel perforation status post colostomy and reversal presents with abdominal pain starting this afternoon.      Differential includes but is not limited to foodborne illness, viral enteritis, partial bowel obstruction, diverticulitis, appendicitis    Labs show elevated WBC 17K.  Potassium slightly low at 3.2 with otherwise normal electrolytes and renal function.  UA without significant evidence of UTI.    Independent interpretation of CT abdomen/pelvis shows no evidence of bowel  obstruction or inflammatory changes.  Radiology report reviewed as above noting no acute abnormality in the abdomen or pelvis.    Patient reassessed and continues to state that her abdominal pain is now resolved.  She states she is taking prednisone which is likely cause of leukocytosis.  Patient has not been able to provide stool sample.  She states she has not had a bowel movement or diarrhea since last night.  Low suspicion for any significant bacterial infection given otherwise reassuring workup.  Instructed to follow-up closely with PCP.  Return precaution discussed.        Medical Decision Making  Amount and/or Complexity of Data Reviewed  Labs: ordered. Decision-making details documented in ED Course.  Radiology: ordered and independent interpretation performed. Decision-making details documented in ED Course.    Risk  OTC drugs.        Disposition and Plan     Clinical Impression:  1. Abdominal pain, acute         Disposition:  Discharge  2/5/2025 10:45 pm    Follow-up:  Gatito Rachel MD  [address] [address]  [phone]    Schedule an appointment as soon as possible for a visit            Medications Prescribed:  Current Discharge Medication List              Supplementary Documentation:

## 2025-02-11 DIAGNOSIS — R73.03 PREDIABETES: ICD-10-CM

## 2025-02-11 DIAGNOSIS — E78.00 PURE HYPERCHOLESTEROLEMIA: ICD-10-CM

## 2025-02-11 DIAGNOSIS — R73.09 ELEVATED HEMOGLOBIN A1C: Primary | ICD-10-CM

## 2025-02-11 DIAGNOSIS — E04.2 MULTIPLE THYROID NODULES: ICD-10-CM

## 2025-02-11 DIAGNOSIS — I10 BENIGN ESSENTIAL HTN: ICD-10-CM

## 2025-02-14 ENCOUNTER — NURSE TRIAGE (OUTPATIENT)
Dept: INTERNAL MEDICINE CLINIC | Facility: CLINIC | Age: 75
End: 2025-02-14

## 2025-02-14 NOTE — TELEPHONE ENCOUNTER
Action Requested: Summary for Provider     []  Critical Lab, Recommendations Needed  [] Need Additional Advice  []   FYI    []   Need Orders  [] Need Medications Sent to Pharmacy  []  Other     SUMMARY: appointment scheduled for 2/18    Reason for call: Diarrhea  Onset: 2 weeks    Patient called stating she would like an appointment with Dr. Rachel, states she has been having stomach issues, states she has loose stool or diarrhea, per patient this does not happen every day, but when it does happen it is 2-3 times in 1 day.   Denies blood/tarry stool, denies fever, denies abdominal pain.   Was in the ER on 2/5/25 for abdominal pain.        Reason for Disposition   Patient wants to be seen    Protocols used: Diarrhea-A-OH    Future Appointments   Date Time Provider Department Center   2/17/2025 12:00 PM  MR RM[facility] (3T WIDE[facility]   2/17/2025 12:45 PM  MR RM3 (3T WIDE) [facility]   2/18/2025 11:00 AM Gatito Rachel MD [facility] [address] [facility]   2/19/2025  9:45 AM Luna Juarez, DO [facility] [place]   4/7/2025 10:30 AM PF CT RM1 PF CT [place]   4/8/2025 11:20 AM PF GERMÁN RM2 PF [facility] [place]   5/21/2025  9:45 AM Luna Juarez, DO [facility] [place]   6/18/2025 10:15 AM Luna Juarez, DO [facility] [place]   10/14/2025 10:00 AM Gatito Rachel MD [facility] [address] [facility]

## 2025-02-17 ENCOUNTER — HOSPITAL ENCOUNTER (OUTPATIENT)
Dept: MRI IMAGING | Facility: HOSPITAL | Age: 75
Discharge: HOME | End: 2025-02-17
Attending: INTERNAL MEDICINE
Payer: MEDICARE

## 2025-02-17 DIAGNOSIS — M25.511 CHRONIC RIGHT SHOULDER PAIN: ICD-10-CM

## 2025-02-17 DIAGNOSIS — G89.29 CHRONIC MIDLINE LOW BACK PAIN WITH RIGHT-SIDED SCIATICA: ICD-10-CM

## 2025-02-17 DIAGNOSIS — G89.29 CHRONIC RIGHT SHOULDER PAIN: ICD-10-CM

## 2025-02-17 DIAGNOSIS — Z87.39 HISTORY OF ROTATOR CUFF TEAR: ICD-10-CM

## 2025-02-17 DIAGNOSIS — M35.3 PMR (POLYMYALGIA RHEUMATICA) (HCC): ICD-10-CM

## 2025-02-17 DIAGNOSIS — M51.362 DEGENERATION OF INTERVERTEBRAL DISC OF LUMBAR REGION WITH DISCOGENIC BACK PAIN AND LOWER EXTREMITY PAIN: ICD-10-CM

## 2025-02-17 DIAGNOSIS — M54.41 CHRONIC MIDLINE LOW BACK PAIN WITH RIGHT-SIDED SCIATICA: ICD-10-CM

## 2025-02-17 PROCEDURE — 73221 MRI JOINT UPR EXTREM W/O DYE: CPT | Performed by: INTERNAL MEDICINE

## 2025-02-17 PROCEDURE — 72148 MRI LUMBAR SPINE W/O DYE: CPT | Performed by: INTERNAL MEDICINE

## 2025-02-18 ENCOUNTER — OFFICE VISIT (OUTPATIENT)
Dept: INTERNAL MEDICINE CLINIC | Facility: CLINIC | Age: 75
End: 2025-02-18
Payer: MEDICARE

## 2025-02-18 ENCOUNTER — LAB ENCOUNTER (OUTPATIENT)
Dept: LAB | Facility: HOSPITAL | Age: 75
End: 2025-02-18
Attending: FAMILY MEDICINE
Payer: MEDICARE

## 2025-02-18 VITALS
SYSTOLIC BLOOD PRESSURE: 120 MMHG | WEIGHT: 150 LBS | TEMPERATURE: 97 F | OXYGEN SATURATION: 99 % | HEART RATE: 71 BPM | BODY MASS INDEX: 27 KG/M2 | DIASTOLIC BLOOD PRESSURE: 72 MMHG

## 2025-02-18 DIAGNOSIS — R19.7 DIARRHEA, UNSPECIFIED TYPE: ICD-10-CM

## 2025-02-18 DIAGNOSIS — M47.816 LUMBAR SPONDYLOSIS: ICD-10-CM

## 2025-02-18 DIAGNOSIS — M19.011 GLENOHUMERAL ARTHRITIS, RIGHT: ICD-10-CM

## 2025-02-18 DIAGNOSIS — M75.121 NONTRAUMATIC COMPLETE TEAR OF RIGHT ROTATOR CUFF: ICD-10-CM

## 2025-02-18 DIAGNOSIS — R19.7 DIARRHEA, UNSPECIFIED TYPE: Primary | ICD-10-CM

## 2025-02-18 PROCEDURE — 99214 OFFICE O/P EST MOD 30 MIN: CPT | Performed by: FAMILY MEDICINE

## 2025-02-18 PROCEDURE — 87427 SHIGA-LIKE TOXIN AG IA: CPT

## 2025-02-18 PROCEDURE — 87015 SPECIMEN INFECT AGNT CONCNTJ: CPT

## 2025-02-18 PROCEDURE — 87493 C DIFF AMPLIFIED PROBE: CPT

## 2025-02-18 PROCEDURE — 87045 FECES CULTURE AEROBIC BACT: CPT

## 2025-02-18 PROCEDURE — 87046 STOOL CULTR AEROBIC BACT EA: CPT

## 2025-02-18 PROCEDURE — 87507 IADNA-DNA/RNA PROBE TQ 12-25: CPT | Performed by: EMERGENCY MEDICINE

## 2025-02-19 ENCOUNTER — OFFICE VISIT (OUTPATIENT)
Dept: RHEUMATOLOGY | Facility: CLINIC | Age: 75
End: 2025-02-19
Payer: MEDICARE

## 2025-02-19 VITALS
HEIGHT: 62 IN | TEMPERATURE: 97 F | OXYGEN SATURATION: 98 % | DIASTOLIC BLOOD PRESSURE: 60 MMHG | HEART RATE: 68 BPM | SYSTOLIC BLOOD PRESSURE: 138 MMHG | BODY MASS INDEX: 27.23 KG/M2 | RESPIRATION RATE: 16 BRPM | WEIGHT: 148 LBS

## 2025-02-19 DIAGNOSIS — M51.362 DEGENERATION OF INTERVERTEBRAL DISC OF LUMBAR REGION WITH DISCOGENIC BACK PAIN AND LOWER EXTREMITY PAIN: ICD-10-CM

## 2025-02-19 DIAGNOSIS — G89.29 CHRONIC RIGHT SHOULDER PAIN: ICD-10-CM

## 2025-02-19 DIAGNOSIS — R79.82 ELEVATED C-REACTIVE PROTEIN (CRP): ICD-10-CM

## 2025-02-19 DIAGNOSIS — R70.0 ELEVATED SED RATE: ICD-10-CM

## 2025-02-19 DIAGNOSIS — M15.0 PRIMARY OSTEOARTHRITIS INVOLVING MULTIPLE JOINTS: ICD-10-CM

## 2025-02-19 DIAGNOSIS — M35.3 PMR (POLYMYALGIA RHEUMATICA) (HCC): Primary | ICD-10-CM

## 2025-02-19 DIAGNOSIS — G89.29 CHRONIC MIDLINE LOW BACK PAIN WITH RIGHT-SIDED SCIATICA: ICD-10-CM

## 2025-02-19 DIAGNOSIS — M54.41 CHRONIC MIDLINE LOW BACK PAIN WITH RIGHT-SIDED SCIATICA: ICD-10-CM

## 2025-02-19 DIAGNOSIS — M25.511 CHRONIC RIGHT SHOULDER PAIN: ICD-10-CM

## 2025-02-19 DIAGNOSIS — Z79.52 LONG TERM (CURRENT) USE OF SYSTEMIC STEROIDS: ICD-10-CM

## 2025-02-19 LAB
ADENOVIRUS F 40/41 PCR: NEGATIVE
ASTROVIRUS PCR: NEGATIVE
C CAYETANENSIS DNA SPEC QL NAA+PROBE: NEGATIVE
C DIFF TOX B STL QL: NEGATIVE
CAMPY SP DNA.DIARRHEA STL QL NAA+PROBE: NEGATIVE
CRYPTOSP DNA SPEC QL NAA+PROBE: NEGATIVE
EAEC PAA PLAS AGGR+AATA ST NAA+NON-PRB: NEGATIVE
EC STX1+STX2 + H7 FLIC SPEC NAA+PROBE: NEGATIVE
ENTAMOEBA HISTOLYTICA PCR: NEGATIVE
EPEC EAE GENE STL QL NAA+NON-PROBE: NEGATIVE
ETEC LTA+ST1A+ST1B TOX ST NAA+NON-PROBE: NEGATIVE
GIARDIA LAMBLIA PCR: NEGATIVE
NOROVIRUS GI/GII PCR: NEGATIVE
P SHIGELLOIDES DNA STL QL NAA+PROBE: NEGATIVE
ROTAVIRUS A PCR: NEGATIVE
SALMONELLA DNA SPEC QL NAA+PROBE: NEGATIVE
SAPOVIRUS PCR: NEGATIVE
SHIGELLA SP+EIEC IPAH ST NAA+NON-PROBE: NEGATIVE
V CHOLERAE DNA SPEC QL NAA+PROBE: NEGATIVE
VIBRIO DNA SPEC NAA+PROBE: NEGATIVE
YERSINIA DNA SPEC NAA+PROBE: NEGATIVE

## 2025-02-19 PROCEDURE — 99214 OFFICE O/P EST MOD 30 MIN: CPT | Performed by: INTERNAL MEDICINE

## 2025-02-19 PROCEDURE — G2211 COMPLEX E/M VISIT ADD ON: HCPCS | Performed by: INTERNAL MEDICINE

## 2025-02-19 NOTE — PROGRESS NOTES
?  RHEUMATOLOGY FOLLOW UP   Date of visit: 02/19/2025  ?  Chief Complaint   Patient presents with    PMR     4 month f/u. Had a hard start to the year. Pain with walking in right leg starting in hip. MRI done. Right shoulder pain due to torn rotator cuff. Prednisone 7 mg. Converted rapid score of 5.0      ASSESSMENT, DISCUSSION & PLAN   Assessment:  1. PMR (polymyalgia rheumatica) (HCC)    2. Elevated sed rate    3. Elevated C-reactive protein (CRP)    4. Degeneration of intervertebral disc of lumbar region with discogenic back pain and lower extremity pain    5. Chronic right shoulder pain    6. Chronic midline low back pain with right-sided sciatica    7. Long term (current) use of systemic steroids    8. Primary osteoarthritis involving multiple joints        Discussion:  Mrs. Emily Velasquez is a 73 yo woman who initially presented with new onset of bilateral shoulder as well as hip pain and associated weakness due to the pain. Her exam and her inflammatory markers were consistent with diagnosis of PMR.  Previously discussed at length complications from the disease as well as the risk of development of giant cell arteritis down the road.  Discussed symptoms of headaches, blurred vision as well as jaw claudication. She required long terms steroid course due to flaring when trying to decrease. She was previously hesitant to start methotrexate due to issues her late- had when receiving for chemotherapy.  She had responded nicely to monotherapy with steroids. She was actually able to taper off of prednisone altogether. She was last seen in 2022 and even at that time, had been off steroids for over a year.    She had been doing well until recently, suffered a flare of her PMR last year.   Last year, she injured her right shoulder and developed and RUE DVT which required xarelto for 3 months.   She developed neck pain, shoulder pain, hip pain along with worsened tingling in the hands. She developed worsened  morning stiffness/pain and generalized fatigue over the past few days.   Inflammatory markers were higher than they were when she had PMR several years ago.  She was started on oral prednisone, 40mg and slowly weaning since May of last year. She is currently on 7mg.   Seems like her PMR has been better controlled  Since her labs are better, will decrease to 6mg and have her repeat labs in about 4-6 weeks.    She has been having worsened lower back pain and leg weakness. MRI showed advanced DDD of the lumbar spine with spinal and neural foraminal stenosis. She completed PT and while slightly better, she is still struggling. I echoed her pcp's recommendations and advised to see PM&R for possible injections.  Regarding her right shoulder, there is significant rotator cuff disease and effusion due to the arthritis and tears/tendinosis. She is not interested in surgery and has plans to see her PCP for an injection. I still recommended ortho evaluation.     Of note, she was given oral bisphosphonate for osteoporosis ppx for while on high doses of steroids. However, had to stop bisphosphonate d/t GI intolerance. She is on lower dose so will likely be okay to hold med. Will try to repeat BMD this summer instead of waiting until 2026.     Previously discussed again steroid sparing agents like plaquenil vs methotrexate vs IL-6 inhibition. She has a hx of diverticulitis which previously required surgical intervention so would like to avoid IL6 inhibition. Still recommended methotrexate, but pt adamant in not taking due to side effects her ex- experienced. Discussed possible plaquenil use but pt still hesitant. Hand out provided and discussed risk/benefit in detail. Will consider adding depending on her initial response to the steroids.     Reminded pt of signs/symptoms of GCA which can be associated with PMR.     Follow up in 3 months or sooner as needed  We will be in communication often in the meantime.  Previously  discussed with pt need for age appropriate cancer screening and to discuss this further with her PCP.     Patient verbalized understanding of above instructions. No further questions at this time.     Code selection for this visit was based on time spent (30min) on date of service in preparing to see the patient, obtaining and/or reviewing separately obtained history, performing a medically appropriate examination, counseling and educating the patient/family/caregiver, ordering medications or testing, referring and communicating with other healthcare providers, documenting clinical information in the E HR, independently interpreting results and communicating results to the patient/family/caregiver and care coordination with the patient's other providers.      Corticosteroids carry a risk of multiple side effects in long term use including but not limited to glaucoma, fluid retention, hypertension, mood disorders, weight gain, elevated blood sugars, diabetes, increased risk of infection, osteoporosis and fractures, suppressed adrenal gland hormone production as well as thinning of the skin and slower wound healing.    ?  Plan:  Diagnoses and all orders for this visit:    PMR (polymyalgia rheumatica) (HCC)  -     C-Reactive Protein; Standing  -     Sed Rate, Westergren (Automated); Standing    Elevated sed rate    Elevated C-reactive protein (CRP)    Degeneration of intervertebral disc of lumbar region with discogenic back pain and lower extremity pain    Chronic right shoulder pain    Chronic midline low back pain with right-sided sciatica    Long term (current) use of systemic steroids  -     C-Reactive Protein; Standing  -     Sed Rate, Westergren (Automated); Standing    Primary osteoarthritis involving multiple joints          No follow-ups on file.  ?  HPI   Emily Velasquez is a 74 year old female with the following active problems who is seen for medically necessary follow-up today. She was seen as a new  patient initially for evaluation of PMR. At that time, she was on 20mg of prednisone. Previously seen in 2022- was able to taper off steroids completely. Represented in may and had worsened PMR flare symptoms so started back at 40mg and has been slowly weaning. Currently on 7mg. Presents for follow up today.    Since her last visit, she hasn't been feeling well.  Has been having diarrhea for the past 3 weeks. Testing negative so far. Not sure if stress related but her  also has some diarrhea. Some blood from hemorrhoids. Denies mucous in the stools. No abdominal pain.     Still having trouble walking due to pain. Saw PCP yesterday and recommended seeing PM&R.   Having pain in the right groin but also down the buttock with pain down the outside of the leg past the knee and into the foot. Also pain in the upper right thigh.   Is limping due to the severity of pain.   Did formal PT for the back (completed about a month ago)- still doing those exercises at home.   Denies symptoms while at rest- sitting or laying down. Denies pain waking from sleep at night.   Difficulty getting in/out of car as well   Denies numbness/tingling.    Her right shoulder is still bothersome. Does have some range of motion limitations.   Pt not interested in surgical intervention. Did not do formal PT for the shoulders but got some exercises.    Denies morning stiffness or diffuse body aches  Denies swelling.  Has been able to lose 5-6 pounds but relates to diarrhea.     Started nutrafol and xfusion (keratin to cover bald spots)- which she thinks is helping with the hair thinning but still present   + puffiness of the face, stable  + some tremors of the hands  + prior A1c elevated at 7.4. told related to prednisone- improved with recent testing to 6.7   + still not sleeping well, which she thinks is slightly better with the lower prednisone prednisone   + fatigue stable and some generalized weakness.   + tinnitus slightly worsened since  taking the prednisone - has plans to see audiologist. Told by ENT not going to get better.     Previously stopped alendronate due to upset stomach which has since resolved since stopping medication     Denies HA, blurred vision, temple pain or jaw pain.   Denies any other joint pain or swelling.   Denies current tingling in the hands like before.   Denies recent infections.   Denies skin rashes or suspicion skin lesions     Has hx of bilateral knee replacements over the past few years.  Told by ortho that hips showed no arthritis. Hx of left hip fracture s/p minerva placement.     Previously:  Last mammogram was normal last year  Last cscope sometime between [age] - cannot remember details but told no need to repeat  States last year in August, torn her biceps tendon and developed blood clot. Seen by orthopedics, no surgery required. 3m of xarelto. Had been seen by heme/onc and did not do further workup.   Followed with cardiology- Dr. Holden and stress test negative       HPI from initial consultation  referred for rheumatologic evaluation due to elevated inflammatory markers and concern for PMR.       Last year, she had to get a left hip surgery with minerva placement after falling and femur fracture.  She has stays active and had been doing some pool exercises and had noticed increased pain/aching in her shoulders. States her symptoms continued and progressed to also have inability to lift her arms above her head to do her hair, etc. Noticed the shoulder symptoms first started end of June/beginning of July. Got more severe by the end of July/beginning of August. Then she noticed bilateral hip discomfort in the middle of August. States in the mornings, she had a difficult time bending as well as reaching.   She was initially started on oral prednisone 10mg with some improvement of symptoms. Would be on for about 10 days at a time.     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 Had undergone  bilateral knee injections with cortisone. Had significant elevation in her blood pressure as well as blood sugar, as well as headaches and nausea without vomiting. Did have to go to the ER due to symptoms. Was told likely reaction to steroids vs flu. Pt thinks more due to the steroids because symptoms improved within 24 hours.      States due to this sensitivity, pt was hesitant to increase the prednisone further. Upon stopping the prednisone, pt had suffered another flare with bilateral shoulder as well as hip discomfort. Was recommended to take 30mg, however pt hesitant due to her previous reaction. So pt is currently taking 10mg twice daily with improvement. States she feels back to her normal self now.      Does feel like prednisone has made her more tired overall.  Does have hx of osteopenia, does get some calcium/vit d in her multivitamin  Admits to being under increased stress over the past several months after requiring stent placement as well as recently moving.  Denies any current bitemporal headaches, jaw claudication or vision changes.   + hx of diverticulitis s/p colon resection     Denies family history of any autoimmune condition. Denies family history of PMR.  ?  Past Medical History:  Past Medical History:    Acute pain of both shoulders    Arthritis    Atherosclerosis of coronary artery    Black stools    occasionally    Bloating    occasionally    Body piercing    ears    Breast cancer (HCC)    dcis    Calculus of kidney    about 30 years ago    Cancer (HCC)    breast    Chest pain of uncertain etiology    Constipation    occasionally    Diverticulitis    Ductal carcinoma in situ of breast    DCIS    Esophageal reflux    Essential hypertension    Exposure to medical diagnostic radiation    Flatulence/gas pain/belching    aternoons    Hearing impairment    tinnitis    Heart attack (HCC)    Hemorrhoids    occasionally    High blood pressure    High cholesterol    History of blood transfusion    many  years ago    History of diverticulitis    Hx of diseases NEC    diverticulosis    Hx of diseases NEC    had stress test - had some extra beats    Hx of diseases NEC    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    Hx of diseases NEC    utd with gyn    Hx of motion sickness    Hyperlipidemia    Hypokalemia    Irregular bowel habits    about a year    Leg swelling    ankle    Night sweats    on and off for several months    Osteoarthritis    Other and unspecified personal history of malignant neoplasm    breast cancer    Pain in joints    Pain with bowel movements    straining more than pain    Stented coronary artery    Thyroid nodule    benign    Uncomfortable fullness after meals    about a year    Unspecified menopausal and postmenopausal disorder    treated with effexor - however pt would like to get off med    Visual impairment    contacts/glasses    Wears glasses    contact lenses     Past Surgical History:  Past Surgical History:   Procedure Laterality Date    Angioplasty (coronary)  12/31/2018    stent RCA Johann    Appendectomy  2005    at time of diverticulitis    Appendectomy      appendix removed at 53 years old    Bowel resection      after diverticulosis    Cath percutaneous  transluminal coronary angioplasty      Colectomy      Colonoscopy      Colonoscopy,diagnostic  2004    nl colonoscopy    Fracture surgery Left     hip    Hernia surgery      had a patch put in lower abdomen at 53, after resection    Hip surgery Left     May 2018    Knee replacement surgery Left     3/2022    Lumpectomy right  2004    Sofía biopsy stereo nodule 1 site right (cpt=19081)  2004    Other Left     hip-minerva on place in femur    Part removal colon w end colostomy      at 53 years old    Radiation right  2004    Special service or report  2005    diverticulitis - had L sigmoidectomy    Special service or report  2005    ventral hernia repair    Total knee replacement Right 03/22/2023     Family History:  Family History   Problem Relation Age of  Onset    Breast Cancer Self 53    DCIS Self 53    Hypertension Mother     Stroke Mother     Other (Other) Mother     Breast Cancer Mother 49    Heart Disorder Father         mi at 46 and  at 53 of 7th mi    Lipids Father     Heart Disease Father     Other (Other) Father     Heart Attack Father     Other (Other) Sister         spinal problems, prolactin problem, factor v leiden + (pt negative)    Other (Other) Brother         healthy    Other (Other) Maternal Grandmother          of aneurysm    Breast Cancer Maternal Aunt 52    Breast Cancer Paternal Cousin Female 50     Social History:  Social History     Socioeconomic History    Marital status:    Tobacco Use    Smoking status: Never    Smokeless tobacco: Never   Vaping Use    Vaping status: Never Used   Substance and Sexual Activity    Alcohol use: Yes     Alcohol/week: 7.0 standard drinks of alcohol     Types: 7 Glasses of wine per week     Comment: \"couple glasses a week\"    Drug use: No    Sexual activity: Yes     Partners: Male   Other Topics Concern    Caffeine Concern Yes     Comment: 1 cup tea daily     Exercise Yes     Comment: 2-x weekly     Seat Belt Yes     Medications:  Outpatient Medications Marked as Taking for the 25 encounter (Office Visit) with Luna Juarez DO   Medication Sig Dispense Refill    NON FORMULARY Neutrafol      predniSONE 1 MG Oral Tab Take 2 tablets (2 mg total) by mouth daily with breakfast. 90 tablet 0    PREDNISONE 5 MG Oral Tab TAKE 1 TABLET (5 MG TOTAL) BY MOUTH DAILY. 90 tablet 0    aspirin 81 MG Oral Tab EC Take 1 tablet (81 mg total) by mouth daily.      TRIAMTERENE-HCTZ 37.5-25 MG Oral Tab TAKE 1 TABLET BY MOUTH EVERY DAY 90 tablet 0    Acidophilus/Pectin Oral Cap Take 1 capsule by mouth daily.      Flaxseed, Linseed, (FLAX SEED OIL) 1300 MG Oral Cap 1,000 mg daily.      Calcium Carbonate-Vitamin D (CALCIUM-VITAMIN D3 OR) Take 1 tablet by mouth daily.      ezetimibe 10 MG Oral Tab Take 1 tablet (10 mg  total) by mouth every other day.  4    atorvastatin 20 MG Oral Tab Take 1 tablet (20 mg total) by mouth nightly. 30 tablet 2    atenolol 50 MG Oral Tab Take 1 tablet (50 mg total) by mouth daily. 30 tablet 0     Modified Medications    No medications on file     There are no discontinued medications.    ?  ?  Allergies:  Allergies   Allergen Reactions    Altace [Ramipril] ANAPHYLAXIS     angioedema    Dilaudid [Hydromorphone] NAUSEA AND VOMITING     severe    Ramipril ANAPHYLAXIS     Oral    Cortisone OTHER (SEE COMMENTS)     Headache, blood pressure became raised    No Known Allergies OTHER (SEE COMMENTS)     ?  REVIEW OF SYSTEMS   ?  Review of Systems   Constitutional:  Positive for malaise/fatigue (last couple days) and weight loss. Negative for chills and fever.   HENT:  Positive for tinnitus.    Eyes:  Negative for pain and redness.   Respiratory:  Negative for cough and shortness of breath.    Cardiovascular:  Negative for chest pain, palpitations and leg swelling.   Gastrointestinal:  Positive for diarrhea. Negative for abdominal pain, blood in stool, constipation, heartburn and nausea.   Genitourinary:  Negative for dysuria, frequency, hematuria and urgency.   Musculoskeletal:  Positive for back pain and joint pain. Negative for myalgias and neck pain.   Skin:  Negative for itching and rash.   Neurological:  Positive for tingling, tremors and weakness. Negative for dizziness (one episode of vertigo), focal weakness, seizures and headaches.   Endo/Heme/Allergies:  Does not bruise/bleed easily.   Psychiatric/Behavioral:  Positive for depression. The patient is nervous/anxious and has insomnia.      PHYSICAL EXAM   Today's Vitals:  Temperature Blood Pressure Heart Rate Resp Rate SpO2   Temp: 97.1 °F (36.2 °C) BP: 138/60 Pulse: 68 Resp: 16 SpO2: 98 %   ?  Current Weight Height BMI BSA Pain   Wt Readings from Last 1 Encounters:   02/19/25 148 lb (67.1 kg)    Height: 5' 2\" (157.5 cm) Body mass index is 27.07  kg/m². Body surface area is 1.68 meters squared.         Physical Exam  Vitals and nursing note reviewed.   Constitutional:       General: She is not in acute distress.     Appearance: She is well-developed. She is not diaphoretic.   HENT:      Head: Normocephalic.   Eyes:      General: No scleral icterus.     Extraocular Movements: Extraocular movements intact.      Conjunctiva/sclera: Conjunctivae normal.   Neck:      Vascular: No JVD.      Trachea: No tracheal deviation.      Comments: + significant paraspinal muscle tightness and trapezius tightness.   Cardiovascular:      Rate and Rhythm: Normal rate and regular rhythm.      Heart sounds: Normal heart sounds. No murmur heard.  Pulmonary:      Effort: Pulmonary effort is normal. No respiratory distress.      Breath sounds: Normal breath sounds. No wheezing.   Musculoskeletal:         General: Tenderness and deformity present. No swelling.      Cervical back: Neck supple.      Comments: Diffuse small heberden/brenda nodes of the fingers, no basilar joint tenderness of the 1st CMC bilaterally but slight squaring present.   No swelling, tenderness, redness or restriction of motion of the DIPs, PIPs, MCPs, wrists, elbows, ankles, or joints of the feet.  Bilateral shoulders with some hesitation ROM. Able to raise arms above head but significant restriction and pain with the right shoulder   Both knees s/p replacement    Lymphadenopathy:      Cervical: No cervical adenopathy.   Skin:     General: Skin is warm and dry.      Findings: No erythema or rash.      Comments: No periungal erythema    Neurological:      Mental Status: She is alert and oriented to person, place, and time.      Cranial Nerves: No cranial nerve deficit.      Gait: Gait abnormal.   Psychiatric:         Mood and Affect: Mood normal.         Behavior: Behavior normal.       Radiology review:  MRI SPINE LUMBAR (CPT=72148)    Result Date: 2/17/2025  CONCLUSION:   1. Multilevel degenerative changes  lumbar spine are most pronounced at the L3-4 level.  2. Mild-to-moderate spinal canal stenosis with mild bilateral subarticular zone narrowing at L3-4.  Mild spinal canal stenosis with mild bilateral subarticular zone narrowing at L2-3.  Mild spinal canal stenosis with right subarticular zone stenosis at L4-5.  Mild left subarticular zone narrowing at L5-S1.  3. Mild right and mild-to-moderate left neural foraminal stenosis at L1-2.  Moderate right and mild left neural foraminal stenosis at L2-3.  Mild to moderate right and mild left neural foraminal stenosis at L3-4.  Moderate bilateral neural foraminal stenosis at L4-5.  Mild right and moderate left neural foraminal stenosis at L5-S1.  4. Facet arthropathy throughout the lumbar spine as above.  Please see above for further details.  LOCATION:  [facility]   Dictated by (CST): Panfilo Guardado MD on 2/17/2025 at 1:56 PM     Finalized by (CST): Panfilo Guardado MD on 2/17/2025 at 2:02 PM       MRI SHOULDER, RIGHT (CPT=73221)    Result Date: 2/17/2025  CONCLUSION:   1. Full-thickness, full width tear of the distal supraspinatus tendon at the footplate insertion with tendon retraction to the level of the medial humeral head.  Advanced muscle atrophy of the supraspinatus indicating a chronic process.   2. Partial-thickness intrasubstance tearing involving the full width of the infraspinatus tendon, the tear propagating medially to the myotendinous junction.  These findings are on a background of severe tendinopathy.  3. Severe tendinopathy of the subscapularis with partial thickness bursal surface tearing of the distal tendon with disruption of the transverse humeral ligament.  4. Advanced muscle atrophy of the supraspinatus.  Moderate muscle atrophy of the subscapularis and infraspinatus.  Mild intramuscular edema of the infraspinatus.   5. Nonvisualization of the long head of the biceps tendon, likely torn and retracted.  6. Circumferential degenerative tearing of the  glenoid labrum.  7. Advanced glenohumeral osteoarthritis with moderate glenohumeral joint effusion and evidence of synovitis.  This is in addition to subacromial/subdeltoid bursitis.  LOCATION:  [facility]   Dictated by (CST): Apolinar Solis MD on 2/17/2025 at 12:57 PM     Finalized by (CST): Apolinar Solis MD on 2/17/2025 at 1:09 PM         PROCEDURE:  XR DEXA BONE DENSITOMETRY (CPT=77080)       COMPARISON:  [place], XR, XR DEXA BONE DENSITOMETRY (CPT=77080), 1/28/2020, 11:24 AM.       INDICATIONS:    Z13.820 Screening for osteoporosis M85.89 Osteopenia of multiple sites       PATIENT STATED HISTORY: (As transcribed by Technologist)  Screening for osteoporosis.            LUMBAR SPINE ANALYSIS RESULTS:       Bone mineral density (BMD) (g/cm2):  1.076     Lumbar T-Score:  0.3       % young normals:  103       % age matched controls:  136       Change from prior spine examination:  0.6%                TOTAL HIP ANALYSIS RESULTS:         Bone mineral density (BMD) (g/cm2):  0.703       Total Hip T-Score:  -2.0       % young normals:  75       % age matched controls:  96       Change from prior hip examination:  -1.6%                FEMORAL NECK ANALYSIS RESULTS:         Bone mineral density (BMD) (g/cm2):  0.620       Femoral neck T-Score:  -2.1       % young normals:  73       % age matched controls:  99       Change from prior hip examination:  2.6%               ADDITIONAL FINDINGS:  FRAX not reported due to prior hip or vertebral fracture.                          Impression   CONCLUSION:  Bone mineral density values for right hip are compatible with the diagnosis of osteopenia by WHO definition (T score between -1.0 and -2.5).  If therapy is initiated, follow-up study is recommended in 2 years for further evaluation of   therapeutic efficacy.                 The World Health Organization has defined the following categories based on bone density:   Normal bone:  T-score better than -1   Osteopenia: T-score between  -1 and -2.5   Osteoporosis:  T-score less than -2.5               Dictated by (CST): Marcelle Boo MD on 2/24/2022 at 1:01 PM       Finalized by (CST): Marcelle Boo MD on 2/24/2022 at 1:01 PM       PROCEDURE:  XR CERVICAL SPINE COMPLETE W/FLEX + EXT (CPT=72052)       TECHNIQUE:  AP, lateral, obliques, coned down view, and flexion/extension views of the spine were obtained.       COMPARISON:  None.       INDICATIONS:  M54.2 Neck pain       PATIENT STATED HISTORY: (As transcribed by Technologist)  Patient complains of intermittent tingling to her bilateral fingers X a few months.  She also has chronic neck stiffness.  Currently she feels some stiffness to her posterior neck but denies any   significant pain at this time.  She has a history of polymyalgia rheumatica.            FINDINGS:  No acute fracture or subluxation in the cervical spine.  Moderate to severe degenerative disc space loss and endplate spurring most pronounced at C4-5 with scattered uncovertebral osteophytes noted.  Moderate facet arthropathy throughout the   cervical spine.  No abnormal motion with flexion or extension views.  Osteopenia.  C1-2 articulation intact.  Vascular calcifications.    Impression   CONCLUSION:  No acute fracture or subluxation in the cervical spine.  Degenerative changes as above.           Dictated by (CST): Panfilo Guardado MD on 11/16/2021 at 2:21 PM       Finalized by (CST): Panfilo Guardado MD on 11/16/2021 at 2:21 PM      04/2019  X-rays AP of the pelvis and AP and lateral of the right hip show mild to moderate osteoarthritic changes with spur formation and slight loss of clear space.  No fractures dislocations or loose bodies.     X-rays AP of the pelvis and AP and lateral of the left hip show mild osteoarthritis with spur formation in both sides of the joint and slight loss of clear space.  There are no acute changes.  There is a proximal intramedullary nail and lag screw device consistent with prior open reduction and  internal fixation of a peritrochanteric fracture.  There is no residual fracture line visible.  This appears to have gone on to full healing.  No fractures dislocations or loose bodies.     X-rays 4 views of the right knee show moderate to severe osteoarthritis with significant tricompartmental spurring and significant loss of medial clear space.     X-rays 4 views of the left knee show severe osteoarthritis with near complete loss of medial clear space and tricompartmental spur formation.  There are no fractures dislocations or loose bodies.    Labs:  Lab Results   Component Value Date    WBC 17.4 (H) 02/05/2025    RBC 4.96 02/05/2025    HGB 14.3 02/05/2025    HCT 42.5 02/05/2025    .0 02/05/2025    MCV 85.7 02/05/2025    MCH 28.8 02/05/2025    MCHC 33.6 02/05/2025    RDW 13.3 02/05/2025    NEPRELIM 14.49 (H) 02/05/2025    NEPERCENT 83.4 02/05/2025    LYPERCENT 9.1 02/05/2025    MOPERCENT 6.4 02/05/2025    EOPERCENT 0.2 02/05/2025    BAPERCENT 0.4 02/05/2025    NE 14.49 (H) 02/05/2025    LYMABS 1.58 02/05/2025    MOABSO 1.11 (H) 02/05/2025    EOABSO 0.04 02/05/2025    BAABSO 0.07 02/05/2025     Lab Results   Component Value Date     (H) 02/05/2025    BUN 16 02/05/2025    BUNCREA 12.6 10/05/2022    CREATSERUM 0.96 02/05/2025    ANIONGAP 10 02/05/2025    GFR 54 (L) 09/01/2017    GFRNAA 64 04/19/2022    GFRAA 73 04/19/2022    CA 9.8 02/05/2025    OSMOCALC 285 02/05/2025    ALKPHO 60 02/05/2025    AST 29 02/05/2025    ALT 20 02/05/2025    BILT 0.7 02/05/2025    TP 7.3 02/05/2025    ALB 4.2 02/05/2025    GLOBULIN 3.1 02/05/2025    AGRATIO 1.6 11/27/2007     02/05/2025    K 3.2 (L) 02/05/2025    CL 97 (L) 02/05/2025    CO2 29.0 02/05/2025       Additional Labs:   SED RATE   Latest Ref Rng 0 - 30 mm/Hr   10/17/2024 19    11/12/2024 28    12/18/2024 25    1/8/2025 28    1/29/2025 15         C-REACTIVE PROTEIN   Latest Ref Rng <=0.50 mg/dL   10/17/2024 <0.40    11/12/2024 0.40    12/18/2024 <0.40     1/8/2025 0.50    1/29/2025 <0.40        05/2024  CRP 3.59 elevated  ESR 82 elevated    11/2021  ESR 13 normal   CRP normal     07/2021  ESR 12 normal   CRP normal     04/2021  CRP normal  ESR 9 normal     08/2020  ESR 9 normal   CRP <0.29 normal     07/2020  ESR 9 normal   CRP 0.46 borderline     06/2020  ESR 11  CRP 0.32 borderline     03/2020  CRP 0.42 borderline  ESR 4 normal    02/2020  ESR 9 normal  CRP normal     01/2020  ESR 14 normal   CRP 0.67 borderline   Vit D 31.6 low normal    11/2019  ESR 16 normal   CRP 0.72 borderline     10/2019  ESR 7 normal   CRP <0.29 normal   Vit D 23.4     08/21/2019  CK normal   CRP 3.70  ESR 30     08/12/2019  CRP 0.78  ESR 14    Luna Juarez DO  [facility]  02/19/2025

## 2025-02-19 NOTE — PROGRESS NOTES
Emily Velasquez  4/29/1950    Chief Complaint   Patient presents with    Diarrhea     On and off diarrhea for x3 weeks   Back and R shoulder pain + mri        HPI:   Emily Velasquez is a 74 year old female who presents for evaluation of 2 weeks of loose stool, about 3 episodes per day. Was seen in the ER on 2/5 for abdominal pain and had overall normal CT. No sig abdominal pain at this time. Has used occasional imodium. No fever or chills. Also had MRI of her lumbar spine and shoulder done. Her back pain and radicular leg pain is more concerning to her at this time than her shoulder.     Current Outpatient Medications   Medication Sig Dispense Refill    predniSONE 1 MG Oral Tab Take 2 tablets (2 mg total) by mouth daily with breakfast. 90 tablet 0    PREDNISONE 5 MG Oral Tab TAKE 1 TABLET (5 MG TOTAL) BY MOUTH DAILY. 90 tablet 0    aspirin 81 MG Oral Tab EC Take 1 tablet (81 mg total) by mouth daily.      TRIAMTERENE-HCTZ 37.5-25 MG Oral Tab TAKE 1 TABLET BY MOUTH EVERY DAY 90 tablet 0    Acidophilus/Pectin Oral Cap Take 1 capsule by mouth daily.      Flaxseed, Linseed, (FLAX SEED OIL) 1300 MG Oral Cap 1,000 mg daily.      Calcium Carbonate-Vitamin D (CALCIUM-VITAMIN D3 OR) Take 1 tablet by mouth daily.      ezetimibe 10 MG Oral Tab Take 1 tablet (10 mg total) by mouth every other day.  4    atorvastatin 20 MG Oral Tab Take 1 tablet (20 mg total) by mouth nightly. 30 tablet 2    atenolol 50 MG Oral Tab Take 1 tablet (50 mg total) by mouth daily. 30 tablet 0    NON FORMULARY Neutrafol        Allergies[1]   Past Medical History:    Acute pain of both shoulders    Arthritis    Atherosclerosis of coronary artery    Black stools    occasionally    Bloating    occasionally    Body piercing    ears    Breast cancer (HCC)    dcis    Calculus of kidney    about 30 years ago    Cancer (HCC)    breast    Chest pain of uncertain etiology    Constipation    occasionally    Diverticulitis    Ductal carcinoma in situ of  breast    DCIS    Esophageal reflux    Essential hypertension    Exposure to medical diagnostic radiation    Flatulence/gas pain/belching    aternoons    Hearing impairment    tinnitis    Heart attack (HCC)    Hemorrhoids    occasionally    High blood pressure    High cholesterol    History of blood transfusion    many years ago    History of diverticulitis    Hx of diseases NEC    diverticulosis    Hx of diseases NEC    had stress test - had some extra beats    Hx of diseases NEC    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    Hx of diseases NEC    utd with gyn    Hx of motion sickness    Hyperlipidemia    Hypokalemia    Irregular bowel habits    about a year    Leg swelling    ankle    Night sweats    on and off for several months    Osteoarthritis    Other and unspecified personal history of malignant neoplasm    breast cancer    Pain in joints    Pain with bowel movements    straining more than pain    Stented coronary artery    Thyroid nodule    benign    Uncomfortable fullness after meals    about a year    Unspecified menopausal and postmenopausal disorder    treated with effexor - however pt would like to get off med    Visual impairment    contacts/glasses    Wears glasses    contact lenses      Patient Active Problem List   Diagnosis    Benign essential HTN    Pure hypercholesterolemia    Multiple thyroid nodules    History of breast cancer    CAD (coronary artery disease)    History of non-ST elevation myocardial infarction (NSTEMI)    Prediabetes    Presence of drug coated stent in right coronary artery    PMR (polymyalgia rheumatica) (HCC)    Personal history of colonic polyps    Sensory hearing loss, bilateral    S/p total knee replacement, bilateral    Arm DVT (deep venous thromboembolism), acute, right (HCC)    Primary osteoarthritis involving multiple joints    Osteopenia of multiple sites    Long term (current) use of systemic steroids    DDD (degenerative disc disease), cervical      Past Surgical History:    Procedure Laterality Date    Angioplasty (coronary)  2018    stent RCA Johann    Appendectomy  2005    at time of diverticulitis    Appendectomy      appendix removed at 53 years old    Bowel resection      after diverticulosis    Cath percutaneous  transluminal coronary angioplasty      Colectomy      Colonoscopy      Colonoscopy,diagnostic      nl colonoscopy    Fracture surgery Left     hip    Hernia surgery      had a patch put in lower abdomen at 53, after resection    Hip surgery Left     May 2018    Knee replacement surgery Left     3/2022    Lumpectomy right      Sofía biopsy stereo nodule 1 site right (cpt=19081)  2004    Other Left     hip-minerva on place in femur    Part removal colon w end colostomy      at 53 years old    Radiation right  2004    Special service or report      diverticulitis - had L sigmoidectomy    Special service or report      ventral hernia repair    Total knee replacement Right 2023      Family History   Problem Relation Age of Onset    Breast Cancer Self 53    DCIS Self 53    Hypertension Mother     Stroke Mother     Other (Other) Mother     Breast Cancer Mother 49    Heart Disorder Father         mi at 46 and  at 53 of 7th mi    Lipids Father     Heart Disease Father     Other (Other) Father     Heart Attack Father     Other (Other) Sister         spinal problems, prolactin problem, factor v leiden + (pt negative)    Other (Other) Brother         healthy    Other (Other) Maternal Grandmother          of aneurysm    Breast Cancer Maternal Aunt 52    Breast Cancer Paternal Cousin Female 50      Social History     Socioeconomic History    Marital status:    Tobacco Use    Smoking status: Never    Smokeless tobacco: Never   Vaping Use    Vaping status: Never Used   Substance and Sexual Activity    Alcohol use: Yes     Alcohol/week: 7.0 standard drinks of alcohol     Types: 7 Glasses of wine per week     Comment: \"couple glasses a week\"    Drug  use: No    Sexual activity: Yes     Partners: Male   Other Topics Concern    Caffeine Concern Yes     Comment: 1 cup tea daily     Exercise Yes     Comment: 2-x weekly     Seat Belt Yes         REVIEW OF SYSTEMS:   GENERAL: no fever or chills, no new CP or dyspnea. See HPI    EXAM:   /72 (BP Location: Left arm, Patient Position: Sitting, Cuff Size: adult)   Pulse 71   Temp 97.1 °F (36.2 °C) (Temporal)   Wt 150 lb (68 kg)   LMP  (LMP Unknown)   SpO2 99%   BMI 27.44 kg/m²   GENERAL: Well developed, well nourished,in no apparent distress  SKIN: No rash  MSK: mild restriction in passive R shoulder ROM with strength deficits in rotator cuff testing and reproducible pain.   GI: good BS's,no masses, HSM or tenderness    ASSESSMENT AND PLAN:   Emily Velasquez is a 74 year old female who presents for evaluation    Diarrhea, unspecified type  Likely viral enteritis. Will check stool studies as below. Discussed supportive care, hydration, electrolyte replacement and expected course of improvement.   - C. diff toxigenic PCR (OPT) [E]; Future  - Stool Culture w/Shigatoxin [E]; Future    Nontraumatic complete tear of right rotator cuff  Glenohumeral arthritis, right  Reviewed MRI today. We discussed treatment options regarding her chronic rotator cuff tear, tendinopathy and glenohumeral arthritis including non operative treatment and surgical referral. At this time she would like to focus on her back which is understandable. She is hoping to avoid surgery regarding her shoulder and discussed that we could try corticosteroid injection at anytime.      Lumbar spondylosis  Reviewed MRI today. Recommend evaluation with Dr. Heath for non-surgical options.   - Physiatry Referral - In Network      All questions were answered and the patient agrees with the plan.     Thank you,  Gatito Rachel MD         [1]   Allergies  Allergen Reactions    Altace [Ramipril] ANAPHYLAXIS     angioedema    Dilaudid [Hydromorphone] NAUSEA  AND VOMITING     severe    Ramipril ANAPHYLAXIS     Oral    Cortisone OTHER (SEE COMMENTS)     Headache, blood pressure became raised    No Known Allergies OTHER (SEE COMMENTS)

## 2025-02-24 ENCOUNTER — NURSE TRIAGE (OUTPATIENT)
Dept: INTERNAL MEDICINE CLINIC | Facility: CLINIC | Age: 75
End: 2025-02-24

## 2025-02-24 NOTE — TELEPHONE ENCOUNTER
Action Requested: Summary for Provider     []  Critical Lab, Recommendations Needed  [] Need Additional Advice  []   FYI    []   Need Orders  [] Need Medications Sent to Pharmacy  []  Other     SUMMARY: Next day appt scheduled for follow up.    Reason for call: Diarrhea  Onset: 4 weeks    Pt states she was seen in the office for diarrhea on 2/18/25. All her stool labs came back negative. Pt states she has some semi-solid stools, but still had loose stools about 3 times per day. Pt trying to stay hydrated and drinking Gatorade. Pt slowly progressing on solid foods. No available appts today. Offered appt tomorrow with first available provider. Pt agreed.    Reason for Disposition   MILD diarrhea (e.g., 1-3 or more stools than normal in past 24 hours) diarrhea without known cause and present > 7 days    Protocols used: Diarrhea-A-OH

## 2025-02-25 ENCOUNTER — OFFICE VISIT (OUTPATIENT)
Dept: INTERNAL MEDICINE CLINIC | Facility: CLINIC | Age: 75
End: 2025-02-25
Payer: MEDICARE

## 2025-02-25 ENCOUNTER — LAB ENCOUNTER (OUTPATIENT)
Dept: LAB | Age: 75
End: 2025-02-25
Payer: MEDICARE

## 2025-02-25 VITALS
OXYGEN SATURATION: 98 % | BODY MASS INDEX: 26.87 KG/M2 | SYSTOLIC BLOOD PRESSURE: 130 MMHG | DIASTOLIC BLOOD PRESSURE: 80 MMHG | HEIGHT: 62 IN | TEMPERATURE: 98 F | RESPIRATION RATE: 16 BRPM | HEART RATE: 68 BPM | WEIGHT: 146 LBS

## 2025-02-25 DIAGNOSIS — E87.6 HYPOKALEMIA: ICD-10-CM

## 2025-02-25 DIAGNOSIS — R53.83 OTHER FATIGUE: ICD-10-CM

## 2025-02-25 DIAGNOSIS — R19.7 DIARRHEA, UNSPECIFIED TYPE: Primary | ICD-10-CM

## 2025-02-25 LAB
ALBUMIN SERPL-MCNC: 4.4 G/DL (ref 3.2–4.8)
ALBUMIN/GLOB SERPL: 1.6 {RATIO} (ref 1–2)
ALP LIVER SERPL-CCNC: 54 U/L
ALT SERPL-CCNC: 22 U/L
ANION GAP SERPL CALC-SCNC: 4 MMOL/L (ref 0–18)
AST SERPL-CCNC: 33 U/L (ref ?–34)
BASOPHILS # BLD AUTO: 0.06 X10(3) UL (ref 0–0.2)
BASOPHILS NFR BLD AUTO: 0.5 %
BILIRUB SERPL-MCNC: 0.6 MG/DL (ref 0.2–1.1)
BUN BLD-MCNC: 17 MG/DL (ref 9–23)
CALCIUM BLD-MCNC: 9.8 MG/DL (ref 8.7–10.6)
CHLORIDE SERPL-SCNC: 101 MMOL/L (ref 98–112)
CO2 SERPL-SCNC: 34 MMOL/L (ref 21–32)
CREAT BLD-MCNC: 1.26 MG/DL
EGFRCR SERPLBLD CKD-EPI 2021: 44 ML/MIN/1.73M2 (ref 60–?)
EOSINOPHIL # BLD AUTO: 0.04 X10(3) UL (ref 0–0.7)
EOSINOPHIL NFR BLD AUTO: 0.3 %
ERYTHROCYTE [DISTWIDTH] IN BLOOD BY AUTOMATED COUNT: 13.2 %
FASTING STATUS PATIENT QL REPORTED: NO
GLOBULIN PLAS-MCNC: 2.8 G/DL (ref 2–3.5)
GLUCOSE BLD-MCNC: 132 MG/DL (ref 70–99)
HCT VFR BLD AUTO: 42.5 %
HGB BLD-MCNC: 14.1 G/DL
IMM GRANULOCYTES # BLD AUTO: 0.09 X10(3) UL (ref 0–1)
IMM GRANULOCYTES NFR BLD: 0.7 %
LYMPHOCYTES # BLD AUTO: 0.93 X10(3) UL (ref 1–4)
LYMPHOCYTES NFR BLD AUTO: 7.5 %
MCH RBC QN AUTO: 28.5 PG (ref 26–34)
MCHC RBC AUTO-ENTMCNC: 33.2 G/DL (ref 31–37)
MCV RBC AUTO: 86 FL
MONOCYTES # BLD AUTO: 0.64 X10(3) UL (ref 0.1–1)
MONOCYTES NFR BLD AUTO: 5.2 %
NEUTROPHILS # BLD AUTO: 10.6 X10 (3) UL (ref 1.5–7.7)
NEUTROPHILS # BLD AUTO: 10.6 X10(3) UL (ref 1.5–7.7)
NEUTROPHILS NFR BLD AUTO: 85.8 %
OSMOLALITY SERPL CALC.SUM OF ELEC: 291 MOSM/KG (ref 275–295)
PLATELET # BLD AUTO: 297 10(3)UL (ref 150–450)
POTASSIUM SERPL-SCNC: 3.6 MMOL/L (ref 3.5–5.1)
PROT SERPL-MCNC: 7.2 G/DL (ref 5.7–8.2)
RBC # BLD AUTO: 4.94 X10(6)UL
SODIUM SERPL-SCNC: 139 MMOL/L (ref 136–145)
WBC # BLD AUTO: 12.4 X10(3) UL (ref 4–11)

## 2025-02-25 PROCEDURE — 99214 OFFICE O/P EST MOD 30 MIN: CPT

## 2025-02-25 PROCEDURE — 85025 COMPLETE CBC W/AUTO DIFF WBC: CPT

## 2025-02-25 PROCEDURE — 36415 COLL VENOUS BLD VENIPUNCTURE: CPT

## 2025-02-25 PROCEDURE — 80053 COMPREHEN METABOLIC PANEL: CPT

## 2025-02-25 NOTE — PROGRESS NOTES
Emily Velasquez is a 74 year old female.   Chief Complaint   Patient presents with    Diarrhea     Yb rm 16B diarrhea for a month.       HPI:    Patient here for follow up on persistent diarrhea over the last month. Seen last in office on 2/18/25 which stool studies completed and negative.  had norovirus which patients symptoms were suspected to be viral induced. Patient states she has had a few days of improved symptoms though symptoms seem to return. She remains afebrile. Abdominal cramping present before diarrhea. At this time she denies abdominal pain, cramping, chills, fever. Noted to have fatigue. Drinking Pedialyte. Last cmp 2/5/25 with potassium of 3.2 denies taking potassium supplementation. She has been following BRAT diet. Taken Pepto and imodium on a few occasions. Does take probiotic daily.     Allergies:  Allergies[1]   Current Meds:  Current Outpatient Medications   Medication Sig Dispense Refill    NON FORMULARY Neutrafol      predniSONE 1 MG Oral Tab Take 2 tablets (2 mg total) by mouth daily with breakfast. 90 tablet 0    PREDNISONE 5 MG Oral Tab TAKE 1 TABLET (5 MG TOTAL) BY MOUTH DAILY. 90 tablet 0    aspirin 81 MG Oral Tab EC Take 1 tablet (81 mg total) by mouth daily.      TRIAMTERENE-HCTZ 37.5-25 MG Oral Tab TAKE 1 TABLET BY MOUTH EVERY DAY 90 tablet 0    Acidophilus/Pectin Oral Cap Take 1 capsule by mouth daily.      Flaxseed, Linseed, (FLAX SEED OIL) 1300 MG Oral Cap 1,000 mg daily.      Calcium Carbonate-Vitamin D (CALCIUM-VITAMIN D3 OR) Take 1 tablet by mouth daily.      ezetimibe 10 MG Oral Tab Take 1 tablet (10 mg total) by mouth every other day.  4    atorvastatin 20 MG Oral Tab Take 1 tablet (20 mg total) by mouth nightly. 30 tablet 2    atenolol 50 MG Oral Tab Take 1 tablet (50 mg total) by mouth daily. 30 tablet 0        PMH:     Past Medical History:    Acute pain of both shoulders    Arthritis    Atherosclerosis of coronary artery    Black stools    occasionally     Bloating    occasionally    Body piercing    ears    Breast cancer (HCC)    dcis    Calculus of kidney    about 30 years ago    Cancer (HCC)    breast    Chest pain of uncertain etiology    Constipation    occasionally    Diverticulitis    Ductal carcinoma in situ of breast    DCIS    Esophageal reflux    Essential hypertension    Exposure to medical diagnostic radiation    Flatulence/gas pain/belching    aternoons    Hearing impairment    tinnitis    Heart attack (HCC)    Hemorrhoids    occasionally    High blood pressure    High cholesterol    History of blood transfusion    many years ago    History of diverticulitis    Hx of diseases NEC    diverticulosis    Hx of diseases NEC    had stress test - had some extra beats    Hx of diseases NEC    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    Hx of diseases NEC    utd with gyn    Hx of motion sickness    Hyperlipidemia    Hypokalemia    Irregular bowel habits    about a year    Leg swelling    ankle    Night sweats    on and off for several months    Osteoarthritis    Other and unspecified personal history of malignant neoplasm    breast cancer    Pain in joints    Pain with bowel movements    straining more than pain    Stented coronary artery    Thyroid nodule    benign    Uncomfortable fullness after meals    about a year    Unspecified menopausal and postmenopausal disorder    treated with effexor - however pt would like to get off med    Visual impairment    contacts/glasses    Wears glasses    contact lenses       ROS:   Review of Systems   Constitutional:  Positive for appetite change and fatigue. Negative for chills and fever.   Respiratory: Negative.     Cardiovascular: Negative.    Gastrointestinal:  Positive for abdominal pain (cramping prior to diarrhea reported) and diarrhea. Negative for abdominal distention, blood in stool, nausea, rectal pain and vomiting.   Genitourinary: Negative.    Neurological: Negative.             PHYSICAL EXAM:    /80   Pulse 68   Temp  98 °F (36.7 °C) (Temporal)   Resp 16   Ht 5' 2\" (1.575 m)   Wt 146 lb (66.2 kg)   LMP  (LMP Unknown)   SpO2 98%   BMI 26.70 kg/m²   Physical Exam  Constitutional:       Appearance: Normal appearance. She is not ill-appearing or toxic-appearing.   Cardiovascular:      Rate and Rhythm: Normal rate.   Pulmonary:      Effort: Pulmonary effort is normal.      Breath sounds: Normal breath sounds.   Abdominal:      General: There is no distension.      Palpations: Abdomen is soft.      Tenderness: There is no abdominal tenderness.   Skin:     General: Skin is warm and dry.      Coloration: Skin is not pale.   Neurological:      Mental Status: She is alert and oriented to person, place, and time.        ASSESSMENT/ PLAN:   1. Diarrhea, unspecified type  Still suspect viral etiology. Discussed increasing probiotic to BID. Pepto bismol x 5 days. BRAT diet. If symptoms fail to improve recommend GI consult. Call with changes/concerns. ER for weakness or severe symptoms.     2. Hypokalemia  Last cmp 2/5/25 with potassium of 3.2 - check labs given persistent diarrhea.  - CBC With Differential With Platelet; Future  - Comp Metabolic Panel (14) [E]; Future    3. Other fatigue  Check labs, discussed BRAT diet, hydration replacement discussed.   - CBC With Differential With Platelet; Future  - Comp Metabolic Panel (14) [E]; Future      Health Maintenance Due   Topic Date Due    Zoster Vaccines (1 of 2) Never done    COVID-19 Vaccine (5 - 2024-25 season) 09/01/2024    Influenza Vaccine (1) 10/01/2024     Pt indicates understanding and agrees to the plan.     No follow-ups on file.    MARGIE Reardon          [1]   Allergies  Allergen Reactions    Altace [Ramipril] ANAPHYLAXIS     angioedema    Dilaudid [Hydromorphone] NAUSEA AND VOMITING     severe    Ramipril ANAPHYLAXIS     Oral    Cortisone OTHER (SEE COMMENTS)     Headache, blood pressure became raised    No Known Allergies OTHER (SEE COMMENTS)

## 2025-02-26 ENCOUNTER — TELEPHONE (OUTPATIENT)
Dept: INTERNAL MEDICINE CLINIC | Facility: CLINIC | Age: 75
End: 2025-02-26

## 2025-02-26 NOTE — TELEPHONE ENCOUNTER
Patient stated she saw Monika Mcmanus yesterday and is concerned about her lab results.  Patient wants a call back on them and stated she wants to be sure Dr. Gatito Rachel saw them as well.

## 2025-02-26 NOTE — TELEPHONE ENCOUNTER
Pt complete CMP and CBC yesterday. Multiple abnormalities. No provider comments available.     Routing to BD for review and recommendations.

## 2025-03-03 ENCOUNTER — TELEPHONE (OUTPATIENT)
Dept: INTERNAL MEDICINE CLINIC | Facility: CLINIC | Age: 75
End: 2025-03-03

## 2025-03-03 DIAGNOSIS — R19.7 DIARRHEA, UNSPECIFIED TYPE: Primary | ICD-10-CM

## 2025-03-03 NOTE — TELEPHONE ENCOUNTER
Discussed with patient. Symptoms persistent. Discussed continue bland diet, hydration, and PRN imodium. Referral placed for GI evaluation

## 2025-03-03 NOTE — TELEPHONE ENCOUNTER
Patient asking if you can call and speak with her, states she has had this diarrhea ongoing for 5 weeks, she saw both you and BD for this.   No open appointments with you today, asking for a phone call to discuss more options    Please advise     Per patient BD advised patient to take pepto 4 times a day and double probiotic and at first it was less frequent, starting at 4:30 is has been on and off since    LOV 2/25/25  1. Diarrhea, unspecified type  Still suspect viral etiology. Discussed increasing probiotic to BID. Pepto bismol x 5 days. BRAT diet. If symptoms fail to improve recommend GI consult. Call with changes/concerns. ER for weakness or severe symptoms.     OV 2/18/25

## 2025-03-05 ENCOUNTER — HOSPITAL ENCOUNTER (OUTPATIENT)
Dept: GENERAL RADIOLOGY | Facility: HOSPITAL | Age: 75
Discharge: HOME | End: 2025-03-05
Attending: NURSE PRACTITIONER
Payer: MEDICARE

## 2025-03-05 ENCOUNTER — LAB ENCOUNTER (OUTPATIENT)
Dept: LAB | Facility: HOSPITAL | Age: 75
End: 2025-03-05
Attending: NURSE PRACTITIONER
Payer: MEDICARE

## 2025-03-05 DIAGNOSIS — R19.4 CHANGE IN BOWEL HABITS: ICD-10-CM

## 2025-03-05 DIAGNOSIS — R15.2 FECAL URGENCY: ICD-10-CM

## 2025-03-05 DIAGNOSIS — R19.5 LOOSE STOOLS: ICD-10-CM

## 2025-03-05 PROBLEM — M25.511 PAIN IN RIGHT SHOULDER: Status: ACTIVE | Noted: 2019-08-12

## 2025-03-05 PROBLEM — M75.41 IMPINGEMENT SYNDROME OF RIGHT SHOULDER: Status: ACTIVE | Noted: 2023-09-12

## 2025-03-05 PROBLEM — M54.12 CERVICAL RADICULITIS: Status: ACTIVE | Noted: 2024-03-26

## 2025-03-05 PROCEDURE — 82607 VITAMIN B-12: CPT | Performed by: NURSE PRACTITIONER

## 2025-03-05 PROCEDURE — 85025 COMPLETE CBC W/AUTO DIFF WBC: CPT | Performed by: NURSE PRACTITIONER

## 2025-03-05 PROCEDURE — 86364 TISS TRNSGLTMNASE EA IG CLAS: CPT | Performed by: NURSE PRACTITIONER

## 2025-03-05 PROCEDURE — 84443 ASSAY THYROID STIM HORMONE: CPT | Performed by: NURSE PRACTITIONER

## 2025-03-05 PROCEDURE — 82746 ASSAY OF FOLIC ACID SERUM: CPT | Performed by: NURSE PRACTITIONER

## 2025-03-05 PROCEDURE — 36415 COLL VENOUS BLD VENIPUNCTURE: CPT | Performed by: NURSE PRACTITIONER

## 2025-03-05 PROCEDURE — 82784 ASSAY IGA/IGD/IGG/IGM EACH: CPT | Performed by: NURSE PRACTITIONER

## 2025-03-05 PROCEDURE — 74018 RADEX ABDOMEN 1 VIEW: CPT | Performed by: NURSE PRACTITIONER

## 2025-03-05 PROCEDURE — 86140 C-REACTIVE PROTEIN: CPT | Performed by: NURSE PRACTITIONER

## 2025-03-15 DIAGNOSIS — R70.0 ELEVATED SED RATE: ICD-10-CM

## 2025-03-15 DIAGNOSIS — M35.3 PMR (POLYMYALGIA RHEUMATICA) (HCC): ICD-10-CM

## 2025-03-15 DIAGNOSIS — R79.82 ELEVATED C-REACTIVE PROTEIN (CRP): ICD-10-CM

## 2025-03-17 RX ORDER — PREDNISONE 5 MG/1
5 TABLET ORAL DAILY
Qty: 90 TABLET | Refills: 0 | Status: SHIPPED | OUTPATIENT
Start: 2025-03-17

## 2025-03-17 RX ORDER — PREDNISONE 1 MG/1
1 TABLET ORAL
Qty: 90 TABLET | Refills: 0 | Status: SHIPPED | OUTPATIENT
Start: 2025-03-17

## 2025-03-17 NOTE — TELEPHONE ENCOUNTER
Prednisone 1 mg    Future Appointments   Date Time Provider Department Center   3/18/2025  2:20 PM Rudy Heath DO [facility]   4/7/2025 10:30 AM PF CT RM1 PF [facility]   4/8/2025 11:20 AM PF [facility] RM2 PF [facility]   4/23/2025  2:30 PM Carlyn Romero APRN [facility]   5/21/2025  9:45 AM Luna Juarez DO [facility]   10/14/2025 10:00 AM Gatito Rachel MD [facility] [address] [facility]   12/19/2025  9:00 AM Luna Juarez DO [facility] [place]     Last office visit: 2/19/2025    Last fill: 1/24/2025 90 tab, 0 refills (directed to take 2 tabs daily)    Per last office note, pt was switched to 6 mg of prednisone daily (5 mg tab plus 1 mg tab) Switching dosage instructions to match this.

## 2025-03-18 ENCOUNTER — OFFICE VISIT (OUTPATIENT)
Dept: PHYSICAL MEDICINE AND REHAB | Facility: CLINIC | Age: 75
End: 2025-03-18
Payer: MEDICARE

## 2025-03-18 ENCOUNTER — TELEPHONE (OUTPATIENT)
Dept: PHYSICAL MEDICINE AND REHAB | Facility: CLINIC | Age: 75
End: 2025-03-18

## 2025-03-18 VITALS — HEIGHT: 62 IN | BODY MASS INDEX: 26.87 KG/M2 | WEIGHT: 146 LBS

## 2025-03-18 DIAGNOSIS — M54.16 LUMBAR RADICULOPATHY: Primary | ICD-10-CM

## 2025-03-18 PROCEDURE — 99204 OFFICE O/P NEW MOD 45 MIN: CPT | Performed by: PHYSICAL MEDICINE & REHABILITATION

## 2025-03-18 NOTE — TELEPHONE ENCOUNTER
Patient has been scheduled for L4/5 Interlaminar epidural steroid injection on 4/4/2025 at [facility] with Dr. Heath.   Anesthesia type:  Local  Arrival time: 930am & Procedure time: 1030am  Patient was advised that if he/she does receive the covid vaccine it needs to be at least 2 weeks before or after the injection.  Medications and allergies reviewed.  Educated to hold NSAIDS (Aleve, Ibuprofen, Motrin, Advil) and anti-inflammatories (Meloxicam, Naproxen, Diclofenac, Celebrex)  and for cervical injections must hold Multi-Vitamins, Vitamin E, Fish Oil/Omega-3 for 3 days prior to procedure.  If on blood thinner, clearance has been received and approved to hold this medication by provider.   Patient informed of [facility]'s  policy:  he/she will need a  to and from procedure.   [facility]  Address: [address] [place], [place] [id number] [place] [address].  Patient verbalized understanding and agrees with plan.  Scheduled in Epic: Yes  Scheduled in Surgical Case: Yes  Follow up appointment made: NOV: Visit date not found

## 2025-03-18 NOTE — TELEPHONE ENCOUNTER
Per CMS Guidelines -no authorization is required for L4/5 ILESI with fluoroscopic guidance.  CPT code: 15691       Status: Authorization is not required based on medical necessity however is not a guarantee of payment and may be subject to review once claim is submitted.

## 2025-03-18 NOTE — PROGRESS NOTES
NEW PATIENT VISIT    CHIEF COMPLAINT  Low back pain      HISTORY OF PRESENTING ILLNESS    Emily Velasquez is a 74 year old female who presents for evaluation of low back pain.     History of Present Illness  The patient, referred by Dr. Rachel and Dr. Mcmahon, presents with issues in her right leg and hip that have been affecting her ability to walk. The patient describes a sensation of her right leg giving out, particularly from the hip, during walking. She also reports intermittent aches in various areas of her right side, including the lower hip, groin, and down the right side of her body. These symptoms have been ongoing for a few months and have not fully resolved despite undergoing physical therapy. The patient has a history of arthritis and a recent MRI of her back showed degeneration. The patient also mentions a recent bout of diarrhea, which has been exhausting and has impacted her ability to keep up with her exercises. She has previously been an aerobic instructor and expresses a desire to return to her regular activities.     PAST MEDICAL HISTORY  Past Medical History:    Acute pain of both shoulders    Arthritis    Atherosclerosis of coronary artery    Back pain    Lower lumbar age related degeration    Black stools    occasionally    Bloating    occasionally    Body piercing    ears    Breast cancer (HCC)    dcis    Calculus of kidney    about 30 years ago    Cancer (HCC)    breast    Chest pain of uncertain etiology    Constipation    occasionally    Diarrhea, unspecified    Diverticulitis    Dizziness    Had Vertigo    Ductal carcinoma in situ of breast    DCIS    Esophageal reflux    Essential hypertension    Exposure to medical diagnostic radiation    Fatigue    Flatulence/gas pain/belching    aternoons    Frequent use of laxatives    Hearing impairment    tinnitis    Hearing loss    Tinnitus    Heart attack (HCC)    Hemorrhoids    occasionally    High blood pressure    High cholesterol     History of blood transfusion    many years ago    History of diverticulitis    Hx of diseases NEC    diverticulosis    Hx of diseases NEC    had stress test - had some extra beats    Hx of diseases NEC    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    Hx of diseases NEC    utd with gyn    Hx of motion sickness    Hyperlipidemia    Hypokalemia    Irregular bowel habits    about a year    Leg swelling    ankle    Night sweats    on and off for several months    Osteoarthritis    Other and unspecified personal history of malignant neoplasm    breast cancer    Pain in joints    Pain with bowel movements    straining more than pain    Stented coronary artery    Thyroid nodule    benign    Uncomfortable fullness after meals    about a year    Unspecified menopausal and postmenopausal disorder    treated with effexor - however pt would like to get off med    Visual impairment    contacts/glasses    Wears glasses    contact lenses       PAST SURGICAL HISTORY  Past Surgical History:   Procedure Laterality Date    Angioplasty (coronary)  12/31/2018    stent RCA Johann    Appendectomy  2005    at time of diverticulitis    Appendectomy      appendix removed at 53 years old    Bowel resection      after diverticulosis    Cath percutaneous  transluminal coronary angioplasty      Colectomy      Colonoscopy      Colonoscopy,diagnostic  2004    nl colonoscopy    Fracture surgery Left     hip    Hernia surgery      had a patch put in lower abdomen at 53, after resection    Hip surgery Left     May 2018    Knee replacement surgery Left     3/2022    Lumpectomy right  2004    Sofía biopsy stereo nodule 1 site right (cpt=19081)  2004    Other Left     hip-minerva on place in femur    Part removal colon w end colostomy      at 53 years old    Radiation right  2004    Special service or report  2005    diverticulitis - had L sigmoidectomy    Special service or report  2005    ventral hernia repair    Total knee replacement Right 03/22/2023        MEDICATIONS  Medications Ordered Prior to Encounter[1]    ALLERGIES  Allergies[2]    SOCIAL HISTORY   reports that she has never smoked. She has never used smokeless tobacco. She reports current alcohol use of about 60.0 standard drinks of alcohol per week. She reports that she does not use drugs.    FAMILY HISTORY  Family History   Problem Relation Age of Onset    Breast Cancer Self 53    DCIS Self 53    Hypertension Mother     Stroke Mother     Other (Other) Mother     Breast Cancer Mother 49    Heart Disorder Father         mi at 46 and  at 53 of 7th mi    Lipids Father     Heart Disease Father     Other (Other) Father     Heart Attack Father     Other (Other) Sister         spinal problems, prolactin problem, factor v leiden + (pt negative)    Other (Other) Brother         healthy    Other (Other) Maternal Grandmother          of aneurysm    Breast Cancer Maternal Aunt 52    Breast Cancer Paternal Cousin Female 50       REVIEW OF SYSTEMS  Complete review of systems was performed and was negative except for those items stated in the History of Presenting Illness and Past Medical/Surgical History.    PHYSICAL EXAMINATION  GENERAL:  In no acute distress. Well-developed and well nourished.   SKIN: No rashes or open wounds involving posterior torso, posterior pelvis, lower extremities.  NEUROLOGIC:   Strength: 5/5 bilaterally with hip flexion, knee extension, knee flexion, ankle dorsiflexion, ankle eversion, ankle inversion, ankle plantarflexion, and great toe extension.   Sensation: intact light touch sensation throughout both lower extremities.   Reflexes: intact and symmetric in bilateral lower extremities. Babinski downgoing bilaterally. No clonus.   Gait: able to heel walk, toe walk, and perform tandem gait.   MUSCULOSKELETAL:  Physical Exam  MUSCULOSKELETAL: Tenderness in left hip on palpation. Tenderness over right greater trochanter. No tenderness over greater trochanters bilaterally. Negative  straight leg raise bilaterally. Internal rotation of hip is non-tender today. Normal sitting posture. Normal supine posture. Normal lateral decubitus posture. Tenderness on lateral pressure to hip. Normal alignment of lower spine. Tenderness in lower back on palpation.       REVIEW OF PRIOR X-RAYS/STUDIES  Independently reviewed the MRI of the lumbar spine dated 2/17/2025 which reveals slight S-shaped curvature in the thoracolumbar spine.  There is diffuse disc bulging throughout the lumbar spine.  There is a right paracentral disc protrusion which results in moderate bilateral foraminal stenosis there is also mild right and moderate left foraminal stenosis at L5-S1.  Moderate right and mild left foraminal stenosis at L2-3.  And moderate spinal canal stenosis at L3-4 with moderate right and mild left foraminal stenosis at L3-4.    IMPRESSION/DIAGNOSIS  Encounter Diagnosis   Name Primary?    Lumbar radiculopathy Yes       TREATMENT/PLAN  Assessment & Plan  Lumbar Degenerative Disc Disease with Neuroforaminal Stenosis  MRI confirmed degenerative disc disease with disc bulging and neuroforaminal stenosis causing nerve root irritation. Lumbar spine identified as primary symptom source.  - Order epidural steroid injection with midline approach.  - Continue physical therapy focusing on core and gluteal muscle strengthening.    Hypertension  Discussed potential effects of epidural steroid injection on blood pressure.  - Monitor blood pressure following the procedure.      Education was provided regarding the above impression/diagnosis and treatment options/plan were discussed.  All questions were answered during today's visit.  Patient will contact clinic if any other questions or concerns.            Rudy Heath DO  Interventional Spine and Sports Medicine Specialist   Physical Medicine and Rehabilitation  [address] [address]    [facility]  [address] [place] [address]                 [1]   Current Outpatient Medications on File Prior to Visit   Medication Sig Dispense Refill    predniSONE 5 MG Oral Tab Take 1 tablet (5 mg total) by mouth daily. 90 tablet 0    predniSONE 1 MG Oral Tab Take 1 tablet (1 mg total) by mouth daily with breakfast. 90 tablet 0    NON FORMULARY Neutrafol      aspirin 81 MG Oral Tab EC Take 1 tablet (81 mg total) by mouth daily.      TRIAMTERENE-HCTZ 37.5-25 MG Oral Tab TAKE 1 TABLET BY MOUTH EVERY DAY 90 tablet 0    Acidophilus/Pectin Oral Cap Take 1 capsule by mouth 2 (two) times daily.      Flaxseed, Linseed, (FLAX SEED OIL) 1300 MG Oral Cap 1,000 mg daily.      Calcium Carbonate-Vitamin D (CALCIUM-VITAMIN D3 OR) Take 1 tablet by mouth daily.      ezetimibe 10 MG Oral Tab Take 1 tablet (10 mg total) by mouth every other day.  4    atorvastatin 20 MG Oral Tab Take 1 tablet (20 mg total) by mouth nightly. 30 tablet 2    atenolol 50 MG Oral Tab Take 1 tablet (50 mg total) by mouth daily. 30 tablet 0     No current facility-administered medications on file prior to visit.   [2]   Allergies  Allergen Reactions    Altace [Ramipril] ANAPHYLAXIS     angioedema    Dilaudid [Hydromorphone] NAUSEA AND VOMITING     severe    Ramipril ANAPHYLAXIS     Oral    Cortisone OTHER (SEE COMMENTS)     Headache, blood pressure became raised    No Known Allergies OTHER (SEE COMMENTS)

## 2025-03-18 NOTE — PROGRESS NOTES
The following individual(s) verbally consented to be recorded using ambient AI listening technology and understand that they can each withdraw their consent to this listening technology at any point by asking the clinician to turn off or pause the recording:    Patient name: Emily Diabuddy

## 2025-03-21 ENCOUNTER — LAB ENCOUNTER (OUTPATIENT)
Dept: LAB | Age: 75
End: 2025-03-21
Attending: INTERNAL MEDICINE
Payer: MEDICARE

## 2025-03-21 DIAGNOSIS — M35.3 PMR (POLYMYALGIA RHEUMATICA) (HCC): ICD-10-CM

## 2025-03-21 DIAGNOSIS — Z79.52 LONG TERM (CURRENT) USE OF SYSTEMIC STEROIDS: ICD-10-CM

## 2025-03-21 LAB
CRP SERPL-MCNC: <0.4 MG/DL (ref ?–0.5)
ERYTHROCYTE [SEDIMENTATION RATE] IN BLOOD: 13 MM/HR

## 2025-03-21 PROCEDURE — 36415 COLL VENOUS BLD VENIPUNCTURE: CPT

## 2025-03-21 PROCEDURE — 86140 C-REACTIVE PROTEIN: CPT

## 2025-03-21 PROCEDURE — 85652 RBC SED RATE AUTOMATED: CPT

## 2025-03-24 ENCOUNTER — PATIENT MESSAGE (OUTPATIENT)
Dept: RHEUMATOLOGY | Facility: CLINIC | Age: 75
End: 2025-03-24

## 2025-03-25 NOTE — TELEPHONE ENCOUNTER
Referred to result note from 3/212/2025. Relayed message to pt per provider: \"Please call patient and get update on how feeling?  She has been slowly trying to decrease her prednisone.  She should be on 6 mg per her last office visit in February.  If still doing well, can try to decrease down to 5 mg and repeat labs in another 4 to 6 weeks.\"     Copied and pasted this message to pt via [facility]. Awaiting possible response. Signing encounter

## 2025-04-02 ENCOUNTER — HOSPITAL ENCOUNTER (OUTPATIENT)
Dept: GENERAL RADIOLOGY | Age: 75
Discharge: HOME | End: 2025-04-02
Attending: NURSE PRACTITIONER
Payer: MEDICARE

## 2025-04-02 DIAGNOSIS — R15.2 FECAL URGENCY: ICD-10-CM

## 2025-04-02 DIAGNOSIS — R19.4 CHANGE IN BOWEL HABITS: ICD-10-CM

## 2025-04-02 DIAGNOSIS — R19.5 LOOSE STOOLS: ICD-10-CM

## 2025-04-02 DIAGNOSIS — R14.0 ABDOMINAL BLOATING: ICD-10-CM

## 2025-04-02 PROCEDURE — 74018 RADEX ABDOMEN 1 VIEW: CPT | Performed by: NURSE PRACTITIONER

## 2025-04-03 ENCOUNTER — LAB ENCOUNTER (OUTPATIENT)
Dept: LAB | Facility: HOSPITAL | Age: 75
End: 2025-04-03
Attending: NURSE PRACTITIONER
Payer: MEDICARE

## 2025-04-03 DIAGNOSIS — R15.2 FECAL URGENCY: ICD-10-CM

## 2025-04-03 DIAGNOSIS — R19.4 CHANGE IN BOWEL HABITS: ICD-10-CM

## 2025-04-03 DIAGNOSIS — R19.5 LOOSE STOOLS: ICD-10-CM

## 2025-04-03 DIAGNOSIS — R14.0 ABDOMINAL BLOATING: ICD-10-CM

## 2025-04-03 PROCEDURE — 87493 C DIFF AMPLIFIED PROBE: CPT

## 2025-04-03 PROCEDURE — 83993 ASSAY FOR CALPROTECTIN FECAL: CPT

## 2025-04-04 LAB — C DIFF TOX B STL QL: NEGATIVE

## 2025-04-06 LAB — CALPROTECTIN STL-MCNT: 192 ΜG/G (ref ?–50)

## 2025-04-08 ENCOUNTER — HOSPITAL ENCOUNTER (OUTPATIENT)
Dept: MAMMOGRAPHY | Age: 75
Discharge: HOME | End: 2025-04-08
Payer: MEDICARE

## 2025-04-08 ENCOUNTER — HOSPITAL ENCOUNTER (OUTPATIENT)
Dept: CT IMAGING | Age: 75
Discharge: HOME | End: 2025-04-08
Attending: FAMILY MEDICINE
Payer: MEDICARE

## 2025-04-08 DIAGNOSIS — R91.1 PULMONARY NODULE SEEN ON IMAGING STUDY: ICD-10-CM

## 2025-04-08 DIAGNOSIS — Z12.31 ENCOUNTER FOR SCREENING MAMMOGRAM FOR MALIGNANT NEOPLASM OF BREAST: ICD-10-CM

## 2025-04-08 PROCEDURE — 77063 BREAST TOMOSYNTHESIS BI: CPT

## 2025-04-08 PROCEDURE — 77067 SCR MAMMO BI INCL CAD: CPT

## 2025-04-08 PROCEDURE — 71250 CT THORAX DX C-: CPT | Performed by: FAMILY MEDICINE

## 2025-04-23 ENCOUNTER — LAB ENCOUNTER (OUTPATIENT)
Dept: LAB | Age: 75
End: 2025-04-23
Attending: INTERNAL MEDICINE
Payer: MEDICARE

## 2025-04-23 DIAGNOSIS — Z79.52 LONG TERM (CURRENT) USE OF SYSTEMIC STEROIDS: ICD-10-CM

## 2025-04-23 DIAGNOSIS — M35.3 PMR (POLYMYALGIA RHEUMATICA) (HCC): ICD-10-CM

## 2025-04-23 LAB
CRP SERPL-MCNC: <0.4 MG/DL (ref ?–0.5)
ERYTHROCYTE [SEDIMENTATION RATE] IN BLOOD: 24 MM/HR (ref 0–30)

## 2025-04-23 PROCEDURE — 85652 RBC SED RATE AUTOMATED: CPT

## 2025-04-23 PROCEDURE — 36415 COLL VENOUS BLD VENIPUNCTURE: CPT

## 2025-04-23 PROCEDURE — 86140 C-REACTIVE PROTEIN: CPT

## 2025-04-30 ENCOUNTER — LAB ENCOUNTER (OUTPATIENT)
Dept: LAB | Facility: HOSPITAL | Age: 75
End: 2025-04-30
Attending: PHYSICIAN ASSISTANT
Payer: MEDICARE

## 2025-04-30 DIAGNOSIS — I25.10 CAD (CORONARY ARTERY DISEASE): Primary | ICD-10-CM

## 2025-04-30 DIAGNOSIS — E78.00 PURE HYPERCHOLESTEROLEMIA: ICD-10-CM

## 2025-04-30 LAB
ANION GAP SERPL CALC-SCNC: 9 MMOL/L (ref 0–18)
BUN BLD-MCNC: 16 MG/DL (ref 9–23)
CALCIUM BLD-MCNC: 10.1 MG/DL (ref 8.7–10.6)
CHLORIDE SERPL-SCNC: 101 MMOL/L (ref 98–112)
CO2 SERPL-SCNC: 28 MMOL/L (ref 21–32)
CREAT BLD-MCNC: 1.03 MG/DL (ref 0.55–1.02)
EGFRCR SERPLBLD CKD-EPI 2021: 56 ML/MIN/1.73M2 (ref 60–?)
FASTING STATUS PATIENT QL REPORTED: NO
GLUCOSE BLD-MCNC: 123 MG/DL (ref 70–99)
MAGNESIUM SERPL-MCNC: 2 MG/DL (ref 1.6–2.6)
OSMOLALITY SERPL CALC.SUM OF ELEC: 289 MOSM/KG (ref 275–295)
POTASSIUM SERPL-SCNC: 3.4 MMOL/L (ref 3.5–5.1)
SODIUM SERPL-SCNC: 138 MMOL/L (ref 136–145)

## 2025-04-30 PROCEDURE — 83735 ASSAY OF MAGNESIUM: CPT

## 2025-04-30 PROCEDURE — 36415 COLL VENOUS BLD VENIPUNCTURE: CPT

## 2025-04-30 PROCEDURE — 80048 BASIC METABOLIC PNL TOTAL CA: CPT

## 2025-05-13 PROBLEM — M54.16 LUMBAR RADICULOPATHY: Status: ACTIVE | Noted: 2025-05-13

## 2025-05-14 ENCOUNTER — TELEPHONE (OUTPATIENT)
Dept: PHYSICAL MEDICINE AND REHAB | Facility: CLINIC | Age: 75
End: 2025-05-14

## 2025-05-14 NOTE — TELEPHONE ENCOUNTER
Spoke with patient who had L4/5 Interlaminar Epidural Steroid Injection on 5/13/25.    Patient stated she does have a headache and was instructed to call our office if this happened. Headache is not positional per patient. Informed patient this is most likely a side effect from the steroid.     Patient stated her cheeks are flushed and she took a temperature of her cheeks and it was 99.2. Informed patient flushing is also a normal side effect from the steroids.     Patient also reported her B/P this morning at 7:30 am was 148/71 and that she took an amlodipine at 8 am. Patient wanting to know if she should also take her regular B/P medications. Advised patient I do not want her B/P to drop too low. Informed patient to recheck her B/P since taking the amlodipine and since we do not manage her high blood pressure she may also want to reach out to her cardiologist to discuss further. Patient understood.     Advised patient to stay hydrated as this will help with the headache. Patient will also continue to monitor her B/P.    Follow up appointment has also been scheduled for patient.

## 2025-05-21 ENCOUNTER — OFFICE VISIT (OUTPATIENT)
Dept: RHEUMATOLOGY | Facility: CLINIC | Age: 75
End: 2025-05-21
Payer: MEDICARE

## 2025-05-21 VITALS
BODY MASS INDEX: 25.12 KG/M2 | DIASTOLIC BLOOD PRESSURE: 70 MMHG | RESPIRATION RATE: 18 BRPM | TEMPERATURE: 98 F | OXYGEN SATURATION: 98 % | SYSTOLIC BLOOD PRESSURE: 130 MMHG | HEART RATE: 68 BPM | HEIGHT: 62.5 IN | WEIGHT: 140 LBS

## 2025-05-21 DIAGNOSIS — Z79.52 LONG TERM (CURRENT) USE OF SYSTEMIC STEROIDS: ICD-10-CM

## 2025-05-21 DIAGNOSIS — G89.29 CHRONIC RIGHT SHOULDER PAIN: ICD-10-CM

## 2025-05-21 DIAGNOSIS — Z87.39 HISTORY OF ROTATOR CUFF TEAR: ICD-10-CM

## 2025-05-21 DIAGNOSIS — M25.511 CHRONIC RIGHT SHOULDER PAIN: ICD-10-CM

## 2025-05-21 DIAGNOSIS — R79.82 ELEVATED C-REACTIVE PROTEIN (CRP): ICD-10-CM

## 2025-05-21 DIAGNOSIS — M15.0 PRIMARY OSTEOARTHRITIS INVOLVING MULTIPLE JOINTS: ICD-10-CM

## 2025-05-21 DIAGNOSIS — M25.551 BILATERAL HIP PAIN: ICD-10-CM

## 2025-05-21 DIAGNOSIS — M35.3 PMR (POLYMYALGIA RHEUMATICA) (HCC): Primary | ICD-10-CM

## 2025-05-21 DIAGNOSIS — R70.0 ELEVATED SED RATE: ICD-10-CM

## 2025-05-21 DIAGNOSIS — M25.552 BILATERAL HIP PAIN: ICD-10-CM

## 2025-05-21 DIAGNOSIS — M51.362 DEGENERATION OF INTERVERTEBRAL DISC OF LUMBAR REGION WITH DISCOGENIC BACK PAIN AND LOWER EXTREMITY PAIN: ICD-10-CM

## 2025-05-21 PROCEDURE — G2211 COMPLEX E/M VISIT ADD ON: HCPCS | Performed by: INTERNAL MEDICINE

## 2025-05-21 PROCEDURE — 99214 OFFICE O/P EST MOD 30 MIN: CPT | Performed by: INTERNAL MEDICINE

## 2025-05-21 RX ORDER — PREDNISONE 1 MG/1
4 TABLET ORAL DAILY
Qty: 360 TABLET | Refills: 0 | Status: SHIPPED | OUTPATIENT
Start: 2025-05-21 | End: 2025-08-19

## 2025-05-21 NOTE — PROGRESS NOTES
?  RHEUMATOLOGY FOLLOW UP   Date of visit: 05/21/2025  ?  Chief Complaint   Patient presents with    PMR     Est pt- LO\"V 2/19/25. Received spinal injections last week, does not seem to be helping. Having some upper thigh pain. Knees are stiff. Not able to walk which she loves. Right shoulder limited mobility. PMR seems to be controlled. Taking 5mg prednisone daily.       ASSESSMENT, DISCUSSION & PLAN   Assessment:  1. PMR (polymyalgia rheumatica) (HCC)    2. Elevated sed rate    3. Elevated C-reactive protein (CRP)    4. Degeneration of intervertebral disc of lumbar region with discogenic back pain and lower extremity pain    5. Chronic right shoulder pain    6. Primary osteoarthritis involving multiple joints    7. Long term (current) use of systemic steroids    8. History of rotator cuff tear    9. Bilateral hip pain      Discussion:  Mrs. Emily Velasquez is a 74 yo woman who initially presented with new onset of bilateral shoulder as well as hip pain and associated weakness due to the pain. Her exam and her inflammatory markers were consistent with diagnosis of PMR.  Previously discussed at length complications from the disease as well as the risk of development of giant cell arteritis down the road.  Discussed symptoms of headaches, blurred vision as well as jaw claudication. She required long terms steroid course due to flaring when trying to decrease. She was previously hesitant to start methotrexate due to issues her late- had when receiving for chemotherapy.  She had responded nicely to monotherapy with steroids. She was actually able to taper off of prednisone altogether. She was last seen in 2022 and even at that time, had been off steroids for over a year.    She had been doing well until recently, suffered a flare of her PMR last year.   Last year, she injured her right shoulder and developed and RUE DVT which required xarelto for 3 months.   She developed neck pain, shoulder pain, hip pain along  with worsened tingling in the hands. She developed worsened morning stiffness/pain and generalized fatigue over the past few days.   Inflammatory markers were higher than they were when she had PMR several years ago.  She was started on oral prednisone, 40mg and slowly weaning since May of last year. She is currently on 5mg.   Seems like her PMR has been better controlled  Since her labs are better, will decrease to 4mg and have her repeat labs in about 4 weeks.    She continues with worsened lower back pain and leg weakness. MRI showed advanced DDD of the lumbar spine with spinal and neural foraminal stenosis. She completed PT and while slightly better, she is still struggling. I echoed her pcp's recommendations and advised to see PM&R for possible injections.  Regarding her right shoulder, there is significant rotator cuff disease and effusion due to the arthritis and tears/tendinosis. She is not interested in surgery.    Of note, she was given oral bisphosphonate for osteoporosis ppx for while on high doses of steroids. However, had to stop bisphosphonate d/t GI intolerance. She is on lower dose so will likely be okay to hold med. Will try to repeat BMD this summer instead of waiting until 2026.     Previously discussed again steroid sparing agents like plaquenil vs methotrexate vs IL-6 inhibition. She has a hx of diverticulitis which previously required surgical intervention so would like to avoid IL6 inhibition. Still recommended methotrexate, but pt adamant in not taking due to side effects her ex- experienced. Discussed possible plaquenil use but pt still hesitant. Hand out provided and discussed risk/benefit in detail. Will consider adding depending on her initial response to the steroids.     Reminded pt of signs/symptoms of GCA which can be associated with PMR.   Previously discussed with pt need for age appropriate cancer screening and to discuss this further with her PCP.     -- decrease  prednisone to 4mg daily  -- updated labs in 4 weeks to monitor for improvement  -- consider physical therapy for the shoulder  -- continue following with other specialists regarding the other areas of pain  -- get updated bilateral hip xrays  -- follow up in about 6 months or sooner as needed  -- we'll be in communication in the meantime with results when available       Patient verbalized understanding of above instructions. No further questions at this time.     Code selection for this visit was based on time spent (35min) on date of service in preparing to see the patient, obtaining and/or reviewing separately obtained history, performing a medically appropriate examination, counseling and educating the patient/family/caregiver, ordering medications or testing, referring and communicating with other healthcare providers, documenting clinical information in the E HR, independently interpreting results and communicating results to the patient/family/caregiver and care coordination with the patient's other providers.      Corticosteroids carry a risk of multiple side effects in long term use including but not limited to glaucoma, fluid retention, hypertension, mood disorders, weight gain, elevated blood sugars, diabetes, increased risk of infection, osteoporosis and fractures, suppressed adrenal gland hormone production as well as thinning of the skin and slower wound healing.    ?  Plan:  Diagnoses and all orders for this visit:    PMR (polymyalgia rheumatica) (HCC)  -     XR HIP W OR WO PELVIS 3 OR 4 VIEWS, BILAT(CPT=73522); Future  -     predniSONE 1 MG Oral Tab; Take 4 tablets (4 mg total) by mouth daily.    Elevated sed rate    Elevated C-reactive protein (CRP)    Degeneration of intervertebral disc of lumbar region with discogenic back pain and lower extremity pain    Chronic right shoulder pain  -     Physical Therapy Referral - [facility]    Primary osteoarthritis involving multiple joints    Long term  (current) use of systemic steroids  -     XR HIP W OR WO PELVIS 3 OR 4 VIEWS, BILAT(CPT=73522); Future    History of rotator cuff tear  -     Physical Therapy Referral - [facility]    Bilateral hip pain  -     XR HIP W OR WO PELVIS 3 OR 4 VIEWS, BILAT(CPT=73522); Future            Return in about 6 months (around 11/21/2025).  ?  HPI   Emily Velasquez is a 75 year old female with the following active problems who is seen for medically necessary follow-up today. She was seen as a new patient initially for evaluation of PMR. At that time, she was on 20mg of prednisone. Previously seen in 2022- was able to taper off steroids completely. Represented in may and had worsened PMR flare symptoms so started back at 40mg and has been slowly weaning. Currently on 5mg. Presents for follow up today with her .     Since her last visit, she has been feeling better from a PMR standpoint.  Still having pain in the b/l inside hip R>L. Knees are stiffness (hx of replacement). Back pain still present  Right shoulder still an issue.  Denies PMR like symptoms. Feels both stiffness and pain  Has been on prednisone 5mg for the past two months.   Is following with Jass Heath and had recent injection 5/13 but hasn't notice a difference in the pain or weakness in the legs. Has appt next week to discuss further.     Prior diarrhea subsided, still some symptoms. Did have wbcs in stool studies. Has plans to get colonoscopy.   Stress has improved since her 's cancer is remission (stage I b cell lymphoma)    Denies symptoms while at rest- sitting or laying down. Denies pain waking from sleep at night.   Difficulty getting in/out of car as well particularly with change in weather   Denies numbness/tingling.  + stable tinnitus b/l L>R (got worse after the injection)  + increased hearing problems- has plans to follow with audiologist.     Denies morning stiffness except some issues with blow drying her hair- relates to the right  shoulder problem. Still not interested in surgical intervention. Did not completed PT for the shoulder. Did complete PT for the back/legs.     Continues with nutrafol and xfusion (keratin to cover bald spots)- which she thinks is helping with the hair thinning but still present. Plans to change after a month.   + puffiness of the face, resolved. Has lost over 10# from prior diarrhea. Admits to poor appetite.   + some tremors of the hands- felt worsened after recent shot, stable   + prior A1c elevated at 7.4. told related to prednisone- improved with recent testing to 6.7   Sleeping better now (except nocturia)   Stil with some fatigue but relates to everything with her illness and stress     Previously stopped alendronate due to upset stomach which has since resolved since stopping medication     Denies HA, blurred vision, temple pain or jaw pain.   Denies any other joint pain or swelling.   Denies recent infections.   Denies skin rashes or suspicion skin lesions     Has hx of bilateral knee replacements over the past few years.  Told by ortho that hips showed no arthritis. Hx of left hip fracture s/p minerva placement.     Previously:  Last mammogram was normal last year  Last cscope sometime between [age] - cannot remember details but told no need to repeat  States last year in August, torn her biceps tendon and developed blood clot. Seen by orthopedics, no surgery required. 3m of xarelto. Had been seen by heme/onc and did not do further workup.   Followed with cardiology- Dr. Holden and stress test negative       HPI from initial consultation  referred for rheumatologic evaluation due to elevated inflammatory markers and concern for PMR.       Last year, she had to get a left hip surgery with minerva placement after falling and femur fracture.  She has stays active and had been doing some pool exercises and had noticed increased pain/aching in her shoulders. States her symptoms continued and progressed to also have  inability to lift her arms above her head to do her hair, etc. Noticed the shoulder symptoms first started end of June/beginning of July. Got more severe by the end of July/beginning of August. Then she noticed bilateral hip discomfort in the middle of August. States in the mornings, she had a difficult time bending as well as reaching.   She was initially started on oral prednisone 10mg with some improvement of symptoms. Would be on for about 10 days at a time.     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 Had undergone bilateral knee injections with cortisone. Had significant elevation in her blood pressure as well as blood sugar, as well as headaches and nausea without vomiting. Did have to go to the ER due to symptoms. Was told likely reaction to steroids vs flu. Pt thinks more due to the steroids because symptoms improved within 24 hours.      States due to this sensitivity, pt was hesitant to increase the prednisone further. Upon stopping the prednisone, pt had suffered another flare with bilateral shoulder as well as hip discomfort. Was recommended to take 30mg, however pt hesitant due to her previous reaction. So pt is currently taking 10mg twice daily with improvement. States she feels back to her normal self now.      Does feel like prednisone has made her more tired overall.  Does have hx of osteopenia, does get some calcium/vit d in her multivitamin  Admits to being under increased stress over the past several months after requiring stent placement as well as recently moving.  Denies any current bitemporal headaches, jaw claudication or vision changes.   + hx of diverticulitis s/p colon resection     Denies family history of any autoimmune condition. Denies family history of PMR.  ?  Past Medical History:  Past Medical History:    Acute pain of both shoulders    Arthritis    Atherosclerosis of coronary artery    Back pain    Lower lumbar age related degeration    Black stools     occasionally    Bloating    occasionally    Body piercing    ears    Breast cancer (HCC)    dcis    Calculus of kidney    about 30 years ago    Cancer (HCC)    breast    Chest pain of uncertain etiology    Constipation    occasionally    Diarrhea, unspecified    Diverticulitis    Dizziness    Had Vertigo    Ductal carcinoma in situ of breast    DCIS    Esophageal reflux    Essential hypertension    Exposure to medical diagnostic radiation    Fatigue    Flatulence/gas pain/belching    aternoons    Frequent use of laxatives    Hearing impairment    tinnitis    Hearing loss    Tinnitus    Heart attack (HCC)    Hemorrhoids    occasionally    High blood pressure    High cholesterol    History of blood transfusion    many years ago    History of diverticulitis    Hx of diseases NEC    diverticulosis    Hx of diseases NEC    had stress test - had some extra beats    Hx of diseases NEC    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    Hx of diseases NEC    utd with gyn    Hx of motion sickness    Hyperlipidemia    Hypokalemia    Irregular bowel habits    about a year    Leg swelling    ankle    Night sweats    on and off for several months    Osteoarthritis    Other and unspecified personal history of malignant neoplasm    breast cancer    Pain in joints    Pain with bowel movements    straining more than pain    Stented coronary artery    Thyroid nodule    benign    Uncomfortable fullness after meals    about a year    Unspecified menopausal and postmenopausal disorder    treated with effexor - however pt would like to get off med    Visual impairment    contacts/glasses    Wears glasses    contact lenses     Past Surgical History:  Past Surgical History:   Procedure Laterality Date    Angioplasty (coronary)  12/31/2018    stent RCA Johann    Appendectomy  2005    at time of diverticulitis    Appendectomy      appendix removed at 53 years old    Bowel resection      after diverticulosis    Cath percutaneous  transluminal coronary  angioplasty      Colectomy      Colonoscopy      Colonoscopy,diagnostic      nl colonoscopy    D & c      Fracture surgery Left     hip    Hernia surgery      had a patch put in lower abdomen at 53, after resection    Hip surgery Left     May 2018    Knee replacement surgery Left     3/2022    Lumpectomy right      Sofía biopsy stereo nodule 1 site right (cpt=19081)      Sofía localization wire 1 site right (cpt=19281)  2004    Needle biopsy right            3 live children    Other Left     hip-minerva on place in femur    Part removal colon w end colostomy      at 53 years old    Radiation right      Skin surgery  1965    Benign fibroid tumor    Special service or report      diverticulitis - had L sigmoidectomy    Special service or report      ventral hernia repair    Total knee replacement Right 2023     Family History:  Family History   Problem Relation Age of Onset    Breast Cancer Self 53    DCIS Self 53    Hypertension Mother     Stroke Mother     Other (Other) Mother     Breast Cancer Mother 49    Cancer Mother     Heart Disorder Father         mi at 46 and  at 53 of 7th mi    Lipids Father     Heart Disease Father     Other (Other) Father     Heart Attack Father     Other (Other) Sister         spinal problems, prolactin problem, factor v leiden + (pt negative)    Other (Other) Brother         healthy    Other (Other) Maternal Grandmother          of aneurysm    Breast Cancer Maternal Aunt 52    Breast Cancer Paternal Cousin Female 50     Social History:  Social History     Socioeconomic History    Marital status:    Tobacco Use    Smoking status: Never    Smokeless tobacco: Never   Vaping Use    Vaping status: Never Used   Substance and Sexual Activity    Alcohol use: Yes     Alcohol/week: 4.0 standard drinks of alcohol     Types: 4 Glasses of wine per week     Comment: \"couple glasses a week\"    Drug use: Never    Sexual activity: Yes     Partners: Male    Other Topics Concern    Caffeine Concern Yes     Comment: 1 cup tea daily     Exercise Yes     Comment: 2-x weekly     Seat Belt Yes     Medications:  Outpatient Medications Marked as Taking for the 5/21/25 encounter (Office Visit) with Luna Juarez,    Medication Sig Dispense Refill    psyllium Oral Powd Pack Take 1 packet by mouth at bedtime.      predniSONE 1 MG Oral Tab Take 4 tablets (4 mg total) by mouth daily. 360 tablet 0    NON FORMULARY Neutrafol      aspirin 81 MG Oral Tab EC Take 81 mg by mouth in the morning.      TRIAMTERENE-HCTZ 37.5-25 MG Oral Tab TAKE 1 TABLET BY MOUTH EVERY DAY 90 tablet 0    Acidophilus/Pectin Oral Cap Take 1 capsule by mouth in the morning and 1 capsule before bedtime.      Flaxseed, Linseed, (FLAX SEED OIL) 1300 MG Oral Cap 1,000 mg in the morning.      Calcium Carbonate-Vitamin D (CALCIUM-VITAMIN D3 OR) Take 1 tablet by mouth in the morning.      ezetimibe 10 MG Oral Tab Take 10 mg by mouth every other day.  4    atorvastatin 20 MG Oral Tab Take 1 tablet (20 mg total) by mouth nightly. 30 tablet 2    atenolol 50 MG Oral Tab Take 50 mg by mouth in the morning. 30 tablet 0     Modified Medications    Modified Medication Previous Medication    PREDNISONE 1 MG ORAL TAB predniSONE 5 MG Oral Tab       Take 4 tablets (4 mg total) by mouth daily.    Take 1 tablet (5 mg total) by mouth daily.     Medications Discontinued During This Encounter   Medication Reason    predniSONE 5 MG Oral Tab        ?  ?  Allergies:  Allergies   Allergen Reactions    Altace [Ramipril] ANAPHYLAXIS     angioedema    Dilaudid [Hydromorphone] NAUSEA AND VOMITING     severe    Ramipril ANAPHYLAXIS     Oral    Cortisone OTHER (SEE COMMENTS)     Headache, blood pressure became raised    No Known Allergies OTHER (SEE COMMENTS)     ?  REVIEW OF SYSTEMS   ?  Review of Systems   Constitutional:  Positive for malaise/fatigue and weight loss. Negative for chills and fever.   HENT:  Positive for tinnitus.    Eyes:   Negative for pain and redness.   Respiratory:  Negative for cough and shortness of breath.    Cardiovascular:  Negative for chest pain, palpitations and leg swelling.   Gastrointestinal:  Negative for abdominal pain, blood in stool, constipation, diarrhea, heartburn and nausea.   Genitourinary:  Positive for frequency. Negative for dysuria, hematuria and urgency.   Musculoskeletal:  Positive for back pain and joint pain. Negative for myalgias and neck pain.   Skin:  Negative for itching and rash.   Neurological:  Positive for tingling, tremors and weakness. Negative for dizziness, seizures and headaches.   Endo/Heme/Allergies:  Does not bruise/bleed easily.   Psychiatric/Behavioral:  Positive for depression. The patient is nervous/anxious and has insomnia.      PHYSICAL EXAM   Today's Vitals:  Temperature Blood Pressure Heart Rate Resp Rate SpO2   Temp: 98 °F (36.7 °C) BP: 130/70 Pulse: 68 Resp: 18 SpO2: 98 %   ?  Current Weight Height BMI BSA Pain   Wt Readings from Last 1 Encounters:   05/21/25 140 lb (63.5 kg)    Height: 5' 2.5\" (158.8 cm) Body mass index is 25.2 kg/m². Body surface area is 1.65 meters squared.         Physical Exam  Vitals and nursing note reviewed.   Constitutional:       General: She is not in acute distress.     Appearance: She is well-developed. She is not diaphoretic.   HENT:      Head: Normocephalic.   Eyes:      General: No scleral icterus.     Extraocular Movements: Extraocular movements intact.      Conjunctiva/sclera: Conjunctivae normal.   Neck:      Vascular: No JVD.      Trachea: No tracheal deviation.   Cardiovascular:      Rate and Rhythm: Normal rate and regular rhythm.      Heart sounds: Normal heart sounds. No murmur heard.  Pulmonary:      Effort: Pulmonary effort is normal. No respiratory distress.      Breath sounds: Normal breath sounds. No wheezing.   Musculoskeletal:         General: Tenderness and deformity present. No swelling.      Cervical back: Neck supple.       Comments: Diffuse small heberden/brenda nodes of the fingers, no basilar joint tenderness of the 1st CMC bilaterally but slight squaring present.   No swelling, tenderness, redness or restriction of motion of the DIPs, PIPs, MCPs, wrists, elbows, ankles, or joints of the feet.  Bilateral shoulders with hesitation ROM. Able to raise arms above head but significant restriction and pain with the right shoulder   Both knees s/p replacement    Lymphadenopathy:      Cervical: No cervical adenopathy.   Skin:     General: Skin is warm and dry.      Findings: No erythema or rash.      Comments: No periungal erythema    Neurological:      Mental Status: She is alert and oriented to person, place, and time.      Cranial Nerves: No cranial nerve deficit.      Gait: Gait abnormal.   Psychiatric:         Mood and Affect: Mood normal.         Behavior: Behavior normal.       Radiology review:         PROCEDURE:  XR DEXA BONE DENSITOMETRY (CPT=77080)       COMPARISON:  [facility], XR DEXA BONE DENSITOMETRY (CPT=77080), 1/28/2020, 11:24 AM.       INDICATIONS:    Z13.820 Screening for osteoporosis M85.89 Osteopenia of multiple sites       PATIENT STATED HISTORY: (As transcribed by Technologist)  Screening for osteoporosis.            LUMBAR SPINE ANALYSIS RESULTS:       Bone mineral density (BMD) (g/cm2):  1.076     Lumbar T-Score:  0.3       % young normals:  103       % age matched controls:  136       Change from prior spine examination:  0.6%                TOTAL HIP ANALYSIS RESULTS:         Bone mineral density (BMD) (g/cm2):  0.703       Total Hip T-Score:  -2.0       % young normals:  75       % age matched controls:  96       Change from prior hip examination:  -1.6%                FEMORAL NECK ANALYSIS RESULTS:         Bone mineral density (BMD) (g/cm2):  0.620       Femoral neck T-Score:  -2.1       % young normals:  73       % age matched controls:  99       Change from prior hip examination:  2.6%                ADDITIONAL FINDINGS:  FRAX not reported due to prior hip or vertebral fracture.                          Impression   CONCLUSION:  Bone mineral density values for right hip are compatible with the diagnosis of osteopenia by WHO definition (T score between -1.0 and -2.5).  If therapy is initiated, follow-up study is recommended in 2 years for further evaluation of   therapeutic efficacy.                 The World Health Organization has defined the following categories based on bone density:   Normal bone:  T-score better than -1   Osteopenia: T-score between -1 and -2.5   Osteoporosis:  T-score less than -2.5               Dictated by (CST): Marcelle Boo MD on 2/24/2022 at 1:01 PM       Finalized by (CST): Marcelle Boo MD on 2/24/2022 at 1:01 PM       PROCEDURE:  XR CERVICAL SPINE COMPLETE W/FLEX + EXT (CPT=72052)       TECHNIQUE:  AP, lateral, obliques, coned down view, and flexion/extension views of the spine were obtained.       COMPARISON:  None.       INDICATIONS:  M54.2 Neck pain       PATIENT STATED HISTORY: (As transcribed by Technologist)  Patient complains of intermittent tingling to her bilateral fingers X a few months.  She also has chronic neck stiffness.  Currently she feels some stiffness to her posterior neck but denies any   significant pain at this time.  She has a history of polymyalgia rheumatica.            FINDINGS:  No acute fracture or subluxation in the cervical spine.  Moderate to severe degenerative disc space loss and endplate spurring most pronounced at C4-5 with scattered uncovertebral osteophytes noted.  Moderate facet arthropathy throughout the   cervical spine.  No abnormal motion with flexion or extension views.  Osteopenia.  C1-2 articulation intact.  Vascular calcifications.    Impression   CONCLUSION:  No acute fracture or subluxation in the cervical spine.  Degenerative changes as above.           Dictated by (CST): Panfilo Guardado MD on 11/16/2021 at 2:21 PM       Finalized by  (CST): Panfilo Guardado MD on 11/16/2021 at 2:21 PM      04/2019  X-rays AP of the pelvis and AP and lateral of the right hip show mild to moderate osteoarthritic changes with spur formation and slight loss of clear space.  No fractures dislocations or loose bodies.     X-rays AP of the pelvis and AP and lateral of the left hip show mild osteoarthritis with spur formation in both sides of the joint and slight loss of clear space.  There are no acute changes.  There is a proximal intramedullary nail and lag screw device consistent with prior open reduction and internal fixation of a peritrochanteric fracture.  There is no residual fracture line visible.  This appears to have gone on to full healing.  No fractures dislocations or loose bodies.     X-rays 4 views of the right knee show moderate to severe osteoarthritis with significant tricompartmental spurring and significant loss of medial clear space.     X-rays 4 views of the left knee show severe osteoarthritis with near complete loss of medial clear space and tricompartmental spur formation.  There are no fractures dislocations or loose bodies.    Labs:  Lab Results   Component Value Date    WBC 12.2 (H) 03/05/2025    RBC 5.24 03/05/2025    HGB 15.0 03/05/2025    HCT 44.3 03/05/2025    .0 03/05/2025    MCV 84.5 03/05/2025    MCH 28.6 03/05/2025    MCHC 33.9 03/05/2025    RDW 13.0 03/05/2025    NEPRELIM 10.30 (H) 03/05/2025    NEPERCENT 84.4 03/05/2025    LYPERCENT 8.6 03/05/2025    MOPERCENT 5.7 03/05/2025    EOPERCENT 0.2 03/05/2025    BAPERCENT 0.6 03/05/2025    NE 10.30 (H) 03/05/2025    LYMABS 1.05 03/05/2025    MOABSO 0.69 03/05/2025    EOABSO 0.02 03/05/2025    BAABSO 0.07 03/05/2025     Lab Results   Component Value Date     (H) 04/30/2025    BUN 16 04/30/2025    BUNCREA 12.6 10/05/2022    CREATSERUM 1.03 (H) 04/30/2025    ANIONGAP 9 04/30/2025    GFR 54 (L) 09/01/2017    GFRNAA 64 04/19/2022    GFRAA 73 04/19/2022    CA 10.1 04/30/2025     OSMOCALC 289 04/30/2025    ALKPHO 54 (L) 02/25/2025    AST 33 02/25/2025    ALT 22 02/25/2025    BILT 0.6 02/25/2025    TP 7.2 02/25/2025    ALB 4.4 02/25/2025    GLOBULIN 2.8 02/25/2025    AGRATIO 1.6 11/27/2007     04/30/2025    K 3.4 (L) 04/30/2025     04/30/2025    CO2 28.0 04/30/2025       Additional Labs:     C-REACTIVE PROTEIN   Latest Ref Rng <=0.50 mg/dL   8/12/2019 0.78 (H)    8/21/2019 3.70 (H)    10/3/2019 <0.29    11/14/2019 0.72 (H)    1/6/2020 0.67 (H)    1/24/2020 2.21 (H)    2/14/2020 0.31 (H)    3/16/2020 0.42 (H)    6/12/2020 0.32 (H)    7/10/2020 0.46 (H)    8/5/2020 <0.29    9/9/2020 0.30 (H)    10/15/2020 0.43 (H)    12/3/2020 <0.29    2/8/2021 <0.29    4/12/2021 <0.29    7/7/2021 <0.29    11/16/2021 <0.29    3/12/2023 <0.29    5/21/2024 3.59 (H)    6/4/2024 <0.29    6/18/2024 0.31 (H)    7/12/2024 1.56 (H)    8/5/2024 1.50 (H)    8/20/2024 <0.40    9/16/2024 <0.40    10/17/2024 <0.40    11/12/2024 0.40    12/18/2024 <0.40    1/8/2025 0.50    1/29/2025 <0.40    3/5/2025 <0.40    3/21/2025 <0.40    4/23/2025 <0.40        SED RATE   Latest Ref Rng 0 - 30 mm/Hr   8/12/2019 14    8/21/2019 30 (H)    10/3/2019 7    11/14/2019 16    1/6/2020 14    1/24/2020 18    2/14/2020 9    3/16/2020 4    6/12/2020 11    7/10/2020 9    8/5/2020 9    9/9/2020 11    10/15/2020 14    12/3/2020 10    2/8/2021 13    4/12/2021 9    7/7/2021 12    11/16/2021 13    3/12/2023 24    5/21/2024 82 (H)    6/4/2024 19    6/18/2024 14    7/12/2024 28    8/5/2024 27    8/20/2024 12    9/16/2024 7    10/17/2024 19    11/12/2024 28    12/18/2024 25    1/8/2025 28    1/29/2025 15    3/21/2025 13    4/23/2025 24         05/2024  CRP 3.59 elevated  ESR 82 elevated    11/2021  ESR 13 normal   CRP normal     07/2021  ESR 12 normal   CRP normal     04/2021  CRP normal  ESR 9 normal     08/2020  ESR 9 normal   CRP <0.29 normal     07/2020  ESR 9 normal   CRP 0.46 borderline     06/2020  ESR 11  CRP 0.32 borderline      03/2020  CRP 0.42 borderline  ESR 4 normal    02/2020  ESR 9 normal  CRP normal     01/2020  ESR 14 normal   CRP 0.67 borderline   Vit D 31.6 low normal    11/2019  ESR 16 normal   CRP 0.72 borderline     10/2019  ESR 7 normal   CRP <0.29 normal   Vit D 23.4     08/21/2019  CK normal   CRP 3.70  ESR 30     08/12/2019  CRP 0.78  ESR 14    Luna Juarez DO  [facility]  05/21/2025

## 2025-05-21 NOTE — PATIENT INSTRUCTIONS
-- decrease prednisone to 4mg daily  -- updated labs in 4 weeks to monitor for improvement  -- consider physical therapy for the shoulder  -- continue following with other specialists regarding the other areas of pain  -- get updated bilateral hip xrays  -- follow up in about 6 months or sooner as needed  -- we'll be in communication in the meantime with results when available     Dr. Juarez

## 2025-05-23 ENCOUNTER — HOSPITAL ENCOUNTER (OUTPATIENT)
Dept: GENERAL RADIOLOGY | Facility: HOSPITAL | Age: 75
Discharge: HOME | End: 2025-05-23
Attending: INTERNAL MEDICINE
Payer: MEDICARE

## 2025-05-23 DIAGNOSIS — M25.552 BILATERAL HIP PAIN: ICD-10-CM

## 2025-05-23 DIAGNOSIS — Z79.52 LONG TERM (CURRENT) USE OF SYSTEMIC STEROIDS: ICD-10-CM

## 2025-05-23 DIAGNOSIS — M25.551 BILATERAL HIP PAIN: ICD-10-CM

## 2025-05-23 DIAGNOSIS — M35.3 PMR (POLYMYALGIA RHEUMATICA) (HCC): ICD-10-CM

## 2025-05-23 PROCEDURE — 73522 X-RAY EXAM HIPS BI 3-4 VIEWS: CPT | Performed by: INTERNAL MEDICINE

## 2025-05-27 ENCOUNTER — OFFICE VISIT (OUTPATIENT)
Dept: PHYSICAL MEDICINE AND REHAB | Facility: CLINIC | Age: 75
End: 2025-05-27
Payer: MEDICARE

## 2025-05-27 VITALS
HEART RATE: 62 BPM | WEIGHT: 140 LBS | DIASTOLIC BLOOD PRESSURE: 70 MMHG | OXYGEN SATURATION: 98 % | HEIGHT: 62.5 IN | SYSTOLIC BLOOD PRESSURE: 132 MMHG | BODY MASS INDEX: 25.12 KG/M2

## 2025-05-27 DIAGNOSIS — M25.551 RIGHT HIP PAIN: ICD-10-CM

## 2025-05-27 DIAGNOSIS — M54.16 LUMBAR RADICULOPATHY: Primary | ICD-10-CM

## 2025-05-27 PROCEDURE — 99214 OFFICE O/P EST MOD 30 MIN: CPT | Performed by: PHYSICAL MEDICINE & REHABILITATION

## 2025-05-27 NOTE — PROGRESS NOTES
The following individual(s) verbally consented to be recorded using ambient AI listening technology and understand that they can each withdraw their consent to this listening technology at any point by asking the clinician to turn off or pause the recording:    Patient name: Emily Thomaschacorta  Additional names:

## 2025-05-27 NOTE — PROGRESS NOTES
RETURN PATIENT VISIT    CHIEF COMPLAINT  Right hip pain  Lumbar spinal stenosis    INTERVAL HISTORY  Emily Velasquez is a 75 year old who was last seen in clinic on 3/18/2025 following up after L4-5 interlaminar epidural steroid injection.  Overall doing about 60% improvement in her low back and lower extremity claudication type complaints but continues to endorse persistent right hip pain in the setting of severe osteoarthritis.      History of Present Illness  Emily Velasquez is a 75 year old female with severe osteoarthritis in the right hip who presents with hip pain and difficulty walking. She was referred by Dr. Talbert for evaluation of severe arthritis in the right hip.    She experiences severe pain predominantly in the right groin area, significantly impacting her ability to walk. There is a mild ache on the left side, but it is less severe. Imaging studies confirm severe arthritis in the right hip. Injections in her back have improved her walking distance, but hip pain persists. She describes a pattern of walking and needing to rest due to a combination of back and hip issues. She experiences tinnitus, which she associates with prednisone use for polymyalgia rheumatica, and notes it became more pronounced after back injections, though she can still sleep despite the noise.      REVIEW OF SYSTEMS  Review of systems was completed with the patient today as pertinent to today's visit    PHYSICAL EXAMINATION  CONSTITUTIONAL: Well-appearing, in no apparent distress  EYES: No scleral icterus or conjunctival hemorrhage  CARDIOVASCULAR: Skin warm and well-perfused, no peripheral edema  RESPIRATORY: Breathing unlabored without accessory muscle use  PSYCHIATRIC: Alert, cooperative, appropriate mood and affect  SKIN: No lesions or rashes on exposed skin  MUSCULOSKELETAL:   Physical Exam  Physical exam reveals painful right hip with internal/external rotation reproducing groin pain.  Nonantalgic  gait.      REVIEW OF PRIOR X-RAYS/STUDIES  Independently reviewed the x-ray of the hip/pelvis dated 5/23/2025 which reveals advanced osteoarthritis in the right hip with severe joint space loss and subchondral sclerosis.    IMPRESSION/DIAGNOSIS  1.   Encounter Diagnoses   Name Primary?    Lumbar radiculopathy Yes    Right hip pain          TREATMENT/PLAN  Assessment & Plan  Severe osteoarthritis of right hip  Severe osteoarthritis with joint space narrowing and subchondral sclerosis. Imaging confirmed hip involvement. Discussed treatment options including joint offloading, exercises, and potential hip injection. Explained risks of steroid injections and surgical options.  - Encourage aquatic exercises to offload and strengthen the joint.  - Discuss potential hip injection if symptoms worsen or limit daily activities.  - Consider surgical consultation to understand recovery and surgical options.    Tinnitus  Tinnitus exacerbated by prednisone for PMR. Symptoms manageable for sleep. Discussed potential for worsening and audiologist consultation.  - Consult an audiologist for tinnitus management.    Polymyalgia rheumatica (PMR)  PMR managed with prednisone, contributing to tinnitus.    Osteopenia  Osteopenia confirmed by DEXA scan. Bone mineralization well-managed. Encouraged exercises to improve bone density.  - Encourage weight-bearing and resistance exercises to improve bone density.      Education was provided regarding the above impression/diagnosis and treatment options/plan were discussed.  All questions were answered during today's visit.  Patient will contact clinic if any other questions or concerns.          Rudy Heath,   Interventional Spine and Sports Medicine Specialist   Physical Medicine and Rehabilitation  [address] [address]    [address] [place] [address]

## 2025-05-28 PROBLEM — R15.2 FECAL URGENCY: Status: ACTIVE | Noted: 2025-05-28

## 2025-05-28 PROBLEM — D12.2 BENIGN NEOPLASM OF ASCENDING COLON: Status: ACTIVE | Noted: 2025-05-28

## 2025-05-28 PROBLEM — R19.4 CHANGE IN BOWEL HABITS: Status: ACTIVE | Noted: 2025-05-28

## 2025-06-05 ENCOUNTER — LAB ENCOUNTER (OUTPATIENT)
Dept: LAB | Facility: HOSPITAL | Age: 75
End: 2025-06-05
Attending: NURSE PRACTITIONER
Payer: MEDICARE

## 2025-06-05 DIAGNOSIS — R19.5 ELEVATED FECAL CALPROTECTIN: ICD-10-CM

## 2025-06-05 PROCEDURE — 83993 ASSAY FOR CALPROTECTIN FECAL: CPT

## 2025-06-08 LAB — CALPROTECTIN STL-MCNT: 53.2 ΜG/G (ref ?–50)

## 2025-06-12 DIAGNOSIS — M35.3 PMR (POLYMYALGIA RHEUMATICA) (HCC): ICD-10-CM

## 2025-06-12 RX ORDER — PREDNISONE 1 MG/1
1 TABLET ORAL
Qty: 90 TABLET | Refills: 0 | OUTPATIENT
Start: 2025-06-12

## 2025-06-12 NOTE — TELEPHONE ENCOUNTER
Prednisone 1 mg      Last office visit: 5/21/2025    Next Rheum Apt:12/19/2025 Luna Juarez,     Last fill: 5/21/2025 360 tab, 0 refills    Sending pt my chart message that there are refills at the pharmacy

## 2025-06-30 ENCOUNTER — LAB ENCOUNTER (OUTPATIENT)
Dept: LAB | Facility: HOSPITAL | Age: 75
End: 2025-06-30
Attending: INTERNAL MEDICINE
Payer: MEDICARE

## 2025-06-30 DIAGNOSIS — Z79.52 LONG TERM (CURRENT) USE OF SYSTEMIC STEROIDS: ICD-10-CM

## 2025-06-30 DIAGNOSIS — M35.3 PMR (POLYMYALGIA RHEUMATICA) (HCC): ICD-10-CM

## 2025-06-30 LAB
CRP SERPL-MCNC: <0.5 MG/DL (ref ?–0.5)
ERYTHROCYTE [SEDIMENTATION RATE] IN BLOOD: 14 MM/HR (ref 0–30)

## 2025-06-30 PROCEDURE — 86140 C-REACTIVE PROTEIN: CPT

## 2025-06-30 PROCEDURE — 36415 COLL VENOUS BLD VENIPUNCTURE: CPT

## 2025-06-30 PROCEDURE — 85652 RBC SED RATE AUTOMATED: CPT

## 2025-07-12 DIAGNOSIS — M35.3 PMR (POLYMYALGIA RHEUMATICA) (HCC): ICD-10-CM

## 2025-07-14 RX ORDER — PREDNISONE 5 MG/1
5 TABLET ORAL DAILY
Qty: 90 TABLET | Refills: 0 | OUTPATIENT
Start: 2025-07-14

## 2025-07-17 ENCOUNTER — APPOINTMENT (OUTPATIENT)
Dept: GENERAL RADIOLOGY | Age: 75
End: 2025-07-17
Attending: EMERGENCY MEDICINE
Payer: MEDICARE

## 2025-07-17 ENCOUNTER — HOSPITAL ENCOUNTER (EMERGENCY)
Age: 75
Discharge: HOME | End: 2025-07-17
Attending: EMERGENCY MEDICINE
Payer: MEDICARE

## 2025-07-17 VITALS
BODY MASS INDEX: 25.95 KG/M2 | SYSTOLIC BLOOD PRESSURE: 155 MMHG | HEIGHT: 62 IN | TEMPERATURE: 98 F | RESPIRATION RATE: 18 BRPM | HEART RATE: 70 BPM | OXYGEN SATURATION: 100 % | WEIGHT: 141 LBS | DIASTOLIC BLOOD PRESSURE: 69 MMHG

## 2025-07-17 DIAGNOSIS — S30.0XXA LUMBAR CONTUSION, INITIAL ENCOUNTER: ICD-10-CM

## 2025-07-17 DIAGNOSIS — S30.0XXA CONTUSION OF LEFT BUTTOCK: Primary | ICD-10-CM

## 2025-07-17 PROCEDURE — 72110 X-RAY EXAM L-2 SPINE 4/>VWS: CPT | Performed by: EMERGENCY MEDICINE

## 2025-07-17 PROCEDURE — 99283 EMERGENCY DEPT VISIT LOW MDM: CPT

## 2025-07-17 PROCEDURE — 99284 EMERGENCY DEPT VISIT MOD MDM: CPT

## 2025-07-17 RX ORDER — LIDOCAINE 50 MG/G
1 PATCH TOPICAL EVERY 24 HOURS
Qty: 30 PATCH | Refills: 0 | Status: SHIPPED | OUTPATIENT
Start: 2025-07-17

## 2025-07-17 RX ORDER — ACETAMINOPHEN 500 MG
1000 TABLET ORAL ONCE
Status: COMPLETED | OUTPATIENT
Start: 2025-07-17 | End: 2025-07-17

## 2025-07-17 NOTE — ED PROVIDER NOTES
Patient Seen in: [facility] In [place]        History  Chief Complaint   Patient presents with    Fall     Stated Complaint: Mechanical fall- hit lower back/buttock on stair. Denies head injury- takes bab*    Subjective:   HPI            75-year-old female with a past medical history as below presents with pain in her left lower back/buttock area after a fall.  Patient states that she was trying to avoid some objects on the ground and lost her balance and hit her left lower back/buttock area on the edge of a stair.  She denies head injury or LOC.  Denies any other injury from the fall.  Denies pain radiating down her legs.      Objective:     Past Medical History:    Acute pain of both shoulders    Arthritis    Atherosclerosis of coronary artery    Back pain    Lower lumbar age related degeration    Black stools    occasionally    Bloating    occasionally    Body piercing    ears    Breast cancer (HCC)    dcis    Calculus of kidney    about 30 years ago    Cancer (HCC)    breast    Chest pain of uncertain etiology    Constipation    occasionally    Diarrhea, unspecified    Diverticulitis    Dizziness    Had Vertigo    Ductal carcinoma in situ of breast    DCIS    Esophageal reflux    Essential hypertension    Exposure to medical diagnostic radiation    Fatigue    Flatulence/gas pain/belching    aternoons    Frequent use of laxatives    Hearing impairment    tinnitis    Hearing loss    Tinnitus    Heart attack (HCC)    Hemorrhoids    occasionally    High blood pressure    High cholesterol    History of blood transfusion    many years ago    History of diverticulitis    Hx of diseases NEC    diverticulosis    Hx of diseases NEC    had stress test - had some extra beats    Hx of diseases NEC    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    Hx of diseases NEC    utd with gyn    Hx of motion sickness    Hyperlipidemia    on file    Hypokalemia    Irregular bowel habits    about a year    Leg swelling    ankle     Night sweats    on and off for several months    Osteoarthritis    Other and unspecified personal history of malignant neoplasm    breast cancer    Pain in joints    Pain with bowel movements    straining more than pain    Stented coronary artery    Thyroid nodule    benign    Uncomfortable fullness after meals    about a year    Unspecified menopausal and postmenopausal disorder    treated with effexor - however pt would like to get off med    Visual impairment    contacts/glasses    Wears glasses    contact lenses              Past Surgical History:   Procedure Laterality Date    Angioplasty (coronary)  2018    stent RCA Johann    Appendectomy  2005    at time of diverticulitis    Appendectomy      appendix removed at 53 years old    Bowel resection      after diverticulosis    Cath percutaneous  transluminal coronary angioplasty      Colectomy      Colonoscopy      Colonoscopy,diagnostic      nl colonoscopy    D & c      Fracture surgery Left     hip    Hernia surgery      had a patch put in lower abdomen at 53, after resection    Hip surgery Left     May 2018    Knee replacement surgery Left     3/2022    Lumpectomy right      Sofía biopsy stereo nodule 1 site right (cpt=19081)      Sofía localization wire 1 site right (cpt=19281)  2004    Needle biopsy right            3 live children    Other Left     hip-minerva on place in femur    Part removal colon w end colostomy      at 53 years old    Radiation right      Skin surgery  1965    Benign fibroid tumor    Special service or report      diverticulitis - had L sigmoidectomy    Special service or report      ventral hernia repair    Total knee replacement Right 2023                Social History     Socioeconomic History    Marital status:    Tobacco Use    Smoking status: Never    Smokeless tobacco: Never   Vaping Use    Vaping status: Never Used   Substance and Sexual Activity    Alcohol use: Yes      Alcohol/week: 4.0 standard drinks of alcohol     Types: 4 Glasses of wine per week     Comment: \"couple glasses a week\"    Drug use: Never    Sexual activity: Yes     Partners: Male   Other Topics Concern    Caffeine Concern Yes     Comment: 1 cup tea daily     Exercise Yes     Comment: 2-x weekly     Seat Belt Yes                                Physical Exam    ED Triage Vitals [07/17/25 1558]   /77   Pulse 74   Resp 14   Temp 98.3 °F (36.8 °C)   Temp src Oral   SpO2 97 %   O2 Device None (Room air)       Current Vitals:   Vital Signs  BP: 158/77  Pulse: 74  Resp: 14  Temp: 98.3 °F (36.8 °C)  Temp src: Oral    Oxygen Therapy  SpO2: 97 %  O2 Device: None (Room air)            Physical Exam  Vitals and nursing note reviewed.   Constitutional:       Appearance: She is well-developed.   HENT:      Head: Normocephalic and atraumatic.      Mouth/Throat:      Mouth: Mucous membranes are moist.   Eyes:      General: No scleral icterus.  Musculoskeletal:        Back:    Skin:     General: Skin is warm and dry.   Neurological:      General: No focal deficit present.      Mental Status: She is alert and oriented to person, place, and time.      Cranial Nerves: No cranial nerve deficit.      Motor: No weakness.   Psychiatric:         Mood and Affect: Mood normal.         Behavior: Behavior normal.                 ED Course  Labs Reviewed - No data to display                         XR LUMBAR SPINE (MIN 4 VIEWS) (CPT=72110)  Result Date: 7/17/2025  CONCLUSION: Lumbar vertebral bodies maintain normal height with subtle levoconvex scoliosis centered within the mid lumbar spine. No spondylolisthesis. Advanced multilevel disc, facet, and endplate degenerative change with suspected osseous foraminal encroachment at the L4-5 and L5-S1 levels. No appreciable pars defects. No destructive osseous lesions. Electronically Verified and Signed by Attending Radiologist: Apolinar Solis MD 7/17/2025 5:16 PM Workstation:  [id number]      [facility]     75-year-old female with a past medical history as below presents with pain in her left lower back/buttock area after a fall.      Differential includes but is not limited to lumbar contusion versus fracture, buttock contusion    Independent interpretation of lumbar spine x-rays negative for fracture.  Radiology report reviewed as above.    Instructed to ice area of contusion.  Will give Rx for lidocaine patches.  Instructed to continue Tylenol or ibuprofen for pain.  Return precautions discussed.        Medical Decision Making  Amount and/or Complexity of Data Reviewed  Radiology: ordered and independent interpretation performed. Decision-making details documented in ED Course.    Risk  Prescription drug management.        Disposition and Plan     Clinical Impression:  1. Contusion of left buttock    2. Lumbar contusion, initial encounter         Disposition:  There is no disposition on file for this visit.  There is no disposition time on file for this visit.    Follow-up:  Gatito Rachel MD  [address] [place] [id number]  [phone]    Schedule an appointment as soon as possible for a visit            Medications Prescribed:  Current Discharge Medication List        START taking these medications    Details   lidocaine 5 % External Patch Place 1 patch onto the skin daily.  Qty: 30 patch, Refills: 0                   Supplementary Documentation:

## 2025-07-17 NOTE — ED INITIAL ASSESSMENT (HPI)
Patient here following mechanical fall. States she tripped over item in garage, fell backwards and hit upper part of left buttock on stair. Denies loss of consciousness, denies head or neck injury. Ambulatory.

## 2025-07-18 ENCOUNTER — PATIENT OUTREACH (OUTPATIENT)
Age: 75
End: 2025-07-18

## 2025-07-18 RX ORDER — ACETAMINOPHEN 500 MG
500 TABLET ORAL EVERY 6 HOURS PRN
COMMUNITY

## 2025-07-18 NOTE — PROGRESS NOTES
This writer contacted patient at [phone]  for KRISTINE Navigation follow up call on 7/18/2025 at 8:50 AM. There was no answer and I was unable to reach the patient at this time. I left a nondescript message with my contact information and the reason for my call on voicemail.

## 2025-07-18 NOTE — PROGRESS NOTES
25 1128   KRISTINE Assessment   Assessment Type KRISTINE Initial   Assessment completed with Patient   Patient Subjective The patient did admit to soreness/pain in the lower back (rated 7-8/10 for intensity). The patient did admit pain worsens with walking/moving. The patient reported lower back edema today.   The patient was able to sleep well with the lidocaine patch. The patient denies any headache, chest pain, or shortness of breath.   Chief Complaint Contusion of left buttock   Lumbar contusion   KRISTINE Navigation Initial Assessment   Verify patient name and  with patient/ caregiver Yes   Tell me what you understand of why you were in the hospital or emergency department I took a fall yesterday and hurt my lower back.   Prior to leaving the hospital were your Discharge Instructions reviewed with you? Yes   Did you receive a copy of your written Discharge Instructions? Yes   What questions do you have about your Discharge Instructions? Not at this time.   Do you feel better or worse since you left the hospital or emergency department? Better   Do you have a follow-up appointment? No   Were you able to schedule the appt? Scheduled   Are there any barriers to getting to your follow-up appointment? No   Prior to leaving the hospital was Home Health (HH) arranged for you? No   Prior to leaving the hospital or emergency department was Durable Medical Equipment (DME), medical supplies, or infusions arranged for you? No   Are DME/medical supply/infusions needs identified by staff during this assessment? No   Did any of your medications change, during or after your hospital stay or ED visit? Yes   Do you have your new or updated medications? Yes  (The patient is using over the counter lidocaine patches)   Do you understand what your medications are for and possible side effects? Yes   Are there any reasons that keep you from taking your medication as prescribed? No   Any concerns about medication refills? No   Were you  given a different diet per your Discharge Instructions? No   Do you have any questions or concerns that have not been discussed? No       Patient symptoms were assessed, education was completed for signs/symptoms to monitor, and reviewed when to contact providers versus go to the emergency department/call 911.   Reviewed all discharge instructions with a focus on pain management with icing, tylenol, and the lidocaine patches.   Nurse Navigator did review/stress the importance of fall precautions.   All medications were reviewed and educated on the importance of taking the medications as directed.   Appointments were reviewed and stressed the importance of a close follow up with providers. An emergency department  follow up appointment was scheduled.  Social determinants of health (SDOH) questions were completed.   Advance care planning was discussed with the patient.    The patient verbalized understanding and will contact the office with any further questions or concerns.

## 2025-07-22 ENCOUNTER — OFFICE VISIT (OUTPATIENT)
Age: 75
End: 2025-07-22
Payer: MEDICARE

## 2025-07-22 ENCOUNTER — PATIENT OUTREACH (OUTPATIENT)
Age: 75
End: 2025-07-22

## 2025-07-22 VITALS
DIASTOLIC BLOOD PRESSURE: 82 MMHG | WEIGHT: 142.63 LBS | HEART RATE: 52 BPM | OXYGEN SATURATION: 99 % | BODY MASS INDEX: 26.25 KG/M2 | RESPIRATION RATE: 16 BRPM | HEIGHT: 62 IN | SYSTOLIC BLOOD PRESSURE: 128 MMHG

## 2025-07-22 DIAGNOSIS — S30.0XXA CONTUSION OF SACRUM, INITIAL ENCOUNTER: Primary | ICD-10-CM

## 2025-07-22 DIAGNOSIS — M16.11 PRIMARY OSTEOARTHRITIS OF RIGHT HIP: ICD-10-CM

## 2025-07-22 PROCEDURE — 99213 OFFICE O/P EST LOW 20 MIN: CPT | Performed by: FAMILY MEDICINE

## 2025-07-22 NOTE — PROGRESS NOTES
Emily Velasquez  4/29/1950    Chief Complaint   Patient presents with    ER F/U     Contusion of left buttock  7/17/2025  ROOM 10       HPI:   Emily Velasquez is a 75 year old female who presents for ER follow-up.  She suffered a fall on Thursday in the backyard while her  was grilling.  She landed on a wooden stair landing on her tailbone region.  She had radiographs done in the emergency room that showed no acute fracture.  She was given a prescription for 5% lidocaine which she was not able to fill due to lack of insurance coverage thus opted for the 4% over-the-counter lidocaine which she has been using in addition to using Tylenol.    Current Medications[1]   Allergies[2]   Past Medical History[3]   Problem List[4]   Past Surgical History[5]   Family History[6]   Short Social Hx on File[7]      REVIEW OF SYSTEMS:   GENERAL: feels well otherwise    EXAM:   /82 (BP Location: Left arm)   Pulse 52   Resp 16   Ht 5' 2\" (1.575 m)   Wt 142 lb 9.6 oz (64.7 kg)   LMP  (LMP Unknown)   SpO2 99%   BMI 26.08 kg/m²   GENERAL: Well developed, well nourished,in no apparent distress  MSK: Evaluation of patient's gluteal region reveals diffuse ecchymosis and mild associated tenderness.  No    ASSESSMENT AND PLAN:   Emily Velasquez is a 75 year old female who presents for ER follow-up    Contusion of sacrum, initial encounter  I reviewed her ER course including imaging.  Her symptoms are likely secondary to bone contusion.  Luckily there is no evidence of fracture.  We discussed continued supportive care, analgesic options, and expected course of improvement.    Primary osteoarthritis of right hip  She has had progressive increasing activity pendant right hip pain and is considering proceeding with arthroplasty.  She has an orthopedic surgeon who she will have evaluation with.      All questions were answered and the patient agrees with the plan.     Thank you,  Gatito Rachel MD         [1]    Current Outpatient Medications   Medication Sig Dispense Refill    acetaminophen 500 MG Oral Tab Take 1 tablet (500 mg total) by mouth every 6 (six) hours as needed for Pain.      lidocaine 5 % External Patch Place 1 patch onto the skin daily. (Patient taking differently: Place 1 patch onto the skin daily. 4%) 30 patch 0    psyllium Oral Powd Pack Take 1 packet by mouth at bedtime.      predniSONE 1 MG Oral Tab Take 4 tablets (4 mg total) by mouth daily. 360 tablet 0    aspirin 81 MG Oral Tab EC Take 1 tablet (81 mg total) by mouth in the morning.      TRIAMTERENE-HCTZ 37.5-25 MG Oral Tab TAKE 1 TABLET BY MOUTH EVERY DAY 90 tablet 0    Acidophilus/Pectin Oral Cap Take 1 capsule by mouth in the morning and 1 capsule before bedtime.      Flaxseed, Linseed, (FLAX SEED OIL) 1300 MG Oral Cap 1,000 mg in the morning.      Calcium Carbonate-Vitamin D (CALCIUM-VITAMIN D3 OR) Take 1 tablet by mouth in the morning.      ezetimibe 10 MG Oral Tab Take 1 tablet (10 mg total) by mouth every other day.  4    atorvastatin 20 MG Oral Tab Take 1 tablet (20 mg total) by mouth nightly. 30 tablet 2    atenolol 50 MG Oral Tab Take 1 tablet (50 mg total) by mouth in the morning. 30 tablet 0   [2]   Allergies  Allergen Reactions    Altace [Ramipril] ANAPHYLAXIS     angioedema    Dilaudid [Hydromorphone] NAUSEA AND VOMITING     severe    Ramipril ANAPHYLAXIS     Oral    Cortisone OTHER (SEE COMMENTS)     Headache, blood pressure became raised    No Known Allergies OTHER (SEE COMMENTS)   [3]   Past Medical History:   Acute pain of both shoulders    Arthritis    Atherosclerosis of coronary artery    Back pain    Lower lumbar age related degeration    Black stools    occasionally    Bloating    occasionally    Body piercing    ears    Breast cancer (HCC)    dcis    Calculus of kidney    about 30 years ago    Cancer (HCC)    breast    Chest pain of uncertain etiology    Constipation    occasionally    Diarrhea, unspecified    Diverticulitis     Dizziness    Had Vertigo    Ductal carcinoma in situ of breast    DCIS    Esophageal reflux    Essential hypertension    Exposure to medical diagnostic radiation    Fatigue    Flatulence/gas pain/belching    aternoons    Frequent use of laxatives    Hearing impairment    tinnitis    Hearing loss    Tinnitus    Heart attack (HCC)    Hemorrhoids    occasionally    High blood pressure    High cholesterol    History of blood transfusion    many years ago    History of diverticulitis    Hx of diseases NEC    diverticulosis    Hx of diseases NEC    had stress test - had some extra beats    Hx of diseases NEC    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    Hx of diseases NEC    utd with gyn    Hx of motion sickness    Hyperlipidemia    on file    Hypokalemia    Irregular bowel habits    about a year    Leg swelling    ankle    Night sweats    on and off for several months    Osteoarthritis    Other and unspecified personal history of malignant neoplasm    breast cancer    Pain in joints    Pain with bowel movements    straining more than pain    Stented coronary artery    Thyroid nodule    benign    Uncomfortable fullness after meals    about a year    Unspecified menopausal and postmenopausal disorder    treated with effexor - however pt would like to get off med    Visual impairment    contacts/glasses    Wears glasses    contact lenses   [4]   Patient Active Problem List  Diagnosis    Benign essential HTN    Pure hypercholesterolemia    Bursitis of right shoulder    Multiple thyroid nodules    History of breast cancer    CAD (coronary artery disease)    History of non-ST elevation myocardial infarction (NSTEMI)    Prediabetes    Pain in right shoulder    Presence of drug coated stent in right coronary artery    PMR (polymyalgia rheumatica) ([facility])    Personal history of colonic polyps    Sensory hearing loss, bilateral    S/p total knee replacement, bilateral    Arm DVT (deep venous thromboembolism), acute, right ([facility])    Primary  osteoarthritis involving multiple joints    Osteopenia of multiple sites    Long term (current) use of systemic steroids    DDD (degenerative disc disease), cervical    Cervical radiculitis    Impingement syndrome of right shoulder    Lumbar radiculopathy    Change in bowel habits    Fecal urgency    Benign neoplasm of ascending colon   [5]   Past Surgical History:  Procedure Laterality Date    Angioplasty (coronary)  2018    stent RCA Johann    Appendectomy  2005    at time of diverticulitis    Appendectomy      appendix removed at 53 years old    Bowel resection      after diverticulosis    Cath percutaneous  transluminal coronary angioplasty      Colectomy      Colonoscopy      Colonoscopy,diagnostic      nl colonoscopy    D & c      Fracture surgery Left     hip    Hernia surgery      had a patch put in lower abdomen at 53, after resection    Hip surgery Left     May 2018    Knee replacement surgery Left     3/2022    Lumpectomy right      Sofía biopsy stereo nodule 1 site right (cpt=19081)      Sofía localization wire 1 site right (cpt=19281)  2004    Needle biopsy right            3 live children    Other Left     hip-minerva on place in femur    Part removal colon w end colostomy      at 53 years old    Radiation right  2004    Skin surgery  1965    Benign fibroid tumor    Special service or report      diverticulitis - had L sigmoidectomy    Special service or report      ventral hernia repair    Total knee replacement Right 2023   [6]   Family History  Problem Relation Age of Onset    Breast Cancer Self 53    DCIS Self 53    Hypertension Mother     Stroke Mother     Other (Other) Mother     Breast Cancer Mother 49    Cancer Mother     Heart Disorder Father         mi at 46 and  at 53 of 7th mi    Lipids Father     Heart Disease Father     Other (Other) Father     Heart Attack Father     Other (Other) Sister         spinal problems, prolactin problem, factor v leiden +  (pt negative)    Other (Other) Brother         healthy    Other (Other) Maternal Grandmother          of aneurysm    Breast Cancer Maternal Aunt 52    Breast Cancer Paternal Cousin Female 50   [7]   Social History  Socioeconomic History    Marital status:    Tobacco Use    Smoking status: Never    Smokeless tobacco: Never   Vaping Use    Vaping status: Never Used   Substance and Sexual Activity    Alcohol use: Yes     Alcohol/week: 4.0 standard drinks of alcohol     Types: 4 Glasses of wine per week     Comment: \"couple glasses a week\"    Drug use: Never    Sexual activity: Yes     Partners: Male   Other Topics Concern    Caffeine Concern Yes     Comment: 1 cup tea daily     Exercise Yes     Comment: 2-x weekly     Seat Belt Yes     Social Drivers of Health     Food Insecurity: No Food Insecurity (2025)    NCSS - Food Insecurity     Worried About Running Out of Food in the Last Year: No     Ran Out of Food in the Last Year: No   Transportation Needs: No Transportation Needs (2025)    NCSS - Transportation     Lack of Transportation: No   Housing Stability: Not At Risk (2025)    NCSS - Housing/Utilities     Has Housing: Yes     Worried About Losing Housing: No     Unable to Get Utilities: No

## 2025-07-22 NOTE — PROGRESS NOTES
Nurse Navigator spoke briefly with the patient. The patient was out to dinner and requested a call back later in the week. Nurse Navigator will call back as requested.

## 2025-07-24 ENCOUNTER — TELEPHONE (OUTPATIENT)
Age: 75
End: 2025-07-24

## 2025-07-24 DIAGNOSIS — E87.6 LOW SERUM POTASSIUM LEVEL: Primary | ICD-10-CM

## 2025-07-24 DIAGNOSIS — R53.83 OTHER FATIGUE: ICD-10-CM

## 2025-07-24 DIAGNOSIS — E55.9 VITAMIN D DEFICIENCY: ICD-10-CM

## 2025-07-24 NOTE — TELEPHONE ENCOUNTER
Pt inquiring if should recheck labs for low K?  Any further recommendation on fatigue and dry skin?  Lab pended for your approval if ok.    Spoke with pt f/u post ER f/u with PCP on 7/22/25. Pt still experiencing increased fatigue.    PCP and pt slightly discussed on dry skin- tried moisturizers, certain ones can cause burning.   Pt was constipated- metamucil is helping.   Pt had low K at 3.4. Per pt, follows with Cardio and  tried to discontinue hydrochlorothiazide part of combo with triamterene but insurance would not cover.   Pt inquiring if PCP recommends rechecking labs?  Informed will relay to PCP update and further recommendations. Pt verbalized understanding and agreed with POC.

## 2025-07-24 NOTE — TELEPHONE ENCOUNTER
Repeat BMP ordered and vit D to assess for fatigue. Recommend Aveeno, CeraVe or Cetaphil for moisturizers.

## 2025-07-25 ENCOUNTER — LAB ENCOUNTER (OUTPATIENT)
Dept: LAB | Age: 75
End: 2025-07-25
Attending: FAMILY MEDICINE
Payer: MEDICARE

## 2025-07-25 ENCOUNTER — RESULTS FOLLOW-UP (OUTPATIENT)
Dept: RHEUMATOLOGY | Facility: CLINIC | Age: 75
End: 2025-07-25

## 2025-07-25 DIAGNOSIS — M35.3 PMR (POLYMYALGIA RHEUMATICA) (HCC): ICD-10-CM

## 2025-07-25 DIAGNOSIS — I15.8 OTHER SECONDARY HYPERTENSION: ICD-10-CM

## 2025-07-25 DIAGNOSIS — E87.6 LOW SERUM POTASSIUM LEVEL: ICD-10-CM

## 2025-07-25 DIAGNOSIS — R53.83 OTHER FATIGUE: ICD-10-CM

## 2025-07-25 DIAGNOSIS — E55.9 VITAMIN D DEFICIENCY: ICD-10-CM

## 2025-07-25 DIAGNOSIS — Z79.52 LONG TERM (CURRENT) USE OF SYSTEMIC STEROIDS: ICD-10-CM

## 2025-07-25 LAB
ANION GAP SERPL CALC-SCNC: 12 MMOL/L (ref 0–18)
BUN BLD-MCNC: 11 MG/DL (ref 9–23)
CALCIUM BLD-MCNC: 9.9 MG/DL (ref 8.7–10.6)
CHLORIDE SERPL-SCNC: 98 MMOL/L (ref 98–112)
CO2 SERPL-SCNC: 31 MMOL/L (ref 21–32)
CREAT BLD-MCNC: 0.95 MG/DL (ref 0.55–1.02)
CRP SERPL-MCNC: <0.5 MG/DL (ref ?–0.5)
EGFRCR SERPLBLD CKD-EPI 2021: 62 ML/MIN/1.73M2 (ref 60–?)
ERYTHROCYTE [SEDIMENTATION RATE] IN BLOOD: 14 MM/HR (ref 0–30)
FASTING STATUS PATIENT QL REPORTED: YES
GLUCOSE BLD-MCNC: 111 MG/DL (ref 70–99)
OSMOLALITY SERPL CALC.SUM OF ELEC: 292 MOSM/KG (ref 275–295)
POTASSIUM SERPL-SCNC: 3.5 MMOL/L (ref 3.5–5.1)
SODIUM SERPL-SCNC: 141 MMOL/L (ref 136–145)
VIT D+METAB SERPL-MCNC: 59.3 NG/ML (ref 30–100)

## 2025-07-25 PROCEDURE — 86140 C-REACTIVE PROTEIN: CPT

## 2025-07-25 PROCEDURE — 80048 BASIC METABOLIC PNL TOTAL CA: CPT

## 2025-07-25 PROCEDURE — 85652 RBC SED RATE AUTOMATED: CPT

## 2025-07-25 PROCEDURE — 36415 COLL VENOUS BLD VENIPUNCTURE: CPT

## 2025-07-25 PROCEDURE — 82306 VITAMIN D 25 HYDROXY: CPT

## 2025-07-25 NOTE — PROGRESS NOTES
07/25/25 1324   KRISTINE Assessment   Assessment Type KRISTINE Follow up   Assessment completed with Patient   Patient Subjective The patient reported continued body ache. The patient did admit weather (storms) have not helped with joint pain. The patient did take an Aleve today for the pain. The patient reported tylenol has not helped with the pain in the past. The patient has continued with prednisone as prescribed. The patient reported back/buttock pain has continued to improve, and the bruise has decreased in size. The patient denies any redness  around the contusion, increased pain, increased size of the contusion or fever.   Chief Complaint Contusion of left buttock   Lumbar contusion   KRISTINE Navigation Follow-Up Call   Is the patient progressing as planned? Yes   Care Plan Update The patient was seen by Dr. Rachel on 7/22/2025. The patient had an evaluation by Dr. Gabriel for future surgery to treat right hip osteoarthritis. The patient reported plans for right hip injections for pain in the future.   New Care Plan The patient will follow fall precautions. The patient will manage pain with icing, tylenol/ibuprofen, and lidocaine patches.   Frequency/Follow Up Plan The patient agreed to a follow up call next week.   Navigator Notes Patient symptoms were assessed, education was completed for signs/symptoms to monitor, and reviewed when to contact providers versus go to the emergency department/call 911. Nurse Navigator did review pain management included recommended medications and icing (for 20 minutes with a barrier for the skin). Nurse Navigator  did review/stress the importance of fall precautions.

## 2025-07-28 RX ORDER — AMLODIPINE BESYLATE 5 MG/1
5 TABLET ORAL DAILY
Qty: 90 TABLET | Refills: 0 | Status: SHIPPED | OUTPATIENT
Start: 2025-07-28

## 2025-07-28 NOTE — TELEPHONE ENCOUNTER
Amlodipine 5 mg      Last office visit: 5/21/2025    Next Rheum Apt:12/19/2025 Luna Juarez DO    Last fill: 5/22/2024 90 tab, 0 refills

## 2025-07-29 ENCOUNTER — PATIENT OUTREACH (OUTPATIENT)
Age: 75
End: 2025-07-29

## 2025-07-30 ENCOUNTER — HOSPITAL ENCOUNTER (OUTPATIENT)
Dept: GENERAL RADIOLOGY | Facility: HOSPITAL | Age: 75
Discharge: HOME | End: 2025-07-30
Attending: ORTHOPAEDIC SURGERY

## 2025-07-30 DIAGNOSIS — M16.11 PRIMARY OSTEOARTHRITIS OF RIGHT HIP: ICD-10-CM

## 2025-07-30 PROCEDURE — 77002 NEEDLE LOCALIZATION BY XRAY: CPT | Performed by: ORTHOPAEDIC SURGERY

## 2025-07-30 PROCEDURE — 20610 DRAIN/INJ JOINT/BURSA W/O US: CPT | Performed by: ORTHOPAEDIC SURGERY

## 2025-07-30 RX ORDER — METHYLPREDNISOLONE ACETATE 80 MG/ML
INJECTION, SUSPENSION INTRA-ARTICULAR; INTRALESIONAL; INTRAMUSCULAR; SOFT TISSUE
Status: COMPLETED
Start: 2025-07-30 | End: 2025-07-30

## 2025-07-30 RX ORDER — IOPAMIDOL 612 MG/ML
15 INJECTION, SOLUTION INTRATHECAL
Status: COMPLETED | OUTPATIENT
Start: 2025-07-30 | End: 2025-07-30

## 2025-08-07 ENCOUNTER — PATIENT OUTREACH (OUTPATIENT)
Age: 75
End: 2025-08-07

## 2025-08-15 ENCOUNTER — PATIENT OUTREACH (OUTPATIENT)
Age: 75
End: 2025-08-15

## 2025-08-23 DIAGNOSIS — M35.3 PMR (POLYMYALGIA RHEUMATICA) (HCC): ICD-10-CM

## 2025-08-25 ENCOUNTER — LAB ENCOUNTER (OUTPATIENT)
Dept: LAB | Age: 75
End: 2025-08-25
Attending: INTERNAL MEDICINE

## 2025-08-25 DIAGNOSIS — Z79.52 LONG TERM (CURRENT) USE OF SYSTEMIC STEROIDS: ICD-10-CM

## 2025-08-25 DIAGNOSIS — M35.3 PMR (POLYMYALGIA RHEUMATICA) (HCC): ICD-10-CM

## 2025-08-25 LAB
CRP SERPL-MCNC: <0.5 MG/DL (ref ?–0.5)
ERYTHROCYTE [SEDIMENTATION RATE] IN BLOOD: 16 MM/HR (ref 0–30)

## 2025-08-25 PROCEDURE — 86140 C-REACTIVE PROTEIN: CPT

## 2025-08-25 PROCEDURE — 85652 RBC SED RATE AUTOMATED: CPT

## 2025-08-25 PROCEDURE — 36415 COLL VENOUS BLD VENIPUNCTURE: CPT

## 2025-08-25 RX ORDER — PREDNISONE 1 MG/1
4 TABLET ORAL DAILY
Qty: 360 TABLET | Refills: 0 | OUTPATIENT
Start: 2025-08-25

## (undated) DIAGNOSIS — R53.82 CHRONIC FATIGUE: Primary | ICD-10-CM

## (undated) DEVICE — NEEDLE SPINAL 18X3-1/2 PINK.

## (undated) DEVICE — Device: Brand: STABLECUT®

## (undated) DEVICE — SIGMA LCS HIGH PERFORMANCE INSTRUMENTS STERILE THREADED PINS: Brand: SIGMA LCS HIGH PERFORMANCE

## (undated) DEVICE — SLEEVE KENDALL SCD EXPRESS MED

## (undated) DEVICE — LIGHT HANDLE

## (undated) DEVICE — BOWL CEMENT MIX QUICK-VAC

## (undated) DEVICE — BIPOLAR SEALER 23-112-1 AQM 6.0: Brand: AQUAMANTYS™

## (undated) DEVICE — 450 ML BOTTLE OF 0.05% CHLORHEXIDINE GLUCONATE IN 99.95% STERILE WATER FOR IRRIGATION, USP AND APPLICATOR.: Brand: IRRISEPT ANTIMICROBIAL WOUND LAVAGE

## (undated) DEVICE — STERILE POLYISOPRENE POWDER-FREE SURGICAL GLOVES WITH EMOLLIENT COATING: Brand: PROTEXIS

## (undated) DEVICE — STERILE POLYISOPRENE POWDER-FREE SURGICAL GLOVES: Brand: PROTEXIS

## (undated) DEVICE — GOWN SURG AERO CHROME XXL

## (undated) DEVICE — DRAPE,U/SHT,SPLIT,FILM,60X84,STERILE: Brand: MEDLINE

## (undated) DEVICE — 2T11 #2 PDO 36 X 36: Brand: 2T11 #2 PDO 36 X 36

## (undated) DEVICE — DISPOSABLE TOURNIQUET CUFF SINGLE BLADDER, DUAL PORT AND QUICK CONNECT CONNECTOR: Brand: COLOR CUFF

## (undated) DEVICE — CONVERTORS STOCKINETTE: Brand: CONVERTORS

## (undated) DEVICE — CHLORAPREP 26ML APPLICATOR

## (undated) DEVICE — HOOD, PEEL-AWAY: Brand: FLYTE

## (undated) DEVICE — SYRINGE 30ML LL TIP

## (undated) DEVICE — SUTURE VICRYL 1 OS-6

## (undated) DEVICE — UNIVERSAL STERIBUMP® STERILE (5/CASE): Brand: UNIVERSAL STERIBUMP®

## (undated) DEVICE — TOTAL KNEE CDS: Brand: MEDLINE INDUSTRIES, INC.

## (undated) DEVICE — SOLUTION  .9 3000ML

## (undated) DEVICE — SUTURE VICRYL 2-0 CP-1

## (undated) DEVICE — STOCK SURG XL 48X12IN

## (undated) DEVICE — SOL  .9 3000ML

## (undated) DEVICE — SCD SLEEVE KNEE HI BLEND

## (undated) DEVICE — SUT VICRYL 1 OS-6 J535H

## (undated) DEVICE — HOOD: Brand: FLYTE

## (undated) DEVICE — SIGMA LCS HIGH PERFORMANCE STERILE THREADED HEADED PINS: Brand: SIGMA LCS HIGH PERFORMANCE

## (undated) DEVICE — WRAP COOLING KNEE W/ICE PILLOW

## (undated) DEVICE — STERILE HOOK LOCK LATEX FREE ELASTIC BANDAGE 4INX5YD: Brand: HOOK LOCK™

## (undated) DEVICE — SUT VICRYL 2-0 CP-1 J266H

## (undated) DEVICE — GOWN AERO CHROME XXL

## (undated) DEVICE — DRESSING AQUACEL AG 3.5X12

## (undated) DEVICE — SOL NACL IRRIG 0.9% 1000ML BTL

## (undated) DEVICE — E-Z BUTTON SWITCH PENCIL

## (undated) DEVICE — SOL  .9 1000ML BTL

## (undated) NOTE — LETTER
January 25, 2022      [address] [facility] [address]        Dear Ana M Montalvo:    It was a pleasure speaking with you over the phone recently.  To follow up, I wanted to send you my contact information to utilize when you have

## (undated) NOTE — IP AVS SNAPSHOT
[address]            (For Outpatient Use Only) Initial Admit Date: 3/30/2022   Inpt/Obs Admit Date: Inpt: N/A / Obs: N/A   Discharge Date:    Hospital Acct:  [de-identified]   MRN: [de-identified]   CSN: [id number]   CEID: [id number]        ENCOUNTER  Patient Class: OPIB Admitting Provider: Dixie Dumont MD Unit: [facility] 3SW-A   Hospital Service: Ortho/Spine Attending Provider: Dixie Dumont MD   Bed: 353-A   Visit Type:   Referring Physician: No ref. provider found Billing Flag:    Admit Diagnosis: Primary osteoarthritis of left knee [M17.12]      PATIENT  Legal Name:   Tasha Smith   Legal Sex: Female  Gender ID: Female             Pref Name:   Sammie Michel PCP:  Mitesh Chawla MD Home: [phone]   Address:  [address] : 1950 (71 yrs) Mobile: [phone]         City/[facility]/Zip: [address] [place] [id number] Marital:  Language: [address] [place] [address] Drive: Will SSN4: [id number] Judaism: Wishes Privacy     Race: White Ethnicity: Non  Or  O*   EMERGENCY CONTACT   Name Relationship Legal Guardian? Home Phone Work Phone Mobile Phone   1. Dominguez Salmeron  2.  Glo Gutierrez      [phone]     GUARANTOR  Guarantor: Katharine KENDALL : 1950 Home Phone: [phone]   Address: [address] [place]  Sex: Female Work Phone:    City/State/Zip: [address] [place] [address]   Rel. to Patient: Self Guarantor ID: [id number]   GUARANTOR EMPLOYER   Employer:  Status: RETIRED     COVERAGE  PRIMARY INSURANCE   Payor: MEDICARE Plan: MEDICARE PART A&B   Group Number:  Insurance Type: INDEMNITY   Subscriber Name: Kalina Madrigal : 1950   Subscriber ID: [id number] Pt Rel to Subscriber: Self   SECONDARY INSURANCE   Payor: [address] [place] [address],[address]: [facility]*   Group Number: [id number] Insurance Type: [address] Name: Paco Ann : 10/7/1954   Subscriber ID: [id number] Pt Rel to Subscriber: SPOUSE   TERTIARY INSURANCE   Payor:  Plan:    Group Number:  Insurance Type:    Subscriber Name:  Subscriber :    Subscriber ID:  Pt Rel to Subscriber:    Hospital Account Financial Class: Medicare    2022

## (undated) NOTE — LETTER
09/04/20        Galina Carlson  [address] [place] [address]      Dear Jarod Ty,    Our records indicate that you have outstanding lab work and or testing that was ordered for you and has not yet been completed:  Orders Placed This Encoun

## (undated) NOTE — LETTER
09/09/21        Tommy Garrison  [address] [facility] [address]      Dear Diana Hanley,    Our records indicate that you have outstanding lab work and or testing that was ordered for you and has not yet been completed:  No orders of the defined

## (undated) NOTE — Clinical Note
Vick Frank! Thank you for referring Mrs. Libby Masterson for rheumatologic evaluation. Please see the discussion portion of my note and let me know if you have any questions.  She is too nervous to start methotrexate at this time, we will try to decr

## (undated) NOTE — LETTER
[facility] [address], [address]  Consent for Procedure/Sedation    Date: 12/30/2018    Time: 1600      1.  I authorize the performance upon Emily Caba the following:cardiac catheterization, left ventricular cineangiography, period, the physician will determine when the applicable recovery period ends for purposes of reinstating the Do Not Resuscitate (DNR) order.     Signature of Patient: ____________________________________________________    Signature of person authorized

## (undated) NOTE — LETTER
6/3/2021        Majnula 3, 2021      Referring:  No referring provider defined for this encounter. Dear Dr. Ray Ozuna:     Please see my attached note for my findings and recommendations.  Should you have any questions or concerns, please do not hesitate (TAB-A-JIMBO) Oral Tab, Take 1 tablet by mouth daily. , Disp: , Rfl:   ezetimibe 10 MG Oral Tab, Take 10 mg by mouth every other day.  , Disp: , Rfl: 4  atorvastatin 20 MG Oral Tab, Take 1 tablet (20 mg total) by mouth nightly., Disp: 30 tablet, Rfl: 2  aten Problem Relation Age of Onset   • Heart Disorder Father         mi at 55 and  at 48 of 9th mi   • Lipids Father    • Heart Disease Father    • Other (Other) Father    • Hypertension Mother    • Cancer Mother 52        breast ca   • Stroke Mother    • O palpation overlying greater trochanteric bursa.   ?  Labs:  Lab Results   Component Value Date    WBC 8.7 04/12/2021    RBC 4.73 04/12/2021    HGB 13.3 04/12/2021    HCT 41.1 04/12/2021    .0 04/12/2021    MCV 86.9 04/12/2021    MCH 28.1 04/12/2021 history of polymyalgia rheumatica diagnosed after bilateral shoulder and hip girdle stiffness and weakness.   Elevated inflammatory markers were noted in the past.    She has been tapered off of steroids completely for the last 2 months without any recrudes

## (undated) NOTE — Clinical Note
Hi, Dr. Rachel and Dr. Almanza! Unfortunately, Emily has had recurrence of her PMR. Please see my note for further details. Let me know if you have any questions. Thanks,   Luna Juarez, DO [facility] 5/22/2024

## (undated) NOTE — ED AVS SNAPSHOT
Mrs. Corley Findluis daniel   MRN: [id number]    Department:  THE [facility] Emergency Department in [place]   Date of Visit:  4/19/2019           Disclosure     Insurance plans vary and the physician(s) referred by the ER may not be covered by your plan.  Please c tell this physician (or your personal doctor if your instructions are to return to your personal doctor) about any new or lasting problems. The primary care or specialist physician will see patients referred from the [facility] Emergency Department.  Antonia Johnson

## (undated) NOTE — LETTER
04/06/22      Dear Sherrie Memory:  I wanted to reach out to you personally as I feel you would benefit from our Chronic Care Management program here at [facility]. [address] Denny Melgoza has recently reached out to you on my behalf to introduce the program and all its benefits. To help you take control of your health and provide you with optimal quality care, I am recommending this program to you. If you choose to enroll in this Chronic Care Management program you will be working closely with your very own Health  Care Manager. They will help provide you with the extra support and education needed to keep you healthy, as well as keeping me informed about you and your health in between office visits. I ask that you take the time to review the information the Health  sent you, and consider all of the outstanding benefits available to you. Your health is important to me! The Health , [facility], will be calling you again soon to discuss your final decision and any questions you may have. You may also call her at [phone].      Thank you for considering,    Dr. Sherri East

## (undated) NOTE — LETTER
[facility] [address], [address]  Consent for Procedure/Sedation    Date: SEPT. 11 , 2019   Time:       1.  I authorize the performance upon Emily Romero the following:cardiac catheterization, left ventricular cineangiography, period, the physician will determine when the applicable recovery period ends for purposes of reinstating the Do Not Resuscitate (DNR) order.     Signature of Patient: ____________________________________________________    Signature of person authorized

## (undated) NOTE — LETTER
07/05/18        Emily KENDALL [address]      Dear Christine Costa,    [facility] records indicate that you have outstanding FASTING lab work and or testing that was ordered for you and has not yet been completed:          Comp Met

## (undated) NOTE — ED AVS SNAPSHOT
[facility] in [address]    [address]    Phone:  [phone]    Fax:  [phone]           Mrs. Tripp Gusman   MRN: [id number]    Department:  THE [facility] [address]   Date of Visit:  1/22/201 determine coverage for follow-up care and referrals. [facility]  [address]  [phone] [address]  [address]  [phone] [facility]  [address] If the Immediate Care Provider has read X-rays, these will be re-interpreted by a radiologist.  If there is a significant change in your reading, you will be contacted. Please make sure we have your correct phone number before you leave.  After you leave, y and ask to get set up for an insurance coverage that is in-network with Gisela Myrick.         MyChart     Visit [employer]  You can access your [employer] to more actively manage your health care and view more details from this visit by going to https:/

## (undated) NOTE — Clinical Note
NCM spoke with pt for TCM today. NCM sent a TE to PCP clinical pool to f/up with pt to schedule a TCM HFU as no openings within recommended 7 day timeframe. Pt high risk for readmission. Thank you.

## (undated) NOTE — LETTER
[address] [facility] [address]  [phone]          May 5, 2021    [address] [facility] [address]    Dear Agatha Sanchez:  It was a pleasure speaking with you over the phone re [address] [facility]. [address] [facility] [address]  [address] [place] [address]  Office [phone]  [email or web address]. org    Your own personal care manager is just a phone call away     Our Chronic Ca

## (undated) NOTE — LETTER
07/01/20        [address] [address]      Dear Agatha Sanchez,    Our records indicate that you have outstanding lab work and or testing that was ordered for you and has not yet been completed:  Orders Placed This Encoun

## (undated) NOTE — ED AVS SNAPSHOT
Mrs. Linda Roland   MRN: [id number]    Department:  [facility] Emergency Department   Date of Visit:  1/4/2019           Disclosure     Insurance plans vary and the physician(s) referred by the ER may not be covered by your plan.  Please contact tell this physician (or your personal doctor if your instructions are to return to your personal doctor) about any new or lasting problems. The primary care or specialist physician will see patients referred from the [facility] Emergency Department.  Kamran Gill

## (undated) NOTE — LETTER
JUSTINE Notifier: [employer]   FESTUS Patient Name: Marah Glover Identification Number: [id number]      Advance Beneficiary Notice of Noncoverage (ABN)  NOTE:  If Medicare doesn’t pay for D.  Pap, breast and or pelvic exam  below, you may have to p ? OPTION 2. I want the D. listed above, but do not bill Medicare. You may ask to be paid now as I am responsible for payment. I cannot appeal if Medicare is not billed. ? OPTION 3. I don’t want the D. listed above.  I understand with this choice  I am no

## (undated) NOTE — MR AVS SNAPSHOT
[facility] [place] [id number] [phone]               Thank you for choosing us for your health care visit with Eliseo Thomas MD.  We are glad to serve you and happy to provide you with this summa call [facility] Sched[facility] at [phone]. Saturday June 17, 2017     Imaging:  XR DEXA BONE DENSITY AXIAL (CPT=77080)    Instructions: To schedule an appointment for your radiology test please call [facility] Sched[facility] at [phone]. Encounter for hepatitis C screening test for low risk patient          Instructions and Information about Your Health     None      Allergies as of Jun 17, 2017     Altace [Ramipril] Anaphylaxis    angioedema                Today's Vital Signs     BP Puls discharge instructions in [employer] by going to Visits < Admission Summaries. If you've been to the Emergency Department or your doctor's office, you can view your past visit information in [employer] by going to Visits < Visit Summaries. [employer] questions?